# Patient Record
Sex: MALE | Race: WHITE | Employment: FULL TIME | URBAN - METROPOLITAN AREA
[De-identification: names, ages, dates, MRNs, and addresses within clinical notes are randomized per-mention and may not be internally consistent; named-entity substitution may affect disease eponyms.]

---

## 2017-04-13 ENCOUNTER — OFFICE VISIT (OUTPATIENT)
Dept: SURGERY | Age: 47
End: 2017-04-13

## 2017-04-13 VITALS
SYSTOLIC BLOOD PRESSURE: 129 MMHG | HEART RATE: 75 BPM | DIASTOLIC BLOOD PRESSURE: 76 MMHG | RESPIRATION RATE: 14 BRPM | TEMPERATURE: 98.4 F | BODY MASS INDEX: 36.54 KG/M2 | HEIGHT: 71 IN | OXYGEN SATURATION: 97 % | WEIGHT: 261 LBS

## 2017-04-13 DIAGNOSIS — R10.84 GENERALIZED ABDOMINAL PAIN: Primary | ICD-10-CM

## 2017-04-13 NOTE — PROGRESS NOTES
1. Have you been to the ER, urgent care clinic since your last visit? Hospitalized since your last visit?no    2. Have you seen or consulted any other health care providers outside of the [address] [place] [address] since your last visit? Include any pap smears or colon screening.  no

## 2017-04-13 NOTE — PROGRESS NOTES
Subjective:     Jay Gayle is a 55 y.o.  male who is being seen for possible incisional hernia recurrence. A few weeks ago he noticed a bulge in his abdomen the size of 'an alien head.'   It was painful. Pain was associated with constipation. He  nausea and vomiting. Bulge now comes and goes. It also moves around his abdomen. It was initially in the RLQ but has been in the left upper and lower quadrants as well.     Past Medical History:   Diagnosis Date    Gastrointestinal disorder     GERD    GERD (gastroesophageal reflux disease)     Panic attack     Psychiatric disorder     anxiety    Seizures ([facility] [address]     5 yo   was told it was a stress seizure, never had another one    Sleep apnea     uses CPAP    Unspecified adverse effect of anesthesia     woke up during endoscopy procedure      Past Surgical History:   Procedure Laterality Date    ABDOMEN SURGERY [address] [place] [address]    hernia(abdomen)- open    HX GI  2008    esophagus repair narrow- balloon dilation and scar tissue removal    HX HERNIA REPAIR      inguinal hernia    HX OTHER SURGICAL  11years of age    tonsils    HX OTHER SURGICAL  2009    hernia (hiatal and abdomen)-open    HX OTHER SURGICAL  23years of age    wisdom teeth extraction under anesthesia    HX OTHER SURGICAL  8 years     distended testicle     HX OTHER SURGICAL  1/16/15    Laparoscopic ventral hernia repair with mesh     Family History   Problem Relation Age of Onset    Diabetes Mother     Hypertension Mother     Stroke Mother      copd    Cancer Father      skin    Heart Disease Father     Diabetes Father     Heart Attack Father       Social History   Substance Use Topics    Smoking status: Never Smoker    Smokeless tobacco: Never Used    Alcohol use 1.0 oz/week     2 Cans of beer per week      Comment: 1 or 2 beer every two weeks       Current Outpatient Prescriptions   Medication Sig Dispense Refill    docusate sodium (COLACE) 100 mg capsule Take 100 mg by mouth two (2) times a day.  polyethylene glycol (MIRALAX) 17 gram/dose powder Take 17 g by mouth daily.  SERTRALINE HCL (ZOLOFT PO) Take 150 mg by mouth nightly.  LANSOPRAZOLE (PREVACID PO) Take 40 mg by mouth daily.  HYDROmorphone (DILAUDID) 2 mg tablet Take 1 tablet by mouth every six (6) hours as needed for Pain. 30 tablet 0    ondansetron (ZOFRAN ODT) 4 mg disintegrating tablet Take 1 tablet by mouth every eight (8) hours as needed for Nausea. 30 tablet 0    LORazepam (ATIVAN) 1 mg tablet Take 1 tablet by mouth every six (6) hours as needed for Anxiety. 30 tablet 0        Allergies   Allergen Reactions    Pcn [Penicillins] Hives    Codeine Nausea and Vomiting        Review of Systems:  A comprehensive review of systems was negative except for that written in the History of Present Illness. Objective:       Physical Exam:   Visit Vitals    /76 (BP 1 Location: Left arm, BP Patient Position: Sitting)    Pulse 75    Temp 98.4 °F (36.9 °C) (Oral)    Resp 14    Ht 5' 11\" (1.803 m)    Wt 261 lb (118.4 kg)    SpO2 97%    BMI 36.4 kg/m2     General appearance: alert, cooperative, no distress, appears stated age  Head: Normocephalic, without obvious abnormality, atraumatic  Eyes: conjunctivae/corneas clear. , EOM's intact. Abdomen: soft, non-tender. Bowel sounds normal. No masses,  no organomegaly, some diastasis in the epigastric region but no appreciable recurrent hernia  . Assessment:     Patient with an episode of abdominal pain and bulging concerning for a hernia recurrence.   I don't appreciate a hernia on exam.     Will get a CT to assess  Plan:     CT abdomen to assess for a recurrence     Signed By: Jc Zuniga MD     April 13, 2017

## 2017-04-20 ENCOUNTER — HOSPITAL ENCOUNTER (OUTPATIENT)
Dept: CT IMAGING | Age: 47
Discharge: HOME | End: 2017-04-20
Attending: SURGERY
Payer: COMMERCIAL

## 2017-04-20 DIAGNOSIS — R10.84 GENERALIZED ABDOMINAL PAIN: ICD-10-CM

## 2017-04-20 PROCEDURE — 74011636320 HC RX REV CODE- 636/320: Performed by: RADIOLOGY

## 2017-04-20 PROCEDURE — 74177 CT ABD & PELVIS W/CONTRAST: CPT

## 2017-04-20 RX ADMIN — IOPAMIDOL 100 ML: 755 INJECTION, SOLUTION INTRAVENOUS at 19:31

## 2019-12-30 ENCOUNTER — APPOINTMENT (OUTPATIENT)
Dept: CT IMAGING | Age: 49
End: 2019-12-30
Attending: PHYSICIAN ASSISTANT
Payer: COMMERCIAL

## 2019-12-30 ENCOUNTER — HOSPITAL ENCOUNTER (EMERGENCY)
Age: 49
Discharge: HOME | End: 2019-12-30
Attending: EMERGENCY MEDICINE
Payer: COMMERCIAL

## 2019-12-30 VITALS
WEIGHT: 250 LBS | SYSTOLIC BLOOD PRESSURE: 143 MMHG | DIASTOLIC BLOOD PRESSURE: 93 MMHG | RESPIRATION RATE: 18 BRPM | HEIGHT: 71 IN | OXYGEN SATURATION: 93 % | HEART RATE: 87 BPM | TEMPERATURE: 98.2 F | BODY MASS INDEX: 35 KG/M2

## 2019-12-30 DIAGNOSIS — K57.92 DIVERTICULITIS: Primary | ICD-10-CM

## 2019-12-30 LAB
ALBUMIN SERPL-MCNC: 4 G/DL (ref 3.5–5)
ALBUMIN/GLOB SERPL: 1 {RATIO} (ref 1.1–2.2)
ALP SERPL-CCNC: 85 U/L (ref 45–117)
ALT SERPL-CCNC: 47 U/L (ref 12–78)
ANION GAP SERPL CALC-SCNC: 5 MMOL/L (ref 5–15)
APPEARANCE UR: CLEAR
AST SERPL-CCNC: 18 U/L (ref 15–37)
BACTERIA URNS QL MICRO: NEGATIVE /HPF
BASOPHILS # BLD: 0.1 K/UL (ref 0–0.1)
BASOPHILS NFR BLD: 1 % (ref 0–1)
BILIRUB SERPL-MCNC: 0.7 MG/DL (ref 0.2–1)
BILIRUB UR QL: NEGATIVE
BUN SERPL-MCNC: 18 MG/DL (ref 6–20)
BUN/CREAT SERPL: 13 (ref 12–20)
CALCIUM SERPL-MCNC: 9.4 MG/DL (ref 8.5–10.1)
CHLORIDE SERPL-SCNC: 107 MMOL/L (ref 97–108)
CO2 SERPL-SCNC: 28 MMOL/L (ref 21–32)
COLOR UR: NORMAL
CREAT SERPL-MCNC: 1.38 MG/DL (ref 0.7–1.3)
DIFFERENTIAL METHOD BLD: NORMAL
EOSINOPHIL # BLD: 0.1 K/UL (ref 0–0.4)
EOSINOPHIL NFR BLD: 1 % (ref 0–7)
EPITH CASTS URNS QL MICRO: NORMAL /LPF
ERYTHROCYTE [DISTWIDTH] IN BLOOD BY AUTOMATED COUNT: 13.1 % (ref 11.5–14.5)
GLOBULIN SER CALC-MCNC: 4 G/DL (ref 2–4)
GLUCOSE SERPL-MCNC: 111 MG/DL (ref 65–100)
GLUCOSE UR STRIP.AUTO-MCNC: NEGATIVE MG/DL
HCT VFR BLD AUTO: 46.5 % (ref 36.6–50.3)
HGB BLD-MCNC: 16 G/DL (ref 12.1–17)
HGB UR QL STRIP: NEGATIVE
HYALINE CASTS URNS QL MICRO: NORMAL /LPF (ref 0–5)
IMM GRANULOCYTES # BLD AUTO: 0 K/UL (ref 0–0.04)
IMM GRANULOCYTES NFR BLD AUTO: 0 % (ref 0–0.5)
KETONES UR QL STRIP.AUTO: NEGATIVE MG/DL
LEUKOCYTE ESTERASE UR QL STRIP.AUTO: NEGATIVE
LYMPHOCYTES # BLD: 1.9 K/UL (ref 0.8–3.5)
LYMPHOCYTES NFR BLD: 19 % (ref 12–49)
MCH RBC QN AUTO: 30 PG (ref 26–34)
MCHC RBC AUTO-ENTMCNC: 34.4 G/DL (ref 30–36.5)
MCV RBC AUTO: 87.2 FL (ref 80–99)
MONOCYTES # BLD: 0.9 K/UL (ref 0–1)
MONOCYTES NFR BLD: 9 % (ref 5–13)
NEUTS SEG # BLD: 7.1 K/UL (ref 1.8–8)
NEUTS SEG NFR BLD: 70 % (ref 32–75)
NITRITE UR QL STRIP.AUTO: NEGATIVE
NRBC # BLD: 0 K/UL (ref 0–0.01)
NRBC BLD-RTO: 0 PER 100 WBC
PH UR STRIP: 5 [PH] (ref 5–8)
PLATELET # BLD AUTO: 215 K/UL (ref 150–400)
PMV BLD AUTO: 8.9 FL (ref 8.9–12.9)
POTASSIUM SERPL-SCNC: 3.7 MMOL/L (ref 3.5–5.1)
PROT SERPL-MCNC: 8 G/DL (ref 6.4–8.2)
PROT UR STRIP-MCNC: NEGATIVE MG/DL
RBC # BLD AUTO: 5.33 M/UL (ref 4.1–5.7)
RBC #/AREA URNS HPF: NORMAL /HPF (ref 0–5)
SODIUM SERPL-SCNC: 140 MMOL/L (ref 136–145)
SP GR UR REFRACTOMETRY: 1.01 (ref 1–1.03)
UROBILINOGEN UR QL STRIP.AUTO: 0.2 EU/DL (ref 0.2–1)
WBC # BLD AUTO: 10.2 K/UL (ref 4.1–11.1)
WBC URNS QL MICRO: NORMAL /HPF (ref 0–4)

## 2019-12-30 PROCEDURE — 99284 EMERGENCY DEPT VISIT MOD MDM: CPT

## 2019-12-30 PROCEDURE — 80053 COMPREHEN METABOLIC PANEL: CPT

## 2019-12-30 PROCEDURE — 96375 TX/PRO/DX INJ NEW DRUG ADDON: CPT

## 2019-12-30 PROCEDURE — 74177 CT ABD & PELVIS W/CONTRAST: CPT

## 2019-12-30 PROCEDURE — 85025 COMPLETE CBC W/AUTO DIFF WBC: CPT

## 2019-12-30 PROCEDURE — 81001 URINALYSIS AUTO W/SCOPE: CPT

## 2019-12-30 PROCEDURE — 96374 THER/PROPH/DIAG INJ IV PUSH: CPT

## 2019-12-30 PROCEDURE — 36415 COLL VENOUS BLD VENIPUNCTURE: CPT

## 2019-12-30 PROCEDURE — 74011636320 HC RX REV CODE- 636/320: Performed by: RADIOLOGY

## 2019-12-30 PROCEDURE — 74011250637 HC RX REV CODE- 250/637: Performed by: PHYSICIAN ASSISTANT

## 2019-12-30 PROCEDURE — 96361 HYDRATE IV INFUSION ADD-ON: CPT

## 2019-12-30 PROCEDURE — 74011250636 HC RX REV CODE- 250/636: Performed by: PHYSICIAN ASSISTANT

## 2019-12-30 RX ORDER — KETOROLAC TROMETHAMINE 30 MG/ML
30 INJECTION, SOLUTION INTRAMUSCULAR; INTRAVENOUS
Status: COMPLETED | OUTPATIENT
Start: 2019-12-30 | End: 2019-12-30

## 2019-12-30 RX ORDER — METRONIDAZOLE 250 MG/1
500 TABLET ORAL
Status: COMPLETED | OUTPATIENT
Start: 2019-12-30 | End: 2019-12-30

## 2019-12-30 RX ORDER — METRONIDAZOLE 500 MG/1
500 TABLET ORAL 2 TIMES DAILY
Qty: 14 TAB | Refills: 0 | Status: SHIPPED | OUTPATIENT
Start: 2019-12-30 | End: 2020-01-06

## 2019-12-30 RX ORDER — CIPROFLOXACIN 500 MG/1
750 TABLET ORAL
Status: DISCONTINUED | OUTPATIENT
Start: 2019-12-30 | End: 2019-12-30 | Stop reason: CLARIF

## 2019-12-30 RX ORDER — TRAMADOL HYDROCHLORIDE 50 MG/1
50 TABLET ORAL
Qty: 10 TAB | Refills: 0 | Status: SHIPPED | OUTPATIENT
Start: 2019-12-30 | End: 2020-01-02

## 2019-12-30 RX ORDER — MORPHINE SULFATE 4 MG/ML
4 INJECTION INTRAVENOUS
Status: COMPLETED | OUTPATIENT
Start: 2019-12-30 | End: 2019-12-30

## 2019-12-30 RX ORDER — CIPROFLOXACIN 500 MG/1
500 TABLET ORAL 2 TIMES DAILY
Qty: 14 TAB | Refills: 0 | Status: SHIPPED | OUTPATIENT
Start: 2019-12-30 | End: 2020-01-06

## 2019-12-30 RX ORDER — LEVOFLOXACIN 750 MG/1
750 TABLET ORAL
Status: COMPLETED | OUTPATIENT
Start: 2019-12-30 | End: 2019-12-30

## 2019-12-30 RX ADMIN — LEVOFLOXACIN 750 MG: 750 TABLET, FILM COATED ORAL at 20:28

## 2019-12-30 RX ADMIN — MORPHINE SULFATE 4 MG: 4 INJECTION INTRAVENOUS at 20:29

## 2019-12-30 RX ADMIN — SODIUM CHLORIDE 1000 ML: 900 INJECTION, SOLUTION INTRAVENOUS at 19:49

## 2019-12-30 RX ADMIN — METRONIDAZOLE 500 MG: 250 TABLET ORAL at 20:28

## 2019-12-30 RX ADMIN — KETOROLAC TROMETHAMINE 30 MG: 30 INJECTION, SOLUTION INTRAMUSCULAR at 19:49

## 2019-12-30 RX ADMIN — IOPAMIDOL 100 ML: 755 INJECTION, SOLUTION INTRAVENOUS at 17:37

## 2019-12-30 NOTE — ED TRIAGE NOTES
Patient arrives with c/o LLQ abdominal pain that now radiates to mid lower abdomen, onset yesterday with worsening.

## 2019-12-30 NOTE — ED PROVIDER NOTES
52 y.o male with past medical history significant for GERD, sleep apnea, anxiety, seizures, and hernia who presents via POV with c/c of abdominal pain. Pt c/o LLQ abdominal pain that now radiates to the middle of his abdomen jonnathan started last night and worsened today. He denies nausea, vomiting, diarrhea, and hematuria. He endorses normal urination, and normal LBM today. Social hx: denies tobacco use, endorses EtOH use (2 drinks per week), former marijuana user. Note written by John Nino, as dictated by Antoine Blackburn PA-C   4:40 PM      The history is provided by the patient. No  was used.         Past Medical History:   Diagnosis Date    Gastrointestinal disorder     GERD    GERD (gastroesophageal reflux disease)     Panic attack     Psychiatric disorder     anxiety    Seizures ([facility] [address]     5 yo   was told it was a stress seizure, never had another one    Sleep apnea     uses CPAP    Unspecified adverse effect of anesthesia     woke up during endoscopy procedure       Past Surgical History:   Procedure Laterality Date    ABDOM[address] SURGERY [address] [facility] [address]    hernia(abdomen)- open    HX GI  2008    esophagus repair narrow- balloon dilation and scar tissue removal    HX HERNIA REPAIR      inguinal hernia    HX OTHER SURGICAL  11years of age    tonsils    HX OTHER SURGICAL  2009    hernia (hiatal and abdomen)-open    HX OTHER SURGICAL  23years of age    wisdom teeth extraction under anesthesia    HX OTHER SURGICAL  8 years     distended testicle     HX OTHER SURGICAL  1/16/15    Laparoscopic ventral hernia repair with mesh         Family History:   Problem Relation Age of Onset    Diabetes Mother     Hypertension Mother     Stroke Mother         copd    Cancer Father         skin    Heart Disease Father     Diabetes Father     Heart Attack Father        Social History     Socioeconomic History    Marital status:      Spouse name: Not on file    Number of children: Not on file    Years of education: Not on file    Highest education level: Not on file   Occupational History    Not on file   Social Needs    Financial resource strain: Not on file    Food insecurity:     Worry: Not on file     Inability: Not on file    Transportation needs:     Medical: Not on file     Non-medical: Not on file   Tobacco Use    Smoking status: Never Smoker    Smokeless tobacco: Never Used   Substance and Sexual Activity    Alcohol use: Yes     Alcohol/week: 1.7 standard drinks     Types: 2 Cans of beer per week     Comment: 1 or 2 beer every two weeks    Drug use: Yes     Types: Marijuana     Comment: younger years/ in his twenities    Sexual activity: Yes     Partners: Female     Birth control/protection: None   Lifestyle    Physical activity:     Days per week: Not on file     Minutes per session: Not on file    Stress: Not on file   Relationships    Social connections:     Talks on phone: Not on file     Gets together: Not on file     Attends Bahai service: Not on file     Active member of club or organization: Not on file     Attends meetings of clubs or organizations: Not on file     Relationship status: Not on file    Intimate partner violence:     Fear of current or ex partner: Not on file     Emotionally abused: Not on file     Physically abused: Not on file     Forced sexual activity: Not on file   Other Topics Concern    Not on file   Social History Narrative    Not on file         ALLERGIES: Pcn [penicillins] and Codeine    Review of Systems   Constitutional: Negative for activity change, appetite change, chills and fever. HENT: Negative for congestion and sore throat. Respiratory: Negative for cough and shortness of breath. Cardiovascular: Negative for chest pain. Gastrointestinal: Positive for abdominal pain. Negative for blood in stool, diarrhea, nausea and vomiting.    Genitourinary: Negative for difficulty urinating, dysuria and hematuria. Musculoskeletal: Negative for arthralgias and myalgias. Skin: Negative for color change. Neurological: Negative for dizziness. Psychiatric/Behavioral: The patient is not nervous/anxious. All other systems reviewed and are negative. Vitals:    12/30/19 1636   BP: 147/85   Pulse: 87   Resp: 18   Temp: 98.2 °F (36.8 °C)   SpO2: 96%   Weight: 113.4 kg (250 lb)   Height: 5' 11\" (1.803 m)            Physical Exam  Vitals signs and nursing note reviewed. Constitutional:       Appearance: He is well-developed. HENT:      Head: Normocephalic and atraumatic. Right Ear: External ear normal.      Left Ear: External ear normal.      Mouth/Throat:      Pharynx: No oropharyngeal exudate. Eyes:      General: No scleral icterus. Right eye: No discharge. Left eye: No discharge. Conjunctiva/sclera: Conjunctivae normal.      Pupils: Pupils are equal, round, and reactive to light. Neck:      Musculoskeletal: Normal range of motion and neck supple. Thyroid: No thyromegaly. Trachea: No tracheal deviation. Cardiovascular:      Rate and Rhythm: Normal rate and regular rhythm. Heart sounds: Normal heart sounds. No murmur. Pulmonary:      Effort: Pulmonary effort is normal. No respiratory distress. Breath sounds: Normal breath sounds. No wheezing or rales. Abdominal:      General: Bowel sounds are normal. There is no distension. Palpations: Abdomen is soft. Tenderness: There is tenderness in the left lower quadrant. There is no guarding or rebound. Musculoskeletal: Normal range of motion. General: No tenderness. Lymphadenopathy:      Cervical: No cervical adenopathy. Skin:     General: Skin is warm. Findings: No erythema or rash. Neurological:      Mental Status: He is alert and oriented to person, place, and time. Cranial Nerves: No cranial nerve deficit.       Coordination: Coordination normal.   Psychiatric: Behavior: Behavior normal.         Thought Content: Thought content normal.         Judgment: Judgment normal.          MDM  Number of Diagnoses or Management Options  Diverticulitis:   Diagnosis management comments: Assesment/Plan- 52 y.o. Patient presents with:  Abdominal Pain  differential includes: diverticulitis, colitis, obstruction, constipation. Labs and imaging reviewed with ct showing diverticulitis. Patient is well appearing, afebrile and tolerating PO. Patient discharged with antibiotics. Recommend GI, PCP follow up. Patient educated on reasons to return to the ED.            Procedures

## 2019-12-31 NOTE — DISCHARGE INSTRUCTIONS
Patient Education        Diverticulitis: Care Instructions  Your Care Instructions    Diverticulitis occurs when pouches form in the wall of the colon and become inflamed or infected. It can be very painful. Doctors aren't sure what causes diverticulitis. There is no proof that foods such as nuts, seeds, or berries cause it or make it worse. A low-fiber diet may cause the colon to work harder to push stool forward. Pouches may form because of this extra work. It may be hard to think about healthy eating while you're in pain. But as you recover, you might think about how you can use healthy eating for overall better health. Healthy eating may help you avoid future attacks. Follow-up care is a key part of your treatment and safety. Be sure to make and go to all appointments, and call your doctor if you are having problems. It's also a good idea to know your test results and keep a list of the medicines you take. How can you care for yourself at home? · Drink plenty of fluids, enough so that your urine is light yellow or clear like water. If you have kidney, heart, or liver disease and have to limit fluids, talk with your doctor before you increase the amount of fluids you drink. · Stick to liquids or a bland diet (plain rice, bananas, dry toast or crackers, applesauce) until you are feeling better. Then you can return to regular foods and gradually increase the amount of fiber in your diet. · Use a heating pad set on low on your belly to relieve mild cramps and pain. · Get extra rest until you are feeling better. · Be safe with medicines. Read and follow all instructions on the label. ? If the doctor gave you a prescription medicine for pain, take it as prescribed. ? If you are not taking a prescription pain medicine, ask your doctor if you can take an over-the-counter medicine. · If your doctor prescribed antibiotics, take them as directed. Do not stop taking them just because you feel better.  You need to take the full course of antibiotics. To prevent future attacks of diverticulitis  · Avoid constipation:  ? Include fruits, vegetables, beans, and whole grains in your diet each day. These foods are high in fiber. ? Drink plenty of fluids, enough so that your urine is light yellow or clear like water. If you have kidney, heart, or liver disease and have to limit fluids, talk with your doctor before you increase the amount of fluids you drink. ? Get some exercise every day. Build up slowly to 30 to 60 minutes a day on 5 or more days of the week. ? Take a fiber supplement, such as Citrucel or Metamucil, every day if needed. Read and follow all instructions on the label. ? Schedule time each day for a bowel movement. Having a daily routine may help. Take your time and do not strain when having a bowel movement. When should you call for help? Call your doctor now or seek immediate medical care if:    · You have a fever.     · You are vomiting.     · You have new or worse belly pain.     · You cannot pass stools or gas.    Watch closely for changes in your health, and be sure to contact your doctor if you have any problems. Where can you learn more? Go to http://aman-nicolasa.info/. Enter H901 in the search box to learn more about \"Diverticulitis: Care Instructions. \"  Current as of: November 7, 2018  Content Version: 12.2  © 5680-3421 [employer]. Care instructions adapted under license by [employer] (which disclaims liability or warranty for this information). If you have questions about a medical condition or this instruction, always ask your healthcare professional. [place] [address] any warranty or liability for your use of this information.

## 2020-01-22 ENCOUNTER — APPOINTMENT (OUTPATIENT)
Dept: GENERAL RADIOLOGY | Age: 50
End: 2020-01-22
Attending: EMERGENCY MEDICINE
Payer: COMMERCIAL

## 2020-01-22 ENCOUNTER — HOSPITAL ENCOUNTER (OUTPATIENT)
Age: 50
Setting detail: OBSERVATION
Discharge: HOME | End: 2020-01-24
Attending: EMERGENCY MEDICINE | Admitting: INTERNAL MEDICINE
Payer: COMMERCIAL

## 2020-01-22 ENCOUNTER — APPOINTMENT (OUTPATIENT)
Dept: CT IMAGING | Age: 50
End: 2020-01-22
Attending: EMERGENCY MEDICINE
Payer: COMMERCIAL

## 2020-01-22 DIAGNOSIS — S09.90XA INJURY OF HEAD, INITIAL ENCOUNTER: Primary | ICD-10-CM

## 2020-01-22 DIAGNOSIS — R55 SYNCOPE AND COLLAPSE: ICD-10-CM

## 2020-01-22 DIAGNOSIS — R55 VASOVAGAL SYNCOPE: ICD-10-CM

## 2020-01-22 DIAGNOSIS — F41.0 PANIC ANXIETY SYNDROME: ICD-10-CM

## 2020-01-22 LAB
COMMENT, HOLDF: NORMAL
COMMENT, HOLDF: NORMAL
SAMPLES BEING HELD,HOLD: NORMAL
SAMPLES BEING HELD,HOLD: NORMAL

## 2020-01-22 PROCEDURE — 96374 THER/PROPH/DIAG INJ IV PUSH: CPT

## 2020-01-22 PROCEDURE — 85025 COMPLETE CBC W/AUTO DIFF WBC: CPT

## 2020-01-22 PROCEDURE — 82550 ASSAY OF CK (CPK): CPT

## 2020-01-22 PROCEDURE — 85730 THROMBOPLASTIN TIME PARTIAL: CPT

## 2020-01-22 PROCEDURE — 94762 N-INVAS EAR/PLS OXIMTRY CONT: CPT

## 2020-01-22 PROCEDURE — 36415 COLL VENOUS BLD VENIPUNCTURE: CPT

## 2020-01-22 PROCEDURE — 99285 EMERGENCY DEPT VISIT HI MDM: CPT

## 2020-01-22 PROCEDURE — 71045 X-RAY EXAM CHEST 1 VIEW: CPT

## 2020-01-22 PROCEDURE — 70450 CT HEAD/BRAIN W/O DYE: CPT

## 2020-01-22 PROCEDURE — 85610 PROTHROMBIN TIME: CPT

## 2020-01-22 PROCEDURE — 85379 FIBRIN DEGRADATION QUANT: CPT

## 2020-01-22 PROCEDURE — 93005 ELECTROCARDIOGRAM TRACING: CPT

## 2020-01-22 PROCEDURE — 84484 ASSAY OF TROPONIN QUANT: CPT

## 2020-01-22 PROCEDURE — 80053 COMPREHEN METABOLIC PANEL: CPT

## 2020-01-23 ENCOUNTER — APPOINTMENT (OUTPATIENT)
Dept: MRI IMAGING | Age: 50
End: 2020-01-23
Attending: INTERNAL MEDICINE
Payer: COMMERCIAL

## 2020-01-23 ENCOUNTER — APPOINTMENT (OUTPATIENT)
Dept: CT IMAGING | Age: 50
End: 2020-01-23
Attending: INTERNAL MEDICINE
Payer: COMMERCIAL

## 2020-01-23 ENCOUNTER — APPOINTMENT (OUTPATIENT)
Dept: VASCULAR SURGERY | Age: 50
End: 2020-01-23
Attending: INTERNAL MEDICINE
Payer: COMMERCIAL

## 2020-01-23 ENCOUNTER — APPOINTMENT (OUTPATIENT)
Dept: NON INVASIVE DIAGNOSTICS | Age: 50
End: 2020-01-23
Attending: INTERNAL MEDICINE
Payer: COMMERCIAL

## 2020-01-23 PROBLEM — F41.0 PANIC ATTACK: Status: ACTIVE | Noted: 2020-01-23

## 2020-01-23 PROBLEM — E66.01 MORBID OBESITY (HCC): Status: ACTIVE | Noted: 2020-01-23

## 2020-01-23 PROBLEM — I10 HTN (HYPERTENSION): Status: ACTIVE | Noted: 2020-01-23

## 2020-01-23 PROBLEM — R55 SYNCOPE: Status: ACTIVE | Noted: 2020-01-23

## 2020-01-23 PROBLEM — R56.9 SEIZURES (HCC): Status: ACTIVE | Noted: 2020-01-23

## 2020-01-23 PROBLEM — F99 PSYCHIATRIC DISORDER: Status: ACTIVE | Noted: 2020-01-23

## 2020-01-23 PROBLEM — G47.30 SLEEP APNEA: Status: ACTIVE | Noted: 2020-01-23

## 2020-01-23 LAB
ALBUMIN SERPL-MCNC: 3.9 G/DL (ref 3.5–5)
ALBUMIN/GLOB SERPL: 1.1 {RATIO} (ref 1.1–2.2)
ALP SERPL-CCNC: 75 U/L (ref 45–117)
ALT SERPL-CCNC: 43 U/L (ref 12–78)
ANION GAP SERPL CALC-SCNC: 6 MMOL/L (ref 5–15)
APPEARANCE UR: CLEAR
APTT PPP: 25.2 SEC (ref 22.1–32)
AST SERPL-CCNC: 29 U/L (ref 15–37)
ATRIAL RATE: 79 BPM
BACTERIA URNS QL MICRO: NEGATIVE /HPF
BASOPHILS # BLD: 0.1 K/UL (ref 0–0.1)
BASOPHILS NFR BLD: 1 % (ref 0–1)
BILIRUB SERPL-MCNC: 0.5 MG/DL (ref 0.2–1)
BILIRUB UR QL: NEGATIVE
BUN SERPL-MCNC: 17 MG/DL (ref 6–20)
BUN/CREAT SERPL: 13 (ref 12–20)
CALCIUM SERPL-MCNC: 9 MG/DL (ref 8.5–10.1)
CALCULATED P AXIS, ECG09: 32 DEGREES
CALCULATED R AXIS, ECG10: 96 DEGREES
CALCULATED T AXIS, ECG11: 20 DEGREES
CHLORIDE SERPL-SCNC: 107 MMOL/L (ref 97–108)
CK MB CFR SERPL CALC: 0.8 % (ref 0–2.5)
CK MB SERPL-MCNC: 2.1 NG/ML (ref 5–25)
CK SERPL-CCNC: 263 U/L (ref 39–308)
CK SERPL-CCNC: 286 U/L (ref 39–308)
CO2 SERPL-SCNC: 26 MMOL/L (ref 21–32)
COLOR UR: NORMAL
CREAT SERPL-MCNC: 1.35 MG/DL (ref 0.7–1.3)
D DIMER PPP FEU-MCNC: 0.49 MG/L FEU (ref 0–0.65)
DIAGNOSIS, 93000: NORMAL
DIFFERENTIAL METHOD BLD: NORMAL
ECHO AO ASC DIAM: 3.12 CM
ECHO AO ROOT DIAM: 3.43 CM
ECHO LA AREA 4C: 17 CM2
ECHO LA MAJOR AXIS: 4.2 CM
ECHO LA TO AORTIC ROOT RATIO: 1.22
ECHO LA VOL 2C: 33.19 ML (ref 18–58)
ECHO LA VOL 4C: 41.51 ML (ref 18–58)
ECHO LA VOL BP: 38 ML (ref 18–58)
ECHO LA VOL/BSA BIPLANE: 16.21 ML/M2 (ref 16–28)
ECHO LA VOLUME INDEX A2C: 14.16 ML/M2 (ref 16–28)
ECHO LA VOLUME INDEX A4C: 17.7 ML/M2 (ref 16–28)
ECHO LV E' LATERAL VELOCITY: 9.91 CENTIMETER/SECOND
ECHO LV E' SEPTAL VELOCITY: 8.89 CENTIMETER/SECOND
ECHO LV EDV TEICHHOLZ: 0.72 ML
ECHO LV ESV TEICHHOLZ: 0.35 ML
ECHO LV INTERNAL DIMENSION DIASTOLIC: 5.2 CM (ref 4.2–5.9)
ECHO LV INTERNAL DIMENSION SYSTOLIC: 3.84 CM
ECHO LV IVSD: 0.96 CM (ref 0.6–1)
ECHO LV MASS 2D: 226.5 G (ref 88–224)
ECHO LV MASS INDEX 2D: 96.6 G/M2 (ref 49–115)
ECHO LV POSTERIOR WALL DIASTOLIC: 1.03 CM (ref 0.6–1)
ECHO LVOT DIAM: 2.26 CM
ECHO MV A VELOCITY: 65.61 CM/S
ECHO MV AREA PHT: 3.7 CM2
ECHO MV E DECELERATION TIME (DT): 206.4 MS
ECHO MV E VELOCITY: 76.62 CM/S
ECHO MV E/A RATIO: 1.17
ECHO MV PRESSURE HALF TIME (PHT): 59.9 MS
ECHO PV MAX VELOCITY: 115.02 CM/S
ECHO PV PEAK GRADIENT: 5.3 MMHG
ECHO RV INTERNAL DIMENSION: 4.35 CM
ECHO RV TAPSE: 2.57 CM (ref 1.5–2)
ECHO TV REGURGITANT MAX VELOCITY: 221.62 CM/S
ECHO TV REGURGITANT PEAK GRADIENT: 19.6 MMHG
EOSINOPHIL # BLD: 0.2 K/UL (ref 0–0.4)
EOSINOPHIL NFR BLD: 2 % (ref 0–7)
EPITH CASTS URNS QL MICRO: NORMAL /LPF
ERYTHROCYTE [DISTWIDTH] IN BLOOD BY AUTOMATED COUNT: 12.9 % (ref 11.5–14.5)
GLOBULIN SER CALC-MCNC: 3.4 G/DL (ref 2–4)
GLUCOSE SERPL-MCNC: 133 MG/DL (ref 65–100)
GLUCOSE UR STRIP.AUTO-MCNC: NEGATIVE MG/DL
HCT VFR BLD AUTO: 41.9 % (ref 36.6–50.3)
HGB BLD-MCNC: 14.4 G/DL (ref 12.1–17)
HGB UR QL STRIP: NEGATIVE
HYALINE CASTS URNS QL MICRO: NORMAL /LPF (ref 0–5)
IMM GRANULOCYTES # BLD AUTO: 0 K/UL (ref 0–0.04)
IMM GRANULOCYTES NFR BLD AUTO: 0 % (ref 0–0.5)
INR PPP: 1 (ref 0.9–1.1)
KETONES UR QL STRIP.AUTO: NEGATIVE MG/DL
LEUKOCYTE ESTERASE UR QL STRIP.AUTO: NEGATIVE
LVFS 2D: 26.26 %
LVSV (TEICH): 27.3 ML
LYMPHOCYTES # BLD: 2.4 K/UL (ref 0.8–3.5)
LYMPHOCYTES NFR BLD: 30 % (ref 12–49)
MCH RBC QN AUTO: 29.5 PG (ref 26–34)
MCHC RBC AUTO-ENTMCNC: 34.4 G/DL (ref 30–36.5)
MCV RBC AUTO: 85.9 FL (ref 80–99)
MONOCYTES # BLD: 0.8 K/UL (ref 0–1)
MONOCYTES NFR BLD: 10 % (ref 5–13)
MV DEC SLOPE: 3.71
NEUTS SEG # BLD: 4.6 K/UL (ref 1.8–8)
NEUTS SEG NFR BLD: 57 % (ref 32–75)
NITRITE UR QL STRIP.AUTO: NEGATIVE
NRBC # BLD: 0 K/UL (ref 0–0.01)
NRBC BLD-RTO: 0 PER 100 WBC
P-R INTERVAL, ECG05: 156 MS
PH UR STRIP: 7 [PH] (ref 5–8)
PLATELET # BLD AUTO: 212 K/UL (ref 150–400)
PMV BLD AUTO: 9.2 FL (ref 8.9–12.9)
POTASSIUM SERPL-SCNC: 3.6 MMOL/L (ref 3.5–5.1)
PROT SERPL-MCNC: 7.3 G/DL (ref 6.4–8.2)
PROT UR STRIP-MCNC: NEGATIVE MG/DL
PROTHROMBIN TIME: 9.8 SEC (ref 9–11.1)
PV END DIASTOLIC VELOCITY: 0.6 MMHG
Q-T INTERVAL, ECG07: 390 MS
QRS DURATION, ECG06: 102 MS
QTC CALCULATION (BEZET), ECG08: 447 MS
RBC # BLD AUTO: 4.88 M/UL (ref 4.1–5.7)
RBC #/AREA URNS HPF: NORMAL /HPF (ref 0–5)
SODIUM SERPL-SCNC: 139 MMOL/L (ref 136–145)
SP GR UR REFRACTOMETRY: 1.01 (ref 1–1.03)
THERAPEUTIC RANGE,PTTT: NORMAL SECS (ref 58–77)
TROPONIN I SERPL-MCNC: <0.05 NG/ML
TROPONIN I SERPL-MCNC: <0.05 NG/ML
UA: UC IF INDICATED,UAUC: NORMAL
UROBILINOGEN UR QL STRIP.AUTO: 1 EU/DL (ref 0.2–1)
VENTRICULAR RATE, ECG03: 79 BPM
WBC # BLD AUTO: 8 K/UL (ref 4.1–11.1)
WBC URNS QL MICRO: NORMAL /HPF (ref 0–4)

## 2020-01-23 PROCEDURE — 74011250637 HC RX REV CODE- 250/637: Performed by: INTERNAL MEDICINE

## 2020-01-23 PROCEDURE — 96372 THER/PROPH/DIAG INJ SC/IM: CPT

## 2020-01-23 PROCEDURE — 97535 SELF CARE MNGMENT TRAINING: CPT

## 2020-01-23 PROCEDURE — 97116 GAIT TRAINING THERAPY: CPT

## 2020-01-23 PROCEDURE — 74011250636 HC RX REV CODE- 250/636: Performed by: EMERGENCY MEDICINE

## 2020-01-23 PROCEDURE — 77030021668 HC NEB PREFIL KT VYRM -A

## 2020-01-23 PROCEDURE — 71275 CT ANGIOGRAPHY CHEST: CPT

## 2020-01-23 PROCEDURE — 70544 MR ANGIOGRAPHY HEAD W/O DYE: CPT

## 2020-01-23 PROCEDURE — 95816 EEG AWAKE AND DROWSY: CPT | Performed by: NURSE PRACTITIONER

## 2020-01-23 PROCEDURE — 99218 HC RM OBSERVATION: CPT

## 2020-01-23 PROCEDURE — 74011250636 HC RX REV CODE- 250/636: Performed by: INTERNAL MEDICINE

## 2020-01-23 PROCEDURE — 93880 EXTRACRANIAL BILAT STUDY: CPT

## 2020-01-23 PROCEDURE — 70551 MRI BRAIN STEM W/O DYE: CPT

## 2020-01-23 PROCEDURE — 77030018846 HC SOL IRR STRL H20 ICUM -A

## 2020-01-23 PROCEDURE — 82550 ASSAY OF CK (CPK): CPT

## 2020-01-23 PROCEDURE — 36415 COLL VENOUS BLD VENIPUNCTURE: CPT

## 2020-01-23 PROCEDURE — 97165 OT EVAL LOW COMPLEX 30 MIN: CPT

## 2020-01-23 PROCEDURE — 96375 TX/PRO/DX INJ NEW DRUG ADDON: CPT

## 2020-01-23 PROCEDURE — 94660 CPAP INITIATION&MGMT: CPT

## 2020-01-23 PROCEDURE — 81001 URINALYSIS AUTO W/SCOPE: CPT

## 2020-01-23 PROCEDURE — 84484 ASSAY OF TROPONIN QUANT: CPT

## 2020-01-23 PROCEDURE — 96374 THER/PROPH/DIAG INJ IV PUSH: CPT

## 2020-01-23 PROCEDURE — 74011250637 HC RX REV CODE- 250/637: Performed by: EMERGENCY MEDICINE

## 2020-01-23 PROCEDURE — 97161 PT EVAL LOW COMPLEX 20 MIN: CPT

## 2020-01-23 PROCEDURE — 74011636320 HC RX REV CODE- 636/320: Performed by: RADIOLOGY

## 2020-01-23 RX ORDER — SERTRALINE HYDROCHLORIDE 50 MG/1
100 TABLET, FILM COATED ORAL DAILY
Status: DISCONTINUED | OUTPATIENT
Start: 2020-01-23 | End: 2020-01-24 | Stop reason: HOSPADM

## 2020-01-23 RX ORDER — NALOXONE HYDROCHLORIDE 0.4 MG/ML
0.4 INJECTION, SOLUTION INTRAMUSCULAR; INTRAVENOUS; SUBCUTANEOUS AS NEEDED
Status: DISCONTINUED | OUTPATIENT
Start: 2020-01-23 | End: 2020-01-24 | Stop reason: HOSPADM

## 2020-01-23 RX ORDER — GUAIFENESIN 100 MG/5ML
81 LIQUID (ML) ORAL DAILY
Status: DISCONTINUED | OUTPATIENT
Start: 2020-01-23 | End: 2020-01-24 | Stop reason: HOSPADM

## 2020-01-23 RX ORDER — OMEPRAZOLE 40 MG/1
40 CAPSULE, DELAYED RELEASE ORAL EVERY MORNING
COMMUNITY

## 2020-01-23 RX ORDER — PANTOPRAZOLE SODIUM 40 MG/1
40 TABLET, DELAYED RELEASE ORAL
Status: DISCONTINUED | OUTPATIENT
Start: 2020-01-23 | End: 2020-01-24 | Stop reason: HOSPADM

## 2020-01-23 RX ORDER — SODIUM CHLORIDE 0.9 % (FLUSH) 0.9 %
5-40 SYRINGE (ML) INJECTION EVERY 8 HOURS
Status: DISCONTINUED | OUTPATIENT
Start: 2020-01-23 | End: 2020-01-24 | Stop reason: HOSPADM

## 2020-01-23 RX ORDER — KETOROLAC TROMETHAMINE 30 MG/ML
30 INJECTION, SOLUTION INTRAMUSCULAR; INTRAVENOUS
Status: COMPLETED | OUTPATIENT
Start: 2020-01-23 | End: 2020-01-23

## 2020-01-23 RX ORDER — SIMVASTATIN 20 MG/1
40 TABLET, FILM COATED ORAL DAILY
Status: DISCONTINUED | OUTPATIENT
Start: 2020-01-23 | End: 2020-01-24 | Stop reason: HOSPADM

## 2020-01-23 RX ORDER — GUAIFENESIN 100 MG/5ML
162 LIQUID (ML) ORAL
Status: DISCONTINUED | OUTPATIENT
Start: 2020-01-23 | End: 2020-01-23

## 2020-01-23 RX ORDER — SIMVASTATIN 40 MG/1
40 TABLET, FILM COATED ORAL EVERY EVENING
COMMUNITY

## 2020-01-23 RX ORDER — BUPROPION HYDROCHLORIDE 150 MG/1
150 TABLET ORAL
COMMUNITY
End: 2020-11-04

## 2020-01-23 RX ORDER — LISINOPRIL 40 MG/1
40 TABLET ORAL EVERY EVENING
COMMUNITY

## 2020-01-23 RX ORDER — ACETAMINOPHEN 500 MG
1000 TABLET ORAL
Status: COMPLETED | OUTPATIENT
Start: 2020-01-23 | End: 2020-01-23

## 2020-01-23 RX ORDER — LISINOPRIL 20 MG/1
20 TABLET ORAL DAILY
Status: DISCONTINUED | OUTPATIENT
Start: 2020-01-23 | End: 2020-01-24 | Stop reason: HOSPADM

## 2020-01-23 RX ORDER — SODIUM CHLORIDE 0.9 % (FLUSH) 0.9 %
5-40 SYRINGE (ML) INJECTION AS NEEDED
Status: DISCONTINUED | OUTPATIENT
Start: 2020-01-23 | End: 2020-01-24 | Stop reason: HOSPADM

## 2020-01-23 RX ORDER — SERTRALINE HCL 100 MG
100 TABLET ORAL DAILY
COMMUNITY
End: 2020-11-04

## 2020-01-23 RX ORDER — GUAIFENESIN 100 MG/5ML
81 LIQUID (ML) ORAL DAILY
Status: DISCONTINUED | OUTPATIENT
Start: 2020-01-23 | End: 2020-01-23 | Stop reason: SDUPTHER

## 2020-01-23 RX ORDER — ENOXAPARIN SODIUM 100 MG/ML
40 INJECTION SUBCUTANEOUS EVERY 24 HOURS
Status: DISCONTINUED | OUTPATIENT
Start: 2020-01-23 | End: 2020-01-24 | Stop reason: HOSPADM

## 2020-01-23 RX ORDER — HYDROMORPHONE HYDROCHLORIDE 1 MG/ML
0.5 INJECTION, SOLUTION INTRAMUSCULAR; INTRAVENOUS; SUBCUTANEOUS
Status: ACTIVE | OUTPATIENT
Start: 2020-01-23 | End: 2020-01-23

## 2020-01-23 RX ORDER — ACETAMINOPHEN 325 MG/1
650 TABLET ORAL
Status: DISCONTINUED | OUTPATIENT
Start: 2020-01-23 | End: 2020-01-24 | Stop reason: HOSPADM

## 2020-01-23 RX ADMIN — IOPAMIDOL 100 ML: 755 INJECTION, SOLUTION INTRAVENOUS at 13:01

## 2020-01-23 RX ADMIN — ACETAMINOPHEN 1000 MG: 500 TABLET ORAL at 01:29

## 2020-01-23 RX ADMIN — SERTRALINE 100 MG: 50 TABLET, FILM COATED ORAL at 08:46

## 2020-01-23 RX ADMIN — ASPIRIN 81 MG 81 MG: 81 TABLET ORAL at 08:45

## 2020-01-23 RX ADMIN — PERFLUTREN 2 ML: 6.52 INJECTION, SUSPENSION INTRAVENOUS at 13:41

## 2020-01-23 RX ADMIN — LISINOPRIL 20 MG: 20 TABLET ORAL at 08:45

## 2020-01-23 RX ADMIN — ENOXAPARIN SODIUM 40 MG: 40 INJECTION SUBCUTANEOUS at 08:45

## 2020-01-23 RX ADMIN — Medication 10 ML: at 06:25

## 2020-01-23 RX ADMIN — KETOROLAC TROMETHAMINE 30 MG: 30 INJECTION, SOLUTION INTRAMUSCULAR; INTRAVENOUS at 01:30

## 2020-01-23 RX ADMIN — PANTOPRAZOLE SODIUM 40 MG: 40 TABLET, DELAYED RELEASE ORAL at 08:46

## 2020-01-23 RX ADMIN — Medication 10 ML: at 21:20

## 2020-01-23 RX ADMIN — SIMVASTATIN 40 MG: 20 TABLET, FILM COATED ORAL at 08:46

## 2020-01-23 NOTE — CONSULTS
Name: Cheryl Faulkner: [address]   : 1970 Admit Date: 2020   Phone: [phone]  Room: [place] [address]   PCP: David Brewer MD  MRN: [id number]   Date: 2020  Code: Full Code          Chart and notes reviewed. Data reviewed. I review the patient's current medications in the medical record at each encounter. I have evaluated and examined the patient. HPI:    2:22 PM       History was obtained from patient. I was asked by Jack Kaur MD to see Lennox [place] in consultation for a chief complaint of acute respiratory failure with hypoxia. History of Present Illness:  Mr. Elena Mercado is a 51 yo gentleman with a history of REBECCA (compliant with CPAP), GERD, and anxiety who presented after a syncopal episode. Yesterday he had an argument with his son and following this had a HA and hyperventilation with associated R hand numbness, facial numbness and blurred vision. He believes he passed out. Denied any notable facial droop or changes in speech. No focal weakness. Today he was noted to be hypoxic to the mid 80's, did not improve with 2L and ultimately required 5L to increase sats to 90% or greater. Did wear CPAP overnight. Has not been evaluated by a sleep physician since being started on this. Denies LE edema/swelling/pain. Does note some shortness of breath over the past several days.      Labs: WBC 8.0, cr 1.35, troponin <0.05,     Images:  CT head: no acute process  CXR : enlarged cardiac silhouette, no acute pulmonary process  Carotid dopplers: 0-49% stenosis bilaterally    CTA ordered, TTE with bubble being done at the bedside      Past Medical History:   Diagnosis Date    Gastrointestinal disorder     GERD    GERD (gastroesophageal reflux disease)     Panic attack     Psychiatric disorder     anxiety    Seizures ([facility] [address]     7 yo   was told it was a stress seizure, never had another one    Sleep apnea     uses CPAP    Unspecified adverse effect of anesthesia     woke up during endoscopy procedure       Past Surgical History:   Procedure Laterality Date    ABDOMEN SURGERY PROC UNLISTED  1977    hernia(abdomen)- open    HX GI  2008    esophagus repair narrow- balloon dilation and scar tissue removal    HX HERNIA REPAIR      inguinal hernia    HX OTHER SURGICAL  11years of age    tonsils    HX OTHER SURGICAL  2009    hernia (hiatal and abdomen)-open    HX OTHER SURGICAL  23years of age    wisdom teeth extraction under anesthesia    HX OTHER SURGICAL  8 years     distended testicle     HX OTHER SURGICAL  1/16/15    Laparoscopic ventral hernia repair with mesh       Family History   Problem Relation Age of Onset    Diabetes Mother     Hypertension Mother     Stroke Mother         copd    Cancer Father         skin    Heart Disease Father     Diabetes Father     Heart Attack Father        Social History     Tobacco Use    Smoking status: Never Smoker    Smokeless tobacco: Never Used   Substance Use Topics    Alcohol use:  Yes     Alcohol/week: 1.7 standard drinks     Types: 2 Cans of beer per week     Comment: 1 or 2 beer every two weeks       Allergies   Allergen Reactions    Pcn [Penicillins] Hives    Codeine Nausea and Vomiting       Current Facility-Administered Medications   Medication Dose Route Frequency    sodium chloride (NS) flush 5-40 mL  5-40 mL IntraVENous Q8H    sodium chloride (NS) flush 5-40 mL  5-40 mL IntraVENous PRN    acetaminophen (TYLENOL) tablet 650 mg  650 mg Oral Q6H PRN    naloxone (NARCAN) injection 0.4 mg  0.4 mg IntraVENous PRN    sertraline (ZOLOFT) tablet 100 mg  100 mg Oral DAILY    pantoprazole (PROTONIX) tablet 40 mg  40 mg Oral ACB    aspirin chewable tablet 81 mg  81 mg Oral DAILY    enoxaparin (LOVENOX) injection 40 mg  40 mg SubCUTAneous Q24H    HYDROmorphone (DILAUDID) syringe 0.5 mg  0.5 mg IntraVENous NOW    lisinopril (PRINIVIL, ZESTRIL) tablet 20 mg  20 mg Oral DAILY    simvastatin (ZOCOR) tablet 40 mg  40 mg Oral DAILY    iopamidoL (ISOVUE-370) 76 % injection 100 mL  100 mL IntraVENous RAD ONCE    iopamidoL (ISOVUE-370) 76 % injection         Current Outpatient Medications   Medication Sig    sertraline (ZOLOFT) 100 mg tablet Take 100 mg by mouth daily.  lisinopril (PRINIVIL, ZESTRIL) 40 mg tablet Take 40 mg by mouth every evening.  simvastatin (ZOCOR) 40 mg tablet Take 40 mg by mouth nightly.  buPROPion XL (WELLBUTRIN XL) 150 mg tablet Take 150 mg by mouth every morning.  omeprazole (PRILOSEC) 40 mg capsule Take 40 mg by mouth daily. REVIEW OF SYSTEMS   12 point ROS negative except as stated in the HPI. Physical Exam:   Visit Vitals  /77   Pulse 66   Temp 98.7 °F (37.1 °C)   Resp 16   Ht 5' 11\" (1.803 m)   Wt 116.4 kg (256 lb 9.9 oz)   SpO2 94%   BMI 35.79 kg/m²       General:  Alert, cooperative, no distress, appears stated age. Head:  Normocephalic, without obvious abnormality, atraumatic. Eyes:  Conjunctivae/corneas clear. Nose: Nares normal. Septum midline. Mucosa normal.    Throat: Lips, mucosa, and tongue normal.    Neck: Supple, symmetrical, trachea midline, no adenopathy. Lungs:   Clear to auscultation bilaterally. Chest wall:  No tenderness or deformity. Heart:  Regular rate and rhythm, S1, S2 normal, no murmur, click, rub or gallop. Abdomen:   Soft, non-tender. Bowel sounds normal. .   Extremities: Extremities normal, atraumatic, no cyanosis or edema. Pulses: 2+ and symmetric all extremities.    Skin: Skin color, texture, turgor normal. No rashes or lesions   Lymph nodes: Cervical, supraclavicular nodes normal.   Neurologic: Grossly nonfocal       Lab Results   Component Value Date/Time    Sodium 139 01/22/2020 11:48 PM    Potassium 3.6 01/22/2020 11:48 PM    Chloride 107 01/22/2020 11:48 PM    CO2 26 01/22/2020 11:48 PM    BUN 17 01/22/2020 11:48 PM    Creatinine 1.35 (H) 01/22/2020 11:48 PM    Glucose 133 (H) 01/22/2020 11:48 PM Calcium 9.0 01/22/2020 11:48 PM       Lab Results   Component Value Date/Time    WBC 8.0 01/22/2020 11:48 PM    HGB 14.4 01/22/2020 11:48 PM    PLATELET 587 33/00/6676 11:48 PM    MCV 85.9 01/22/2020 11:48 PM       Lab Results   Component Value Date/Time    INR 1.0 01/22/2020 11:48 PM    aPTT 25.2 01/22/2020 11:48 PM    AST (SGOT) 29 01/22/2020 11:48 PM    Alk.  phosphatase 75 01/22/2020 11:48 PM    Protein, total 7.3 01/22/2020 11:48 PM    Albumin 3.9 01/22/2020 11:48 PM    Globulin 3.4 01/22/2020 11:48 PM       No results found for: IRON, FE, TIBC, IBCT, PSAT, FERR    No results found for: SR, CRP, TEODORO, ANAIGG, RA, RPR, RPRT, VDRLT, VDRLS, TSH, TSHEXT     No results found for: PH, PHI, PCO2, PCO2I, PO2, PO2I, HCO3, HCO3I, FIO2, FIO2I    Lab Results   Component Value Date/Time     01/23/2020 06:06 AM    CK-MB Index 0.8 01/23/2020 06:06 AM    Troponin-I, Qt. <0.05 01/23/2020 06:06 AM        No results found for: CULT    No results found for: TOXA1, RPR, HBCM, HBSAG, HAAB, HCAB1, HAAT, G6PD, CRYAC, HIVGT, HIVR, HIV1, HIV12, HIVPC, HIVRPI    Lab Results   Component Value Date/Time     01/23/2020 06:06 AM       Lab Results   Component Value Date/Time    Color YELLOW/STRAW 01/23/2020 12:55 AM    Appearance CLEAR 01/23/2020 12:55 AM    pH (UA) 7.0 01/23/2020 12:55 AM    Protein NEGATIVE  01/23/2020 12:55 AM    Glucose NEGATIVE  01/23/2020 12:55 AM    Ketone NEGATIVE  01/23/2020 12:55 AM    Bilirubin NEGATIVE  01/23/2020 12:55 AM    Blood NEGATIVE  01/23/2020 12:55 AM    Urobilinogen 1.0 01/23/2020 12:55 AM    Nitrites NEGATIVE  01/23/2020 12:55 AM    Leukocyte Esterase NEGATIVE  01/23/2020 12:55 AM    WBC 0-4 01/23/2020 12:55 AM    RBC 0-5 01/23/2020 12:55 AM    Bacteria NEGATIVE  01/23/2020 12:55 AM       IMPRESSION  · Acute respiratory failure with hypoxia  · REBECCA, compliant with CPAP  · Syncope    PLAN  · Goal sats >90%, wean O2 as tolerated  · Will need 6 MWT closer to discharge  · CPAP at night, would benefit from outpatient pulmonary follow-up  · TTE with bubble completed, read pending  · CTA pending  · MRI/MRA brain pending  · Neurology consulted  · Lovenox      Thank you for allowing us to participate in the care of this patient. We will be happy to follow along in his/her progress with you.     Eliane Kahn MD

## 2020-01-23 NOTE — ED TRIAGE NOTES
Patient arrives via EMS from home. Per EMS at 2300 patient experienced dizziness with SOB, CP, chest pain, and right arm tingling. Patient then got in the shower and had a syncopal episode. . Patient currently denies any CP but reports a throbbing bilateral headache and numbness to right arm. History of TIA.

## 2020-01-23 NOTE — PROGRESS NOTES
tSephen Flaherty [facility] 79  [address] [facility], [address] [place] [address]  [phone]      Medical Progress Note      NAME: Ed Wilson   :  1970  MRM:  [id number]    Date/Time: 2020  12:00 PM       Assessment and Plan:   1. Syncope: brief LOC. Happened while he was taking a shower. Preceded by episode of headache, chest pain, hyperventilation, R arm / facial numbness. Likely vasovagal. Currently feeling much better. Chain of events precipitated by a huge argument with son, lots of stress recently (fighting with ex-wife over custody of children). Monitor on tele. Rule out CVA/TIA, workup to include brain MRI/MRA, TTE. carotid duplex is normal. Neurology consulted. Start ASA, continue statin    2. Hypoxia. Unclear cause. Will check CTA of the chest to R/O PE. Continue supplement O2 to keep SAO2 > 90%. Consult pulmonary      3. Depression/anxiety. Continue Zoloft.      4. GERD (gastroesophageal reflux disease): continue PPI     5. Sleep apnea: continue CPAP QHS     6. Morbid obesity: Body mass index is 35.79 kg/m². advised weight loss.      7.  Seizure: childhood, x 1 episode            Subjective:     Chief Complaint[de-identified] Patient was seen and examined as a follow up for syncope. Chart was reviewed. feels better     ROS:  (bold if positive, if negative)    Tolerating PT  Tolerating Diet        Objective:     Last 24hrs VS reviewed since prior progress note.  Most recent are:    Visit Vitals  /87 (BP 1 Location: Left arm, BP Patient Position: At rest)   Pulse 66   Temp 98.7 °F (37.1 °C)   Resp 16   Wt 116.4 kg (256 lb 9.9 oz)   SpO2 94%   BMI 35.79 kg/m²     SpO2 Readings from Last 6 Encounters:   20 94%   19 93%   17 97%   01/18/15 94%   14 94%   11 93%    O2 Flow Rate (L/min): 5 l/min       Intake/Output Summary (Last 24 hours) at 2020 1200  Last data filed at 2020 1025  Gross per 24 hour   Intake 240 ml   Output 900 ml   Net -660 ml Physical Exam:    Gen:  obese, in no acute distress  HEENT:  Pink conjunctivae, PERRL, hearing intact to voice, moist mucous membranes  Neck:  Supple, without masses, thyroid non-tender  Resp:  No accessory muscle use, clear breath sounds without wheezes rales or rhonchi  Card:  No murmurs, normal S1, S2 without thrills, bruits or peripheral edema  Abd:  Soft, non-tender, non-distended, normoactive bowel sounds are present, no palpable organomegaly and no detectable hernias  Lymph:  No cervical or inguinal adenopathy  Musc:  No cyanosis or clubbing  Skin:  No rashes or ulcers, skin turgor is good  Neuro:  Cranial nerves are grossly intact, no focal motor weakness, follows commands appropriately  Psych:  Good insight, oriented to person, place and time, alert  __________________________________________________________________  Medications Reviewed: (see below)  Medications:     Current Facility-Administered Medications   Medication Dose Route Frequency    sodium chloride (NS) flush 5-40 mL  5-40 mL IntraVENous Q8H    sodium chloride (NS) flush 5-40 mL  5-40 mL IntraVENous PRN    acetaminophen (TYLENOL) tablet 650 mg  650 mg Oral Q6H PRN    naloxone (NARCAN) injection 0.4 mg  0.4 mg IntraVENous PRN    sertraline (ZOLOFT) tablet 100 mg  100 mg Oral DAILY    pantoprazole (PROTONIX) tablet 40 mg  40 mg Oral ACB    aspirin chewable tablet 81 mg  81 mg Oral DAILY    enoxaparin (LOVENOX) injection 40 mg  40 mg SubCUTAneous Q24H    HYDROmorphone (DILAUDID) syringe 0.5 mg  0.5 mg IntraVENous NOW    lisinopril (PRINIVIL, ZESTRIL) tablet 20 mg  20 mg Oral DAILY    simvastatin (ZOCOR) tablet 40 mg  40 mg Oral DAILY     Current Outpatient Medications   Medication Sig    sertraline (ZOLOFT) 100 mg tablet Take 100 mg by mouth daily.  lisinopril (PRINIVIL, ZESTRIL) 40 mg tablet Take 40 mg by mouth every evening.  simvastatin (ZOCOR) 40 mg tablet Take 40 mg by mouth nightly.     buPROPion XL (WELLBUTRIN XL) 150 mg tablet Take 150 mg by mouth every morning.  omeprazole (PRILOSEC) 40 mg capsule Take 40 mg by mouth daily. Lab Data Reviewed: (see below)  Lab Review:     Recent Labs     01/22/20 2348   WBC 8.0   HGB 14.4   HCT 41.9        Recent Labs     01/22/20 2348      K 3.6      CO2 26   *   BUN 17   CREA 1.35*   CA 9.0   ALB 3.9   TBILI 0.5   SGOT 29   ALT 43   INR 1.0     Lab Results   Component Value Date/Time    Glucose (POC) 130 (H) 02/09/2014 10:05 PM     No results for input(s): PH, PCO2, PO2, HCO3, FIO2 in the last 72 hours. Recent Labs     01/22/20 2348   INR 1.0     All Micro Results     None          I have reviewed notes of prior 24hr. Other pertinent lab:      Total time spent with patient: [address] discussed with: Patient, Nursing Staff and >50% of time spent in counseling and coordination of care    Discussed:  Care Plan    Prophylaxis:  Lovenox    Disposition:  Home w/Family           ___________________________________________________    Attending Physician: Kaylene Loya MD

## 2020-01-23 NOTE — CONSULTS
JAMILAH SECOURS: [id number] [address] Neurology  [place] [facility]  [phone]      Name:   Luciana Diaz record #: [id number]  Admission Date: 1/22/2020     Who Consulted: Michoacano Davidson    Reason for Consult:  Syncope    HISTORY OF PRESENT ILLNESS:     This is a 52 y.o. male who is admitted for syncope. Mr. Elena Mercado presented to the ED after experiencing dizziness with SOB, CP, chest pain, and right arm tingling. Patient then got in the shower and had a syncopal episode. He reports that he had an argument with his son and then developed severe headache, leading to hyperventilation, subsequently feeling R hand numbness and bilateral facial numbness later only R-side. Blurred vision. His admission Bp was 164/99. The Neurology Service is asked to evaluate for posterior stroke. Neuro-imaging:     CT Head: No acute cranial process    EKG: normal sinus rhythm. Care Plan discussed with:  Patient x   Family    RN    Care Manager    Consultant/Specialist:         Thank you for allowing the Neurology Service the pleasure of participating in the care of your patient. This patient will be discussed with my collaborating care team physician,  Dr. Maria Luisa Perez, and he may have further recommendations regarding this patient's care      Rosi Coto, [facility]  ====================      Attending Attestation:       NEUROLOGY ATTENDING ADDENDUM:    January 23, 2020    Pt personally seen and examined. Chart reviewed.     Agree with advanced NP's history, exam and  A/P with changes/additions. Patient describes that he has had a lot of chronic stressors and that yesterday he was in the shower started having severe headache and then he started having chest pressure or chest pain and then he briefly passed out. He also noted that he had a major argument with 1 of his sons earlier  that day.   Patient told ER and telemetry neurology that after the argument he started to hyperventilate and then his right hand felt numb and then both sides of his face felt numb and he had some blurry vision. Tele-neurology recommended MRI brain to evaluate for possible stroke. In regards to any prior seizure patient says that he had one seizure when this is 9years old unclear the details but no further seizures after that. Patient Vitals for the past 4 hrs:   Temp Pulse Resp BP SpO2   01/23/20 1701     98 %   01/23/20 1659 97.5 °F (36.4 °C) 67 18 125/77 96 %   01/23/20 1634 97.9 °F (36.6 °C) 65 18 (!) 136/97 95 %         Exam   General: Awake alert conversant calm no acute distress. CN: EOMI, Face symmetric, Facial sensation intact bilaterally, Hearing grossly normal, Language normal (no aphasia, no dysarthria), Tongue Midline, Shrug symmetric  Motor: 5 out of 5 strength in all extremities  Sensory: Intact light touch pinprick vibration in all extremities  Cerebellar: Normal finger-nose-finger bilaterally  DTRs: 1+ symmetric  Gait: Normal    Impression/ Plan    52 y.o. male with episode of syncope preceded by severe headache and chest pressure. Neurologic exam is normal/ non-focal at this time. Discussed with patient that history is very suggestive that he had a vasovagal syncope episode, not a seizure. However given the remote history of seizure will check EEG to exclude underlying seizure discharges. Also given his description to telemetry neurology of face and arm without contrast to ensure there is no evidence of a stroke. We will follow-up tomorrow and review those results with patient. Thank you for the consultation. Signed By: Iván Mckeon MD     January 23, 2020               Assessment/Plan:     1.   Syncope, r/o Posterior Stroke:    · ASA 81 mg  · Will need ASA at discharge  · Neurochecks:  Every 4 hours  · Blood Sugar Goal:  140-180  · BP Goal: Less than 180/105 for 24 hours  · Telemetry for at least 24 hours    Stroke work up  · A1C:  · LDL:    · TTE:    · MRI:  · Carotid vascular imaging:    Risk factors for stroke include:  Obesity, DM, HTN, CAD, HLD, Excessive ETOH, physical inactivity, REBECCA  · Discussed with patient    · Discussed signs/symptoms of stroke and when to call 911    2. Mobility:   · Has not been OOB. · PT/OT to eval for rehab    3. Diet:    · Does not need SLP, passed STAND    4. VTE Prophylaxes:   · Per Primary team           Review of Systems: 10 point ROS was performed. Pertinent positives listed in HPI. Negative ROS is as follows. Pt denies: angina, palpitations, paresthesias, weakness,slurred speech, aphasia, confusion, fever, chills, diplopia, back pain, neck pain, prior episodes of vertigo, hallucinations, new medications or dosage changes. Allergies: Allergies   Allergen Reactions    Pcn [Penicillins] Hives    Codeine Nausea and Vomiting       Outpatient Meds  No current facility-administered medications on file prior to encounter. Current Outpatient Medications on File Prior to Encounter   Medication Sig Dispense Refill    sertraline (ZOLOFT) 100 mg tablet Take 100 mg by mouth daily.  lisinopril (PRINIVIL, ZESTRIL) 40 mg tablet Take 40 mg by mouth every evening.  simvastatin (ZOCOR) 40 mg tablet Take 40 mg by mouth nightly.  buPROPion XL (WELLBUTRIN XL) 150 mg tablet Take 150 mg by mouth every morning.  omeprazole (PRILOSEC) 40 mg capsule Take 40 mg by mouth daily.          Inpatient Meds    Current Facility-Administered Medications   Medication Dose Route Frequency Provider Last Rate Last Dose    sodium chloride (NS) flush 5-40 mL  5-40 mL IntraVENous Q8H Nabil Obando MD   10 mL at 01/23/20 2983    sodium chloride (NS) flush 5-40 mL  5-40 mL IntraVENous PRN Nabil Obando MD        acetaminophen (TYLENOL) tablet 650 mg  650 mg Oral Q6H PRN Nabil Obando MD        naloxone [facility]) injection 0.4 mg  0.4 mg IntraVENous PRN Nabil Obando MD        sertraline (ZOLOFT) tablet 100 mg  100 mg Oral DAILY Nabil Obando MD 100 mg at 01/23/20 0846    pantoprazole (PROTONIX) tablet 40 mg  40 mg Oral ACB Damaris Giraldo MD   40 mg at 01/23/20 8618    aspirin chewable tablet 81 mg  81 mg Oral DAILY Damaris Giraldo MD   81 mg at 01/23/20 0845    enoxaparin (LOVENOX) injection 40 mg  40 mg SubCUTAneous Q24H Damaris Giraldo MD   40 mg at 01/23/20 0845    lisinopril (PRINIVIL, ZESTRIL) tablet 20 mg  20 mg Oral DAILY Damaris Giraldo MD   20 mg at 01/23/20 0845    simvastatin (ZOCOR) tablet 40 mg  40 mg Oral DAILY Damaris Giraldo MD   40 mg at 01/23/20 0846    iopamidoL (ISOVUE-370) 76 % injection 100 mL  100 mL IntraVENous RAD ONCE Nguyen Jeffery MD        iopamidoL (ISOVUE-370) 76 % injection                    Past Medical History:   Diagnosis Date    Gastrointestinal disorder     GERD    GERD (gastroesophageal reflux disease)     Panic attack     Psychiatric disorder     anxiety    Seizures ([facility] [address]     5 yo   was told it was a stress seizure, never had another one    Sleep apnea     uses CPAP    Unspecified adverse effect of anesthesia     woke up during endoscopy procedure       Past Surgical History:   Procedure Laterality Date    ABDOMEN SURGERY [address]    hernia(abdomen)- open    HX GI  2008    esophagus repair narrow- balloon dilation and scar tissue removal    HX HERNIA REPAIR      inguinal hernia    HX OTHER SURGICAL  11years of age    tonsils    HX OTHER SURGICAL  2009    hernia (hiatal and abdomen)-open    HX OTHER SURGICAL  23years of age    wisdom teeth extraction under anesthesia    HX OTHER SURGICAL  8 years     distended testicle     HX OTHER SURGICAL  1/16/15    Laparoscopic ventral hernia repair with mesh       family history includes Cancer in his father; Diabetes in his father and mother; Heart Attack in his father; Heart Disease in his father; Hypertension in his mother; Stroke in his mother. reports that he has never smoked.  He has never used smokeless tobacco. He reports current alcohol use of about 1.7 standard drinks of alcohol per week. He reports current drug use. Drug: Marijuana. Lab Results (last 24 hrs)  Recent Results (from the past 24 hour(s))   EKG, 12 LEAD, INITIAL    Collection Time: 01/22/20 11:37 PM   Result Value Ref Range    Ventricular Rate 79 BPM    Atrial Rate 79 BPM    P-R Interval 156 ms    QRS Duration 102 ms    Q-T Interval 390 ms    QTC Calculation (Bezet) 447 ms    Calculated P Axis 32 degrees    Calculated R Axis 96 degrees    Calculated T Axis 20 degrees    Diagnosis       Normal sinus rhythm  Rightward axis  Borderline ECG  When compared with ECG of 09-FEB-2014 21:54,  No significant change was found  Confirmed by Mae Moran ([id number]) on 1/23/2020 11:46:28 AM     CBC WITH AUTOMATED DIFF    Collection Time: 01/22/20 11:48 PM   Result Value Ref Range    WBC 8.0 4.1 - 11.1 K/uL    RBC 4.88 4.10 - 5.70 M/uL    HGB 14.4 12.1 - 17.0 g/dL    HCT 41.9 36.6 - 50.3 %    MCV 85.9 80.0 - 99.0 FL    MCH 29.5 26.0 - 34.0 PG    MCHC 34.4 30.0 - 36.5 g/dL    RDW 12.9 11.5 - 14.5 %    PLATELET 023 544 - 222 K/uL    MPV 9.2 8.9 - 12.9 FL    NRBC 0.0 0  WBC    ABSOLUTE NRBC 0.00 0.00 - 0.01 K/uL    NEUTROPHILS 57 32 - 75 %    LYMPHOCYTES 30 12 - 49 %    MONOCYTES 10 5 - 13 %    EOSINOPHILS 2 0 - 7 %    BASOPHILS 1 0 - 1 %    IMMATURE GRANULOCYTES 0 0.0 - 0.5 %    ABS. NEUTROPHILS 4.6 1.8 - 8.0 K/UL    ABS. LYMPHOCYTES 2.4 0.8 - 3.5 K/UL    ABS. MONOCYTES 0.8 0.0 - 1.0 K/UL    ABS. EOSINOPHILS 0.2 0.0 - 0.4 K/UL    ABS. BASOPHILS 0.1 0.0 - 0.1 K/UL    ABS. IMM.  GRANS. 0.0 0.00 - 0.04 K/UL    DF AUTOMATED     CK W/ REFLX CKMB    Collection Time: 01/22/20 11:48 PM   Result Value Ref Range     39 - 564 U/L   METABOLIC PANEL, COMPREHENSIVE    Collection Time: 01/22/20 11:48 PM   Result Value Ref Range    Sodium 139 136 - 145 mmol/L    Potassium 3.6 3.5 - 5.1 mmol/L    Chloride 107 97 - 108 mmol/L    CO2 26 21 - 32 mmol/L    Anion gap 6 5 - 15 mmol/L    Glucose 133 (H) 65 - 100 mg/dL    BUN 17 6 - 20 MG/DL    Creatinine 1.35 (H) 0.70 - 1.30 MG/DL    BUN/Creatinine ratio 13 12 - 20      GFR est AA >60 >60 ml/min/1.73m2    GFR est non-AA 56 (L) >60 ml/min/1.73m2    Calcium 9.0 8.5 - 10.1 MG/DL    Bilirubin, total 0.5 0.2 - 1.0 MG/DL    ALT (SGPT) 43 12 - 78 U/L    AST (SGOT) 29 15 - 37 U/L    Alk. phosphatase 75 45 - 117 U/L    Protein, total 7.3 6.4 - 8.2 g/dL    Albumin 3.9 3.5 - 5.0 g/dL    Globulin 3.4 2.0 - 4.0 g/dL    A-G Ratio 1.1 1.1 - 2.2     TROPONIN I    Collection Time: 01/22/20 11:48 PM   Result Value Ref Range    Troponin-I, Qt. <0.05 <0.05 ng/mL   D DIMER    Collection Time: 01/22/20 11:48 PM   Result Value Ref Range    D-dimer 0.49 0.00 - 0.65 mg/L FEU   PROTHROMBIN TIME + INR    Collection Time: 01/22/20 11:48 PM   Result Value Ref Range    INR 1.0 0.9 - 1.1      Prothrombin time 9.8 9.0 - 11.1 sec   PTT    Collection Time: 01/22/20 11:48 PM   Result Value Ref Range    aPTT 25.2 22.1 - 32.0 sec    aPTT, therapeutic range     58.0 - 77.0 SECS   SAMPLES BEING HELD    Collection Time: 01/22/20 11:48 PM   Result Value Ref Range    SAMPLES BEING HELD 1BLU     COMMENT        Add-on orders for these samples will be processed based on acceptable specimen integrity and analyte stability, which may vary by analyte. SAMPLES BEING HELD    Collection Time: 01/22/20 11:51 PM   Result Value Ref Range    SAMPLES BEING HELD 1SST,1RED,1DRKGRN     COMMENT        Add-on orders for these samples will be processed based on acceptable specimen integrity and analyte stability, which may vary by analyte.    URINALYSIS W/ REFLEX CULTURE    Collection Time: 01/23/20 12:55 AM   Result Value Ref Range    Color YELLOW/STRAW      Appearance CLEAR CLEAR      Specific gravity 1.010 1.003 - 1.030      pH (UA) 7.0 5.0 - 8.0      Protein NEGATIVE  NEG mg/dL    Glucose NEGATIVE  NEG mg/dL    Ketone NEGATIVE  NEG mg/dL    Bilirubin NEGATIVE  NEG      Blood NEGATIVE  NEG      Urobilinogen 1.0 0.2 - 1.0 EU/dL    Nitrites NEGATIVE  NEG      Leukocyte Esterase NEGATIVE  NEG      WBC 0-4 0 - 4 /hpf    RBC 0-5 0 - 5 /hpf    Epithelial cells FEW FEW /lpf    Bacteria NEGATIVE  NEG /hpf    UA:UC IF INDICATED CULTURE NOT INDICATED BY UA RESULT CNI      Hyaline cast 0-2 0 - 5 /lpf   TROPONIN I    Collection Time: 01/23/20  6:06 AM   Result Value Ref Range    Troponin-I, Qt. <0.05 <0.05 ng/mL   CK W/ CKMB & INDEX    Collection Time: 01/23/20  6:06 AM   Result Value Ref Range     39 - 308 U/L    CK - MB 2.1 <3.6 NG/ML    CK-MB Index 0.8 0.0 - 2.5     DUPLEX CAROTID BILATERAL    Collection Time: 01/23/20 11:26 AM   Result Value Ref Range    Right subclavian sys 158.1 cm/s    RIGHT SUBCLAVIAN ARTERY D 15.84 cm/s    Right cca dist sys 81.1 cm/s    Right CCA dist singh 0.0 cm/s    Right CCA prox sys 116.0 cm/s    Right CCA prox sinhg 32.0 cm/s    Right eca sys 132.2 cm/s    RIGHT EXTERNAL CAROTID ARTERY D 11.38 cm/s    Right ICA dist sys 109.6 cm/s    Right ICA dist singh 43.4 cm/s    Right ICA mid sys 91.9 cm/s    Right ICA mid singh 25.7 cm/s    Right ICA prox sys 77.4 cm/s    Right ICA prox singh 20.8 cm/s    Right vertebral sys 88.6 cm/s    RIGHT VERTEBRAL ARTERY D 11.14 cm/s    Right ICA/CCA sys 1.4     Left subclavian sys 145.1 cm/s    LEFT SUBCLAVIAN ARTERY D 0.00 cm/s    Left CCA dist sys 99.7 cm/s    Left CCA dist singh 22.9 cm/s    Left CCA prox sys 141.1 cm/s    Left CCA prox singh 24.8 cm/s    Left ECA sys 105.7 cm/s    LEFT EXTERNAL CAROTID ARTERY D 9.07 cm/s    Left ICA dist sys 69.5 cm/s    Left ICA dist singh 18.9 cm/s    Left ICA mid sys 71.6 cm/s    Left ICA mid singh 39.4 cm/s    Left ICA prox sys 74.7 cm/s    Left ICA prox singh 30.5 cm/s    Left vertebral sys 63.0 cm/s    LEFT VERTEBRAL ARTERY D 9.25 cm/s    Left ICA/CCA sys 0.75    ECHO ADULT COMPLETE    Collection Time: 01/23/20  1:41 PM   Result Value Ref Range    LA Volume 38.0 18 - 58 mL    LV E' Lateral Velocity 9.91 centimeter/second    LV E' Septal Velocity 8.89 centimeter/second    Tapse 2.57 (A) 1.5 - 2.0 cm    Ao Root D 3.43 cm    AO ASC D 3.12 cm    LVIDd 5.20 4.2 - 5.9 cm    LVPWd 1.03 (A) 0.6 - 1.0 cm    LVIDs 3.84 cm    IVSd 0.96 0.6 - 1.0 cm    LVOT d 2.26 cm    MVA (PHT) 3.7 cm2    MV A Oral 65.61 cm/s    MV E Oral 76.62 cm/s    MV E/A 1.17     Left Atrium to Aortic Root Ratio 1.22     RVIDd 4.35 cm    LA Vol 4C 41.51 18 - 58 mL    LA Vol 2C 33.19 18 - 58 mL    LA Area 4C 17.0 cm2    LV Mass .5 (A) 88 - 224 g    LV Mass AL Index 96.6 49 - 115 g/m2    Mitral Valve E Wave Deceleration Time 206.4 ms    Mitral Valve Pressure Half-time 59.9 ms    Left Atrium Major Axis 4.20 cm    Triscuspid Valve Regurgitation Peak Gradient 19.6 mmHg    Pulmonic Valve Max Velocity 115.02 cm/s    TR Max Velocity 221.62 cm/s    LA Vol Index 16.21 16 - 28 ml/m2    LA Vol Index 14.16 16 - 28 ml/m2    LA Vol Index 17.70 16 - 28 ml/m2    Left Ventricular Fractional Shortening by 2D 23.452622447 %    PV End Diastolic Velocity 0.6 mmHg    Mitral Valve Deceleration [place] 4.2094279217120     Left Ventricular End Diastolic Volume by Teichholz Method 9.79142128477 mL    Left Ventricular End Systolic Volume by Teichholz Method 0.51328504596 mL    Left Ventricular Stroke Volume by Teichholz Method 27.703696241 mL    PV peak gradient 5.3 mmHg

## 2020-01-23 NOTE — PROGRESS NOTES
Patient passed STANd. No neuro dx. On regular diet. SLP evaluation deferred at this time. Noted large paraesophageal hernia on abd CT.

## 2020-01-23 NOTE — ED NOTES
Bedside and Verbal shift change report given to Christopher RN (oncoming nurse) by Hardik Webber RN (offgoing nurse). Report included the following information SBAR, Kardex, ED Summary, MAR, Accordion and Recent Results.

## 2020-01-23 NOTE — ED PROVIDER NOTES
The patient is a 71-year-old male with a past medical history significant for anxiety, GERD, panic attack, seizure disorder, sleep apnea who presents to the ED by EMS and states that while he was having a sharp, he felt dizzy, lightheaded, began having chest pain that he described as throbbing in nature, severity 8 out of 10, this was followed by syncopal episode that lasted approximately 1 to 2 minutes. EMS was notified. The patient not complain of headache accompanied by right-sided numbness and tingling sensation as well as left facial numbness. The patient denies any fever, sore throat, cough or congestion, blurred vision, neck pain, back pain, nausea, vomiting, abdominal pain, diarrhea, constipation, dysuria, dizziness, prior history of the same.            Past Medical History:   Diagnosis Date    Gastrointestinal disorder     GERD    GERD (gastroesophageal reflux disease)     Panic attack     Psychiatric disorder     anxiety    Seizures ([facility] [address]     5 yo   was told it was a stress seizure, never had another one    Sleep apnea     uses CPAP    Unspecified adverse effect of anesthesia     woke up during endoscopy procedure       Past Surgical History:   Procedure Laterality Date    ABDOM[address]GERY [address]    hernia(abdomen)- open    HX GI  2008    esophagus repair narrow- balloon dilation and scar tissue removal    HX HERNIA REPAIR      inguinal hernia    HX OTHER SURGICAL  11years of age    tonsils    HX OTHER SURGICAL  2009    hernia (hiatal and abdomen)-open    HX OTHER SURGICAL  23years of age    wisdom teeth extraction under anesthesia    HX OTHER SURGICAL  8 years     distended testicle     HX OTHER SURGICAL  1/16/15    Laparoscopic ventral hernia repair with mesh         Family History:   Problem Relation Age of Onset    Diabetes Mother     Hypertension Mother     Stroke Mother         copd    Cancer Father         skin    Heart Disease Father     Diabetes Father    Dulcynea.Katharina Heart Attack Father        Social History     Socioeconomic History    Marital status:      Spouse name: Not on file    Number of children: Not on file    Years of education: Not on file    Highest education level: Not on file   Occupational History    Not on file   Social Needs    Financial resource strain: Not on file    Food insecurity:     Worry: Not on file     Inability: Not on file    Transportation needs:     Medical: Not on file     Non-medical: Not on file   Tobacco Use    Smoking status: Never Smoker    Smokeless tobacco: Never Used   Substance and Sexual Activity    Alcohol use: Yes     Alcohol/week: 1.7 standard drinks     Types: 2 Cans of beer per week     Comment: 1 or 2 beer every two weeks    Drug use: Yes     Types: Marijuana     Comment: younger years/ in his twenities    Sexual activity: Yes     Partners: Female     Birth control/protection: None   Lifestyle    Physical activity:     Days per week: Not on file     Minutes per session: Not on file    Stress: Not on file   Relationships    Social connections:     Talks on phone: Not on file     Gets together: Not on file     Attends Worship service: Not on file     Active member of club or organization: Not on file     Attends meetings of clubs or organizations: Not on file     Relationship status: Not on file    Intimate partner violence:     Fear of current or ex partner: Not on file     Emotionally abused: Not on file     Physically abused: Not on file     Forced sexual activity: Not on file   Other Topics Concern    Not on file   Social History Narrative    Not on file         ALLERGIES: Pcn [penicillins] and Codeine    Review of Systems   All other systems reviewed and are negative. There were no vitals filed for this visit. Physical Exam  Vitals signs and nursing note reviewed.           CONSTITUTIONAL: Well-appearing; well-nourished; in no apparent distress  HEAD: Normocephalic; atraumatic  EYES: PERRL; EOM intact; conjunctiva and sclera are clear bilaterally. ENT: No rhinorrhea; normal pharynx with no tonsillar hypertrophy; mucous membranes pink/moist, no erythema, no exudate. NECK: Supple; non-tender; no cervical lymphadenopathy  CARD: Normal S1, S2; no murmurs, rubs, or gallops. Regular rate and rhythm. RESP: Normal respiratory effort; breath sounds clear and equal bilaterally; no wheezes, rhonchi, or rales. ABD: Normal bowel sounds; non-distended; non-tender; no palpable organomegaly, no masses, no bruits. Back Exam: Normal inspection; no vertebral point tenderness, no CVA tenderness. Normal range of motion. EXT: Normal ROM in all four extremities; non-tender to palpation; no swelling or deformity; distal pulses are normal, no edema. SKIN: Warm; dry; no rash. NEURO:Alert and oriented x 3, coherent, JEROMY-XII grossly intact, normal memory; normal reflexes ; no pronator drift; finger-to-nose intact bilaterally; sensory and motor are non-focal.      MDM  Number of Diagnoses or Management Options  Diagnosis management comments: Assessment: Differential diagnosis includes ACS,VTE, CVA rule out thrombotic versus hemorrhagic stroke, electrolyte abnormality, panic anxiety syndrome. The patient is a telemetry benign exam at this time with stable vital signs. He appears anxious. There are no focal findings. His NIH stroke scale is 1 at best at this time    Plan: EKG/chest x-ray/lab/CT scan of the head/consult neurology/consult hospitalist/ Monitor and Reevaluate.     .        Amount and/or Complexity of Data Reviewed  Clinical lab tests: ordered and reviewed  Tests in the radiology section of CPT®: ordered and reviewed  Tests in the medicine section of CPT®: reviewed and ordered  Discussion of test results with the performing providers: yes  Decide to obtain previous medical records or to obtain history from someone other than the patient: yes  Obtain history from someone other than the patient: yes  Review and summarize past medical records: yes  Discuss the patient with other providers: yes  Independent visualization of images, tracings, or specimens: yes    Risk of Complications, Morbidity, and/or Mortality  Presenting problems: moderate  Diagnostic procedures: moderate  Management options: moderate    Patient Progress  Patient progress: stable         Procedures    ED EKG interpretation:  Rhythm: normal sinus rhythm; and regular . Rate (approx.): 79; Axis: normal; P wave: normal; QRS interval: normal ; ST/T wave: normal; in  Lead: Diffusely; Other findings: borderline ekg. This EKG was interpreted by Esperanza France MD,ED Provider. XRAY INTERPRETATION (ED MD)  Chest Xray  No acute process seen. Normal heart size. No bony abnormalities. No infiltrate. Conchita Bryant MD 11:44 PM    CONSULT NOTE:  Conchita Bryant MD spoke with Dr. Beatriz Aldridge of [facility] Discussed patient's presentation, history, physical assessment, and available diagnostic results. Will come and see the patient in the ED. PROGRESS NOTE:  Pt has been reexamined by Conchita Bryant MD all available results have been reviewed with pt and any available family. The patient was seen and evaluated by tele-neurology. He is not a candidate for TPA. Recommendation was made for admission and further evaluation for syncope and CVA. He appears hemodynamically stable. Pt understands sx, dx, and tx in ED. Care plan has been outlined and questions have been answered. Pt and any available family understands and agrees to need for admission to hospital for further tx not available in ED. Pt is ready for admission.   Will consult hospitalist.  Written by Esperanza France MD,  12:00 AM    .

## 2020-01-23 NOTE — H&P
[facility]  [address] [facility], [facility] [address]  [phone]    Hospitalist Admission Note      NAME:  Becky Grissom   :   1970   MRN:  [id number]     PCP:  Lucy Stockton MD     Date/Time:  2020 2:45 AM         Assessment / Plan:         Syncope: brief LOC. Preceded by episode of headache, chest pain, hyperventilation, R arm / facial numbness. ?vasogagal. Currently feeling much better. Chain of events precipitated by a huge argument with son. Self-reports lots of stress recently (fighting with ex-wife over custody of children). Monitor on tele. Rule out CVA/TIA, workup to include brain MRI/MRA, TTE, carotid duplex. Neurology consult. Trend cardiac enzymes. Start ASA, continue statin      ? Panic attack: question hx of this per pt's own report which resulted in syncope x 3 prior. Has depression/anxiety. Continue Zoloft. GERD (gastroesophageal reflux disease): continue PPI      Sleep apnea: uses CPAP QHS      Morbid obesity: Body mass index is 35.79 kg/m². advised weight loss. Seizure: childhood, x 1 episode    Code Status: FULL     Surrogate decision maker: wife      ED notes and lab results reviewed. Total time spent with patient: 79 Minutes   Time spent in the care of this patient included reviewing records, discussing with nursing, obtaining history and examining the patient, and discussing treatment plans, with >50% time spent counseling/coordinating care    Risk of deterioration: High                 Care Plan discussed with: ED provider, Patient, Family, Nursing Staff and >50% of time spent in counseling and coordination of care    Discussed:  Care Plan and D/C Planning    Prophylaxis:  Lovenox    Disposition:  Home w/Family                 Subjective:     CHIEF COMPLAINT: syncope    HISTORY OF PRESENT ILLNESS:     Mr. Praveena Dalal is a 52 y.o. male w/ hx of depression/anxiety, REBECCA, GERD who presents with syncope.  Had an intense argument with son today. Thereafter began to have a severe headache, leading to hyperventilation, subsequently feeling R hand numbness and bilateral facial numbness later only R-side. Blurred vision. Believed he then passed out briefly. No facial droop, slurred speech, weakness, or numbness otherwise. ED workup was unremarkable including head CT. Evaluated by teleneurology who did not recommend CTA H&N per ED attending. Mr. Dolly Esquivel is admitted for further evaluation and management. Past Medical History:   Diagnosis Date    Gastrointestinal disorder     GERD    GERD (gastroesophageal reflux disease)     Panic attack     Psychiatric disorder     anxiety    Seizures ([facility] [address]     7 yo   was told it was a stress seizure, never had another one    Sleep apnea     uses CPAP    Unspecified adverse effect of anesthesia     woke up during endoscopy procedure        Past Surgical History:   Procedure Laterality Date    [address]    hernia(abdomen)- open    HX GI  2008    esophagus repair narrow- balloon dilation and scar tissue removal    HX HERNIA REPAIR      inguinal hernia    HX OTHER SURGICAL  11years of age    tonsils    HX OTHER SURGICAL  2009    hernia (hiatal and abdomen)-open    HX OTHER SURGICAL  23years of age    wisdom teeth extraction under anesthesia    HX OTHER SURGICAL  8 years     distended testicle     HX OTHER SURGICAL  1/16/15    Laparoscopic ventral hernia repair with mesh       Social History     Tobacco Use    Smoking status: Never Smoker    Smokeless tobacco: Never Used   Substance Use Topics    Alcohol use:  Yes     Alcohol/week: 1.7 standard drinks     Types: 2 Cans of beer per week     Comment: 1 or 2 beer every two weeks        Family History   Problem Relation Age of Onset    Diabetes Mother     Hypertension Mother     Stroke Mother         copd    Cancer Father         skin    Heart Disease Father     Diabetes Father     Heart Attack Father         Allergies   Allergen Reactions    Pcn [Penicillins] Hives    Codeine Nausea and Vomiting        Prior to Admission medications    Medication Sig Start Date End Date Taking? Authorizing Provider   docusate sodium (COLACE) 100 mg capsule Take 100 mg by mouth two (2) times a day. Provider, Historical   polyethylene glycol (MIRALAX) 17 gram/dose powder Take 17 g by mouth daily. Provider, Historical   HYDROmorphone (DILAUDID) 2 mg tablet Take 1 tablet by mouth every six (6) hours as needed for Pain. 1/28/15   Dyan Scott NP   ondansetron (ZOFRAN ODT) 4 mg disintegrating tablet Take 1 tablet by mouth every eight (8) hours as needed for Nausea. 1/18/15   Alton Mcginnis MD   LORazepam (ATIVAN) 1 mg tablet Take 1 tablet by mouth every six (6) hours as needed for Anxiety. 1/18/15   Alton Mcginnis MD   SERTRALINE HCL (ZOLOFT PO) Take 150 mg by mouth nightly. Agustina Morales MD   LANSOPRAZOLE (PREVACID PO) Take 40 mg by mouth daily. Agustina Morales MD       Review of Systems:  (bold if positive, if negative)    Gen:  fatigueEyes:  Visual changesENT:  CVS:  hest pain,Pulm:  dyspneaGI:    :    MS:  Skin:  Psych:  depression, anxiety, cryingEndo:    Hem:  Renal:    Neuro:  umbness, tinglingheadache          Objective:      VITALS:    Vital signs reviewed; most recent are:    Visit Vitals  /70   Pulse 67   Temp 98.2 °F (36.8 °C)   Resp 22   Wt 116.4 kg (256 lb 9.9 oz)   SpO2 90%   BMI 35.79 kg/m²     SpO2 Readings from Last 6 Encounters:   01/23/20 90%   12/30/19 93%   04/13/17 97%   01/18/15 94%   02/09/14 94%   02/04/11 93%        No intake or output data in the 24 hours ending 01/23/20 0245         Exam:     Physical Exam:    Gen:  Well-developed, well-nourished, in no acute distress. Pleasant   HEENT:  No scleral icterus, PERRL, hearing intact to voice, moist mucous membranes  Neck:  Supple, without masses. Thyroid non-tender  Resp:  No accessory muscle use.  CTAB without wheezing, rales, rhonchi  Card: RRR. Normal S1 and S2 without murmurs, rubs, or gallops. No peripheral lower extremity edema. No JVD. Peripheral pulses in tact. Abd:  Normoactive bowel sounds. Soft, non-tender, non-distended. No rebound, no guarding. No appreciable hepatosplenomegaly   Lymph:  No cervical adenopathy  Musc:  No cyanosis or clubbing  Skin:  No rashes or ulcers; turgor intact. Cap refill ~2 secs  Neuro:  Cranial nerves 3-12 in tact. No focal motor weakness, 5/5 strength BUE and BLEs. No numbness. Follows commands appropriately  Psych:  Good insight, normal affect. Alert, oriented x 3. Answers questions appropriately       Labs:    Recent Labs     01/22/20  2348   WBC 8.0   HGB 14.4   HCT 41.9        Recent Labs     01/22/20  2348      K 3.6      CO2 26   *   BUN 17   CREA 1.35*   CA 9.0   ALB 3.9   SGOT 29   ALT 43     No components found for: GLPOC  No results for input(s): PH, PCO2, PO2, HCO3, FIO2 in the last 72 hours. Recent Labs     01/22/20  2348   INR 1.0     No results found for: SDES  No results found for: CULT  All other current labs reviewed in the computer.       Imaging/Studies:    Head CT  No acute cranial process     CXR  No acute process    EKG: NSR, wandering baseline, poor quality  EKG personally reviewed    ___________________________________________________    Attending Physician: Zak Guzman MD

## 2020-01-23 NOTE — ED NOTES
Bedside report received from Melvi Horn RN. Assumed pt care. Pt is resting in bed with eyes closed. No s/s of distress noted. Call light in reach. Will continue to monitor.

## 2020-01-23 NOTE — PROGRESS NOTES
Problem: Self Care Deficits Care Plan (Adult)  Goal: *Acute Goals and Plan of Care (Insert Text)  Description    FUNCTIONAL STATUS PRIOR TO ADMISSION: Patient was independent and active without use of DME.     HOME SUPPORT: The patient lived with spouse and children, but did not require assist.    Occupational Therapy Goals  Initiated 1/23/2020    1. Patient will utilize energy conservation techniques during functional activities with verbal cues within 7 day(s). 2. Patient will perform grooming and bathing with supervision standing at the sink with < or = 2 rest breaks and maintain oxygen sats > or = 90% on room air within 7 day(s). 3. Patient will perform upper body dressing and lower body dressing with supervision with < or = 2 rest breaks and maintain oxygen sats > or = 90% on room air within 7 day(s). 4. Patient will perform toileting and toilet transfer with supervision and maintain oxygen sats > or = 90% on room air within 7 day(s). 5. Patient will demonstrate pursed lip breathing with min cues during functional tasks within 7 day(s). Outcome: Progressing Towards Goal   OCCUPATIONAL THERAPY EVALUATION  Patient: Ricardo Webb (71 y.o. male)  Date: 1/23/2020  Primary Diagnosis: Syncope [R55]        Precautions:        ASSESSMENT  Based on the objective data described below, the patient presents with hospital admission secondary to syncopal episode at home. Patient today received, supine in bed, agreeable to activity. Patient with decreased higher level balance, decreased O2 sats with activity and at rest, and overall slow mobility. Patient reports R side numbness and L side facial numbness resolved but reports weakness. Patient demonstrates UE strength WFL, but noted unable to take same amount of resistance to RUE as left. Patient able to manage ADLs tasks but limited by overall slow mobility, decreased balance and O2 saturations despite 4L O2.   O2 sats reading as low as 72% on 4L in standing. HR stable though BP elevated. Patient reports HTN baseline. Current Level of Function Impacting Discharge (ADLs/self-care): SBA to CGA for ADLs and transfers     Functional Outcome Measure: The patient scored 85/100 on the Barthel Index and 66/66 on the Fugl Mackay outcome measure. Other factors to consider for discharge: lives with family- per chart, patient with current difficulty with spouse and children     Patient will benefit from skilled therapy intervention to address the above noted impairments. PLAN :  Recommendations and Planned Interventions: self care training, functional mobility training, therapeutic exercise, balance training, therapeutic activities, endurance activities, patient education, home safety training, and family training/education    Frequency/Duration: Patient will be followed by occupational therapy 3 times a week to address goals. Recommendation for discharge: (in order for the patient to meet his/her long term goals)  No skilled occupational therapy/ follow up rehabilitation needs identified at this time. This discharge recommendation:  Has not yet been discussed the attending provider and/or case management    IF patient discharges home will need the following DME: none       SUBJECTIVE:   Patient stated I feel a little light headed.     OBJECTIVE DATA SUMMARY:   HISTORY:   Past Medical History:   Diagnosis Date    Gastrointestinal disorder     GERD    GERD (gastroesophageal reflux disease)     Panic attack     Psychiatric disorder     anxiety    Seizures ([facility] [address]     7 yo   was told it was a stress seizure, never had another one    Sleep apnea     uses CPAP    Unspecified adverse effect of anesthesia     woke up during endoscopy procedure     Past Surgical History:   Procedure Laterality Date    ABDOMEN SURGERY [address]    hernia(abdomen)- open    HX GI  2008    esophagus repair narrow- balloon dilation and scar tissue removal    HX HERNIA REPAIR      inguinal hernia    HX OTHER SURGICAL  11years of age    tonsils    HX OTHER SURGICAL  2009    hernia (hiatal and abdomen)-open    HX OTHER SURGICAL  23years of age    wisdom teeth extraction under anesthesia    HX OTHER SURGICAL  8 years     distended testicle     HX OTHER SURGICAL  1/16/15    Laparoscopic ventral hernia repair with mesh       Expanded or extensive additional review of patient history:     Home Situation  Home Environment: Private residence  # Steps to Enter: 3  Rails to Enter: Yes  Hand Rails : Bilateral  One/Two Story Residence: Two story  Lift Chair Available: No  Living Alone: No  Support Systems: Family member(s), Spouse/Significant Other/Partner, Child(cece)  Patient Expects to be Discharged to[de-identified] Private residence  Current DME Used/Available at Home: CPAP  Tub or Shower Type: Shower    Hand dominance: Right    EXAMINATION OF PERFORMANCE DEFICITS:  Cognitive/Behavioral Status:  Neurologic State: Alert  Orientation Level: Oriented X4  Cognition: Appropriate decision making; Follows commands        Safety/Judgement: Insight into deficits    Skin: intact as seen    Edema: none noted     Hearing:       Vision/Perceptual:                                     Range of Motion:  AROM: Within functional limits                         Strength:  Strength: Within functional limits   Right UE slightly decreased compared to LUE, but WFL. Coordination:                Tone & Sensation:  Tone: Normal  Sensation: Intact                      Balance:  Sitting: Intact  Standing: Intact    Functional Mobility and Transfers for ADLs:  Bed Mobility:  Supine to Sit: Stand-by assistance  Sit to Supine: Stand-by assistance  Scooting: Independent    Transfers:  Sit to Stand: Stand-by assistance  Stand to Sit: Stand-by assistance  Toilet Transfer : Stand-by assistance    ADL Assessment:  Feeding: Independent    Oral Facial Hygiene/Grooming: Independent    Bathing: Stand-by assistance    Upper Body Dressing: Supervision    Lower Body Dressing: Stand-by assistance    Toileting: Stand by assistance                ADL Intervention and task modifications:                                     Cognitive Retraining  Safety/Judgement: Insight into deficits    Therapeutic Exercise:     Functional Measure:  Barthel Index:    Bathin  Bladder: 10  Bowels: 10  Groomin  Dressing: 10  Feeding: 10  Mobility: 10  Stairs: 5  Toilet Use: 10  Transfer (Bed to Chair and Back): 10  Total: 85/100        The Barthel ADL Index: Guidelines  1. The index should be used as a record of what a patient does, not as a record of what a patient could do. 2. The main aim is to establish degree of independence from any help, physical or verbal, however minor and for whatever reason. 3. The need for supervision renders the patient not independent. 4. A patient's performance should be established using the best available evidence. Asking the patient, friends/relatives and nurses are the usual sources, but direct observation and common sense are also important. However direct testing is not needed. 5. Usually the patient's performance over the preceding 24-48 hours is important, but occasionally longer periods will be relevant. 6. Middle categories imply that the patient supplies over 50 per cent of the effort. 7. Use of aids to be independent is allowed. Harlee Cowden., Barthel, D.W. (3095). Functional evaluation: the Barthel Index. [address] [facility] (14)2. GLO Luis, William Salvador, Levi Hutton., [place], [address] [place] [address] (). Measuring the change indisability after inpatient rehabilitation; comparison of the responsiveness of the Barthel Index and Functional [place] Measure. Journal of Neurology, Neurosurgery, and Psychiatry, 66(4), 846-214. Saritha Rojas, N.J.A, UYEN LandJ.FRANCA, & Harshil Baker, M.A. (2004.) Assessment of post-stroke quality of life in cost-effectiveness studies:  The usefulness of the Barthel Index and the EuroQoL-5D. Quality of Life Research, 13, 427-43       Fugl-Mackay Assessment of Motor Recovery after Stroke:     Reflex Activity  Flexors/Biceps/Fingers: Can be elicited  Extensors/Triceps: Can be elicited  Reflex Subtotal: 4    Volitional Movement Within Synergies  Shoulder Retraction: Full  Shoulder Elevation: Full  Shoulder Abduction (90 degrees): Full  Shoulder External Rotation: Full  Elbow Flexion: Full  Forearm Supination: Full  Shoulder Adduction/Internal Rotation: Full  Elbow Extension: Full  Forearm Pronation: Full  Subtotal: 18    Volitional Movement Mixing Synergies  Hand to Lumbar Spine: Full  Shoulder Flexion (0-90 degrees): Full  Pronation-Supination: Full  Subtotal: 6    Volitional Movement With Little or No Synergy  Shoulder Abduction (0-90 degrees): Full  Shoulder Flexion ( degrees): Full  Pronation/Supination: Full  Subtotal : 6    Normal Reflex Activity  Biceps, Triceps, Finger Flexors: Full  Subtotal : 2    Upper Extremity Total   Upper Extremity Total: 36    Wrist  Stability at 15 Degree Dorsiflexion: Full  Repeated Dorsiflexion/ Volar Flexion: Full  Stability at 15 Degree Dorsiflexion: Full  Repeated Dorsiflexion/ Volar Flexion: Full  Circumduction: Full  Wrist Total: 10    Hand  Mass Flexion: Full  Mass Extension: Full  Grasp A: Full  Grasp B: Full  Grasp C: Full  Grasp D: Full  Grasp E: Full  Hand Total: 14    Coordination/Speed  Tremor: None  Dysmetria: None  Time: <1s  Coordination/Speed Total : 6    Total A-D  Total A-D (Motor Function): 66/66         This is a reliable/valid measure of arm function after a neurological event. It has established value to characterize functional status and for measuring spontaneous and therapy-induced recovery; tests proximal and distal motor functions. Fugl-Mackay Assessment - UE scores recorded between five and 30 days post neurologic event can be used to predict UE recovery at six months post neurologic event.   Severe = 0-21 points Moderately Severe = 22-33 points   Moderate = 34-47 points   Mild = 48-66 points  JITENDRA Hatch, YOEL Gil, & KIMBERLEY Tan (1992). Measurement of motor recovery after stroke: Outcome assessment and sample size requirements. Stroke, 23, pp. 9379-3509.   --------------------------------------------------------------------------------------------------------------------------------------------------------------------  MCID:  Stroke:   Mychal Plasencia et al, 2001; n = 171; mean age 79 (6) years; assessed within 16 (12) days of stroke, Acute Stroke)  FMA Motor Scores from Admission to Discharge   10 point increase in FMA Upper Extremity = 1.5 change in discharge FIM   10 point increase in FMA Lower Extremity = 1.9 change in discharge FIM  MDC:   Stroke:   Stefan Bagley et al, 2008, n = 14, mean age = 59.9 (14.6) years, assessed on average 14 (6.5) months post stroke, Chronic Stroke)   FMA = 5.2 points for the Upper Extremity portion of the assessment       Occupational Therapy Evaluation Charge Determination   History Examination Decision-Making   LOW Complexity : Brief history review  LOW Complexity : 1-3 performance deficits relating to physical, cognitive , or psychosocial skils that result in activity limitations and / or participation restrictions  LOW Complexity : No comorbidities that affect functional and no verbal or physical assistance needed to complete eval tasks       Based on the above components, the patient evaluation is determined to be of the following complexity level: LOW   Pain Rating:      Activity Tolerance:   Fair, desaturates with exertion and requires oxygen, and requires rest breaks  Please refer to the flowsheet for vital signs taken during this treatment.     After treatment patient left in no apparent distress:    Sitting in chair, Call bell within reach, and Side rails x 3    COMMUNICATION/EDUCATION:   The patients plan of care was discussed with: Physical Therapist and Registered Nurse. Home safety education was provided and the patient/caregiver indicated understanding., Patient/family have participated as able in goal setting and plan of care. , and Patient/family agree to work toward stated goals and plan of care. This patients plan of care is appropriate for delegation to [facility].     Thank you for this referral.  Pelon Stout, OTR/L  Time Calculation: 27 mins

## 2020-01-23 NOTE — PROGRESS NOTES
Bedside and Verbal shift change report given to Isai King (oncoming nurse) by Antione Waddell (offgoing nurse). Report included the following information SBAR, Kardex, ED Summary, Procedure Summary, Intake/Output and MAR. Patient SOB after Cpap machine removed applied 4L O2 NC patient desat 88%, notified Dr. Christopher Sampson. Adjusted to 5L O2 NC.    4 1/2 O2 NC low 90'S. TRANSFER - OUT REPORT:    Verbal report given to Esther(name) on Casandra Horan  being transferred to [facility](unit) for routine post - op       Report consisted of patients Situation, Background, Assessment and   Recommendations(SBAR). Information from the following report(s) SBAR, Kardex, ED Summary, Procedure Summary, Intake/Output and MAR was reviewed with the receiving nurse. Lines:   Peripheral IV Left Antecubital (Active)   Site Assessment Clean, dry, & intact 1/23/2020  9:46 AM   Phlebitis Assessment 0 1/23/2020  9:46 AM   Infiltration Assessment 0 1/23/2020  9:46 AM   Dressing Status Clean, dry, & intact 1/23/2020  9:46 AM   Dressing Type Transparent 1/23/2020  9:46 AM       Peripheral IV Anterior;Right Forearm (Active)        Opportunity for questions and clarification was provided.       Patient transported with:   Zollo

## 2020-01-23 NOTE — PROGRESS NOTES
[facility]: MED RECONCILIATION    Comments/Recommendations:   Patient able to confirm name, , allergies, and preferred pharmacy    Medications added:     Lisinopril  Simvastatin  Bupropion XL  Omeprazole    Medications removed:    Docusate 100mg PO BID  Dilaudid 1mg PO Q6hrs prn   Lansoprazole 40mg daily  Lorazeam 1mg PO Q6hrs prn  Zofran ODT 4mg PO Q8hrs prn   MIralax 17g PO daily    Medications adjusted:    Zoloft 100mg PO daily; Change from 150mg PO daily as previously reported. Information obtained from: Patient, Rx Query     Allergies: Pcn [penicillins] and Codeine    Prior to Admission Medications:   Prior to Admission Medications   Prescriptions Last Dose Informant Taking? buPROPion XL (WELLBUTRIN XL) 150 mg tablet 2020 at am Self Yes   Sig: Take 150 mg by mouth every morning. lisinopril (PRINIVIL, ZESTRIL) 40 mg tablet 2020 at pm Self Yes   Sig: Take 40 mg by mouth every evening. omeprazole (PRILOSEC) 40 mg capsule 2020 at am Self Yes   Sig: Take 40 mg by mouth daily. sertraline (ZOLOFT) 100 mg tablet 2020 at am Self Yes   Sig: Take 100 mg by mouth daily. simvastatin (ZOCOR) 40 mg tablet 2020 at pm Self Yes   Sig: Take 40 mg by mouth nightly. Facility-Administered Medications: None               Stevie Obregon. JITENDRA   [address]

## 2020-01-23 NOTE — PROGRESS NOTES
1/23/2020  1:18 PM  Case management note    Reason for Admission:   Syncope    Patient had a syncopal episode. He is independent with ADL's and works. Patient has history of GERD, REBECCA with cpap. [employer] @ [place]  Wife not at bedside Jena Delgado  [phone]  OBS educated, letter signed and placed in chart                    RRAT Score:          2           Plan for utilizing home health:      Patient is independent and will most likely not qualify for home health services                    Current Advanced Directive/Advance Care Plan:  No advance directive planning                         Transition of Care Plan:               1. Home with family assistance  2. PCP follow up  3.  CM to follow until discharge    Care Management Interventions  PCP Verified by CM: Yes(dr. jevon pearl)  Mode of Transport at Discharge: Self  Transition of Care Consult (CM Consult): Discharge Planning  Current Support Network: Lives with Spouse  Confirm Follow Up Transport: Family  Lucia Crandall

## 2020-01-23 NOTE — PROGRESS NOTES
Problem: Mobility Impaired (Adult and Pediatric)  Goal: *Acute Goals and Plan of Care (Insert Text)  Description  FUNCTIONAL STATUS PRIOR TO ADMISSION: Patient was independent and active without use of DME.    HOME SUPPORT PRIOR TO ADMISSION: The patient lived with family but did not require assist.    Physical Therapy Goals  Initiated 1/23/2020  1. Patient will move from supine to sit and sit to supine  in bed with independence within 7 day(s). 2.  Patient will transfer from bed to chair and chair to bed with independence using the least restrictive device within 7 day(s). 3.  Patient will perform sit to stand with independence within 7 day(s). 4.  Patient will ambulate with independence for 200 feet with the least restrictive device within 7 day(s). 5.  Patient will ascend/descend 4 stairs with 1 handrail(s) with supervision/set-up within 7 day(s). Outcome: Progressing Towards Goal   PHYSICAL THERAPY EVALUATION  Patient: Elena Ortiz (74 y.o. male)  Date: 1/23/2020  Primary Diagnosis: Syncope [R55]        Precautions: fall         ASSESSMENT  Based on the objective data described below, the patient presents with generalized weakness, decreased functional ambulation from baseline, decreased O2 sats, slightly decreased dynamic balance and slight SOB following admission for syncopal episode at home. Patient was seen in ER today and was alert and oriented. He came to ED on 1/22 with complaints of right sided numbness and tingling as well as syncope,and tele-neurology was consulted. CT of head negative and patient was not a candidate for TPA. Patient reports his symptoms have improved overall. Per chart, patient is under stress at home due to custody wong over his kids with wife, but patient did not open up about that with PT today. He reports that he lives at home with wife and 3 kids and 2 dogs, and works full time as  for [employer].  Patient does demonstrate slower and slightly unsteady gait from baseline, decreased balance, as well and decreased O2 sats even while on 4 liters of O2. Recommend OP PT upon discharge to address balance deficits. He should have assistance at all times with gait for safety. Hopefully will not need home O2. Current Level of Function Impacting Discharge (mobility/balance): Patient stood and ambulated 100 feet with slow gait and CGA. He was on 4 liters of O2 and O2 sats ranged from 72-93%. HR and BP stable, though BP running a bit high (see doc flow sheets). Encouraged pursed lip breathing. Patient does admit to SOB at times. He was on 4 liters at all times with PT. PT did educate patient regarding signs and symptoms of stroke using BEFAST, and handout provided. Patient reported overall fatigue at end of walk. Functional Outcome Measure: The patient scored 42/56 on the Steel outcome measure which is indicative of low fall risk. Other factors to consider for discharge: family issues per chart; low O2 sats with activity and on 4 liters of O2     Patient will benefit from skilled therapy intervention to address the above noted impairments. PLAN :  Recommendations and Planned Interventions: gait training      Frequency/Duration: Patient will be followed by physical therapy:  5 times a week to address goals. Recommendation for discharge: (in order for the patient to meet his/her long term goals)  Outpatient physical therapy follow up recommended for balance deficits     This discharge recommendation:  Has not yet been discussed the attending provider and/or case management    IF patient discharges home will need the following DME: to be determined (TBD)         SUBJECTIVE:   Patient stated I feel better overall.     OBJECTIVE DATA SUMMARY:   HISTORY:    Past Medical History:   Diagnosis Date    Gastrointestinal disorder     GERD    GERD (gastroesophageal reflux disease)     Panic attack     Psychiatric disorder     anxiety    Seizures ([facility] [address] 5 yo   was told it was a stress seizure, never had another one    Sleep apnea     uses CPAP    Unspecified adverse effect of anesthesia     woke up during endoscopy procedure     Past Surgical History:   Procedure Laterality Date    ABDOMEN SURGERY [address]    hernia(abdomen)- open    HX GI  2008    esophagus repair narrow- balloon dilation and scar tissue removal    HX HERNIA REPAIR      inguinal hernia    HX OTHER SURGICAL  11years of age    tonsils    HX OTHER SURGICAL  2009    hernia (hiatal and abdomen)-open    HX OTHER SURGICAL  23years of age    wisdom teeth extraction under anesthesia    HX OTHER SURGICAL  8 years     distended testicle     HX OTHER SURGICAL  1/16/15    Laparoscopic ventral hernia repair with mesh       Personal factors and/or comorbidities impacting plan of care: stress situation at home over custody of children (per chart); dynamic balance deficits, low O2 sats    Home Situation  Home Environment: Private residence  # Steps to Enter: 3  Rails to Enter: Yes  Hand Rails : Bilateral  One/Two Story Residence: Two story  Lift Chair Available: No  Living Alone: No  Support Systems: Family member(s), Spouse/Significant Other/Partner, Child(cece)  Patient Expects to be Discharged to[de-identified] Private residence  Current DME Used/Available at Home: CPAP  Tub or Shower Type: Shower    EXAMINATION/PRESENTATION/DECISION MAKING:   Critical Behavior:  Neurologic State: Alert  Orientation Level: Oriented X4  Cognition: Appropriate decision making, Follows commands  Safety/Judgement: Insight into deficits       Skin:  not fully observed    Range Of Motion:  AROM: Within functional limits                       Strength:    Strength:  Within functional limits                    Tone & Sensation:   Tone: Normal              Sensation: Intact                     Functional Mobility:  Bed Mobility:     Supine to Sit: Stand-by assistance  Sit to Supine: Stand-by assistance  Scooting: Independent  Transfers:  Sit to Stand: Stand-by assistance  Stand to Sit: Stand-by assistance                       Balance:   Sitting: Intact  Standing: Intact  Ambulation/Gait Training:  Distance (ft): 100 Feet (ft)  Assistive Device: Gait belt  Ambulation - Level of Assistance: Contact guard assistance        Gait Abnormalities: Decreased step clearance        Base of Support: Narrowed     Speed/Syl: Pace decreased (<100 feet/min)                                     Therapeutic Exercises:   Pursed lip breathing    Functional Measure:  Steel Balance Test:    Sitting to Standin  Standing Unsupported: 3  Sitting with Back Unsupported: 4  Standing to Sittin  Transfers: 4  Standing Unsupported with Eyes Closed: 3  Standing Unsupported with Feet Together: 3  Reach Forward with Outstretched Arm: 4   Object: 3  Turn to Look Over Shoulders: 4  Turn 360 Degrees: 2  Alternate Foot on Step/Stool: 1  Standing Unsupported One Foot in Front: 2  Stand on One Le  Total: 42/56         56=Maximum possible score;   0-20=High fall risk  21-40=Moderate fall risk   41-56=Low fall risk               Physical Therapy Evaluation Charge Determination   History Examination Presentation Decision-Making   MEDIUM  Complexity : 1-2 comorbidities / personal factors will impact the outcome/ POC  LOW Complexity : 1-2 Standardized tests and measures addressing body structure, function, activity limitation and / or participation in recreation  MEDIUM Complexity : Evolving with changing characteristics  Other outcome measures Steel  LOW       Based on the above components, the patient evaluation is determined to be of the following complexity level: LOW     Pain Rating:  None at this time    Activity Tolerance:   desaturates with exertion and requires oxygen and observed SOB with activity  Please refer to the flowsheet for vital signs taken during this treatment.     After treatment patient left in no apparent distress:   Supine in bed, Call bell within reach, and Side rails x 3    COMMUNICATION/EDUCATION:   The patients plan of care was discussed with: Occupational Therapist and Registered Nurse. Fall prevention education was provided and the patient/caregiver indicated understanding. and Patient/family agree to work toward stated goals and plan of care.     Thank you for this referral.  Elver Foster, PT   Time Calculation: 35 mins

## 2020-01-24 VITALS
BODY MASS INDEX: 35.93 KG/M2 | SYSTOLIC BLOOD PRESSURE: 96 MMHG | TEMPERATURE: 97.4 F | RESPIRATION RATE: 20 BRPM | DIASTOLIC BLOOD PRESSURE: 56 MMHG | HEIGHT: 71 IN | WEIGHT: 256.62 LBS | OXYGEN SATURATION: 96 % | HEART RATE: 73 BPM

## 2020-01-24 LAB
ALBUMIN SERPL-MCNC: 3.6 G/DL (ref 3.5–5)
ALBUMIN/GLOB SERPL: 1.1 {RATIO} (ref 1.1–2.2)
ALP SERPL-CCNC: 71 U/L (ref 45–117)
ALT SERPL-CCNC: 42 U/L (ref 12–78)
ANION GAP SERPL CALC-SCNC: 0 MMOL/L (ref 5–15)
AST SERPL-CCNC: 22 U/L (ref 15–37)
BASOPHILS # BLD: 0.1 K/UL (ref 0–0.1)
BASOPHILS NFR BLD: 1 % (ref 0–1)
BILIRUB SERPL-MCNC: 0.6 MG/DL (ref 0.2–1)
BUN SERPL-MCNC: 18 MG/DL (ref 6–20)
BUN/CREAT SERPL: 16 (ref 12–20)
CALCIUM SERPL-MCNC: 8.9 MG/DL (ref 8.5–10.1)
CHLORIDE SERPL-SCNC: 110 MMOL/L (ref 97–108)
CHOLEST SERPL-MCNC: 249 MG/DL
CO2 SERPL-SCNC: 30 MMOL/L (ref 21–32)
CREAT SERPL-MCNC: 1.16 MG/DL (ref 0.7–1.3)
DIFFERENTIAL METHOD BLD: ABNORMAL
EOSINOPHIL # BLD: 0.2 K/UL (ref 0–0.4)
EOSINOPHIL NFR BLD: 3 % (ref 0–7)
ERYTHROCYTE [DISTWIDTH] IN BLOOD BY AUTOMATED COUNT: 13.3 % (ref 11.5–14.5)
EST. AVERAGE GLUCOSE BLD GHB EST-MCNC: 126 MG/DL
GLOBULIN SER CALC-MCNC: 3.3 G/DL (ref 2–4)
GLUCOSE SERPL-MCNC: 110 MG/DL (ref 65–100)
HBA1C MFR BLD: 6 % (ref 4–5.6)
HCT VFR BLD AUTO: 43.4 % (ref 36.6–50.3)
HDLC SERPL-MCNC: 39 MG/DL
HDLC SERPL: 6.4 {RATIO} (ref 0–5)
HGB BLD-MCNC: 14.7 G/DL (ref 12.1–17)
IMM GRANULOCYTES # BLD AUTO: 0 K/UL (ref 0–0.04)
IMM GRANULOCYTES NFR BLD AUTO: 1 % (ref 0–0.5)
LDLC SERPL CALC-MCNC: 156 MG/DL (ref 0–100)
LEFT CCA DIST DIAS: 22.9 CM/S
LEFT CCA DIST SYS: 99.7 CM/S
LEFT CCA PROX DIAS: 24.8 CM/S
LEFT CCA PROX SYS: 141.1 CM/S
LEFT ECA DIAS: 9.07 CM/S
LEFT ECA SYS: 105.7 CM/S
LEFT ICA DIST DIAS: 18.9 CM/S
LEFT ICA DIST SYS: 69.5 CM/S
LEFT ICA MID DIAS: 39.4 CM/S
LEFT ICA MID SYS: 71.6 CM/S
LEFT ICA PROX DIAS: 30.5 CM/S
LEFT ICA PROX SYS: 74.7 CM/S
LEFT ICA/CCA SYS: 0.75
LEFT SUBCLAVIAN DIAS: 0 CM/S
LEFT SUBCLAVIAN SYS: 145.1 CM/S
LEFT VERTEBRAL DIAS: 9.25 CM/S
LEFT VERTEBRAL SYS: 63 CM/S
LIPID PROFILE,FLP: ABNORMAL
LYMPHOCYTES # BLD: 2.1 K/UL (ref 0.8–3.5)
LYMPHOCYTES NFR BLD: 32 % (ref 12–49)
MAGNESIUM SERPL-MCNC: 2.4 MG/DL (ref 1.6–2.4)
MCH RBC QN AUTO: 29.8 PG (ref 26–34)
MCHC RBC AUTO-ENTMCNC: 33.9 G/DL (ref 30–36.5)
MCV RBC AUTO: 88 FL (ref 80–99)
MONOCYTES # BLD: 0.7 K/UL (ref 0–1)
MONOCYTES NFR BLD: 11 % (ref 5–13)
NEUTS SEG # BLD: 3.4 K/UL (ref 1.8–8)
NEUTS SEG NFR BLD: 52 % (ref 32–75)
NRBC # BLD: 0 K/UL (ref 0–0.01)
NRBC BLD-RTO: 0 PER 100 WBC
PHOSPHATE SERPL-MCNC: 3.4 MG/DL (ref 2.6–4.7)
PLATELET # BLD AUTO: 192 K/UL (ref 150–400)
PMV BLD AUTO: 9 FL (ref 8.9–12.9)
POTASSIUM SERPL-SCNC: 3.9 MMOL/L (ref 3.5–5.1)
PROT SERPL-MCNC: 6.9 G/DL (ref 6.4–8.2)
RBC # BLD AUTO: 4.93 M/UL (ref 4.1–5.7)
RIGHT CCA DIST DIAS: 0 CM/S
RIGHT CCA DIST SYS: 81.1 CM/S
RIGHT CCA PROX DIAS: 32 CM/S
RIGHT CCA PROX SYS: 116 CM/S
RIGHT ECA DIAS: 11.38 CM/S
RIGHT ECA SYS: 132.2 CM/S
RIGHT ICA DIST DIAS: 43.4 CM/S
RIGHT ICA DIST SYS: 109.6 CM/S
RIGHT ICA MID DIAS: 25.7 CM/S
RIGHT ICA MID SYS: 91.9 CM/S
RIGHT ICA PROX DIAS: 20.8 CM/S
RIGHT ICA PROX SYS: 77.4 CM/S
RIGHT ICA/CCA SYS: 1.4
RIGHT SUBCLAVIAN DIAS: 15.84 CM/S
RIGHT SUBCLAVIAN SYS: 158.1 CM/S
RIGHT VERTEBRAL DIAS: 11.14 CM/S
RIGHT VERTEBRAL SYS: 88.6 CM/S
SODIUM SERPL-SCNC: 140 MMOL/L (ref 136–145)
TRIGL SERPL-MCNC: 270 MG/DL (ref ?–150)
VLDLC SERPL CALC-MCNC: 54 MG/DL
WBC # BLD AUTO: 6.4 K/UL (ref 4.1–11.1)

## 2020-01-24 PROCEDURE — 85025 COMPLETE CBC W/AUTO DIFF WBC: CPT

## 2020-01-24 PROCEDURE — 80053 COMPREHEN METABOLIC PANEL: CPT

## 2020-01-24 PROCEDURE — 74011250636 HC RX REV CODE- 250/636: Performed by: INTERNAL MEDICINE

## 2020-01-24 PROCEDURE — 97530 THERAPEUTIC ACTIVITIES: CPT

## 2020-01-24 PROCEDURE — 83036 HEMOGLOBIN GLYCOSYLATED A1C: CPT

## 2020-01-24 PROCEDURE — 36415 COLL VENOUS BLD VENIPUNCTURE: CPT

## 2020-01-24 PROCEDURE — 80061 LIPID PANEL: CPT

## 2020-01-24 PROCEDURE — 83735 ASSAY OF MAGNESIUM: CPT

## 2020-01-24 PROCEDURE — 84100 ASSAY OF PHOSPHORUS: CPT

## 2020-01-24 PROCEDURE — 74011250637 HC RX REV CODE- 250/637: Performed by: INTERNAL MEDICINE

## 2020-01-24 PROCEDURE — 97116 GAIT TRAINING THERAPY: CPT

## 2020-01-24 PROCEDURE — 99218 HC RM OBSERVATION: CPT

## 2020-01-24 PROCEDURE — 96372 THER/PROPH/DIAG INJ SC/IM: CPT

## 2020-01-24 RX ADMIN — SIMVASTATIN 40 MG: 20 TABLET, FILM COATED ORAL at 08:48

## 2020-01-24 RX ADMIN — SERTRALINE 100 MG: 50 TABLET, FILM COATED ORAL at 08:48

## 2020-01-24 RX ADMIN — Medication 10 ML: at 06:19

## 2020-01-24 RX ADMIN — ASPIRIN 81 MG 81 MG: 81 TABLET ORAL at 08:48

## 2020-01-24 RX ADMIN — LISINOPRIL 20 MG: 20 TABLET ORAL at 08:48

## 2020-01-24 RX ADMIN — ENOXAPARIN SODIUM 40 MG: 40 INJECTION SUBCUTANEOUS at 08:48

## 2020-01-24 RX ADMIN — PANTOPRAZOLE SODIUM 40 MG: 40 TABLET, DELAYED RELEASE ORAL at 06:18

## 2020-01-24 NOTE — DISCHARGE SUMMARY
Hospitalist Discharge Summary     Patient ID:    Cordelia Copeland  [id number]  52 y.o.  1970    Admit date: 1/22/2020    Discharge date and time: 1/24/2020    Admission Diagnoses: Syncope [R55]    Chronic Diagnoses:    Problem List as of 1/24/2020 Date Reviewed: 1/23/2020          Codes Class Noted - Resolved    Panic attack ICD-10-CM: F41.0  ICD-9-CM: 300.01  1/23/2020 - Present        Psychiatric disorder ICD-10-CM: F99  ICD-9-CM: 300.9  1/23/2020 - Present    Overview Signed 1/23/2020  1:40 AM by Milo Phalen, MD     anxiety             Seizures ([facility].) ICD-10-CM: R56.9  ICD-9-CM: 780.39  1/23/2020 - Present    Overview Signed 1/23/2020  1:41 AM by Milo Phalen, MD     5 yo   was told it was a stress seizure, never had another one             Sleep apnea ICD-10-CM: G47.30  ICD-9-CM: 780.57  1/23/2020 - Present    Overview Signed 1/23/2020  1:41 AM by Milo Phalen, MD     uses CPAP             Morbid obesity ([facility] ICD-10-CM: E66.01  ICD-9-CM: 278.01  1/23/2020 - Present        Syncope ICD-10-CM: R55  ICD-9-CM: 780.2  1/23/2020 - Present        HTN (hypertension) ICD-10-CM: I10  ICD-9-CM: 401.9  1/23/2020 - Present        Ventral hernia ICD-10-CM: K43.9  ICD-9-CM: 553.20  12/16/2014 - Present        GERD (gastroesophageal reflux disease) ICD-10-CM: K21.9  ICD-9-CM: 530.81  12/16/2014 - Present              Discharge Medications:   Current Discharge Medication List      CONTINUE these medications which have NOT CHANGED    Details   sertraline (ZOLOFT) 100 mg tablet Take 100 mg by mouth daily. lisinopril (PRINIVIL, ZESTRIL) 40 mg tablet Take 40 mg by mouth every evening. simvastatin (ZOCOR) 40 mg tablet Take 40 mg by mouth nightly. buPROPion XL (WELLBUTRIN XL) 150 mg tablet Take 150 mg by mouth every morning. omeprazole (PRILOSEC) 40 mg capsule Take 40 mg by mouth daily. Follow up Care:    1. Justus Vogt MD in 1-2 weeks  2.       Diet:  Regular Diet    Disposition:  Home. Advanced Directive:    Discharge Exam:  See today's note. CONSULTATIONS: Pulmonary/Intensive care and Neurology    Significant Diagnostic Studies:   Recent Labs     01/24/20  0400 01/22/20  2348   WBC 6.4 8.0   HGB 14.7 14.4   HCT 43.4 41.9    212     Recent Labs     01/24/20  0400 01/22/20  2348    139   K 3.9 3.6   * 107   CO2 30 26   BUN 18 17   CREA 1.16 1. 35*   * 133*   CA 8.9 9.0   MG 2.4  --    PHOS 3.4  --      Recent Labs     01/24/20  0400 01/22/20  2348   SGOT 22 29   ALT 42 43   AP 71 75   TBILI 0.6 0.5   TP 6.9 7.3   ALB 3.6 3.9   GLOB 3.3 3.4     Recent Labs     01/22/20  2348   INR 1.0   PTP 9.8   APTT 25.2      No results for input(s): FE, TIBC, PSAT, FERR in the last 72 hours. No results for input(s): PH, PCO2, PO2 in the last 72 hours. Recent Labs     01/23/20  0606      CKMB 2.1     Lab Results   Component Value Date/Time    Glucose (POC) 130 (H) 02/09/2014 10:05 PM             HOSPITAL COURSE & TREATMENT RENDERED:   1.  Syncope: brief LOC. Happened while he was taking a shower. Preceded by episode of headache, chest pain, hyperventilation, R arm / facial numbness. Likely vasovagal. Currently feeling much better. Chain of events precipitated by a huge argument with son, lots of stress recently (fighting with ex-wife over custody of children). Monitor on tele. Rule out CVA/TIA, workup to include brain MRI/MRA, TTE. all negative. Neurology consulted. Continue  ASA, continue statin     2.  Hypoxia. Unclear cause. CTA is negative for PE. Continue supplement O2 to keep SAO2 > 90%. Pulmonary evaluation appreciated. Needs outpatient FU.   6 minutes walk : no hypoxia      3.  Depression/anxiety.  Continue Zoloft.      4.  GERD (gastroesophageal reflux disease): continue PPI     5.  Sleep apnea: continue CPAP QHS     6.  Morbid obesity: Body mass index is 35.79 kg/m². advised weight loss.      7.  Seizure: childhood, x 1 episode        Discharged in improved condition.     Spent 35 minutes    Signed:  Kaylene Loya MD  1/24/2020  12:00 PM

## 2020-01-24 NOTE — PROGRESS NOTES
Problem: Mobility Impaired (Adult and Pediatric)  Goal: *Acute Goals and Plan of Care (Insert Text)  Description  FUNCTIONAL STATUS PRIOR TO ADMISSION: Patient was independent and active without use of DME.    HOME SUPPORT PRIOR TO ADMISSION: The patient lived with family but did not require assist.    Physical Therapy Goals  Initiated 1/23/2020  1. Patient will move from supine to sit and sit to supine  in bed with independence within 7 day(s). 2.  Patient will transfer from bed to chair and chair to bed with independence using the least restrictive device within 7 day(s). 3.  Patient will perform sit to stand with independence within 7 day(s). 4.  Patient will ambulate with independence for 200 feet with the least restrictive device within 7 day(s). 5.  Patient will ascend/descend 4 stairs with 1 handrail(s) with supervision/set-up within 7 day(s). Outcome: Progressing Towards Goal   PHYSICAL THERAPY TREATMENT/DISCHARGE  Patient: Javon Quintero (44 y.o. male)  Date: 1/24/2020  Diagnosis: Syncope [R55]   <principal problem not specified>       Precautions:    Chart, physical therapy assessment, plan of care and goals were reviewed. ASSESSMENT  Patient continues with skilled PT services and has progressed towards goals. independent with ambulation without assistive device as well as up and down stairs and independent with all functional mobility. Reviewed all safety precaution and home exercise program with the patient, verbalized understanding, clear to go home per Physical Therapy perspective. Skilled physical therapy is not indicated at this time. Documentation for home O2:     ROOM AIR    AT REST   O2 SATS  97 HR  78   ROOM AIR WITH ACTIVITY 02 SATS  95 HR  85   ROOM AIR PATIENT LEFT COMFORTABLY  SITTING/SUPINE O2 SATS  98 HR  80       Other factors to consider for discharge: none         PLAN :  Patient will be discharged from acute skilled physical therapy at this time.     Rationale for discharge:  Goals achieved    Recommendation for discharge: (in order for the patient to meet his/her long term goals)  No skilled physical therapy/ follow up rehabilitation needs identified at this time. This discharge recommendation:  Has been made in collaboration with the attending provider and/or case management    IF patient discharges home will need the following DME: none       SUBJECTIVE:   Patient stated Shelby Jules to go home.     OBJECTIVE DATA SUMMARY:   Critical Behavior:  Neurologic State: Alert, Appropriate for age, Eyes open spontaneously  Orientation Level: Appropriate for age, Oriented X4  Cognition: Appropriate decision making, Appropriate for age attention/concentration, Appropriate safety awareness, Follows commands  Safety/Judgement: Insight into deficits  Functional Mobility Training:  Bed Mobility:  Rolling: Independent  Supine to Sit: Independent  Sit to Supine: Independent  Scooting: Independent        Transfers:  Sit to Stand: Independent  Stand to Sit: Independent  Stand Pivot Transfers: Independent     Bed to Chair: Independent                    Balance:  Sitting: Intact  Standing: Intact; Without support  Ambulation/Gait Training:  Distance (ft): 250 Feet (ft)  Assistive Device: Gait belt  Ambulation - Level of Assistance: Independent     Gait Description (WDL): Within defined limits                                        Therapeutic Exercises:    Instructed patient to continue active range of motion exercise on both legs while up on chair or on bed. Pain Ratin/10    Activity Tolerance:   Good  Please refer to the flowsheet for vital signs taken during this treatment. After treatment patient left in no apparent distress:   Sitting in chair, Call bell within reach, and Caregiver / family present    COMMUNICATION/COLLABORATION:   The patients plan of care was discussed with: Occupational Therapist and Registered Nurse    Loren Pennington PT,MORRIS.    Time Calculation: 24 mins

## 2020-01-24 NOTE — PROCEDURES
Name:   Pepe Rust  YOB: 1970  MRN:   [id number]           Procedure:   EEG     Location:   [place] [address]  Date of Recordin20    Date of Interpretation:    20    Interpreting physician: Andie Kapadia MD  Requesting provider:   Andie Kapadia MD      Indication: 52 y.o. male with complaints of syncopal episode. Remote hx of seizure at 9years old, none since. Current medications: has a current medication list which includes the following prescription(s): sertraline, lisinopril, simvastatin, bupropion xl, and omeprazole, and the following Facility-Administered Medications: sodium chloride, sodium chloride, acetaminophen, naloxone, sertraline, pantoprazole, aspirin, enoxaparin, lisinopril, and simvastatin. Technical:   Digital EEG recorded in 10-20 international placement system, multiple montages    Interpretation:   Patient level of alertness: sleep  Background pattern: low amplitude. Posterior dominant rhythm: 2-3 hz.  Photic stimulation: no clear driving response. Hyperventilation: not performed due to pt sleeping. Drowsiness recorded: no.  Sleep recorded: yes,sleep spindles, background frequencies in the 2-3 Hz, low amplitude, occasional vertex waves. Pt briefly awakens and PDR increases to 7-8 Hz for few seconds, then back to sleep background. Single lead EKG: no abnormalities. Areas of focal slowing: none. Epileptiform discharges: none. Electrographic seizures: none. Impression: This is a normal predominantly sleep, brief awake EEG. Clinical correlation is necessary.         Andie Kapadia MD  [facility]

## 2020-01-24 NOTE — PROGRESS NOTES
Bedside shift change report given to Wojciech Ralph RN (oncoming nurse) by Netta Rangel RN (offgoing nurse). Report included the following information SBAR and Kardex.

## 2020-01-24 NOTE — PROGRESS NOTES
Inpatient Neurology Progress Note      Patient ID:   Marleni Norris  [id number]  52 y.o.  1970    Follow up: syncope    Subjective:     MRA Brain: normal.  MRI Brain: no evidence of stroke; a linear tract extending from surface of brain (left temporal-parietal region) to ventricle, suggestive of prior surgery though no definite post-surgical changes on CT head. This area is not identified on other sequence in this MRI. Carotid Dopplers: no significant stenosis. EEG: normal sleep and awake EEG. Discussed with patient. He's never had any type of brain surgery,and no shunt procedures. He's feeling back to himself, says is oxygenation has improved, now on room air. Vitals:   Patient Vitals for the past 8 hrs:   Temp Pulse Resp BP SpO2   01/24/20 1104 97.4 °F (36.3 °C) 73 20 96/56 96 %   01/24/20 0959     95 %   01/24/20 0920     92 %   01/24/20 0825 97.7 °F (36.5 °C) 73 18 168/72 95 %   01/24/20 0700  64      01/24/20 0355 97.9 °F (36.6 °C) 69 18 123/74 98 %        Exam:   Awake,alert,conversant, NAD  EOMI Face symmetric, hearing/ speech normal  Motor: 5/5 in all exts  Sensory: intact to to LT in all exts  Gait: normal         Objective: Interval progress notes in [facility] care were reviewed    Brief ROS: Per Interval Hx above    Allergies:    Allergies   Allergen Reactions    Pcn [Penicillins] Hives    Codeine Nausea and Vomiting       Medications:  Current Facility-Administered Medications   Medication Dose Route Frequency    sodium chloride (NS) flush 5-40 mL  5-40 mL IntraVENous Q8H    sodium chloride (NS) flush 5-40 mL  5-40 mL IntraVENous PRN    acetaminophen (TYLENOL) tablet 650 mg  650 mg Oral Q6H PRN    naloxone (NARCAN) injection 0.4 mg  0.4 mg IntraVENous PRN    sertraline (ZOLOFT) tablet 100 mg  100 mg Oral DAILY    pantoprazole (PROTONIX) tablet 40 mg  40 mg Oral ACB    aspirin chewable tablet 81 mg  81 mg Oral DAILY    enoxaparin (LOVENOX) injection 40 mg  40 mg SubCUTAneous Q24H    lisinopril (PRINIVIL, ZESTRIL) tablet 20 mg  20 mg Oral DAILY    simvastatin (ZOCOR) tablet 40 mg  40 mg Oral DAILY         Impression/ Plan:       ICD-10-CM ICD-9-CM    1. Injury of head, initial encounter S09.90XA 959.01    2. Syncope and collapse R55 780.2    3. Panic anxiety syndrome F41.0 300.01    4. Vasovagal syncope R55 780.2        Vasovagal syncope d/t severe headache and chest pain  EEG, MRI Brain, MRA Brain all normal  Non-focal neurologic exam  Can be discharged from my standpoint  Will s/o.     Signed By: Shireen Santillan MD     January 24, 2020

## 2020-01-24 NOTE — PROGRESS NOTES
Name: Tito Downs: [address]   : 1970 Admit Date: 2020   Phone: [phone]  Room: /   PCP: Moses Dixon MD  MRN: [id number]   Date: 2020  Code: Full Code          Chart and notes reviewed. Data reviewed. I review the patient's current medications in the medical record at each encounter. I have evaluated and examined the patient. History of Present Illness:  Mr. Yossi Lanier is a 53 yo gentleman with a history of REBECCA (compliant with CPAP), GERD, and anxiety who presented after a syncopal episode. Yesterday he had an argument with his son and following this had a HA and hyperventilation with associated R hand numbness, facial numbness and blurred vision. He believes he passed out. Denied any notable facial droop or changes in speech. No focal weakness. Today he was noted to be hypoxic to the mid 80's, did not improve with 2L and ultimately required 5L to increase sats to 90% or greater. Did wear CPAP overnight. Has not been evaluated by a sleep physician since being started on this. Denies LE edema/swelling/pain. Does note some shortness of breath over the past several days. Labs: WBC 8.0, cr 1.35, troponin <0.05,     Images:  CT head: no acute process  CXR : enlarged cardiac silhouette, no acute pulmonary process  Carotid dopplers: 0-49% stenosis bilaterally    CTA ordered, TTE with bubble being done at the bedside    Overnight events  Afebrile  Sats 95% on RA    Chest CTA personally reviewed. Lung are clear. No PE. Minimal left linear left basilar subsegmental atelectasis. ECHO: EF 55-60%; negative bubble study  MRI/MRA: No acute process/infarct  Carotid dopplers normal  EEG: normal    ROS: Feeling better this morning. Reports improved inspiratory effort. Denies SOB at rest on RA. Has not ambulated yet on RA. Denies CP. Denies HA, lightheadedness, dizziness. Denies abd pain or LE pain/swelling.       Past Medical History:   Diagnosis Date    Gastrointestinal disorder     GERD    GERD (gastroesophageal reflux disease)     Panic attack     Psychiatric disorder     anxiety    Seizures ([facility]     7 yo   was told it was a stress seizure, never had another one    Sleep apnea     uses CPAP    Unspecified adverse effect of anesthesia     woke up during endoscopy procedure       Past Surgical History:   Procedure Laterality Date    ABDOMEN SURGERY [address]    hernia(abdomen)- open    HX GI  2008    esophagus repair narrow- balloon dilation and scar tissue removal    HX HERNIA REPAIR      inguinal hernia    HX OTHER SURGICAL  11years of age    tonsils    HX OTHER SURGICAL  2009    hernia (hiatal and abdomen)-open    HX OTHER SURGICAL  23years of age    wisdom teeth extraction under anesthesia    HX OTHER SURGICAL  8 years     distended testicle     HX OTHER SURGICAL  1/16/15    Laparoscopic ventral hernia repair with mesh       Family History   Problem Relation Age of Onset    Diabetes Mother     Hypertension Mother     Stroke Mother         copd    Cancer Father         skin    Heart Disease Father     Diabetes Father     Heart Attack Father        Social History     Tobacco Use    Smoking status: Never Smoker    Smokeless tobacco: Never Used   Substance Use Topics    Alcohol use:  Yes     Alcohol/week: 1.7 standard drinks     Types: 2 Cans of beer per week     Comment: 1 or 2 beer every two weeks       Allergies   Allergen Reactions    Pcn [Penicillins] Hives    Codeine Nausea and Vomiting       Current Facility-Administered Medications   Medication Dose Route Frequency    sodium chloride (NS) flush 5-40 mL  5-40 mL IntraVENous Q8H    sodium chloride (NS) flush 5-40 mL  5-40 mL IntraVENous PRN    acetaminophen (TYLENOL) tablet 650 mg  650 mg Oral Q6H PRN    naloxone (NARCAN) injection 0.4 mg  0.4 mg IntraVENous PRN    sertraline (ZOLOFT) tablet 100 mg  100 mg Oral DAILY    pantoprazole (PROTONIX) tablet 40 mg 40 mg Oral ACB    aspirin chewable tablet 81 mg  81 mg Oral DAILY    enoxaparin (LOVENOX) injection 40 mg  40 mg SubCUTAneous Q24H    lisinopril (PRINIVIL, ZESTRIL) tablet 20 mg  20 mg Oral DAILY    simvastatin (ZOCOR) tablet 40 mg  40 mg Oral DAILY         REVIEW OF SYSTEMS   12 point ROS negative except as stated in the HPI. Physical Exam:   Visit Vitals  /72 (BP 1 Location: Left arm, BP Patient Position: At rest)   Pulse 73   Temp 97.7 °F (36.5 °C)   Resp 18   Ht 5' 11\" (1.803 m)   Wt 116.4 kg (256 lb 9.9 oz)   SpO2 95%   BMI 35.79 kg/m²       General:  Alert, cooperative, no distress, appears stated age. Head:  Normocephalic, without obvious abnormality, atraumatic. Eyes:  Conjunctivae/corneas clear. Nose: Nares normal. Septum midline. Mucosa normal.    Throat: Lips, mucosa, and tongue normal.    Neck: Supple, symmetrical, trachea midline, no adenopathy. Lungs:   Clear to auscultation bilaterally. Resp nonlabored. Chest wall:  No tenderness or deformity. Heart:  Regular rate and rhythm, S1, S2 normal, no murmur, click, rub or gallop. Abdomen:   Soft, non-tender. Bowel sounds normal. .   Extremities: Extremities normal, atraumatic, no cyanosis or edema. Pulses: 2+ and symmetric all extremities.    Skin: Skin color, texture, turgor normal. No rashes or lesions   Lymph nodes: Cervical, supraclavicular nodes normal.   Neurologic: Grossly nonfocal       Lab Results   Component Value Date/Time    Sodium 140 01/24/2020 04:00 AM    Potassium 3.9 01/24/2020 04:00 AM    Chloride 110 (H) 01/24/2020 04:00 AM    CO2 30 01/24/2020 04:00 AM    BUN 18 01/24/2020 04:00 AM    Creatinine 1.16 01/24/2020 04:00 AM    Glucose 110 (H) 01/24/2020 04:00 AM    Calcium 8.9 01/24/2020 04:00 AM    Magnesium 2.4 01/24/2020 04:00 AM    Phosphorus 3.4 01/24/2020 04:00 AM       Lab Results   Component Value Date/Time    WBC 6.4 01/24/2020 04:00 AM    HGB 14.7 01/24/2020 04:00 AM    PLATELET 665 20/62/0051 04:00 AM    MCV 88.0 01/24/2020 04:00 AM       Lab Results   Component Value Date/Time    INR 1.0 01/22/2020 11:48 PM    aPTT 25.2 01/22/2020 11:48 PM    AST (SGOT) 22 01/24/2020 04:00 AM    Alk.  phosphatase 71 01/24/2020 04:00 AM    Protein, total 6.9 01/24/2020 04:00 AM    Albumin 3.6 01/24/2020 04:00 AM    Globulin 3.3 01/24/2020 04:00 AM       No results found for: IRON, FE, TIBC, IBCT, PSAT, FERR    No results found for: SR, CRP, TEODORO, ANAIGG, RA, RPR, RPRT, VDRLT, VDRLS, TSH, TSHEXT, TSHEXT     No results found for: PH, PHI, PCO2, PCO2I, PO2, PO2I, HCO3, HCO3I, FIO2, FIO2I    Lab Results   Component Value Date/Time     01/23/2020 06:06 AM    CK-MB Index 0.8 01/23/2020 06:06 AM    Troponin-I, Qt. <0.05 01/23/2020 06:06 AM        No results found for: CULT    No results found for: TOXA1, RPR, HBCM, HBSAG, HAAB, HCAB1, HAAT, G6PD, CRYAC, HIVGT, HIVR, HIV1, HIV12, HIVPC, HIVRPI    Lab Results   Component Value Date/Time     01/23/2020 06:06 AM       Lab Results   Component Value Date/Time    Color YELLOW/STRAW 01/23/2020 12:55 AM    Appearance CLEAR 01/23/2020 12:55 AM    pH (UA) 7.0 01/23/2020 12:55 AM    Protein NEGATIVE  01/23/2020 12:55 AM    Glucose NEGATIVE  01/23/2020 12:55 AM    Ketone NEGATIVE  01/23/2020 12:55 AM    Bilirubin NEGATIVE  01/23/2020 12:55 AM    Blood NEGATIVE  01/23/2020 12:55 AM    Urobilinogen 1.0 01/23/2020 12:55 AM    Nitrites NEGATIVE  01/23/2020 12:55 AM    Leukocyte Esterase NEGATIVE  01/23/2020 12:55 AM    WBC 0-4 01/23/2020 12:55 AM    RBC 0-5 01/23/2020 12:55 AM    Bacteria NEGATIVE  01/23/2020 12:55 AM       IMPRESSION  · Acute respiratory failure with hypoxia: improved  · REBECCA, compliant with CPAP  · Syncope: work up normal thus far    PLAN  · Goal sats >90%, wean O2 as tolerated  · Will need 6 MWT prior to discharge  · IS  · CPAP at night, would benefit from outpatient pulmonary follow-up; reports upcoming sleep eval appt  · Neurology consulted  · Lovenox/Protonix    Patient is stable from a pulmonary standpoint. We will sign off and arrange for outpatient pulmonary follow up as above. Please call with questions.       Jessee Newton, [address]

## 2020-01-24 NOTE — DISCHARGE INSTRUCTIONS
ACUTE DIAGNOSES:  Syncope [R55]    CHRONIC MEDICAL DIAGNOSES:  Problem List as of 1/24/2020 Date Reviewed: 1/23/2020          Codes Class Noted - Resolved    Panic attack ICD-10-CM: F41.0  ICD-9-CM: 300.01  1/23/2020 - Present        Psychiatric disorder ICD-10-CM: F99  ICD-9-CM: 300.9  1/23/2020 - Present    Overview Signed 1/23/2020  1:40 AM by Wood Beasley MD     anxiety             Seizures [facility]) ICD-10-CM: R56.9  ICD-9-CM: 780.39  1/23/2020 - Present    Overview Signed 1/23/2020  1:41 AM by Wood Beasley MD     7 yo   was told it was a stress seizure, never had another one             Sleep apnea ICD-10-CM: G47.30  ICD-9-CM: 780.57  1/23/2020 - Present    Overview Signed 1/23/2020  1:41 AM by Wood Beasley MD     uses CPAP             Morbid obesity ([facility] [address] ICD-10-CM: E66.01  ICD-9-CM: 278.01  1/23/2020 - Present        Syncope ICD-10-CM: R55  ICD-9-CM: 780.2  1/23/2020 - Present        HTN (hypertension) ICD-10-CM: I10  ICD-9-CM: 401.9  1/23/2020 - Present        Ventral hernia ICD-10-CM: K43.9  ICD-9-CM: 553.20  12/16/2014 - Present        GERD (gastroesophageal reflux disease) ICD-10-CM: K21.9  ICD-9-CM: 530.81  12/16/2014 - Present              DISCHARGE MEDICATIONS:          · It is important that you take the medication exactly as they are prescribed. · Keep your medication in the bottles provided by the pharmacist and keep a list of the medication names, dosages, and times to be taken in your wallet. · Do not take other medications without consulting your doctor. DIET:  Regular Diet    ACTIVITY: Activity as tolerated    ADDITIONAL INFORMATION: If you experience any of the following symptoms then please call your primary care physician or return to the emergency room if you cannot get hold of your doctor: Fever, chills, nausea, vomiting, diarrhea, change in mentation, falling, bleeding, shortness of breath.     FOLLOW UP CARE:  Dr. Miguel Humphrey MD  you are to call and set up an appointment to see them in 2 weeks. Information obtained by :  I understand that if any problems occur once I am at home I am to contact my physician. I understand and acknowledge receipt of the instructions indicated above.                                                                                                                                            Physician's or R.N.'s Signature                                                                  Date/Time                                                                                                                                              Patient or Representative Signature                                                          Date/Time

## 2020-04-21 ENCOUNTER — TELEPHONE (OUTPATIENT)
Dept: SLEEP MEDICINE | Age: 50
End: 2020-04-21

## 2020-04-27 ENCOUNTER — VIRTUAL VISIT (OUTPATIENT)
Dept: SLEEP MEDICINE | Age: 50
End: 2020-04-27

## 2020-04-27 VITALS — HEIGHT: 71 IN | WEIGHT: 250 LBS | BODY MASS INDEX: 35 KG/M2

## 2020-04-27 DIAGNOSIS — G47.33 OSA (OBSTRUCTIVE SLEEP APNEA): Primary | ICD-10-CM

## 2020-04-27 NOTE — PROGRESS NOTES
[address] [place]. [place], [address]  Tel.  [phone]  Fax. [address] [facility] [address]  [place], [address] [facility]  Tel.  [phone]  Fax. [phone] [address]  Tel.  [phone]  Fax. [phone]         Subjective:    Kane Mcgregor is a 52 y.o. male who was seen by synchronous (real-time) audio-video technology on 4/27/2020. Consent:  He is aware that this patient-initiated Telehealth encounter is a billable service, with coverage as determined by his insurance carrier. He is aware that he may receive a bill and has provided verbal consent to proceed: Yes. Encounter switched to a telephone encounter due to audio-difficulties. I was at home while conducting this encounter. Patient verified with 's License. He complains of snoring associated with periods of not breathing, excessive daytime sleepiness. Symptoms began several years ago, he was diagnosed with REBECCA and has been on PAP therapy since that time. He states that he was initial set at a pressure of 20 cmH2O and this was decreased to 16 cmH2O a year and a half previously when he got the current device. He has been experiencing difficulty breathing, waking up gasping for air and choking since that time - he reports of having gained about 60 lbs in the past year. He usually can fall asleep in 60 minutes. Family or house members do not note snoring on PAP. He denies completely or partially paralyzed while falling asleep or waking up. Kane Mcgregor does not wake up frequently at night. He is not bothered by waking up too early and left unable to get back to sleep. He actually sleeps about 6 hours at night and wakes up about 2 times during the night. He does work shifts:  First Shift. Deven Wyman indicates he does not get too little sleep at night. His bedtime is 2300. He awakens at 0600. He does not take naps.   He has the following observed behaviors: Twitching of legs or feet, Pauses in breathing; Nightmares. Other remarks:        [place] Sleepiness Score: 12   and Modified F.O.S.Q. Score Total / 2: 16      Allergies   Allergen Reactions    Pcn [Penicillins] Hives    Codeine Nausea and Vomiting         Current Outpatient Medications:     sertraline (ZOLOFT) 100 mg tablet, Take 100 mg by mouth daily. , Disp: , Rfl:     lisinopril (PRINIVIL, ZESTRIL) 40 mg tablet, Take 40 mg by mouth every evening., Disp: , Rfl:     simvastatin (ZOCOR) 40 mg tablet, Take 40 mg by mouth nightly., Disp: , Rfl:     omeprazole (PRILOSEC) 40 mg capsule, Take 40 mg by mouth daily. , Disp: , Rfl:     buPROPion XL (WELLBUTRIN XL) 150 mg tablet, Take 150 mg by mouth every morning., Disp: , Rfl:      He  has a past medical history of Gastrointestinal disorder, GERD (gastroesophageal reflux disease), Panic attack, Psychiatric disorder, Seizures ([facility] [address] Sleep apnea, and Unspecified adverse effect of anesthesia. He  has a past surgical history that includes hx gi (2008); pr abdomen surgery proc unlisted (1977); hx hernia repair; hx other surgical (11years of age); hx other surgical (2009); hx other surgical (23years of age); hx other surgical (8 years ); and hx other surgical (1/16/15). He family history includes Cancer in his father; Diabetes in his father and mother; Heart Attack in his father; Heart Disease in his father; Hypertension in his mother; Stroke in his mother. He  reports that he has never smoked. He has never used smokeless tobacco. He reports current alcohol use of about 1.7 standard drinks of alcohol per week. He reports current drug use. Drug: Marijuana.      Review of Systems:  Constitutional:  + significant  weight gain  Eyes:  No blurred vision  CVS:  No significant chest pain  Pulm:  + significant shortness of breath - attributed to weight gain  GI:  No significant nausea or vomiting  :  No significant nocturia  Musculoskeletal:  No significant joint pain at night  Skin:  No significant rashes  Neuro:  No significant dizziness   Psych:  No active mood issues    Sleep Review of Systems: notable for difficulty falling asleep; frequent awakenings at night;  regular dreaming noted; no nightmares ; early morning headaches; memory problems; concentration issues; no history of any automobile or occupational accidents due to daytime drowsiness. Objective:   Vitals reported by patient to Medical Assistant    Visit Vitals  Ht 5' 11\" (1.803 m)   Wt 250 lb (113.4 kg)   BMI 34.87 kg/m²          Physical Exam completed by visual and auditory observation of patient with verbal input from patient. General:   Alert, oriented, not in acute distress   Eyes:  Anicteric Sclerae; no obvious strabismus   Nose:  No obvious nasal septum deviation    Neck:   Midline trachea, no visible mass   Chest/Lungs:  Respiratory effort normal, no visualized signs of difficulty breathing or respiratory distress   CVS:  No JVD   Extremities:  No obvious rashes noted on face, neck, or hands   Neuro:  No facial asymmetry, no focal deficits; no obvious tremor    Psych:  Normal affect,  normal countenance       Assessment:       ICD-10-CM ICD-9-CM    1. REBECCA (obstructive sleep apnea) G47.33 327.23 SLEEP STUDY UNATTENDED, 4 CHANNEL      AMB SUPPLY ORDER   2. BMI 34.0-34.9,adult Z68.34 V85.34          Plan:        Sleep testing was ordered for evaluation due to weight change and absence of previous record.  Patient to bring in his current device for download.  If AHI is elevated that we will reset current device if it is not consider a bi-level trial.      Orders Placed This Encounter    AMB SUPPLY ORDER     . Diagnosis: (G47.33) REBECCA (obstructive sleep apnea)  (primary encounter diagnosis)    Replacement Supplies for Positive Airway Pressure Therapy Device:   Duration of need: 99 months.  Full Face Mask 1 every 3 months.  Full Face Mask Cushion 1 per month.      Headgear 1 every 6 months.  Tubing 1 every 3 months.  Filter(s) Disposable 2 per month.  Filter(s) Non-Disposable 1 every 6 months. .   [address] for Humidifier (Replace) 1 every 6 months. Jasper Weaver MD, [facility]; NPI: [id number]    Electronically signed. Date:- 04/27/20    SLEEP STUDY UNATTENDED, 4 CHANNEL     Order Specific Question:   Reason for Exam     Answer:   REBECCA        He was provided information on sleep apnea including coresponding risk factors and the importance of proper treatment.  Treatment options if indicated were reviewed today.  Counseling was provided regarding proper sleep hygiene, appropriate sleep schedule, need for sleep environment safety and safe driving.  Recommended a dedicated weight loss program through appropriate diet and exercise regimen as significant weight reduction has been shown to reduce severity of obstructive sleep apnea. Patient's phone number [phone] (home) [phone] (work) was reviewed and confirmed for accuracy. He gives permission for messages regarding results and appointments to be left at that number. Pursuant to the emergency declaration under the [facility] [address] waiver authority and the [employer] General Act, this Virtual Visit was conducted, with patient's consent, to reduce the patient's risk of exposure to COVID-19 and provide continuity of care for an established patient. Services were provided through a video synchronous discussion virtually to substitute for in-person clinic visit. Tunde Rick MD, FAASM  Electronically signed.  04/27/20

## 2020-04-27 NOTE — PATIENT INSTRUCTIONS
[address] [place]. [address] [facility], [address] Tel.  [phone] Fax. 100 [facility] 60 [place], [address] [facility] Tel.  [phone] Fax. [phone] [address] [employer] Nichole Espinal Tel.  [phone] Fax. [phone] Learning About CPAP for Sleep Apnea What is CPAP? CPAP is a small machine that you use at home every night while you sleep. It increases air pressure in your throat to keep your airway open. When you have sleep apnea, this can help you sleep better so you feel much better. CPAP stands for \"continuous positive airway pressure. \" The CPAP machine will have one of the following: · A mask that covers your nose and mouth · Prongs that fit into your nose · A mask that covers your nose only, the most common type. This type is called NCPAP. The N stands for \"nasal.\" Why is it done? CPAP is usually the best treatment for obstructive sleep apnea. It is the first treatment choice and the most widely used. Your doctor may suggest CPAP if you have: · Moderate to severe sleep apnea. · Sleep apnea and coronary artery disease (CAD) or heart failure. How does it help? · CPAP can help you have more normal sleep, so you feel less sleepy and more alert during the daytime. · CPAP may help keep heart failure or other heart problems from getting worse. · NCPAP may help lower your blood pressure. · If you use CPAP, your bed partner may also sleep better because you are not snoring or restless. What are the side effects? Some people who use CPAP have: · A dry or stuffy nose and a sore throat. · Irritated skin on the face. · Sore eyes. · Bloating. If you have any of these problems, work with your doctor to fix them. Here are some things you can try: · Be sure the mask or nasal prongs fit well. · See if your doctor can adjust the pressure of your CPAP. · If your nose is dry, try a humidifier. · If your nose is runny or stuffy, try decongestant medicine or a steroid nasal spray. If these things do not help, you might try a different type of machine. Some machines have air pressure that adjusts on its own. Others have air pressures that are different when you breathe in than when you breathe out. This may reduce discomfort caused by too much pressure in your nose. Where can you learn more? Go to Stylesight.be Enter X877 in the search box to learn more about \"Learning About CPAP for Sleep Apnea. \"  
© 5195-9650 Healthwise, Incorporated. Care instructions adapted under license by [employer] (which disclaims liability or warranty for this information). This care instruction is for use with your licensed healthcare professional. If you have questions about a medical condition or this instruction, always ask your healthcare professional. [address] any warranty or liability for your use of this information. Content Version: 5.4.17217; Last Revised: January 11, 2010 PROPER SLEEP HYGIENE What to avoid · Do not have drinks with caffeine, such as coffee or black tea, for 8 hours before bed. · Do not smoke or use other types of tobacco near bedtime. Nicotine is a stimulant and can keep you awake. · Avoid drinking alcohol late in the evening, because it can cause you to wake in the middle of the night. · Do not eat a big meal close to bedtime. If you are hungry, eat a light snack. · Do not drink a lot of water close to bedtime, because the need to urinate may wake you up during the night. · Do not read or watch TV in bed. Use the bed only for sleeping and sexual activity. What to try · Go to bed at the same time every night, and wake up at the same time every morning. Do not take naps during the day. · Keep your bedroom quiet, dark, and cool. · Get regular exercise, but not within 3 to 4 hours of your bedtime. Baron Keane · Sleep on a comfortable pillow and mattress. · If watching the clock makes you anxious, turn it facing away from you so you cannot see the time. · If you worry when you lie down, start a worry book. Well before bedtime, write down your worries, and then set the book and your concerns aside. · Try meditation or other relaxation techniques before you go to bed. · If you cannot fall asleep, get up and go to another room until you feel sleepy. Do something relaxing. Repeat your bedtime routine before you go to bed again. · Make your house quiet and calm about an hour before bedtime. Turn down the lights, turn off the TV, log off the computer, and turn down the volume on music. This can help you relax after a busy day. Drowsy Driving: The [employer] cites drowsiness as a causing factor in more than 644,776 police reported crashes annually, resulting in 76,000 injuries and 1,500 deaths. Other surveys suggest 55% of people polled have driven while drowsy in the past year, 23% had fallen asleep but not crashed, 3% crashed, and 2% had and accident due to drowsy driving. Who is at risk? Young Drivers: One study of drowsy driving accidents states that 55% of the drivers were under 25 years. Of those, 75% were male. Shift Workers and Travelers: People who work overnight or travel across time zones frequently are at higher risk of experiencing Circadian Rhythm Disorders. They are trying to work and function when their body is programed to sleep. Sleep Deprived: Lack of sleep has a serious impact on your ability to pay attention or focus on a task. Consistently getting less than the average of 8 hours your body needs creates partial or cumulative sleep deprivation.   
Untreated Sleep Disorders: Sleep Apnea, Narcolepsy, R.L.S., and other sleep disorders (untreated) prevent a person from getting enough restful sleep. This leads to excessive daytime sleepiness and increases the risk for drowsy driving accidents by up to 7 times. Medications / Alcohol: Even over the counter medications can cause drowsiness. Medications that impair a drivers attention should have a warning label. Alcohol naturally makes you sleepy and on its own can cause accidents. Combined with excessive drowsiness its effects are amplified. Signs of Drowsy Driving: * You don't remember driving the last few miles * You may drift out of your rajat * You are unable to focus and your thoughts wander * You may yawn more often than normal 
 * You have difficulty keeping your eyes open / nodding off * Missing traffic signs, speeding, or tailgating Prevention-  
Good sleep hygiene, lifestyle and behavioral choices have the most impact on drowsy driving. There is no substitute for sleep and the average person requires 8 hours nightly. If you find yourself driving drowsy, stop and sleep. Consider the sleep hygiene tips provided during your visit as well. Medication Refill Policy: Refills for all medications require 1 week advance notice. Please have your pharmacy fax a refill request. We are unable to fax, or call in \"controled substance\" medications and you will need to pick these prescriptions up from our office. [employer] Activation Thank you for requesting access to [employer]. Please follow the instructions below to securely access and download your online medical record. [employer] allows you to send messages to your doctor, view your test results, renew your prescriptions, schedule appointments, and more. How Do I Sign Up? 1. In your internet browser, go to [email or web address] 2. Click on the First Time User? Click Here link in the Sign In box. You will see the New Member Sign Up page. 3. Enter your [employer] Access Code exactly as it appears below.  You will not need to use this code after youve completed the sign-up process. If you do not sign up before the expiration date, you must request a new code. [employer] Access Code: [email or web address] Expires: 2020  1:20 PM (This is the date your [employer] access code will ) 4. Enter the last four digits of your Social Security Number (xxxx) and Date of Birth (mm/dd/yyyy) as indicated and click Submit. You will be taken to the next sign-up page. 5. Create a [employer] ID. This will be your [employer] login ID and cannot be changed, so think of one that is secure and easy to remember. 6. Create a [employer] password. You can change your password at any time. 7. Enter your Password Reset Question and Answer. This can be used at a later time if you forget your password. 8. Enter your e-mail address. You will receive e-mail notification when new information is available in [address] 9. Click Sign Up. You can now view and download portions of your medical record. 10. Click the Download Summary menu link to download a portable copy of your medical information. Additional Information If you have questions, please call [phone]. Remember, [employer] is NOT to be used for urgent needs. For medical emergencies, dial 911.

## 2020-04-28 ENCOUNTER — DOCUMENTATION ONLY (OUTPATIENT)
Dept: SLEEP MEDICINE | Age: 50
End: 2020-04-28

## 2020-05-12 ENCOUNTER — PATIENT MESSAGE (OUTPATIENT)
Dept: SLEEP MEDICINE | Age: 50
End: 2020-05-12

## 2020-05-14 ENCOUNTER — TELEPHONE (OUTPATIENT)
Dept: SLEEP MEDICINE | Age: 50
End: 2020-05-14

## 2020-05-14 DIAGNOSIS — G47.33 OSA (OBSTRUCTIVE SLEEP APNEA): Primary | ICD-10-CM

## 2020-05-14 NOTE — TELEPHONE ENCOUNTER
Orders Placed This Encounter    AMB SUPPLY ORDER     Diagnosis: Obstructive Sleep Apnea ICD-10 Code (G47.33)    Positive Airway Pressure Therapy: Duration of need: 99 months. ResMed APAP Device with Heated Humidifer [id number] / [id number]. Minimum Pressure: 4 cmH2O, Maximum Pressure: 20 cmH2O.  Nasal Cushion (Replace) 2 per month.  Nasal Interface Mask 1 every 3 months.  Headgear 1 every 6 months.  Filter(s) Disposable 2 per month.  Filter(s) Non-Disposable 1 every 6 months. [address] [facility] for Yuli Julio (Replace) 1 every 6 months.  Tubing with heating element 1 every 3 months. Perform Mask Fitting per patient preference and comfort - replace as above. Audie Tavera MD, FAASM; NPI: [id number]  Electronically signed. 05/14/20

## 2020-05-18 ENCOUNTER — DOCUMENTATION ONLY (OUTPATIENT)
Dept: SLEEP MEDICINE | Age: 50
End: 2020-05-18

## 2020-08-11 ENCOUNTER — DOCUMENTATION ONLY (OUTPATIENT)
Dept: SLEEP MEDICINE | Age: 50
End: 2020-08-11

## 2020-11-03 ENCOUNTER — HOSPITAL ENCOUNTER (EMERGENCY)
Dept: ULTRASOUND IMAGING | Age: 50
Discharge: HOME | DRG: 417 | End: 2020-11-03
Attending: EMERGENCY MEDICINE
Payer: COMMERCIAL

## 2020-11-03 ENCOUNTER — HOSPITAL ENCOUNTER (INPATIENT)
Age: 50
LOS: 21 days | Discharge: HOME | DRG: 417 | End: 2020-11-25
Attending: EMERGENCY MEDICINE | Admitting: SURGERY
Payer: COMMERCIAL

## 2020-11-03 DIAGNOSIS — R10.11 ABDOMINAL PAIN, RIGHT UPPER QUADRANT: ICD-10-CM

## 2020-11-03 DIAGNOSIS — K81.9 CHOLECYSTITIS: ICD-10-CM

## 2020-11-03 DIAGNOSIS — R11.2 NAUSEA AND VOMITING, INTRACTABILITY OF VOMITING NOT SPECIFIED, UNSPECIFIED VOMITING TYPE: ICD-10-CM

## 2020-11-03 DIAGNOSIS — R10.9 INTRACTABLE ABDOMINAL PAIN: ICD-10-CM

## 2020-11-03 DIAGNOSIS — K80.50 BILIARY COLIC: Primary | ICD-10-CM

## 2020-11-03 LAB
BASOPHILS # BLD: 0.1 K/UL (ref 0–0.1)
BASOPHILS NFR BLD: 1 % (ref 0–1)
COMMENT, HOLDF: NORMAL
DIFFERENTIAL METHOD BLD: NORMAL
EOSINOPHIL # BLD: 0.2 K/UL (ref 0–0.4)
EOSINOPHIL NFR BLD: 2 % (ref 0–7)
ERYTHROCYTE [DISTWIDTH] IN BLOOD BY AUTOMATED COUNT: 12.5 % (ref 11.5–14.5)
HCT VFR BLD AUTO: 41 % (ref 36.6–50.3)
HGB BLD-MCNC: 14.2 G/DL (ref 12.1–17)
IMM GRANULOCYTES # BLD AUTO: 0 K/UL
IMM GRANULOCYTES NFR BLD AUTO: 0 %
LACTATE BLD-SCNC: 1.79 MMOL/L (ref 0.4–2)
LYMPHOCYTES # BLD: 2.2 K/UL (ref 0.8–3.5)
LYMPHOCYTES NFR BLD: 28 % (ref 12–49)
MCH RBC QN AUTO: 29.1 PG (ref 26–34)
MCHC RBC AUTO-ENTMCNC: 34.6 G/DL (ref 30–36.5)
MCV RBC AUTO: 84 FL (ref 80–99)
MONOCYTES # BLD: 0.6 K/UL (ref 0–1)
MONOCYTES NFR BLD: 7 % (ref 5–13)
NEUTS BAND NFR BLD MANUAL: 4 % (ref 0–6)
NEUTS SEG # BLD: 4.9 K/UL (ref 1.8–8)
NEUTS SEG NFR BLD: 58 % (ref 32–75)
NRBC # BLD: 0 K/UL (ref 0–0.01)
NRBC BLD-RTO: 0 PER 100 WBC
PLATELET # BLD AUTO: 246 K/UL (ref 150–400)
PMV BLD AUTO: 9.1 FL (ref 8.9–12.9)
RBC # BLD AUTO: 4.88 M/UL (ref 4.1–5.7)
RBC MORPH BLD: NORMAL
SAMPLES BEING HELD,HOLD: NORMAL
WBC # BLD AUTO: 8 K/UL (ref 4.1–11.1)
WBC MORPH BLD: NORMAL

## 2020-11-03 PROCEDURE — 80053 COMPREHEN METABOLIC PANEL: CPT

## 2020-11-03 PROCEDURE — 74011250636 HC RX REV CODE- 250/636: Performed by: NURSE PRACTITIONER

## 2020-11-03 PROCEDURE — 96374 THER/PROPH/DIAG INJ IV PUSH: CPT

## 2020-11-03 PROCEDURE — 83690 ASSAY OF LIPASE: CPT

## 2020-11-03 PROCEDURE — 36415 COLL VENOUS BLD VENIPUNCTURE: CPT

## 2020-11-03 PROCEDURE — 83605 ASSAY OF LACTIC ACID: CPT

## 2020-11-03 PROCEDURE — 99285 EMERGENCY DEPT VISIT HI MDM: CPT

## 2020-11-03 PROCEDURE — 74011250636 HC RX REV CODE- 250/636

## 2020-11-03 PROCEDURE — 94762 N-INVAS EAR/PLS OXIMTRY CONT: CPT

## 2020-11-03 PROCEDURE — 93005 ELECTROCARDIOGRAM TRACING: CPT

## 2020-11-03 PROCEDURE — 96375 TX/PRO/DX INJ NEW DRUG ADDON: CPT

## 2020-11-03 PROCEDURE — 76705 ECHO EXAM OF ABDOMEN: CPT

## 2020-11-03 PROCEDURE — 83735 ASSAY OF MAGNESIUM: CPT

## 2020-11-03 PROCEDURE — 85025 COMPLETE CBC W/AUTO DIFF WBC: CPT

## 2020-11-03 PROCEDURE — 87040 BLOOD CULTURE FOR BACTERIA: CPT

## 2020-11-03 PROCEDURE — 84484 ASSAY OF TROPONIN QUANT: CPT

## 2020-11-03 PROCEDURE — 74011250636 HC RX REV CODE- 250/636: Performed by: EMERGENCY MEDICINE

## 2020-11-03 RX ORDER — HYDROMORPHONE HYDROCHLORIDE 1 MG/ML
1 INJECTION, SOLUTION INTRAMUSCULAR; INTRAVENOUS; SUBCUTANEOUS ONCE
Status: COMPLETED | OUTPATIENT
Start: 2020-11-03 | End: 2020-11-04

## 2020-11-03 RX ORDER — MORPHINE SULFATE 2 MG/ML
4 INJECTION, SOLUTION INTRAMUSCULAR; INTRAVENOUS
Status: COMPLETED | OUTPATIENT
Start: 2020-11-03 | End: 2020-11-03

## 2020-11-03 RX ORDER — ONDANSETRON 2 MG/ML
INJECTION INTRAMUSCULAR; INTRAVENOUS
Status: COMPLETED
Start: 2020-11-03 | End: 2020-11-04

## 2020-11-03 RX ORDER — MORPHINE SULFATE 4 MG/ML
INJECTION INTRAVENOUS
Status: DISPENSED
Start: 2020-11-03 | End: 2020-11-04

## 2020-11-03 RX ORDER — ONDANSETRON 2 MG/ML
8 INJECTION INTRAMUSCULAR; INTRAVENOUS
Status: COMPLETED | OUTPATIENT
Start: 2020-11-03 | End: 2020-11-03

## 2020-11-03 RX ORDER — MORPHINE SULFATE 4 MG/ML
6 INJECTION, SOLUTION INTRAMUSCULAR; INTRAVENOUS
Status: DISCONTINUED | OUTPATIENT
Start: 2020-11-03 | End: 2020-11-03

## 2020-11-03 RX ADMIN — SODIUM CHLORIDE 1000 ML: 900 INJECTION, SOLUTION INTRAVENOUS at 23:28

## 2020-11-03 RX ADMIN — MORPHINE SULFATE 4 MG: 2 INJECTION, SOLUTION INTRAMUSCULAR; INTRAVENOUS at 23:24

## 2020-11-03 RX ADMIN — ONDANSETRON 8 MG: 2 INJECTION INTRAMUSCULAR; INTRAVENOUS at 23:15

## 2020-11-04 ENCOUNTER — ANESTHESIA EVENT (OUTPATIENT)
Dept: SURGERY | Age: 50
DRG: 417 | End: 2020-11-04
Payer: COMMERCIAL

## 2020-11-04 ENCOUNTER — ANESTHESIA (OUTPATIENT)
Dept: SURGERY | Age: 50
DRG: 417 | End: 2020-11-04
Payer: COMMERCIAL

## 2020-11-04 ENCOUNTER — APPOINTMENT (OUTPATIENT)
Dept: GENERAL RADIOLOGY | Age: 50
DRG: 417 | End: 2020-11-04
Attending: SURGERY
Payer: COMMERCIAL

## 2020-11-04 PROBLEM — K81.9 CHOLECYSTITIS: Status: ACTIVE | Noted: 2020-11-04

## 2020-11-04 PROBLEM — K80.50 BILIARY COLIC: Status: ACTIVE | Noted: 2020-11-04

## 2020-11-04 LAB
ALBUMIN SERPL-MCNC: 4 G/DL (ref 3.5–5)
ALBUMIN/GLOB SERPL: 1.1 {RATIO} (ref 1.1–2.2)
ALP SERPL-CCNC: 77 U/L (ref 45–117)
ALT SERPL-CCNC: 44 U/L (ref 12–78)
ANION GAP SERPL CALC-SCNC: 7 MMOL/L (ref 5–15)
ARTERIAL PATENCY WRIST A: ABNORMAL
AST SERPL-CCNC: 21 U/L (ref 15–37)
ATRIAL RATE: 125 BPM
ATRIAL RATE: 68 BPM
BASE DEFICIT BLDA-SCNC: 13.7 MMOL/L
BDY SITE: ABNORMAL
BILIRUB SERPL-MCNC: 0.5 MG/DL (ref 0.2–1)
BUN SERPL-MCNC: 17 MG/DL (ref 6–20)
BUN/CREAT SERPL: 13 (ref 12–20)
CALCIUM SERPL-MCNC: 9.1 MG/DL (ref 8.5–10.1)
CALCULATED P AXIS, ECG09: 34 DEGREES
CALCULATED P AXIS, ECG09: 57 DEGREES
CALCULATED R AXIS, ECG10: 105 DEGREES
CALCULATED R AXIS, ECG10: 93 DEGREES
CALCULATED T AXIS, ECG11: 10 DEGREES
CALCULATED T AXIS, ECG11: 34 DEGREES
CHLORIDE SERPL-SCNC: 105 MMOL/L (ref 97–108)
CO2 SERPL-SCNC: 26 MMOL/L (ref 21–32)
CREAT SERPL-MCNC: 1.35 MG/DL (ref 0.7–1.3)
DIAGNOSIS, 93000: NORMAL
DIAGNOSIS, 93000: NORMAL
EPAP/CPAP/PEEP, PAPEEP: 8
ERYTHROCYTE [DISTWIDTH] IN BLOOD BY AUTOMATED COUNT: 13.5 % (ref 11.5–14.5)
ERYTHROCYTE [DISTWIDTH] IN BLOOD BY AUTOMATED COUNT: 13.6 % (ref 11.5–14.5)
FIO2 ON VENT: 100 %
GAS FLOW.O2 SETTING OXYMISER: 14 L/MIN
GLOBULIN SER CALC-MCNC: 3.7 G/DL (ref 2–4)
GLUCOSE SERPL-MCNC: 147 MG/DL (ref 65–100)
HCO3 BLDA-SCNC: 14 MMOL/L (ref 22–26)
HCT VFR BLD AUTO: 21.4 % (ref 36.6–50.3)
HCT VFR BLD AUTO: 27.9 % (ref 36.6–50.3)
HCT VFR BLD AUTO: 28.1 % (ref 36.6–50.3)
HGB BLD-MCNC: 6.8 G/DL (ref 12.1–17)
HGB BLD-MCNC: 9.1 G/DL (ref 12.1–17)
HGB BLD-MCNC: 9.2 G/DL (ref 12.1–17)
HISTORY CHECKED?,CKHIST: NORMAL
LIPASE SERPL-CCNC: 204 U/L (ref 73–393)
MAGNESIUM SERPL-MCNC: 2.2 MG/DL (ref 1.6–2.4)
MCH RBC QN AUTO: 29.9 PG (ref 26–34)
MCH RBC QN AUTO: 30.5 PG (ref 26–34)
MCHC RBC AUTO-ENTMCNC: 31.8 G/DL (ref 30–36.5)
MCHC RBC AUTO-ENTMCNC: 32.7 G/DL (ref 30–36.5)
MCV RBC AUTO: 91.2 FL (ref 80–99)
MCV RBC AUTO: 96 FL (ref 80–99)
NRBC # BLD: 0.03 K/UL (ref 0–0.01)
NRBC # BLD: 0.06 K/UL (ref 0–0.01)
NRBC BLD-RTO: 0.1 PER 100 WBC
NRBC BLD-RTO: 0.2 PER 100 WBC
P-R INTERVAL, ECG05: 118 MS
P-R INTERVAL, ECG05: 172 MS
PCO2 BLDA: 40 MMHG (ref 35–45)
PH BLDA: 7.17 [PH] (ref 7.35–7.45)
PLATELET # BLD AUTO: 197 K/UL (ref 150–400)
PLATELET # BLD AUTO: 218 K/UL (ref 150–400)
PMV BLD AUTO: 9.1 FL (ref 8.9–12.9)
PMV BLD AUTO: 9.3 FL (ref 8.9–12.9)
PO2 BLDA: 477 MMHG (ref 80–100)
POTASSIUM SERPL-SCNC: 3.3 MMOL/L (ref 3.5–5.1)
PROT SERPL-MCNC: 7.7 G/DL (ref 6.4–8.2)
Q-T INTERVAL, ECG07: 322 MS
Q-T INTERVAL, ECG07: 436 MS
QRS DURATION, ECG06: 102 MS
QRS DURATION, ECG06: 92 MS
QTC CALCULATION (BEZET), ECG08: 463 MS
QTC CALCULATION (BEZET), ECG08: 464 MS
RBC # BLD AUTO: 2.23 M/UL (ref 4.1–5.7)
RBC # BLD AUTO: 3.08 M/UL (ref 4.1–5.7)
SAO2 % BLD: 100 % (ref 92–97)
SAO2% DEVICE SAO2% SENSOR NAME: ABNORMAL
SERVICE CMNT-IMP: ABNORMAL
SODIUM SERPL-SCNC: 138 MMOL/L (ref 136–145)
SPECIMEN SITE: ABNORMAL
TROPONIN I SERPL-MCNC: <0.05 NG/ML
TROPONIN I SERPL-MCNC: <0.05 NG/ML
VENTRICULAR RATE, ECG03: 125 BPM
VENTRICULAR RATE, ECG03: 68 BPM
VT SETTING VENT: 500 ML
WBC # BLD AUTO: 26.8 K/UL (ref 4.1–11.1)
WBC # BLD AUTO: 33.7 K/UL (ref 4.1–11.1)

## 2020-11-04 PROCEDURE — 77030008771 HC TU NG SALEM SUMP -A: Performed by: ANESTHESIOLOGY

## 2020-11-04 PROCEDURE — 77030031139 HC SUT VCRL2 J&J -A: Performed by: SURGERY

## 2020-11-04 PROCEDURE — 77030018836 HC SOL IRR NACL ICUM -A: Performed by: SURGERY

## 2020-11-04 PROCEDURE — P9045 ALBUMIN (HUMAN), 5%, 250 ML: HCPCS | Performed by: NURSE ANESTHETIST, CERTIFIED REGISTERED

## 2020-11-04 PROCEDURE — 74011000258 HC RX REV CODE- 258: Performed by: NURSE ANESTHETIST, CERTIFIED REGISTERED

## 2020-11-04 PROCEDURE — 77030039147 HC PWDR HEMSTS SURGICEL JNJ -D: Performed by: SURGERY

## 2020-11-04 PROCEDURE — 74011250636 HC RX REV CODE- 250/636: Performed by: SURGERY

## 2020-11-04 PROCEDURE — 5A1955Z RESPIRATORY VENTILATION, GREATER THAN 96 CONSECUTIVE HOURS: ICD-10-PCS | Performed by: SURGERY

## 2020-11-04 PROCEDURE — 77030008684 HC TU ET CUF COVD -B: Performed by: ANESTHESIOLOGY

## 2020-11-04 PROCEDURE — 77030013797 HC KT TRNSDUC PRSSR EDWD -A: Performed by: ANESTHESIOLOGY

## 2020-11-04 PROCEDURE — 5A09557 ASSISTANCE WITH RESPIRATORY VENTILATION, GREATER THAN 96 CONSECUTIVE HOURS, CONTINUOUS POSITIVE AIRWAY PRESSURE: ICD-10-PCS | Performed by: SURGERY

## 2020-11-04 PROCEDURE — 74011000250 HC RX REV CODE- 250: Performed by: NURSE ANESTHETIST, CERTIFIED REGISTERED

## 2020-11-04 PROCEDURE — 0W3P3ZZ CONTROL BLEEDING IN GASTROINTESTINAL TRACT, PERCUTANEOUS APPROACH: ICD-10-PCS | Performed by: SURGERY

## 2020-11-04 PROCEDURE — 0TJB0ZZ INSPECTION OF BLADDER, OPEN APPROACH: ICD-10-PCS | Performed by: SURGERY

## 2020-11-04 PROCEDURE — 85018 HEMOGLOBIN: CPT

## 2020-11-04 PROCEDURE — 77030010935 HC CLP LIG ABSRB TELE -B: Performed by: SURGERY

## 2020-11-04 PROCEDURE — 74011250636 HC RX REV CODE- 250/636

## 2020-11-04 PROCEDURE — 77030005513 HC CATH URETH FOL11 MDII -B: Performed by: SURGERY

## 2020-11-04 PROCEDURE — 36430 TRANSFUSION BLD/BLD COMPNT: CPT

## 2020-11-04 PROCEDURE — 76010000876 HC OR TIME 2 TO 2.5HR INTENSV - TIER 2: Performed by: SURGERY

## 2020-11-04 PROCEDURE — 74011000258 HC RX REV CODE- 258: Performed by: SURGERY

## 2020-11-04 PROCEDURE — 77030040361 HC SLV COMPR DVT MDII -B

## 2020-11-04 PROCEDURE — 74011250636 HC RX REV CODE- 250/636: Performed by: NURSE ANESTHETIST, CERTIFIED REGISTERED

## 2020-11-04 PROCEDURE — 0FT44ZZ RESECTION OF GALLBLADDER, PERCUTANEOUS ENDOSCOPIC APPROACH: ICD-10-PCS | Performed by: SURGERY

## 2020-11-04 PROCEDURE — 74011000250 HC RX REV CODE- 250: Performed by: SURGERY

## 2020-11-04 PROCEDURE — C1751 CATH, INF, PER/CENT/MIDLINE: HCPCS | Performed by: ANESTHESIOLOGY

## 2020-11-04 PROCEDURE — 76060000034 HC ANESTHESIA 1.5 TO 2 HR: Performed by: SURGERY

## 2020-11-04 PROCEDURE — 77030020703 HC SEAL CANN DISP INTU -B: Performed by: SURGERY

## 2020-11-04 PROCEDURE — 96376 TX/PRO/DX INJ SAME DRUG ADON: CPT

## 2020-11-04 PROCEDURE — 2709999900 HC NON-CHARGEABLE SUPPLY: Performed by: SURGERY

## 2020-11-04 PROCEDURE — 77030026102 HC DEV TISS ENSEAL G2 J&J -F: Performed by: SURGERY

## 2020-11-04 PROCEDURE — 77030013079 HC BLNKT BAIR HGGR 3M -A: Performed by: ANESTHESIOLOGY

## 2020-11-04 PROCEDURE — 71045 X-RAY EXAM CHEST 1 VIEW: CPT

## 2020-11-04 PROCEDURE — 77030018809 HC RETRCTR ALXSO DISP AMR -B: Performed by: SURGERY

## 2020-11-04 PROCEDURE — 76010000153 HC OR TIME 1.5 TO 2 HR: Performed by: SURGERY

## 2020-11-04 PROCEDURE — 30233N1 TRANSFUSION OF NONAUTOLOGOUS RED BLOOD CELLS INTO PERIPHERAL VEIN, PERCUTANEOUS APPROACH: ICD-10-PCS | Performed by: SURGERY

## 2020-11-04 PROCEDURE — 77030010507 HC ADH SKN DERMBND J&J -B: Performed by: SURGERY

## 2020-11-04 PROCEDURE — 74011250636 HC RX REV CODE- 250/636: Performed by: EMERGENCY MEDICINE

## 2020-11-04 PROCEDURE — 76210000017 HC OR PH I REC 1.5 TO 2 HR: Performed by: SURGERY

## 2020-11-04 PROCEDURE — 77030040922 HC BLNKT HYPOTHRM STRY -A

## 2020-11-04 PROCEDURE — 74011000254 HC RX REV CODE- 254: Performed by: SURGERY

## 2020-11-04 PROCEDURE — 85027 COMPLETE CBC AUTOMATED: CPT

## 2020-11-04 PROCEDURE — 36415 COLL VENOUS BLD VENIPUNCTURE: CPT

## 2020-11-04 PROCEDURE — 88304 TISSUE EXAM BY PATHOLOGIST: CPT

## 2020-11-04 PROCEDURE — P9016 RBC LEUKOCYTES REDUCED: HCPCS

## 2020-11-04 PROCEDURE — 99218 HC RM OBSERVATION: CPT

## 2020-11-04 PROCEDURE — 96375 TX/PRO/DX INJ NEW DRUG ADDON: CPT

## 2020-11-04 PROCEDURE — 77030040506 HC DRN WND MDII -A: Performed by: SURGERY

## 2020-11-04 PROCEDURE — 77030002916 HC SUT ETHLN J&J -A: Performed by: SURGERY

## 2020-11-04 PROCEDURE — 76210000001 HC OR PH I REC 2.5 TO 3 HR: Performed by: SURGERY

## 2020-11-04 PROCEDURE — 96372 THER/PROPH/DIAG INJ SC/IM: CPT

## 2020-11-04 PROCEDURE — 77030026438 HC STYL ET INTUB CARD -A: Performed by: ANESTHESIOLOGY

## 2020-11-04 PROCEDURE — 86923 COMPATIBILITY TEST ELECTRIC: CPT

## 2020-11-04 PROCEDURE — 77030035277 HC OBTRTR BLDELSS DISP INTU -B: Performed by: SURGERY

## 2020-11-04 PROCEDURE — 65610000006 HC RM INTENSIVE CARE

## 2020-11-04 PROCEDURE — 77030008756 HC TU IRR SUC STRY -B: Performed by: SURGERY

## 2020-11-04 PROCEDURE — 94002 VENT MGMT INPAT INIT DAY: CPT

## 2020-11-04 PROCEDURE — 8E0W4CZ ROBOTIC ASSISTED PROCEDURE OF TRUNK REGION, PERCUTANEOUS ENDOSCOPIC APPROACH: ICD-10-PCS | Performed by: SURGERY

## 2020-11-04 PROCEDURE — 77030016151 HC PROTCTR LNS DFOG COVD -B: Performed by: SURGERY

## 2020-11-04 PROCEDURE — 86900 BLOOD TYPING SEROLOGIC ABO: CPT

## 2020-11-04 PROCEDURE — 77030002966 HC SUT PDS J&J -A: Performed by: SURGERY

## 2020-11-04 PROCEDURE — 74011250637 HC RX REV CODE- 250/637: Performed by: SURGERY

## 2020-11-04 PROCEDURE — 76060000035 HC ANESTHESIA 2 TO 2.5 HR: Performed by: SURGERY

## 2020-11-04 PROCEDURE — 77030020263 HC SOL INJ SOD CL0.9% LFCR 1000ML: Performed by: SURGERY

## 2020-11-04 PROCEDURE — 86920 COMPATIBILITY TEST SPIN: CPT

## 2020-11-04 PROCEDURE — 93005 ELECTROCARDIOGRAM TRACING: CPT

## 2020-11-04 PROCEDURE — 84484 ASSAY OF TROPONIN QUANT: CPT

## 2020-11-04 PROCEDURE — 77030005401 HC CATH RAD ARRO -A: Performed by: ANESTHESIOLOGY

## 2020-11-04 PROCEDURE — 82803 BLOOD GASES ANY COMBINATION: CPT

## 2020-11-04 PROCEDURE — 77030035029 HC NDL INSUF VERES DISP COVD -B: Performed by: SURGERY

## 2020-11-04 PROCEDURE — 0DNU4ZZ RELEASE OMENTUM, PERCUTANEOUS ENDOSCOPIC APPROACH: ICD-10-PCS | Performed by: SURGERY

## 2020-11-04 RX ORDER — FAMOTIDINE 10 MG/ML
INJECTION INTRAVENOUS AS NEEDED
Status: DISCONTINUED | OUTPATIENT
Start: 2020-11-04 | End: 2020-11-04 | Stop reason: HOSPADM

## 2020-11-04 RX ORDER — INDOCYANINE GREEN AND WATER 25 MG
7.5 KIT INJECTION
Status: COMPLETED | OUTPATIENT
Start: 2020-11-04 | End: 2020-11-04

## 2020-11-04 RX ORDER — LIDOCAINE HYDROCHLORIDE 20 MG/ML
INJECTION, SOLUTION EPIDURAL; INFILTRATION; INTRACAUDAL; PERINEURAL AS NEEDED
Status: DISCONTINUED | OUTPATIENT
Start: 2020-11-04 | End: 2020-11-04 | Stop reason: HOSPADM

## 2020-11-04 RX ORDER — SODIUM CHLORIDE, SODIUM LACTATE, POTASSIUM CHLORIDE, CALCIUM CHLORIDE 600; 310; 30; 20 MG/100ML; MG/100ML; MG/100ML; MG/100ML
125 INJECTION, SOLUTION INTRAVENOUS CONTINUOUS
Status: DISCONTINUED | OUTPATIENT
Start: 2020-11-04 | End: 2020-11-04 | Stop reason: HOSPADM

## 2020-11-04 RX ORDER — BUPROPION HYDROCHLORIDE 150 MG/1
150 TABLET ORAL
Status: DISCONTINUED | OUTPATIENT
Start: 2020-11-04 | End: 2020-11-04

## 2020-11-04 RX ORDER — ROCURONIUM BROMIDE 10 MG/ML
INJECTION, SOLUTION INTRAVENOUS AS NEEDED
Status: DISCONTINUED | OUTPATIENT
Start: 2020-11-04 | End: 2020-11-04 | Stop reason: HOSPADM

## 2020-11-04 RX ORDER — SODIUM CHLORIDE 0.9 % (FLUSH) 0.9 %
5-40 SYRINGE (ML) INJECTION AS NEEDED
Status: CANCELLED | OUTPATIENT
Start: 2020-11-04

## 2020-11-04 RX ORDER — PROPOFOL 10 MG/ML
INJECTION, EMULSION INTRAVENOUS AS NEEDED
Status: DISCONTINUED | OUTPATIENT
Start: 2020-11-04 | End: 2020-11-04 | Stop reason: HOSPADM

## 2020-11-04 RX ORDER — DOCUSATE SODIUM 100 MG/1
100 CAPSULE, LIQUID FILLED ORAL 2 TIMES DAILY
Qty: 20 CAP | Refills: 0 | Status: SHIPPED | OUTPATIENT
Start: 2020-11-04 | End: 2020-11-14

## 2020-11-04 RX ORDER — SODIUM CHLORIDE 0.9 % (FLUSH) 0.9 %
5-40 SYRINGE (ML) INJECTION EVERY 8 HOURS
Status: CANCELLED | OUTPATIENT
Start: 2020-11-04

## 2020-11-04 RX ORDER — SODIUM CHLORIDE 9 MG/ML
250 INJECTION, SOLUTION INTRAVENOUS AS NEEDED
Status: DISCONTINUED | OUTPATIENT
Start: 2020-11-04 | End: 2020-11-11

## 2020-11-04 RX ORDER — HYDROMORPHONE HYDROCHLORIDE 1 MG/ML
1 INJECTION, SOLUTION INTRAMUSCULAR; INTRAVENOUS; SUBCUTANEOUS
Status: DISCONTINUED | OUTPATIENT
Start: 2020-11-04 | End: 2020-11-05

## 2020-11-04 RX ORDER — GLYCOPYRROLATE 0.2 MG/ML
INJECTION INTRAMUSCULAR; INTRAVENOUS AS NEEDED
Status: DISCONTINUED | OUTPATIENT
Start: 2020-11-04 | End: 2020-11-04 | Stop reason: HOSPADM

## 2020-11-04 RX ORDER — SIMETHICONE 80 MG
80 TABLET,CHEWABLE ORAL
Status: DISCONTINUED | OUTPATIENT
Start: 2020-11-04 | End: 2020-11-25 | Stop reason: HOSPADM

## 2020-11-04 RX ORDER — SODIUM CHLORIDE, SODIUM LACTATE, POTASSIUM CHLORIDE, CALCIUM CHLORIDE 600; 310; 30; 20 MG/100ML; MG/100ML; MG/100ML; MG/100ML
100 INJECTION, SOLUTION INTRAVENOUS CONTINUOUS
Status: CANCELLED | OUTPATIENT
Start: 2020-11-04 | End: 2020-11-05

## 2020-11-04 RX ORDER — ONDANSETRON 2 MG/ML
4 INJECTION INTRAMUSCULAR; INTRAVENOUS
Status: DISCONTINUED | OUTPATIENT
Start: 2020-11-04 | End: 2020-11-21

## 2020-11-04 RX ORDER — SODIUM CHLORIDE, SODIUM LACTATE, POTASSIUM CHLORIDE, CALCIUM CHLORIDE 600; 310; 30; 20 MG/100ML; MG/100ML; MG/100ML; MG/100ML
125 INJECTION, SOLUTION INTRAVENOUS CONTINUOUS
Status: CANCELLED | OUTPATIENT
Start: 2020-11-04 | End: 2020-11-05

## 2020-11-04 RX ORDER — KETOROLAC TROMETHAMINE 30 MG/ML
30 INJECTION, SOLUTION INTRAMUSCULAR; INTRAVENOUS
Status: COMPLETED | OUTPATIENT
Start: 2020-11-04 | End: 2020-11-04

## 2020-11-04 RX ORDER — KETOROLAC TROMETHAMINE 30 MG/ML
INJECTION, SOLUTION INTRAMUSCULAR; INTRAVENOUS AS NEEDED
Status: DISCONTINUED | OUTPATIENT
Start: 2020-11-04 | End: 2020-11-04 | Stop reason: HOSPADM

## 2020-11-04 RX ORDER — DICYCLOMINE HYDROCHLORIDE 10 MG/ML
20 INJECTION INTRAMUSCULAR
Status: COMPLETED | OUTPATIENT
Start: 2020-11-04 | End: 2020-11-04

## 2020-11-04 RX ORDER — PANTOPRAZOLE SODIUM 40 MG/1
40 TABLET, DELAYED RELEASE ORAL
Status: DISCONTINUED | OUTPATIENT
Start: 2020-11-04 | End: 2020-11-05

## 2020-11-04 RX ORDER — KETOROLAC TROMETHAMINE 30 MG/ML
15 INJECTION, SOLUTION INTRAMUSCULAR; INTRAVENOUS EVERY 6 HOURS
Status: DISCONTINUED | OUTPATIENT
Start: 2020-11-04 | End: 2020-11-08

## 2020-11-04 RX ORDER — FLUMAZENIL 0.1 MG/ML
0.2 INJECTION INTRAVENOUS
Status: CANCELLED | OUTPATIENT
Start: 2020-11-04

## 2020-11-04 RX ORDER — DOCUSATE SODIUM 100 MG/1
100 CAPSULE, LIQUID FILLED ORAL 2 TIMES DAILY
Status: DISCONTINUED | OUTPATIENT
Start: 2020-11-04 | End: 2020-11-10

## 2020-11-04 RX ORDER — LISINOPRIL 20 MG/1
20 TABLET ORAL DAILY
Status: DISCONTINUED | OUTPATIENT
Start: 2020-11-04 | End: 2020-11-06

## 2020-11-04 RX ORDER — ONDANSETRON 2 MG/ML
INJECTION INTRAMUSCULAR; INTRAVENOUS AS NEEDED
Status: DISCONTINUED | OUTPATIENT
Start: 2020-11-04 | End: 2020-11-04 | Stop reason: HOSPADM

## 2020-11-04 RX ORDER — CALCIUM CHLORIDE INJECTION 100 MG/ML
INJECTION, SOLUTION INTRAVENOUS AS NEEDED
Status: DISCONTINUED | OUTPATIENT
Start: 2020-11-04 | End: 2020-11-04 | Stop reason: HOSPADM

## 2020-11-04 RX ORDER — FENTANYL CITRATE 50 UG/ML
INJECTION, SOLUTION INTRAMUSCULAR; INTRAVENOUS AS NEEDED
Status: DISCONTINUED | OUTPATIENT
Start: 2020-11-04 | End: 2020-11-04 | Stop reason: HOSPADM

## 2020-11-04 RX ORDER — OXYCODONE HYDROCHLORIDE 5 MG/1
5 TABLET ORAL
Status: DISCONTINUED | OUTPATIENT
Start: 2020-11-04 | End: 2020-11-25 | Stop reason: HOSPADM

## 2020-11-04 RX ORDER — OXYCODONE HYDROCHLORIDE 5 MG/1
5 TABLET ORAL
Qty: 20 TAB | Refills: 0 | Status: SHIPPED | OUTPATIENT
Start: 2020-11-04 | End: 2020-11-07

## 2020-11-04 RX ORDER — ALBUMIN HUMAN 50 G/1000ML
SOLUTION INTRAVENOUS AS NEEDED
Status: DISCONTINUED | OUTPATIENT
Start: 2020-11-04 | End: 2020-11-04 | Stop reason: HOSPADM

## 2020-11-04 RX ORDER — LIDOCAINE HYDROCHLORIDE 10 MG/ML
0.1 INJECTION, SOLUTION EPIDURAL; INFILTRATION; INTRACAUDAL; PERINEURAL AS NEEDED
Status: CANCELLED | OUTPATIENT
Start: 2020-11-04

## 2020-11-04 RX ORDER — CITALOPRAM 40 MG/1
40 TABLET, FILM COATED ORAL DAILY
COMMUNITY
End: 2021-03-02

## 2020-11-04 RX ORDER — SODIUM CHLORIDE, SODIUM LACTATE, POTASSIUM CHLORIDE, CALCIUM CHLORIDE 600; 310; 30; 20 MG/100ML; MG/100ML; MG/100ML; MG/100ML
125 INJECTION, SOLUTION INTRAVENOUS CONTINUOUS
Status: DISCONTINUED | OUTPATIENT
Start: 2020-11-04 | End: 2020-11-09

## 2020-11-04 RX ORDER — MIDAZOLAM HYDROCHLORIDE 1 MG/ML
INJECTION, SOLUTION INTRAMUSCULAR; INTRAVENOUS AS NEEDED
Status: DISCONTINUED | OUTPATIENT
Start: 2020-11-04 | End: 2020-11-04 | Stop reason: HOSPADM

## 2020-11-04 RX ORDER — HYDROMORPHONE HYDROCHLORIDE 1 MG/ML
1 INJECTION, SOLUTION INTRAMUSCULAR; INTRAVENOUS; SUBCUTANEOUS ONCE
Status: COMPLETED | OUTPATIENT
Start: 2020-11-04 | End: 2020-11-04

## 2020-11-04 RX ORDER — HYDROMORPHONE HYDROCHLORIDE 2 MG/ML
1 INJECTION, SOLUTION INTRAMUSCULAR; INTRAVENOUS; SUBCUTANEOUS
Status: DISCONTINUED | OUTPATIENT
Start: 2020-11-04 | End: 2020-11-25 | Stop reason: HOSPADM

## 2020-11-04 RX ORDER — ONDANSETRON 2 MG/ML
4 INJECTION INTRAMUSCULAR; INTRAVENOUS
Status: COMPLETED | OUTPATIENT
Start: 2020-11-04 | End: 2020-11-04

## 2020-11-04 RX ORDER — NEOSTIGMINE METHYLSULFATE 1 MG/ML
INJECTION, SOLUTION INTRAVENOUS AS NEEDED
Status: DISCONTINUED | OUTPATIENT
Start: 2020-11-04 | End: 2020-11-04 | Stop reason: HOSPADM

## 2020-11-04 RX ORDER — CITALOPRAM 20 MG/1
40 TABLET, FILM COATED ORAL DAILY
Status: DISCONTINUED | OUTPATIENT
Start: 2020-11-04 | End: 2020-11-06

## 2020-11-04 RX ORDER — SERTRALINE HYDROCHLORIDE 50 MG/1
100 TABLET, FILM COATED ORAL DAILY
Status: DISCONTINUED | OUTPATIENT
Start: 2020-11-04 | End: 2020-11-04

## 2020-11-04 RX ORDER — BUPIVACAINE HYDROCHLORIDE 5 MG/ML
INJECTION, SOLUTION EPIDURAL; INTRACAUDAL AS NEEDED
Status: DISCONTINUED | OUTPATIENT
Start: 2020-11-04 | End: 2020-11-04 | Stop reason: HOSPADM

## 2020-11-04 RX ORDER — HYDROMORPHONE HYDROCHLORIDE 1 MG/ML
.5-1 INJECTION, SOLUTION INTRAMUSCULAR; INTRAVENOUS; SUBCUTANEOUS
Status: DISCONTINUED | OUTPATIENT
Start: 2020-11-04 | End: 2020-11-04 | Stop reason: HOSPADM

## 2020-11-04 RX ORDER — DEXAMETHASONE SODIUM PHOSPHATE 4 MG/ML
INJECTION, SOLUTION INTRA-ARTICULAR; INTRALESIONAL; INTRAMUSCULAR; INTRAVENOUS; SOFT TISSUE AS NEEDED
Status: DISCONTINUED | OUTPATIENT
Start: 2020-11-04 | End: 2020-11-04 | Stop reason: HOSPADM

## 2020-11-04 RX ORDER — ACETAMINOPHEN 325 MG/1
650 TABLET ORAL
Status: DISCONTINUED | OUTPATIENT
Start: 2020-11-04 | End: 2020-11-11

## 2020-11-04 RX ORDER — PROPOFOL 10 MG/ML
5-50 VIAL (ML) INTRAVENOUS
Status: DISCONTINUED | OUTPATIENT
Start: 2020-11-04 | End: 2020-11-10 | Stop reason: SDUPTHER

## 2020-11-04 RX ORDER — DIPHENHYDRAMINE HCL 25 MG
25 CAPSULE ORAL
Status: DISCONTINUED | OUTPATIENT
Start: 2020-11-04 | End: 2020-11-25 | Stop reason: HOSPADM

## 2020-11-04 RX ORDER — SUCCINYLCHOLINE CHLORIDE 20 MG/ML
INJECTION INTRAMUSCULAR; INTRAVENOUS AS NEEDED
Status: DISCONTINUED | OUTPATIENT
Start: 2020-11-04 | End: 2020-11-04 | Stop reason: HOSPADM

## 2020-11-04 RX ORDER — ONDANSETRON 2 MG/ML
4 INJECTION INTRAMUSCULAR; INTRAVENOUS AS NEEDED
Status: DISCONTINUED | OUTPATIENT
Start: 2020-11-04 | End: 2020-11-04 | Stop reason: HOSPADM

## 2020-11-04 RX ORDER — NALOXONE HYDROCHLORIDE 0.4 MG/ML
0.2 INJECTION, SOLUTION INTRAMUSCULAR; INTRAVENOUS; SUBCUTANEOUS
Status: CANCELLED | OUTPATIENT
Start: 2020-11-04

## 2020-11-04 RX ORDER — EPHEDRINE SULFATE/0.9% NACL/PF 50 MG/5 ML
SYRINGE (ML) INTRAVENOUS AS NEEDED
Status: DISCONTINUED | OUTPATIENT
Start: 2020-11-04 | End: 2020-11-04 | Stop reason: HOSPADM

## 2020-11-04 RX ADMIN — PROPOFOL 50 MG: 10 INJECTION, EMULSION INTRAVENOUS at 16:16

## 2020-11-04 RX ADMIN — Medication 20 MG: at 17:00

## 2020-11-04 RX ADMIN — ONDANSETRON 4 MG: 2 INJECTION INTRAMUSCULAR; INTRAVENOUS at 22:54

## 2020-11-04 RX ADMIN — PANTOPRAZOLE SODIUM 40 MG: 40 TABLET, DELAYED RELEASE ORAL at 06:37

## 2020-11-04 RX ADMIN — PHENYLEPHRINE HYDROCHLORIDE 125 MCG/MIN: 10 INJECTION INTRAVENOUS at 21:01

## 2020-11-04 RX ADMIN — SODIUM CHLORIDE 100 MCG: 9 INJECTION INTRAMUSCULAR; INTRAVENOUS; SUBCUTANEOUS at 13:45

## 2020-11-04 RX ADMIN — CEFAZOLIN SODIUM 2 G: 1 POWDER, FOR SOLUTION INTRAMUSCULAR; INTRAVENOUS at 12:01

## 2020-11-04 RX ADMIN — MIDAZOLAM HYDROCHLORIDE 3 MG: 2 INJECTION, SOLUTION INTRAMUSCULAR; INTRAVENOUS at 16:14

## 2020-11-04 RX ADMIN — Medication 7.5 MG: at 12:33

## 2020-11-04 RX ADMIN — KETOROLAC TROMETHAMINE 30 MG: 30 INJECTION, SOLUTION INTRAMUSCULAR at 00:46

## 2020-11-04 RX ADMIN — Medication 10 MG: at 12:39

## 2020-11-04 RX ADMIN — DEXTROSE MONOHYDRATE 7 MCG/MIN: 5 INJECTION, SOLUTION INTRAVENOUS at 21:53

## 2020-11-04 RX ADMIN — MIDAZOLAM HYDROCHLORIDE 2 MG: 2 INJECTION, SOLUTION INTRAMUSCULAR; INTRAVENOUS at 17:05

## 2020-11-04 RX ADMIN — PHENYLEPHRINE HYDROCHLORIDE 60 MCG/MIN: 10 INJECTION INTRAVENOUS at 14:37

## 2020-11-04 RX ADMIN — INDOCYANINE GREEN AND WATER 7.5 MG: KIT at 11:59

## 2020-11-04 RX ADMIN — SODIUM CHLORIDE 100 MCG: 9 INJECTION INTRAMUSCULAR; INTRAVENOUS; SUBCUTANEOUS at 17:00

## 2020-11-04 RX ADMIN — SODIUM CHLORIDE 100 MCG: 9 INJECTION INTRAMUSCULAR; INTRAVENOUS; SUBCUTANEOUS at 13:50

## 2020-11-04 RX ADMIN — ROCURONIUM BROMIDE 10 MG: 10 INJECTION INTRAVENOUS at 13:11

## 2020-11-04 RX ADMIN — MIDAZOLAM HYDROCHLORIDE 3 MG: 2 INJECTION, SOLUTION INTRAMUSCULAR; INTRAVENOUS at 11:46

## 2020-11-04 RX ADMIN — HYDROMORPHONE HYDROCHLORIDE 1 MG: 1 INJECTION, SOLUTION INTRAMUSCULAR; INTRAVENOUS; SUBCUTANEOUS at 00:46

## 2020-11-04 RX ADMIN — DEXTROSE MONOHYDRATE 10 MCG/MIN: 5 INJECTION, SOLUTION INTRAVENOUS at 21:40

## 2020-11-04 RX ADMIN — ROCURONIUM BROMIDE 25 MG: 10 INJECTION INTRAVENOUS at 12:01

## 2020-11-04 RX ADMIN — CALCIUM CHLORIDE 1 G: 100 INJECTION INTRAVENOUS; INTRAVENTRICULAR at 17:00

## 2020-11-04 RX ADMIN — Medication 10 MG: at 17:09

## 2020-11-04 RX ADMIN — SODIUM CHLORIDE, SODIUM LACTATE, POTASSIUM CHLORIDE, AND CALCIUM CHLORIDE 125 ML/HR: 600; 310; 30; 20 INJECTION, SOLUTION INTRAVENOUS at 19:48

## 2020-11-04 RX ADMIN — KETOROLAC TROMETHAMINE 30 MG: 30 INJECTION INTRAMUSCULAR; INTRAVENOUS at 13:56

## 2020-11-04 RX ADMIN — SODIUM CHLORIDE 100 MCG: 9 INJECTION INTRAMUSCULAR; INTRAVENOUS; SUBCUTANEOUS at 17:09

## 2020-11-04 RX ADMIN — LIDOCAINE HYDROCHLORIDE 100 MG: 20 INJECTION, SOLUTION EPIDURAL; INFILTRATION; INTRACAUDAL; PERINEURAL at 11:55

## 2020-11-04 RX ADMIN — PHENYLEPHRINE HYDROCHLORIDE 150 MCG/MIN: 10 INJECTION INTRAVENOUS at 21:05

## 2020-11-04 RX ADMIN — HYDROMORPHONE HYDROCHLORIDE 1 MG: 1 INJECTION, SOLUTION INTRAMUSCULAR; INTRAVENOUS; SUBCUTANEOUS at 06:38

## 2020-11-04 RX ADMIN — SODIUM CHLORIDE 100 MCG: 9 INJECTION INTRAMUSCULAR; INTRAVENOUS; SUBCUTANEOUS at 13:37

## 2020-11-04 RX ADMIN — PROPOFOL 200 MG: 10 INJECTION, EMULSION INTRAVENOUS at 11:55

## 2020-11-04 RX ADMIN — DEXTROSE MONOHYDRATE 5 MCG/MIN: 5 INJECTION, SOLUTION INTRAVENOUS at 21:31

## 2020-11-04 RX ADMIN — ALBUMIN (HUMAN) 250 ML: 12.5 SOLUTION INTRAVENOUS at 14:49

## 2020-11-04 RX ADMIN — KETOROLAC TROMETHAMINE 15 MG: 30 INJECTION, SOLUTION INTRAMUSCULAR at 06:20

## 2020-11-04 RX ADMIN — PROPOFOL 40 MCG/KG/MIN: 10 INJECTION, EMULSION INTRAVENOUS at 22:55

## 2020-11-04 RX ADMIN — PHENYLEPHRINE HYDROCHLORIDE 100 MCG: 10 INJECTION INTRAVENOUS at 13:25

## 2020-11-04 RX ADMIN — Medication 10 MG: at 17:51

## 2020-11-04 RX ADMIN — FENTANYL CITRATE 100 MCG: 50 INJECTION, SOLUTION INTRAMUSCULAR; INTRAVENOUS at 11:55

## 2020-11-04 RX ADMIN — Medication 3 MG: at 13:52

## 2020-11-04 RX ADMIN — PROPOFOL 35 MCG/KG/MIN: 10 INJECTION, EMULSION INTRAVENOUS at 18:51

## 2020-11-04 RX ADMIN — PHENYLEPHRINE HYDROCHLORIDE 200 MCG/MIN: 10 INJECTION INTRAVENOUS at 23:54

## 2020-11-04 RX ADMIN — SODIUM CHLORIDE 200 MCG: 9 INJECTION INTRAMUSCULAR; INTRAVENOUS; SUBCUTANEOUS at 14:35

## 2020-11-04 RX ADMIN — ROCURONIUM BROMIDE 5 MG: 10 INJECTION INTRAVENOUS at 11:55

## 2020-11-04 RX ADMIN — FENTANYL CITRATE 50 MCG: 50 INJECTION, SOLUTION INTRAMUSCULAR; INTRAVENOUS at 12:12

## 2020-11-04 RX ADMIN — Medication 10 MG: at 12:06

## 2020-11-04 RX ADMIN — SODIUM CHLORIDE 200 MCG: 9 INJECTION INTRAMUSCULAR; INTRAVENOUS; SUBCUTANEOUS at 17:26

## 2020-11-04 RX ADMIN — ONDANSETRON 4 MG: 2 INJECTION INTRAMUSCULAR; INTRAVENOUS at 06:52

## 2020-11-04 RX ADMIN — FENTANYL CITRATE 50 MCG: 50 INJECTION, SOLUTION INTRAMUSCULAR; INTRAVENOUS at 13:47

## 2020-11-04 RX ADMIN — HYDROMORPHONE HYDROCHLORIDE 1 MG: 1 INJECTION, SOLUTION INTRAMUSCULAR; INTRAVENOUS; SUBCUTANEOUS at 00:01

## 2020-11-04 RX ADMIN — ALBUMIN (HUMAN) 250 ML: 12.5 SOLUTION INTRAVENOUS at 15:48

## 2020-11-04 RX ADMIN — HYDROMORPHONE HYDROCHLORIDE 1 MG: 2 INJECTION, SOLUTION INTRAMUSCULAR; INTRAVENOUS; SUBCUTANEOUS at 22:55

## 2020-11-04 RX ADMIN — DICYCLOMINE HYDROCHLORIDE 20 MG: 20 INJECTION, SOLUTION INTRAMUSCULAR at 00:45

## 2020-11-04 RX ADMIN — FENTANYL CITRATE 50 MCG: 50 INJECTION, SOLUTION INTRAMUSCULAR; INTRAVENOUS at 16:15

## 2020-11-04 RX ADMIN — LIDOCAINE HYDROCHLORIDE 50 MG: 20 INJECTION, SOLUTION EPIDURAL; INFILTRATION; INTRACAUDAL; PERINEURAL at 16:15

## 2020-11-04 RX ADMIN — FAMOTIDINE 20 MG: 10 INJECTION INTRAVENOUS at 11:46

## 2020-11-04 RX ADMIN — ONDANSETRON HYDROCHLORIDE 4 MG: 2 SOLUTION INTRAMUSCULAR; INTRAVENOUS at 06:52

## 2020-11-04 RX ADMIN — Medication 10 MG: at 18:00

## 2020-11-04 RX ADMIN — SODIUM CHLORIDE, SODIUM LACTATE, POTASSIUM CHLORIDE, AND CALCIUM CHLORIDE 100 ML/HR: 600; 310; 30; 20 INJECTION, SOLUTION INTRAVENOUS at 01:34

## 2020-11-04 RX ADMIN — Medication 10 MG: at 13:02

## 2020-11-04 RX ADMIN — SODIUM CHLORIDE 100 MCG: 9 INJECTION INTRAMUSCULAR; INTRAVENOUS; SUBCUTANEOUS at 14:00

## 2020-11-04 RX ADMIN — SUGAMMADEX 400 MG: 100 INJECTION, SOLUTION INTRAVENOUS at 17:28

## 2020-11-04 RX ADMIN — SODIUM CHLORIDE 150 MCG: 9 INJECTION INTRAMUSCULAR; INTRAVENOUS; SUBCUTANEOUS at 14:32

## 2020-11-04 RX ADMIN — SODIUM CHLORIDE 200 MCG: 9 INJECTION INTRAMUSCULAR; INTRAVENOUS; SUBCUTANEOUS at 14:38

## 2020-11-04 RX ADMIN — SODIUM CHLORIDE 100 MCG: 9 INJECTION INTRAMUSCULAR; INTRAVENOUS; SUBCUTANEOUS at 14:28

## 2020-11-04 RX ADMIN — DEXAMETHASONE SODIUM PHOSPHATE 4 MG: 4 INJECTION, SOLUTION INTRAMUSCULAR; INTRAVENOUS at 12:11

## 2020-11-04 RX ADMIN — KETOROLAC TROMETHAMINE 15 MG: 30 INJECTION, SOLUTION INTRAMUSCULAR at 21:30

## 2020-11-04 RX ADMIN — ROCURONIUM BROMIDE 5 MG: 10 INJECTION INTRAVENOUS at 16:16

## 2020-11-04 RX ADMIN — PHENYLEPHRINE HYDROCHLORIDE 75 MCG/MIN: 10 INJECTION INTRAVENOUS at 19:55

## 2020-11-04 RX ADMIN — GLYCOPYRROLATE 0.5 MG: 0.2 INJECTION INTRAMUSCULAR; INTRAVENOUS at 13:52

## 2020-11-04 RX ADMIN — ONDANSETRON HYDROCHLORIDE 4 MG: 2 SOLUTION INTRAMUSCULAR; INTRAVENOUS at 12:10

## 2020-11-04 RX ADMIN — SUCCINYLCHOLINE CHLORIDE 100 MG: 20 INJECTION, SOLUTION INTRAMUSCULAR; INTRAVENOUS; PARENTERAL at 11:55

## 2020-11-04 RX ADMIN — SUCCINYLCHOLINE CHLORIDE 140 MG: 20 INJECTION, SOLUTION INTRAMUSCULAR; INTRAVENOUS; PARENTERAL at 16:16

## 2020-11-04 NOTE — ED PROVIDER NOTES
This is a 59-year-old male comes emergency room with chief complaint of right upper quadrant and epigastric pain. Patient states that after eating sloppy Dk's tonight he experienced onset of epigastric and right upper quadrant pain. Patient states he then became short of breath then the pain radiated into his chest.  Patient has had nausea vomiting. Patient is also had diarrhea. Patient denies any urinary symptoms. The history is provided by the patient. No  was used. Abdominal Pain    This is a new problem. The current episode started 3 to 5 hours ago. The problem occurs constantly. The problem has been rapidly worsening. The pain is associated with vomiting. The pain is located in the epigastric region and RUQ. The quality of the pain is sharp and cramping. The pain is at a severity of 10/10. The pain is severe. Associated symptoms include diarrhea, nausea, vomiting and chest pain. Pertinent negatives include no fever, no constipation, no dysuria, no hematuria, no myalgias and no back pain. The pain is worsened by palpation and eating. The pain is relieved by nothing. Past workup includes surgery. Past workup comments: Hernia surgery. His past medical history is significant for GERD. His past medical history does not include PUD, UTI or DM.         Past Medical History:   Diagnosis Date    Gastrointestinal disorder     GERD    GERD (gastroesophageal reflux disease)     Panic attack     Psychiatric disorder     anxiety    Seizures ([facility] [address]     7 yo   was told it was a stress seizure, never had another one    Sleep apnea     uses CPAP    Unspecified adverse effect of anesthesia     woke up during endoscopy procedure       Past Surgical History:   Procedure Laterality Date    ABDOMEN SURGERY [address] [place] [address]    hernia(abdomen)- open    HX GI  2008    esophagus repair narrow- balloon dilation and scar tissue removal    HX HERNIA REPAIR      inguinal hernia    HX OTHER SURGICAL 11years of age    tonsils    HX OTHER SURGICAL  2009    hernia (hiatal and abdomen)-open    HX OTHER SURGICAL  23years of age    wisdom teeth extraction under anesthesia    HX OTHER SURGICAL  8 years     distended testicle     HX OTHER SURGICAL  1/16/15    Laparoscopic ventral hernia repair with mesh         Family History:   Problem Relation Age of Onset    Diabetes Mother     Hypertension Mother     Stroke Mother         copd    Cancer Father         skin    Heart Disease Father     Diabetes Father     Heart Attack Father        Social History     Socioeconomic History    Marital status:      Spouse name: Not on file    Number of children: Not on file    Years of education: Not on file    Highest education level: Not on file   Occupational History    Not on file   Social Needs    Financial resource strain: Not on file    Food insecurity     Worry: Not on file     Inability: Not on file    Transportation needs     Medical: Not on file     Non-medical: Not on file   Tobacco Use    Smoking status: Never Smoker    Smokeless tobacco: Never Used   Substance and Sexual Activity    Alcohol use:  Yes     Alcohol/week: 1.7 standard drinks     Types: 2 Cans of beer per week     Comment: 1 or 2 beer every two weeks    Drug use: Yes     Types: Marijuana     Comment: younger years/ in his twenities    Sexual activity: Yes     Partners: Female     Birth control/protection: None   Lifestyle    Physical activity     Days per week: Not on file     Minutes per session: Not on file    Stress: Not on file   Relationships    Social connections     Talks on phone: Not on file     Gets together: Not on file     Attends Mandaen service: Not on file     Active member of club or organization: Not on file     Attends meetings of clubs or organizations: Not on file     Relationship status: Not on file    Intimate partner violence     Fear of current or ex partner: Not on file     Emotionally abused: Not on file     Physically abused: Not on file     Forced sexual activity: Not on file   Other Topics Concern    Not on file   Social History Narrative    Not on file         ALLERGIES: Pcn [penicillins] and Codeine    Review of Systems   Constitutional: Positive for diaphoresis. Negative for appetite change, chills, fever and unexpected weight change. HENT: Negative for ear pain, hearing loss, rhinorrhea and trouble swallowing. Eyes: Negative for pain and visual disturbance. Respiratory: Positive for shortness of breath. Negative for cough and chest tightness. Cardiovascular: Positive for chest pain. Negative for palpitations. Gastrointestinal: Positive for abdominal pain, diarrhea, nausea and vomiting. Negative for abdominal distention, blood in stool and constipation. Genitourinary: Negative for dysuria, hematuria and urgency. Musculoskeletal: Negative for back pain and myalgias. Skin: Negative for rash. Neurological: Negative for dizziness, syncope, weakness and numbness. Psychiatric/Behavioral: Negative for confusion and suicidal ideas. All other systems reviewed and are negative. Vitals:    11/04/20 0015 11/04/20 0034 11/04/20 0045 11/04/20 0130   BP: (!) 139/97  (!) 150/69 135/71   Pulse:       Resp:       Temp:       SpO2: 100% 100% 98% 93%            Physical Exam  Vitals signs and nursing note reviewed. Constitutional:       General: He is not in acute distress. Appearance: He is well-developed. He is ill-appearing and diaphoretic. HENT:      Head: Normocephalic and atraumatic. Right Ear: External ear normal.      Left Ear: External ear normal.   Eyes:      General: No scleral icterus. Right eye: No discharge. Left eye: No discharge. Extraocular Movements: Extraocular movements intact. Conjunctiva/sclera: Conjunctivae normal.      Pupils: Pupils are equal, round, and reactive to light.    Neck:      Musculoskeletal: Normal range of motion and neck supple. Vascular: No JVD. Trachea: No tracheal deviation. Cardiovascular:      Rate and Rhythm: Normal rate and regular rhythm. Heart sounds: Normal heart sounds. No murmur. No friction rub. No gallop. Pulmonary:      Effort: Pulmonary effort is normal. No respiratory distress. Breath sounds: Normal breath sounds. No stridor. No decreased breath sounds, wheezing, rhonchi or rales. Chest:      Chest wall: No tenderness. Abdominal:      General: Bowel sounds are normal. There is no distension. Palpations: Abdomen is soft. Tenderness: There is abdominal tenderness in the right upper quadrant and epigastric area. There is no guarding or rebound. Positive signs include Samuel's sign. Negative signs include Rovsing's sign and McBurney's sign. Musculoskeletal: Normal range of motion. General: No tenderness. Right lower leg: No edema. Left lower leg: No edema. Skin:     General: Skin is warm. Coloration: Skin is not pale. Findings: No erythema or rash. Neurological:      General: No focal deficit present. Mental Status: He is alert and oriented to person, place, and time. GCS: GCS eye subscore is 4. GCS verbal subscore is 5. GCS motor subscore is 6. Cranial Nerves: No cranial nerve deficit. Sensory: No sensory deficit. Motor: No weakness or abnormal muscle tone. Coordination: Coordination normal.      Deep Tendon Reflexes: Reflexes are normal and symmetric. Reflexes normal.   Psychiatric:         Mood and Affect: Mood normal.         Behavior: Behavior normal.         Thought Content:  Thought content normal.         Judgment: Judgment normal.          MDM  Number of Diagnoses or Management Options  Abdominal pain, right upper quadrant:   Biliary colic:   Cholecystitis:   Intractable abdominal pain:   Nausea and vomiting, intractability of vomiting not specified, unspecified vomiting type:      Amount and/or Complexity of Data Reviewed  Clinical lab tests: ordered and reviewed  Tests in the radiology section of CPT®: ordered and reviewed  Tests in the medicine section of CPT®: ordered and reviewed  Discuss the patient with other providers: yes (General surgery)  Independent visualization of images, tracings, or specimens: yes (EKG)    Risk of Complications, Morbidity, and/or Mortality  Presenting problems: moderate  Diagnostic procedures: moderate  Management options: moderate    Patient Progress  Patient progress: stable       Procedures     Chief Complaint   Patient presents with    Abdominal Pain    Vomiting    Chest Pain    Shortness of Breath       The patient's presenting problems have been discussed, and they are in agreement with the care plan formulated and outlined with them. I have encouraged them to ask questions as they arise throughout their visit.     MEDICATIONS GIVEN:  Medications   morphine 4 mg/mL injection (4 mg  Canceled Entry 11/3/20 2318)   ondansetron (ZOFRAN) 4 mg/2 mL injection (  Canceled Entry 11/3/20 2318)   lactated Ringers infusion (100 mL/hr IntraVENous New Bag 11/4/20 8018)   sertraline (ZOLOFT) tablet 100 mg (has no administration in time range)   lisinopriL (PRINIVIL, ZESTRIL) tablet 20 mg (has no administration in time range)   buPROPion XL (WELLBUTRIN XL) tablet 150 mg (has no administration in time range)   pantoprazole (PROTONIX) tablet 40 mg (has no administration in time range)   acetaminophen (TYLENOL) tablet 650 mg (has no administration in time range)   HYDROmorphone (DILAUDID) syringe 1 mg (has no administration in time range)   ketorolac (TORADOL) injection 15 mg (has no administration in time range)   diphenhydrAMINE (BENADRYL) capsule 25 mg (has no administration in time range)   simethicone (MYLICON) tablet 80 mg (has no administration in time range)   ondansetron (ZOFRAN) injection 8 mg (8 mg IntraVENous Given 11/3/20 2315)   morphine injection 4 mg (4 mg IntraVENous Given 11/3/20 2329)   sodium chloride 0.9 % bolus infusion 1,000 mL (1,000 mL IntraVENous New Bag 11/3/20 2328)   HYDROmorphone (DILAUDID) syringe 1 mg (1 mg IntraVENous Given 11/4/20 0001)   HYDROmorphone (DILAUDID) syringe 1 mg (1 mg IntraVENous Given 11/4/20 0046)   ketorolac (TORADOL) injection 30 mg (30 mg IntraVENous Given 11/4/20 0046)   dicyclomine (BENTYL) 10 mg/mL injection 20 mg (20 mg IntraMUSCular Given 11/4/20 0045)       LABS REVIEWED:  Recent Results (from the past 24 hour(s))   EKG, 12 LEAD, INITIAL    Collection Time: 11/03/20 10:58 PM   Result Value Ref Range    Ventricular Rate 68 BPM    Atrial Rate 68 BPM    P-R Interval 172 ms    QRS Duration 102 ms    Q-T Interval 436 ms    QTC Calculation (Bezet) 463 ms    Calculated P Axis 34 degrees    Calculated R Axis 93 degrees    Calculated T Axis 34 degrees    Diagnosis       Normal sinus rhythm  Rightward axis  Borderline ECG  When compared with ECG of 22-JAN-2020 23:37,  No significant change was found     CBC WITH AUTOMATED DIFF    Collection Time: 11/03/20 11:07 PM   Result Value Ref Range    WBC 8.0 4.1 - 11.1 K/uL    RBC 4.88 4.10 - 5.70 M/uL    HGB 14.2 12.1 - 17.0 g/dL    HCT 41.0 36.6 - 50.3 %    MCV 84.0 80.0 - 99.0 FL    MCH 29.1 26.0 - 34.0 PG    MCHC 34.6 30.0 - 36.5 g/dL    RDW 12.5 11.5 - 14.5 %    PLATELET 582 825 - 317 K/uL    MPV 9.1 8.9 - 12.9 FL    NRBC 0.0 0  WBC    ABSOLUTE NRBC 0.00 0.00 - 0.01 K/uL    NEUTROPHILS 58 32 - 75 %    BAND NEUTROPHILS 4 0 - 6 %    LYMPHOCYTES 28 12 - 49 %    MONOCYTES 7 5 - 13 %    EOSINOPHILS 2 0 - 7 %    BASOPHILS 1 0 - 1 %    IMMATURE GRANULOCYTES 0 %    ABS. NEUTROPHILS 4.9 1.8 - 8.0 K/UL    ABS. LYMPHOCYTES 2.2 0.8 - 3.5 K/UL    ABS. MONOCYTES 0.6 0.0 - 1.0 K/UL    ABS. EOSINOPHILS 0.2 0.0 - 0.4 K/UL    ABS. BASOPHILS 0.1 0.0 - 0.1 K/UL    ABS. IMM.  GRANS. 0.0 K/UL    DF MANUAL      RBC COMMENTS NORMOCYTIC, NORMOCHROMIC      WBC COMMENTS TOXIC GRANULATION     METABOLIC PANEL, COMPREHENSIVE Collection Time: 11/03/20 11:07 PM   Result Value Ref Range    Sodium 138 136 - 145 mmol/L    Potassium 3.3 (L) 3.5 - 5.1 mmol/L    Chloride 105 97 - 108 mmol/L    CO2 26 21 - 32 mmol/L    Anion gap 7 5 - 15 mmol/L    Glucose 147 (H) 65 - 100 mg/dL    BUN 17 6 - 20 MG/DL    Creatinine 1.35 (H) 0.70 - 1.30 MG/DL    BUN/Creatinine ratio 13 12 - 20      GFR est AA >60 >60 ml/min/1.73m2    GFR est non-AA 56 (L) >60 ml/min/1.73m2    Calcium 9.1 8.5 - 10.1 MG/DL    Bilirubin, total 0.5 0.2 - 1.0 MG/DL    ALT (SGPT) 44 12 - 78 U/L    AST (SGOT) 21 15 - 37 U/L    Alk. phosphatase 77 45 - 117 U/L    Protein, total 7.7 6.4 - 8.2 g/dL    Albumin 4.0 3.5 - 5.0 g/dL    Globulin 3.7 2.0 - 4.0 g/dL    A-G Ratio 1.1 1.1 - 2.2     TROPONIN I    Collection Time: 11/03/20 11:07 PM   Result Value Ref Range    Troponin-I, Qt. <0.05 <0.05 ng/mL   LIPASE    Collection Time: 11/03/20 11:07 PM   Result Value Ref Range    Lipase 204 73 - 393 U/L   MAGNESIUM    Collection Time: 11/03/20 11:07 PM   Result Value Ref Range    Magnesium 2.2 1.6 - 2.4 mg/dL   SAMPLES BEING HELD    Collection Time: 11/03/20 11:07 PM   Result Value Ref Range    SAMPLES BEING HELD 1BLU,1RED,1SST     COMMENT        Add-on orders for these samples will be processed based on acceptable specimen integrity and analyte stability, which may vary by analyte.    POC LACTIC ACID    Collection Time: 11/03/20 11:08 PM   Result Value Ref Range    Lactic Acid (POC) 1.79 0.40 - 2.00 mmol/L       VITAL SIGNS:  Patient Vitals for the past 24 hrs:   Temp Pulse Resp BP SpO2   11/04/20 0130    135/71 93 %   11/04/20 0045    (!) 150/69 98 %   11/04/20 0034     100 %   11/04/20 0015    (!) 139/97 100 %   11/04/20 0001     100 %   11/04/20 0000    (!) 140/75    11/03/20 2247 97.7 °F (36.5 °C) 78 20 (!) 146/105 98 %       RADIOLOGY RESULTS:  The following have been ordered and reviewed:  [address]    Result Date: 11/3/2020  INDICATION:   ruq pain COMPARISON:  None FINDINGS: Limited right upper quadrant ultrasound was performed. Gallbladder:   Large gallstones in the neck of the gallbladder which does not move in decubitus position. No significant wall thickening. No pericholecystic fluid. Negative sonographic Samuel's sign. Common Bile Duct:  5 mm. Liver:  Diffusely echogenic, mildly enlarged 20 cm. Main Portal Vein:  Patent and appropriate hepatopetal flow. Pancreas Head (Body and Tail not evaluated): Obscured by bowel gas. Right kidney:  12.3 cm in length. No nephrolithiasis or hydronephrosis. Other:  None. IMPRESSION: 1. Cholelithiasis, with no sonographic evidence of acute cholecystitis. 2. Echogenic liver suggests steatosis with mild hepatomegaly. ED EKG interpretation:  Rhythm: normal sinus rhythm; and regular . Rate (approx.): 68; Axis: right axis deviation; P wave: normal; QRS interval: normal ; ST/T wave: normal; Other findings: borderline ekg. This EKG was interpreted by Handy Villarreal DO, ED Provider. CONSULTATIONS:   CONSULT NOTE:  12:23 AM Abdirashid Antonio MD spoke with Dr. Jerrell Machado, Consult for Surgery. Discussed available diagnostic tests and clinical findings. He is in agreement with care plans as outlined. Dr. Jerrell Machado will see and admit pt for further evaluation and treatment. PROGRESS NOTES:  Discussed results and plan with patient. Patient will be admitted/observed for further evaluation and treatment. DIAGNOSIS:    1. Biliary colic    2. Cholecystitis    3. Abdominal pain, right upper quadrant    4. Intractable abdominal pain    5. Nausea and vomiting, intractability of vomiting not specified, unspecified vomiting type        PLAN:    Admit for further surgical evaluation and treatment. ED COURSE: The patient's hospital course has been uncomplicated. Please note that this dictation was completed with [employer], the [employer] voice recognition software.   Quite often unanticipated grammatical, syntax, homophones, and other interpretive errors are inadvertently transcribed by the computer software. Please disregard these errors. Please excuse any errors that have escaped final proofreading.

## 2020-11-04 NOTE — ED TRIAGE NOTES
2248: Pt to room 22 for rapid EKG. 2256: Pt reports abdominal pain with vomiting since 2030. SOB and chest pain shortly after abdominal pain started. No cardiac hx per pt.

## 2020-11-04 NOTE — ANESTHESIA PREPROCEDURE EVALUATION
Anesthetic History               Review of Systems / Medical History  Patient summary reviewed, nursing notes reviewed and pertinent labs reviewed    Pulmonary        Sleep apnea: CPAP  Smoker         Neuro/Psych     seizures (X1 YEARS AGO. \"STRESS RELATED\".   NO RX)    Psychiatric history (ANXIETY/DEPRESSION)     Cardiovascular    Hypertension              Exercise tolerance: >4 METS     GI/Hepatic/Renal     GERD           Endo/Other        Morbid obesity     Other Findings              Physical Exam    Airway  Mallampati: III  TM Distance: < 4 cm  Neck ROM: normal range of motion   Mouth opening: Normal     Cardiovascular    Rhythm: regular  Rate: normal         Dental  No notable dental hx       Pulmonary  Breath sounds clear to auscultation               Abdominal  GI exam deferred       Other Findings            Anesthetic Plan    ASA: 3  Anesthesia type: general          Induction: Intravenous  Anesthetic plan and risks discussed with: Patient

## 2020-11-04 NOTE — ED NOTES
Introduced myself as primary RN. Assumed care of patient. Updated, discussed and explained plan of care to patient, including patient's expectations of visit. Pt given opportunity to ask questions. Pt advised that medical information will be discussed and it is their own responsibility to tell nurse if such conversation should not take place in the presence of visitors. Pt verbalizes understanding. Pt denies any additional complaints at this time. Pt resting on stretcher in low/locked position. Call bell in reach. Pt care whiteboard updated and approximate length of time for test results explained to patient. Pt placed on CPAP mask per his request. Lights dimmed.     Cody Livingston RN

## 2020-11-04 NOTE — BRIEF OP NOTE
Brief Postoperative Note    Patient: Yvette Preston  YOB: 1970  MRN: [id number]    Date of Procedure: 11/4/2020     Pre-Op Diagnosis: Bleeding post-op    Post-Op Diagnosis: tear in liver capsule      Procedure(s):  LAPAROTOMY EXPLORATORY, evacuation of hematoma.     Surgeon(s):  Choco Perea MD    Surgical Assistant: Surg Asst-1: Manju Neff RN    Anesthesia: General     Estimated Blood Loss (mL): 2671    Complications: None    Specimens: * No specimens in log *     Implants: * No implants in log *    Drains:   Pelon-Garcia Drain 11/04/20 Right;Upper Abdomen (Active)       Findings: tear in liver capsule with active bleeding, 2L hematoma evacuated    Electronically Signed by Lesly Arevalo MD on 11/4/2020 at 6:15 PM

## 2020-11-04 NOTE — PROGRESS NOTES
11/4/2020  9:32 AM  CM completed assessment w/ pt in person, CM wore mask at all times. Charted demographics verified, pt lives w/ wife and 3 children in pvt residence, at baseline pt is ambulatory, iADLs, drives, family can assist pt  Reason for Admission:  OBS for  Biliary Colic  Pt is a 51 yo  male who presets to [facility] ED c/o abdominal pain for 3-5 hrs, also c/o vomiting, nausea, diarrhea and chest pain. PMHx: GERD                   RUR Score:  N/A pt is OBS, Low Risk                   Plan for utilizing home health:  No history, pt is not homebound  DME:CPaP  Rx; Reginia Holley         PCP: Raj Cuevas and Last name:  Nerissa Pagan MD   Name of Practice:    Are you a current patient: Yes/No: Yes   Approximate date of last visit:  > 30 days   Can you participate in a virtual visit with your PCP: Yes                    Current Advanced Directive/Advance Care Plan:   Pt does not have ACP, wife Negro Cos C) [phone] is NOK                       Transition of Care Plan:  [facility] admission LIBIA for surgical management  2. Surgery consult  3. CM to follow through for treatment/response  4. ACP planning, pt declined to complete on this admission  5. CM educated pt on OBS status, State OBS letter given, signed, copy to pt and to be scanned into EMR  6. DC when stable to home w/ family assistance  7. Outpatient f/u PCP, surgery  8. [facility] resources  9.  Wife Willie Monroy will transport    Care Management Interventions  PCP Verified by CM: Yes(Harry Angulo MD, last visit > 30 days)  Palliative Care Criteria Met (RRAT>21 & CHF Dx)?: No  Mode of Transport at Discharge: Self(Wife)  Physical Therapy Consult: No  Occupational Therapy Consult: No  Speech Therapy Consult: No  Current Support Network: Lives with Spouse, Own Home(pt lvies w/ wife and children in pvt residence, reports to be ambulatory,iADLs, drives)  Confirm Follow Up Transport: Self  Discharge Location  Discharge Placement: Home with outpatient services  ALYSSA Hickman

## 2020-11-04 NOTE — ED NOTES
TRANSFER - OUT REPORT:    Verbal report given to Laura RN (name) on Dorian Andrew  being transferred to Pre-Op (unit) for ordered procedure. Report consisted of patients Situation, Background, Assessment and   Recommendations(SBAR). Information from the following report(s) SBAR, Kardex, ED Summary, Intake/Output and Recent Results was reviewed with the receiving nurse. Lines:   Peripheral IV 11/03/20 Right Antecubital (Active)   Site Assessment Clean, dry, & intact 11/04/20 0758   Phlebitis Assessment 0 11/04/20 0758   Infiltration Assessment 0 11/04/20 0758   Dressing Status Clean, dry, & intact 11/04/20 0758   Dressing Type Transparent 11/04/20 0758   Hub Color/Line Status Green; Infusing 11/04/20 0758   Action Taken Open ports on tubing capped 11/04/20 0758   Alcohol Cap Used Yes 11/04/20 0758        Opportunity for questions and clarification was provided.       Patient transported with:   [employer]

## 2020-11-04 NOTE — PROGRESS NOTES
Called by PACU about hypotension. Hg sent returned at 6.8  Given significant TAMIKO and concern for post operative bleeding the patient was taken emergently back to the OR for exploration. Discussed plan over the phone with the patients wife. He received 3U of uncrossed match blood as emergence release given significant hypotension and concern for bleeding. Home

## 2020-11-04 NOTE — ANESTHESIA POSTPROCEDURE EVALUATION
Procedure(s):  ROBOTIC ASSISTED LAPAROSCOPIC CHOLECYSTECTOMY, LYSIS OF ADHESIONS. general    Anesthesia Post Evaluation        Patient location during evaluation: bedside  Level of consciousness: awake  Pain management: satisfactory to patient  Airway patency: patent  Anesthetic complications: no  Cardiovascular status: acceptable  Respiratory status: acceptable  Hydration status: acceptable        INITIAL Post-op Vital signs:   Vitals Value Taken Time   BP 79/47 11/4/2020  3:00 PM   Temp 36.9 °C (98.5 °F) 11/4/2020  2:39 PM   Pulse 109 11/4/2020  3:01 PM   Resp 21 11/4/2020  3:01 PM   SpO2 100 % 11/4/2020  3:01 PM   Vitals shown include unvalidated device data.

## 2020-11-04 NOTE — BRIEF OP NOTE
Brief Postoperative Note    Patient: Flavia Loaiza  YOB: 1970  MRN: [id number]    Date of Procedure: 11/4/2020     Pre-Op Diagnosis: Acute CHolecystitis    Post-Op Diagnosis: Same as preoperative diagnosis.       Procedure(s):  ROBOTIC ASSISTED LAPAROSCOPIC CHOLECYSTECTOMY, LYSIS OF ADHESIONS    Surgeon(s):  Chase Perez MD    Surgical Assistant: Surg Asst-1: Ashok GRAMAJO    Anesthesia: General     Estimated Blood Loss (mL): less than 50     Complications: None    Specimens:   ID Type Source Tests Collected by Time Destination   1 : GALLBLADDER Preservative Abdomen  Chase Perez MD 11/4/2020 1328 Pathology        Implants: * No implants in log *    Drains: * No LDAs found *    Findings: large stones, adhesions to mesh along midline    Electronically Signed by John Lea MD on 11/4/2020 at 1:58 PM

## 2020-11-04 NOTE — ED NOTES
Bedside and Verbal shift change report given to [address] [place] [address] (oncoming nurse) by Gabby Roque (offgoing nurse). Report included the following information SBAR, Kardex, ED Summary, Procedure Summary, Intake/Output, MAR, Accordion and Recent Results.

## 2020-11-04 NOTE — CONSULTS
Surgery Consult    Subjective:      Faisal oLera is a 52 y.o. male who presents with RUQ pain associated with nausea, vomiting, and diarrhea. Denies any fevers or chills. No previous episodes. This started last night after a meal.  Patient has had extensive abdominal surgery which included UHR with mesh, open hiatal hernia repair, esophageal dilatation with TAMIKO, inguinal hernia repair.   US showed cholelithiasis with stone in neck of gallbladder    Past Medical History:   Diagnosis Date    Gastrointestinal disorder     GERD    GERD (gastroesophageal reflux disease)     Panic attack     Psychiatric disorder     anxiety    Seizures ([facility] [address]     5 yo   was told it was a stress seizure, never had another one    Sleep apnea     uses CPAP    Unspecified adverse effect of anesthesia     woke up during endoscopy procedure     Past Surgical History:   Procedure Laterality Date    ABDOMEN SURGERY [address]    hernia(abdomen)- open    HX GI  2008    esophagus repair narrow- balloon dilation and scar tissue removal    HX HERNIA REPAIR      inguinal hernia    HX OTHER SURGICAL  11years of age    tonsils    HX OTHER SURGICAL  2009    hernia (hiatal and abdomen)-open    HX OTHER SURGICAL  23years of age    wisdom teeth extraction under anesthesia    HX OTHER SURGICAL  8 years     distended testicle     HX OTHER SURGICAL  1/16/15    Laparoscopic ventral hernia repair with mesh      Family History   Problem Relation Age of Onset    Diabetes Mother     Hypertension Mother     Stroke Mother         copd    Cancer Father         skin    Heart Disease Father     Diabetes Father     Heart Attack Father      Social History     Socioeconomic History    Marital status:      Spouse name: Not on file    Number of children: Not on file    Years of education: Not on file    Highest education level: Not on file   Tobacco Use    Smoking status: Never Smoker    Smokeless tobacco: Never Used Substance and Sexual Activity    Alcohol use: Yes     Alcohol/week: 1.7 standard drinks     Types: 2 Cans of beer per week     Comment: 1 or 2 beer every two weeks    Drug use: Yes     Types: Marijuana     Comment: younger years/ in his twenities    Sexual activity: Yes     Partners: Female     Birth control/protection: None      Current Facility-Administered Medications   Medication Dose Route Frequency    lactated Ringers infusion  100 mL/hr IntraVENous CONTINUOUS    lisinopriL (PRINIVIL, ZESTRIL) tablet 20 mg  20 mg Oral DAILY    pantoprazole (PROTONIX) tablet 40 mg  40 mg Oral ACB    acetaminophen (TYLENOL) tablet 650 mg  650 mg Oral Q4H PRN    HYDROmorphone (DILAUDID) syringe 1 mg  1 mg IntraVENous Q4H PRN    ketorolac (TORADOL) injection 15 mg  15 mg IntraVENous Q6H    diphenhydrAMINE (BENADRYL) capsule 25 mg  25 mg Oral Q6H PRN    simethicone (MYLICON) tablet 80 mg  80 mg Oral QID PRN    citalopram (CELEXA) tablet 40 mg  40 mg Oral DAILY    morphine 4 mg/mL injection         Current Outpatient Medications   Medication Sig    citalopram (CELEXA) 40 mg tablet Take 40 mg by mouth daily.  lisinopril (PRINIVIL, ZESTRIL) 40 mg tablet Take 40 mg by mouth every evening.  simvastatin (ZOCOR) 40 mg tablet Take 40 mg by mouth nightly.  omeprazole (PRILOSEC) 40 mg capsule Take 40 mg by mouth daily.       Allergies   Allergen Reactions    Pcn [Penicillins] Hives    Codeine Nausea and Vomiting       Review of Systems:REVIEW OF SYSTEMS:     []     Unable to obtain  ROS due to  []    mental status change  []    sedated   []    intubated   [x]    Total of 12 systems reviewed as follows:    Constitutional: neg for fevers, chills, weight loss, malaise  Eyes: negative for blurry vision  Ears, nose, mouth, throat, and face: negative for sore throat  Respiratory: negative for SOB  Cardiovascular: negative for CP  Gastrointestinal: negative for constipation, melena, hematochezia  Genitourinary: negative for dysuria  Integument/breast: neg for skin rash  Hematologic/lymphatic: neg for bruising  Musculoskeletal: negative for muscle aches  Neurological: no dizziness or h/a    Objective:        Patient Vitals for the past 8 hrs:   BP Temp Pulse Resp SpO2   20 1000 137/82    95 %   20 0758 133/79 98.4 °F (36.9 °C) 93 18 93 %   20 0627 (!) 122/95    97 %   20 0600 (!) 153/73    99 %   20 0400 136/67    96 %       Temp (24hrs), Av.1 °F (36.7 °C), Min:97.7 °F (36.5 °C), Max:98.4 °F (36.9 °C)      Physical Exam:  General:  Alert, cooperative, no distress, appears stated age. Eyes:  Conjunctivae/corneas clear. Nose: Nares normal. Septum midline   Mouth/Throat: Lips, mucosa, and tongue normal.    Neck: Supple, symmetrical, trachea midline   Lungs:   Clear to auscultation bilaterally. Heart:  Regular rate and rhythm   Abdomen:   Soft, tender epigastric and RUQ, non-distended, no rebound, no guarding, normal bowel sounds   Extremities: Extremities normal, atraumatic, no cyanosis or edema. Skin: Skin color, texture, turgor normal. No rashes or lesions   Neuro: Alert, oriented, speech clear     Labs:   Recent Labs     20  2307   WBC 8.0   HGB 14.2   HCT 41.0        Recent Labs     20  2307      K 3.3*      CO2 26   *   BUN 17   CREA 1.35*   CA 9.1   MG 2.2   ALB 4.0   TBILI 0.5   ALT 44     No results for input(s): INR, INREXT in the last 72 hours.       Assessment and Plan:   53 y/o male with cholelithiasis  Will need cholecystectomy  Spoke about surgery along with risks  All questions answered        Signed By: Gabe Cain MD     2020

## 2020-11-04 NOTE — PROGRESS NOTES
[facility]: MED RECONCILIATION    Comments/Recommendations:   Discussed PTA meds with pt at bedside. Patient was a good historian, noting which medications had been discontinued, med indications, etc. Referenced RxQuery to confirm med list. Concern for non-adherence of simvastatin and lisinopril given lack of fill history on RxQuery and elevated lipid panel and blood pressure, respectively. Medications added:     Citalopram 40mg     Medications removed:    Sertraline 100mg  Bupropion XL 150mg       Information obtained from: patient, RxQuery    Allergies: Pcn [penicillins] and Codeine    Prior to Admission Medications:   Medication Documentation Review Audit       Reviewed by Jana De Leon (Pharmacy Student) on 11/04/20 at 4239      Medication Sig Documenting Provider Last Dose Status Taking?   citalopram (CELEXA) 40 mg tablet Take 40 mg by mouth daily. Provider, Historical 11/3/2020 0900 Active Yes   lisinopril (PRINIVIL, ZESTRIL) 40 mg tablet Take 40 mg by mouth every evening. Provider, Historical 11/3/2020 0900 Active Yes   omeprazole (PRILOSEC) 40 mg capsule Take 40 mg by mouth daily. Provider, Historical 11/3/2020 0900 Active Yes   simvastatin (ZOCOR) 40 mg tablet Take 40 mg by mouth nightly.  Provider, Historical 11/3/2020 0900 Active Yes                    Pharmacy Student Thai [phone]

## 2020-11-04 NOTE — ED NOTES
Bedside and Verbal shift change report given to [employer] RN (oncoming nurse) by Benito Koch RN (offgoing nurse). Report included the following information SBAR, Kardex, ED Summary, MAR, Accordion and Recent Results.

## 2020-11-05 LAB
ALBUMIN SERPL-MCNC: 2.1 G/DL (ref 3.5–5)
ALBUMIN/GLOB SERPL: 1.1 {RATIO} (ref 1.1–2.2)
ALP SERPL-CCNC: 78 U/L (ref 45–117)
ALT SERPL-CCNC: 309 U/L (ref 12–78)
ANION GAP SERPL CALC-SCNC: 3 MMOL/L (ref 5–15)
ANION GAP SERPL CALC-SCNC: 5 MMOL/L (ref 5–15)
ARTERIAL PATENCY WRIST A: NO
AST SERPL-CCNC: 185 U/L (ref 15–37)
BASE DEFICIT BLDA-SCNC: 6.6 MMOL/L
BASOPHILS # BLD: 0 K/UL (ref 0–0.1)
BASOPHILS NFR BLD: 0 % (ref 0–1)
BDY SITE: ABNORMAL
BILIRUB SERPL-MCNC: 0.9 MG/DL (ref 0.2–1)
BUN SERPL-MCNC: 12 MG/DL (ref 6–20)
BUN SERPL-MCNC: 14 MG/DL (ref 6–20)
BUN/CREAT SERPL: 10 (ref 12–20)
BUN/CREAT SERPL: 11 (ref 12–20)
CALCIUM SERPL-MCNC: 7.7 MG/DL (ref 8.5–10.1)
CALCIUM SERPL-MCNC: 7.9 MG/DL (ref 8.5–10.1)
CHLORIDE SERPL-SCNC: 113 MMOL/L (ref 97–108)
CHLORIDE SERPL-SCNC: 116 MMOL/L (ref 97–108)
CO2 SERPL-SCNC: 21 MMOL/L (ref 21–32)
CO2 SERPL-SCNC: 26 MMOL/L (ref 21–32)
CREAT SERPL-MCNC: 1.19 MG/DL (ref 0.7–1.3)
CREAT SERPL-MCNC: 1.24 MG/DL (ref 0.7–1.3)
DIFFERENTIAL METHOD BLD: ABNORMAL
EOSINOPHIL # BLD: 0 K/UL (ref 0–0.4)
EOSINOPHIL NFR BLD: 0 % (ref 0–7)
EPAP/CPAP/PEEP, PAPEEP: 5
ERYTHROCYTE [DISTWIDTH] IN BLOOD BY AUTOMATED COUNT: 13.7 % (ref 11.5–14.5)
ERYTHROCYTE [DISTWIDTH] IN BLOOD BY AUTOMATED COUNT: 14.4 % (ref 11.5–14.5)
FIO2 ON VENT: 50 %
GAS FLOW.O2 SETTING OXYMISER: 16 L/MIN
GLOBULIN SER CALC-MCNC: 2 G/DL (ref 2–4)
GLUCOSE SERPL-MCNC: 160 MG/DL (ref 65–100)
GLUCOSE SERPL-MCNC: 193 MG/DL (ref 65–100)
HCO3 BLDA-SCNC: 19 MMOL/L (ref 22–26)
HCT VFR BLD AUTO: 25.4 % (ref 36.6–50.3)
HCT VFR BLD AUTO: 35.6 % (ref 36.6–50.3)
HGB BLD-MCNC: 12.1 G/DL (ref 12.1–17)
HGB BLD-MCNC: 8.7 G/DL (ref 12.1–17)
IMM GRANULOCYTES # BLD AUTO: 0 K/UL
IMM GRANULOCYTES NFR BLD AUTO: 0 %
LYMPHOCYTES # BLD: 1.4 K/UL (ref 0.8–3.5)
LYMPHOCYTES NFR BLD: 5 % (ref 12–49)
MCH RBC QN AUTO: 29.5 PG (ref 26–34)
MCH RBC QN AUTO: 29.8 PG (ref 26–34)
MCHC RBC AUTO-ENTMCNC: 34 G/DL (ref 30–36.5)
MCHC RBC AUTO-ENTMCNC: 34.3 G/DL (ref 30–36.5)
MCV RBC AUTO: 86.1 FL (ref 80–99)
MCV RBC AUTO: 87.7 FL (ref 80–99)
METAMYELOCYTES NFR BLD MANUAL: 3 %
MONOCYTES # BLD: 1.4 K/UL (ref 0–1)
MONOCYTES NFR BLD: 5 % (ref 5–13)
MYELOCYTES NFR BLD MANUAL: 1 %
NEUTS BAND NFR BLD MANUAL: 19 % (ref 0–6)
NEUTS SEG # BLD: 24.2 K/UL (ref 1.8–8)
NEUTS SEG NFR BLD: 67 % (ref 32–75)
NRBC # BLD: 0.03 K/UL (ref 0–0.01)
NRBC # BLD: 0.05 K/UL (ref 0–0.01)
NRBC BLD-RTO: 0.2 PER 100 WBC
NRBC BLD-RTO: 0.2 PER 100 WBC
PCO2 BLDA: 37 MMHG (ref 35–45)
PH BLDA: 7.32 [PH] (ref 7.35–7.45)
PLATELET # BLD AUTO: 154 K/UL (ref 150–400)
PLATELET # BLD AUTO: 214 K/UL (ref 150–400)
PMV BLD AUTO: 9.3 FL (ref 8.9–12.9)
PMV BLD AUTO: 9.5 FL (ref 8.9–12.9)
PO2 BLDA: 164 MMHG (ref 80–100)
POTASSIUM SERPL-SCNC: 4.2 MMOL/L (ref 3.5–5.1)
POTASSIUM SERPL-SCNC: 5.1 MMOL/L (ref 3.5–5.1)
PROT SERPL-MCNC: 4.1 G/DL (ref 6.4–8.2)
RBC # BLD AUTO: 2.95 M/UL (ref 4.1–5.7)
RBC # BLD AUTO: 4.06 M/UL (ref 4.1–5.7)
RBC MORPH BLD: ABNORMAL
SAO2 % BLD: 99 % (ref 92–97)
SAO2% DEVICE SAO2% SENSOR NAME: ABNORMAL
SODIUM SERPL-SCNC: 142 MMOL/L (ref 136–145)
SODIUM SERPL-SCNC: 142 MMOL/L (ref 136–145)
SPECIMEN SITE: ABNORMAL
VENTILATION MODE VENT: ABNORMAL
VT SETTING VENT: 500 ML
WBC # BLD AUTO: 17.3 K/UL (ref 4.1–11.1)
WBC # BLD AUTO: 28.1 K/UL (ref 4.1–11.1)

## 2020-11-05 PROCEDURE — 77010033678 HC OXYGEN DAILY

## 2020-11-05 PROCEDURE — 74011250636 HC RX REV CODE- 250/636: Performed by: SURGERY

## 2020-11-05 PROCEDURE — 85027 COMPLETE CBC AUTOMATED: CPT

## 2020-11-05 PROCEDURE — 65610000006 HC RM INTENSIVE CARE

## 2020-11-05 PROCEDURE — 94003 VENT MGMT INPAT SUBQ DAY: CPT

## 2020-11-05 PROCEDURE — 82803 BLOOD GASES ANY COMBINATION: CPT

## 2020-11-05 PROCEDURE — 80053 COMPREHEN METABOLIC PANEL: CPT

## 2020-11-05 PROCEDURE — 74011250637 HC RX REV CODE- 250/637: Performed by: SURGERY

## 2020-11-05 PROCEDURE — 36600 WITHDRAWAL OF ARTERIAL BLOOD: CPT

## 2020-11-05 PROCEDURE — 74011000250 HC RX REV CODE- 250: Performed by: SURGERY

## 2020-11-05 PROCEDURE — 36415 COLL VENOUS BLD VENIPUNCTURE: CPT

## 2020-11-05 PROCEDURE — C9113 INJ PANTOPRAZOLE SODIUM, VIA: HCPCS | Performed by: SURGERY

## 2020-11-05 PROCEDURE — 74011250636 HC RX REV CODE- 250/636: Performed by: NURSE PRACTITIONER

## 2020-11-05 PROCEDURE — 74011000258 HC RX REV CODE- 258: Performed by: SURGERY

## 2020-11-05 PROCEDURE — 85025 COMPLETE CBC W/AUTO DIFF WBC: CPT

## 2020-11-05 PROCEDURE — 2709999900 HC NON-CHARGEABLE SUPPLY

## 2020-11-05 RX ORDER — TAMSULOSIN HYDROCHLORIDE 0.4 MG/1
0.4 CAPSULE ORAL
Status: CANCELLED | OUTPATIENT
Start: 2020-11-05

## 2020-11-05 RX ORDER — CALCIUM GLUCONATE 20 MG/ML
2 INJECTION, SOLUTION INTRAVENOUS ONCE
Status: COMPLETED | OUTPATIENT
Start: 2020-11-05 | End: 2020-11-05

## 2020-11-05 RX ORDER — ACETAMINOPHEN 650 MG/1
650 SUPPOSITORY RECTAL
Status: DISCONTINUED | OUTPATIENT
Start: 2020-11-05 | End: 2020-11-25 | Stop reason: HOSPADM

## 2020-11-05 RX ORDER — NOREPINEPHRINE BITARTRATE/D5W 8 MG/250ML
PLASTIC BAG, INJECTION (ML) INTRAVENOUS
Status: DISPENSED
Start: 2020-11-05 | End: 2020-11-06

## 2020-11-05 RX ADMIN — DEXTROSE MONOHYDRATE 6 MCG/MIN: 5 INJECTION, SOLUTION INTRAVENOUS at 18:15

## 2020-11-05 RX ADMIN — PROPOFOL 50 MCG/KG/MIN: 10 INJECTION, EMULSION INTRAVENOUS at 11:53

## 2020-11-05 RX ADMIN — ACETAMINOPHEN 650 MG: 650 SUPPOSITORY RECTAL at 21:06

## 2020-11-05 RX ADMIN — PROPOFOL 40 MCG/KG/MIN: 10 INJECTION, EMULSION INTRAVENOUS at 02:31

## 2020-11-05 RX ADMIN — SODIUM CHLORIDE, SODIUM LACTATE, POTASSIUM CHLORIDE, AND CALCIUM CHLORIDE 125 ML/HR: 600; 310; 30; 20 INJECTION, SOLUTION INTRAVENOUS at 09:50

## 2020-11-05 RX ADMIN — PROPOFOL 50 MCG/KG/MIN: 10 INJECTION, EMULSION INTRAVENOUS at 09:50

## 2020-11-05 RX ADMIN — HYDROMORPHONE HYDROCHLORIDE 1 MG: 2 INJECTION, SOLUTION INTRAMUSCULAR; INTRAVENOUS; SUBCUTANEOUS at 21:07

## 2020-11-05 RX ADMIN — KETOROLAC TROMETHAMINE 15 MG: 30 INJECTION, SOLUTION INTRAMUSCULAR at 02:32

## 2020-11-05 RX ADMIN — PROPOFOL 50 MCG/KG/MIN: 10 INJECTION, EMULSION INTRAVENOUS at 18:07

## 2020-11-05 RX ADMIN — CALCIUM GLUCONATE 2 G: 20 INJECTION, SOLUTION INTRAVENOUS at 09:43

## 2020-11-05 RX ADMIN — PHENYLEPHRINE HYDROCHLORIDE 130 MCG/MIN: 10 INJECTION INTRAVENOUS at 13:00

## 2020-11-05 RX ADMIN — HYDROMORPHONE HYDROCHLORIDE 1 MG: 2 INJECTION, SOLUTION INTRAMUSCULAR; INTRAVENOUS; SUBCUTANEOUS at 18:07

## 2020-11-05 RX ADMIN — PROPOFOL 50 MCG/KG/MIN: 10 INJECTION, EMULSION INTRAVENOUS at 15:09

## 2020-11-05 RX ADMIN — PROPOFOL 50 MCG/KG/MIN: 10 INJECTION, EMULSION INTRAVENOUS at 06:14

## 2020-11-05 RX ADMIN — KETOROLAC TROMETHAMINE 15 MG: 30 INJECTION, SOLUTION INTRAMUSCULAR at 09:48

## 2020-11-05 RX ADMIN — PHENYLEPHRINE HYDROCHLORIDE 145 MCG/MIN: 10 INJECTION INTRAVENOUS at 22:26

## 2020-11-05 RX ADMIN — SODIUM CHLORIDE, SODIUM LACTATE, POTASSIUM CHLORIDE, AND CALCIUM CHLORIDE 125 ML/HR: 600; 310; 30; 20 INJECTION, SOLUTION INTRAVENOUS at 17:11

## 2020-11-05 RX ADMIN — SODIUM CHLORIDE 40 MG: 9 INJECTION INTRAMUSCULAR; INTRAVENOUS; SUBCUTANEOUS at 09:48

## 2020-11-05 RX ADMIN — HYDROMORPHONE HYDROCHLORIDE 1 MG: 2 INJECTION, SOLUTION INTRAMUSCULAR; INTRAVENOUS; SUBCUTANEOUS at 11:54

## 2020-11-05 RX ADMIN — KETOROLAC TROMETHAMINE 15 MG: 30 INJECTION, SOLUTION INTRAMUSCULAR at 14:31

## 2020-11-05 RX ADMIN — PROPOFOL 50 MCG/KG/MIN: 10 INJECTION, EMULSION INTRAVENOUS at 04:30

## 2020-11-05 RX ADMIN — KETOROLAC TROMETHAMINE 15 MG: 30 INJECTION, SOLUTION INTRAMUSCULAR at 20:37

## 2020-11-05 NOTE — CONSULTS
[facility]     Name: Jose Benítez MRN: [id number]   : 1970 Hospital: [address]   Date: 2020        Impression Plan   1. Respiratory insufficiency  2. Hemorrhagic shock due  3. S/p lacerated liver capsule repair  4. Cholelithiasis- S/p Cholecystecomy and lysis of adhesions               · SBT this morning- barrier to extubation is pressor requirement  · Wean levoped and daniel- wean levophed first  · Wean prop to RASS -1  · IVF- LR  · Pantoprazole  · Hold on [facility] proph  · SCDs         Pt is critically ill. Critical care time spent with pt exclusive of procedures was 32 minutes. Pt at risk for further decline due to hemorrhagic shock    Radiology  ( personally reviewed) CXR reviewed: no infiltrates, ETT   ABG No results for input(s): PHI, PO2I, PCO2I in the last 72 hours. Subjective     Cc: hemorrhagic shock    49 yo with cholecystits s/p lab bailee complicated by laceration of the liver capsule and hemorrhagic shock. Hematoma evacuated- 2L blood. Currently on daniel 130 and Levophed 8. Pt intubated and sedated- unable to provide any further hx. Review of Systems:  Review of systems not obtained due to patient factors.     Past Medical History:   Diagnosis Date    GERD (gastroesophageal reflux disease)     Panic attack     Psychiatric disorder     anxiety    Seizures ([facility] [address]     5 yo   was told it was a stress seizure, never had another one    Sleep apnea     uses CPAP      Past Surgical History:   Procedure Laterality Date    ABDOMEN SURGERY PROC UNLISTED      hernia(abdomen)- open    HX GI      esophagus repair narrow- balloon dilation and scar tissue removal    HX HERNIA REPAIR      inguinal hernia    HX OTHER SURGICAL  11years of age    tonsils    HX OTHER SURGICAL  2009    hernia (hiatal and abdomen)-open    HX OTHER SURGICAL  23years of age    wisdom teeth extraction under anesthesia    HX OTHER SURGICAL  8 years     distended testicle  HX OTHER SURGICAL  1/16/15    Laparoscopic ventral hernia repair with mesh      Prior to Admission medications    Medication Sig Start Date End Date Taking? Authorizing Provider   citalopram (CELEXA) 40 mg tablet Take 40 mg by mouth daily. Yes Provider, Historical   docusate sodium (COLACE) 100 mg capsule Take 1 Cap by mouth two (2) times a day for 10 days. 11/4/20 11/14/20 Yes Brii Borden MD   oxyCODONE IR (ROXICODONE) 5 mg immediate release tablet Take 1 Tab by mouth every four (4) hours as needed for Pain for up to 3 days. Max Daily Amount: 30 mg. 11/4/20 11/7/20 Yes Brii Borden MD   lisinopril (PRINIVIL, ZESTRIL) 40 mg tablet Take 40 mg by mouth every evening. Yes Provider, Historical   simvastatin (ZOCOR) 40 mg tablet Take 40 mg by mouth nightly. Yes Provider, Historical   omeprazole (PRILOSEC) 40 mg capsule Take 40 mg by mouth daily.    Yes Provider, Historical     Current Facility-Administered Medications   Medication Dose Route Frequency    pantoprazole (PROTONIX) 40 mg in 0.9% sodium chloride 10 mL injection  40 mg IntraVENous DAILY    calcium gluconate 2 g/100 mL sodium chloride (ISO-OSM)  2 g IntraVENous ONCE    lactated Ringers infusion  125 mL/hr IntraVENous CONTINUOUS    lisinopriL (PRINIVIL, ZESTRIL) tablet 20 mg  20 mg Oral DAILY    ketorolac (TORADOL) injection 15 mg  15 mg IntraVENous Q6H    citalopram (CELEXA) tablet 40 mg  40 mg Oral DAILY    docusate sodium (COLACE) capsule 100 mg  100 mg Oral BID    propofol (DIPRIVAN) 10 mg/mL infusion  5-50 mcg/kg/min IntraVENous TITRATE    NOREPINephrine (LEVOPHED) 8,000 mcg in dextrose 5% 250 mL infusion  0.5-16 mcg/min IntraVENous TITRATE    PHENYLephrine (BETTY-SYNEPHRINE) 100 mg in 0.9% sodium chloride 250 mL infusion   mcg/min IntraVENous TITRATE     Allergies   Allergen Reactions    Pcn [Penicillins] Hives     Reaction was at age 6, itching and eyes swollen; denies any sob or tongue swelling    Codeine Nausea and Vomiting      Social History     Tobacco Use    Smoking status: Never Smoker    Smokeless tobacco: Never Used   Substance Use Topics    Alcohol use: Yes     Alcohol/week: 1.7 standard drinks     Types: 2 Cans of beer per week     Comment: 1 or 2 beer every two weeks      Family History   Problem Relation Age of Onset    Diabetes Mother     Hypertension Mother     Stroke Mother         copd    Cancer Father         skin    Heart Disease Father     Diabetes Father     Heart Attack Father           Laboratory: I have personally reviewed the critical care flowsheet and labs. Recent Labs     11/05/20  0120 11/04/20  1829 11/04/20  1700   WBC 28.1* 33.7* 26.8*   HGB 12.1 9.2* 6.8*   HCT 35.6* 28.1* 21.4*    218 197     Recent Labs     11/05/20  0120 11/03/20  2307    138   K 5.1 3.3*   * 105   CO2 21 26   * 147*   BUN 14 17   CREA 1.24 1.35*   CA 7.7* 9.1   MG  --  2.2   ALB  --  4.0   ALT  --  44       Objective:     Mode Rate Tidal Volume Pressure FiO2 PEEP   Spontaneous   500 ml  5 cm H2O 40 % 5 cm H20     Vital Signs:    Ventilator Pressures  Pressure Support (cm H2O): 5 cm H2O  PIP Observed (cm H2O): 11 cm H2O  MAP (cm H2O): 7.3  PEEP/VENT (cm H2O): 5 cm [id number](24)Ventilator Pressures  Pressure Support (cm H2O): 5 cm H2O  PIP Observed (cm H2O): 11 cm H2O  MAP (cm H2O): 7.3  PEEP/VENT (cm H2O): 5 cm H20  Safety & Alarms  Circuit Temperature: (HME)  Backup Mode Checked/Apnea: Yes  Pressure Max: 45 cm H2O  Pressure Min: 13 cm H2O  Ve Min: 2  Ve Max: 20  Vt Min: 200 ml  Vt Max: 1000 ml  RR Min: 16  RR Max: 50  Intake/Output:   Last shift:      Ventilator Volumes  Vt Set (ml): 500 ml  Vt Exhaled (Machine Breath) (ml): 562 ml  Vt Spont (ml): 685 ml  Ve Observed (l/min): 10.7 l/min  Last 3 shifts: No intake/output data recorded. [id number]    Intake/Output Summary (Last 24 hours) at 11/5/2020 0927  Last data filed at 11/5/2020 0600  Gross per 24 hour   Intake 48197.46 ml Output 5695 ml   Net 9472.46 ml     EXAM:   GENERAL: intubated, sedated, HEENT:  PERRL, EOMI, no alar flaring or epistaxis, oral mucosa moist without cyanosis, NECK:  no jugular vein distention, no retractions, no thyromegaly or masses, LUNGS: CTA, no w/r/r , HEART:  Regular rate and rhythm with no MGR; no edema is present, ABDOMEN:  soft s, bowel sounds absent, EXTREMITIES:  warm with no cyanosis, SKIN:  no jaundice or ecchymosis and NEUROLOGIC: RASS -2    Pilo Hnedricks MD  [facility]

## 2020-11-05 NOTE — PERIOP NOTES
Current vent settings: Assist Controlled- Rate-16, Peep-8, TV-500, FIO2-60%
Dr. Jerzy Camilo at the patient bedside and patient went back to the OR.
Notified Dr. Jim Erp of patient being hypotensive, new orders to draw an H and H. Will continue to monitor pt.
Patient Fall Protocol  Yellow arm band applied to patient and yellow non skid socks placed on  Bed in low position, all side rails up, call bell in reach  Pt and Family instructed in \"call- don't fall\" protocol   -use your call bell, wait for assistance, staff not family will assist you to get up and move about   Pt and family verbalize understanding of fall precautions and the \"call don't fall\" Protocol
Spoke to Dr. Kael Washington, Pulmonologist and orders to wean FIO2 to 40% to keep saturations greater than 92%. Spoke to RT to inform her of MD orders.
Spoke to Regina Suazo RN receiving nurse in the ICU to draw a CBC an hour after blood transfusion completion which is 2110 per Dr. Shahram Peng. Patient transferred to room 3013 in the ICU for further monitoring. Spoke to patients wife Omar aClloway and notified her of patient location and current patient condition.
Spoke to patients wife Radha Dueñas and gave her an update on patient condition, any and all questions answered.
Vent settings: Rate-14, Peep-8, TV-500, FIO2-100%.
Home

## 2020-11-05 NOTE — PROGRESS NOTES
Bedside and Verbal shift change report given to Rubin Pickett (oncoming nurse) by Abdirahman Little (offgoing nurse). Report included the following information SBAR, Procedure Summary, Intake/Output, Recent Results, Cardiac Rhythm sinus tach and Alarm Parameters . Primary Nurse Brandie Osman, RN and Bruce Shelby RN performed a dual skin assessment on this patient Impairment noted- see wound doc flow sheet  Jeffery score is 10    Neuro- pupils reactive and equal, follows commands, anxious and agitated  Cardiac- Sinus tach, art line, multiple pressors  Resp- Intubated, PEEP 5, 40% fiO2   GI/- cooley, pink tinge  Skin- surgical incision sites    0800  Pt agitated this AM, pain meds given, pt reaching and trying to remove ETT, restraints applied with order from Dr. Brianna Vanegas. Meds given, PO meds held d/t NG tube to suction. 1000  Wife given update, pt remains on multiple pressors, pt more comfortable after pain meds    1200  Pt remins intubated d/t pressures. Assessment complete, pt placed back on rate from SBT. SBT passed. 1600  Assessment complete, difficulty sliding in line suction catheter down ETT, RT repositioned ETT and suctioned pt. Pressors increasing. Repeat labs show improved K+, hgb dropped from ~12 to ~8.    Bedside and Verbal shift change report given to [facility] (oncoming nurse) by Rubin Pickett (offgoing nurse). Report included the following information SBAR, Intake/Output, MAR, Cardiac Rhythm sinus tach and Alarm Parameters .

## 2020-11-05 NOTE — PROGRESS NOTES
SURGERY PROGRESS NOTE      Admit Date: 11/3/2020    POD 1 Day Post-Op    Procedure: Procedure(s):  LAPAROTOMY EXPLORATORY, evacuation of hematoma. Subjective: Intubated and sedated but responsive to touch  Hg responded appropriately after transfusion  On pressors      Objective:     Visit Vitals  /62   Pulse (!) 111   Temp 99.1 °F (37.3 °C)   Resp 27   Ht 5' 11\" (1.803 m)   Wt 118.9 kg (262 lb 2 oz)   SpO2 100%   BMI 36.56 kg/m²        Temp (24hrs), Av °F (36.7 °C), Min:97.1 °F (36.2 °C), Max:99.1 °F (37.3 °C)      Physical Exam:     Abdomen:  Soft. Appropriately tender, non-distended.   dressing C/D/I. SRI thin bloody fluid        Lab Results   Component Value Date/Time    WBC 28.1 (H) 2020 01:20 AM    HGB 12.1 2020 01:20 AM    Hemoglobin (POC) 13.3 2014 10:05 PM    HCT 35.6 (L) 2020 01:20 AM    Hematocrit (POC) 39 2014 10:05 PM    PLATELET 941  01:20 AM    MCV 87.7 2020 01:20 AM     Lab Results   Component Value Date/Time    GFR est non-AA 56 (L) 2020 11:07 PM    GFRNA, POC >60 2014 10:05 PM    GFR est AA >60 2020 11:07 PM    GFRAA, POC >60 2014 10:05 PM    Creatinine 1.35 (H) 2020 11:07 PM    Creatinine (POC) 1.1 2014 10:05 PM    BUN 17 2020 11:07 PM    BUN (POC) 15 2014 10:05 PM    Sodium 138 2020 11:07 PM    Sodium (POC) 141 2014 10:05 PM    Potassium 3.3 (L) 2020 11:07 PM    Potassium (POC) 3.5 2014 10:05 PM    Chloride 105 2020 11:07 PM    Chloride (POC) 106 2014 10:05 PM    CO2 26 2020 11:07 PM    Magnesium 2.2 2020 11:07 PM    Phosphorus 3.4 2020 04:00 AM       Assessment:     Active Problems:    Biliary colic ()      Cholecystitis (2020)        Plan/Recommendations/Medical Decision Making:   OK to extubate today if meets parameters  Wean pressors as able  Continue to closely monitor in ICU

## 2020-11-05 NOTE — PROGRESS NOTES
Problem: Falls - Risk of  Goal: *Absence of Falls  Description: Document Mel Heads Fall Risk and appropriate interventions in the flowsheet. Outcome: Progressing Towards Goal  Note: Fall Risk Interventions:            Medication Interventions: Bed/chair exit alarm, Evaluate medications/consider consulting pharmacy, Teach patient to arise slowly    Elimination Interventions: Bed/chair exit alarm, Call light in reach, Patient to call for help with toileting needs              Problem: Patient Education: Go to Patient Education Activity  Goal: Patient/Family Education  Outcome: Progressing Towards Goal     Problem: Ventilator Management  Goal: *Adequate oxygenation and ventilation  Outcome: Progressing Towards Goal  Goal: *Patient maintains clear airway/free of aspiration  Outcome: Progressing Towards Goal  Goal: *Absence of infection signs and symptoms  Outcome: Progressing Towards Goal  Goal: *Normal spontaneous ventilation  Outcome: Progressing Towards Goal     Problem: Patient Education: Go to Patient Education Activity  Goal: Patient/Family Education  Outcome: Progressing Towards Goal     Problem: Pressure Injury - Risk of  Goal: *Prevention of pressure injury  Description: Document Jeffery Scale and appropriate interventions in the flowsheet.   Outcome: Progressing Towards Goal  Note: Pressure Injury Interventions:  Sensory Interventions: Check visual cues for pain, Discuss PT/OT consult with provider, Float heels, Keep linens dry and wrinkle-free, Minimize linen layers, Monitor skin under medical devices, Pressure redistribution bed/mattress (bed type)    Moisture Interventions: Absorbent underpads    Activity Interventions: Assess need for specialty bed, Pressure redistribution bed/mattress(bed type)    Mobility Interventions: Float heels, HOB 30 degrees or less, Pressure redistribution bed/mattress (bed type)    Nutrition Interventions: Discuss nutritional consult with provider    Friction and Shear Interventions: HOB 30 degrees or less, Lift sheet                Problem: Patient Education: Go to Patient Education Activity  Goal: Patient/Family Education  Outcome: Progressing Towards Goal     Problem: Infection - Risk of, Central Venous Catheter-Associated Bloodstream Infection  Goal: *Absence of infection signs and symptoms  Outcome: Progressing Towards Goal     Problem: Patient Education: Go to Patient Education Activity  Goal: Patient/Family Education  Outcome: Progressing Towards Goal     Problem: Patient Education: Go to Patient Education Activity  Goal: Patient/Family Education  Outcome: Resolved/Met     Problem: Surgical Pathway Day of Surgery  Goal: Off Pathway (Use only if patient is Off Pathway)  Outcome: Progressing Towards Goal  Goal: Activity/Safety  Outcome: Progressing Towards Goal  Goal: Consults, if ordered  Outcome: Progressing Towards Goal  Goal: Nutrition/Diet  Outcome: Progressing Towards Goal  Goal: Medications  Outcome: Progressing Towards Goal  Goal: Respiratory  Outcome: Progressing Towards Goal  Goal: Treatments/Interventions/Procedures  Outcome: Progressing Towards Goal  Goal: Psychosocial  Outcome: Progressing Towards Goal  Goal: *No signs and symptoms of infection or wound complications  Outcome: Progressing Towards Goal  Goal: *Optimal pain control at patient's stated goal  Outcome: Progressing Towards Goal  Goal: *Adequate urinary output (equal to or greater than 30 milliliters/hour)  Description: Ambulatory Surgery patients voiding without difficulty.   Outcome: Progressing Towards Goal  Goal: *Hemodynamically stable  Outcome: Progressing Towards Goal  Goal: *Tolerating diet  Outcome: Progressing Towards Goal  Goal: *Demonstrates progressive activity  Outcome: Progressing Towards Goal     Problem: Surgical Pathway Post-Op Day 1  Goal: Off Pathway (Use only if patient is Off Pathway)  Outcome: Progressing Towards Goal  Goal: Activity/Safety  Outcome: Progressing Towards Goal  Goal: Diagnostic Test/Procedures  Outcome: Progressing Towards Goal  Goal: Nutrition/Diet  Outcome: Progressing Towards Goal  Goal: Discharge Planning  Outcome: Progressing Towards Goal  Goal: Medications  Outcome: Progressing Towards Goal  Goal: Respiratory  Outcome: Progressing Towards Goal  Goal: Treatments/Interventions/Procedures  Outcome: Progressing Towards Goal  Goal: Psychosocial  Outcome: Progressing Towards Goal  Goal: *No signs and symptoms of infection or wound complications  Outcome: Progressing Towards Goal  Goal: *Optimal pain control at patient's stated goal  Outcome: Progressing Towards Goal  Goal: *Adequate urinary output (equal to or greater than 30 milliliters/hour)  Outcome: Progressing Towards Goal  Goal: *Hemodynamically stable  Outcome: Progressing Towards Goal  Goal: *Tolerating diet  Outcome: Progressing Towards Goal  Goal: *Demonstrates progressive activity  Outcome: Progressing Towards Goal  Goal: *Lungs clear or at baseline  Outcome: Progressing Towards Goal

## 2020-11-05 NOTE — PROGRESS NOTES
Transition of care note:  RUR 8%      Pt remains intubated (Peep5,Fio2 40%)    Pt remains on pressors . Wife has been @ bedside. Support provided.     Pepe Ramon

## 2020-11-05 NOTE — ROUTINE PROCESS
TRANSFER - OUT REPORT: 
 
Verbal report given to Abel Perez RN(name) on Inga Em  being transferred to [facility](unit) for routine post - op Report consisted of patients Situation, Background, Assessment and  
Recommendations(SBAR). Information from the following report(s) SBAR and MAR was reviewed with the receiving nurse. Opportunity for questions and clarification was provided. Patient transported with: 
 Monitor O2 @ 15 liters Registered Nurse

## 2020-11-05 NOTE — PROGRESS NOTES
2115 Patient hypotensive and no urine output noted on admission. Full assessment completed. 2131 Levophed started and prepared to give last ordered unit of blood. 2300 noted there was still no urine output, began to trouble shoot catheter and after repositioning obtained a large amount of urine with a small amount of elan blood that dissipated after urine began to freely drain. 0100 Weaning pressors as tolerated. 0400 Became agitated with mouth care and suctioning, increased propofol, will wean back off as patient calms down. Is reaching for tubes and lines. Sedation wears off very quickly when paused. [phone] paged surgeon in reference to sepsis alert and patient care questions. 4330 Dr. Troy Whitehead returned page, updated on patient condition and events of the night, no new orders at this time. Plan is to move to extubate later this morning.

## 2020-11-06 ENCOUNTER — APPOINTMENT (OUTPATIENT)
Dept: GENERAL RADIOLOGY | Age: 50
DRG: 417 | End: 2020-11-06
Attending: INTERNAL MEDICINE
Payer: COMMERCIAL

## 2020-11-06 LAB
ABO + RH BLD: NORMAL
ANION GAP SERPL CALC-SCNC: 2 MMOL/L (ref 5–15)
ARTERIAL PATENCY WRIST A: YES
BASE DEFICIT BLDA-SCNC: 0.8 MMOL/L
BASOPHILS # BLD: 0 K/UL (ref 0–0.1)
BASOPHILS NFR BLD: 0 % (ref 0–1)
BDY SITE: ABNORMAL
BLD PROD TYP BPU: NORMAL
BLOOD GROUP ANTIBODIES SERPL: NORMAL
BPU ID: NORMAL
BUN SERPL-MCNC: 9 MG/DL (ref 6–20)
BUN/CREAT SERPL: 8 (ref 12–20)
CALCIUM SERPL-MCNC: 7.9 MG/DL (ref 8.5–10.1)
CHLORIDE SERPL-SCNC: 112 MMOL/L (ref 97–108)
CO2 SERPL-SCNC: 28 MMOL/L (ref 21–32)
CREAT SERPL-MCNC: 1.11 MG/DL (ref 0.7–1.3)
CROSSMATCH RESULT,%XM: NORMAL
DIFFERENTIAL METHOD BLD: ABNORMAL
EOSINOPHIL # BLD: 0 K/UL (ref 0–0.4)
EOSINOPHIL NFR BLD: 0 % (ref 0–7)
EPAP/CPAP/PEEP, PAPEEP: 5
ERYTHROCYTE [DISTWIDTH] IN BLOOD BY AUTOMATED COUNT: 14.4 % (ref 11.5–14.5)
FIO2 ON VENT: 40 %
GAS FLOW.O2 SETTING OXYMISER: 16 L/MIN
GLUCOSE SERPL-MCNC: 160 MG/DL (ref 65–100)
HCO3 BLDA-SCNC: 24 MMOL/L (ref 22–26)
HCT VFR BLD AUTO: 21.9 % (ref 36.6–50.3)
HGB BLD-MCNC: 6.8 G/DL (ref 12.1–17)
HGB BLD-MCNC: 7.4 G/DL (ref 12.1–17)
IMM GRANULOCYTES # BLD AUTO: 0 K/UL
IMM GRANULOCYTES NFR BLD AUTO: 0 %
LYMPHOCYTES # BLD: 2.3 K/UL (ref 0.8–3.5)
LYMPHOCYTES NFR BLD: 14 % (ref 12–49)
MCH RBC QN AUTO: 30 PG (ref 26–34)
MCHC RBC AUTO-ENTMCNC: 33.8 G/DL (ref 30–36.5)
MCV RBC AUTO: 88.7 FL (ref 80–99)
MONOCYTES # BLD: 1.3 K/UL (ref 0–1)
MONOCYTES NFR BLD: 8 % (ref 5–13)
NEUTS BAND NFR BLD MANUAL: 6 % (ref 0–6)
NEUTS SEG # BLD: 12.6 K/UL (ref 1.8–8)
NEUTS SEG NFR BLD: 72 % (ref 32–75)
NRBC # BLD: 0.03 K/UL (ref 0–0.01)
NRBC BLD-RTO: 0.2 PER 100 WBC
PCO2 BLDA: 41 MMHG (ref 35–45)
PH BLDA: 7.4 [PH] (ref 7.35–7.45)
PLATELET # BLD AUTO: 146 K/UL (ref 150–400)
PMV BLD AUTO: 9 FL (ref 8.9–12.9)
PO2 BLDA: 109 MMHG (ref 80–100)
POTASSIUM SERPL-SCNC: 3.9 MMOL/L (ref 3.5–5.1)
RBC # BLD AUTO: 2.47 M/UL (ref 4.1–5.7)
RBC MORPH BLD: ABNORMAL
SAO2 % BLD: 98 % (ref 92–97)
SAO2% DEVICE SAO2% SENSOR NAME: ABNORMAL
SODIUM SERPL-SCNC: 142 MMOL/L (ref 136–145)
SPECIMEN EXP DATE BLD: NORMAL
SPECIMEN SITE: ABNORMAL
STATUS OF UNIT,%ST: NORMAL
UNIT DIVISION, %UDIV: 0
VENTILATION MODE VENT: ABNORMAL
VT SETTING VENT: 500 ML
WBC # BLD AUTO: 16.2 K/UL (ref 4.1–11.1)
WBC MORPH BLD: ABNORMAL

## 2020-11-06 PROCEDURE — 2709999900 HC NON-CHARGEABLE SUPPLY

## 2020-11-06 PROCEDURE — 74011250637 HC RX REV CODE- 250/637: Performed by: SURGERY

## 2020-11-06 PROCEDURE — 65610000006 HC RM INTENSIVE CARE

## 2020-11-06 PROCEDURE — 74011250636 HC RX REV CODE- 250/636: Performed by: INTERNAL MEDICINE

## 2020-11-06 PROCEDURE — 74011250636 HC RX REV CODE- 250/636: Performed by: SURGERY

## 2020-11-06 PROCEDURE — 94003 VENT MGMT INPAT SUBQ DAY: CPT

## 2020-11-06 PROCEDURE — 74011000250 HC RX REV CODE- 250: Performed by: INTERNAL MEDICINE

## 2020-11-06 PROCEDURE — C9113 INJ PANTOPRAZOLE SODIUM, VIA: HCPCS | Performed by: SURGERY

## 2020-11-06 PROCEDURE — 71045 X-RAY EXAM CHEST 1 VIEW: CPT

## 2020-11-06 PROCEDURE — 0BJ08ZZ INSPECTION OF TRACHEOBRONCHIAL TREE, VIA NATURAL OR ARTIFICIAL OPENING ENDOSCOPIC: ICD-10-PCS | Performed by: INTERNAL MEDICINE

## 2020-11-06 PROCEDURE — 0BH17EZ INSERTION OF ENDOTRACHEAL AIRWAY INTO TRACHEA, VIA NATURAL OR ARTIFICIAL OPENING: ICD-10-PCS | Performed by: INTERNAL MEDICINE

## 2020-11-06 PROCEDURE — 82803 BLOOD GASES ANY COMBINATION: CPT

## 2020-11-06 PROCEDURE — 74011000258 HC RX REV CODE- 258: Performed by: SURGERY

## 2020-11-06 PROCEDURE — 80048 BASIC METABOLIC PNL TOTAL CA: CPT

## 2020-11-06 PROCEDURE — 36600 WITHDRAWAL OF ARTERIAL BLOOD: CPT

## 2020-11-06 PROCEDURE — 85025 COMPLETE CBC W/AUTO DIFF WBC: CPT

## 2020-11-06 PROCEDURE — 31500 INSERT EMERGENCY AIRWAY: CPT

## 2020-11-06 PROCEDURE — 36415 COLL VENOUS BLD VENIPUNCTURE: CPT

## 2020-11-06 PROCEDURE — 74011000250 HC RX REV CODE- 250: Performed by: SURGERY

## 2020-11-06 PROCEDURE — 77030019563 HC DEV ATTCH FEED HOLL -A

## 2020-11-06 RX ORDER — FLUCONAZOLE 2 MG/ML
200 INJECTION, SOLUTION INTRAVENOUS DAILY
Status: DISCONTINUED | OUTPATIENT
Start: 2020-11-06 | End: 2020-11-11

## 2020-11-06 RX ORDER — SUCCINYLCHOLINE CHLORIDE 20 MG/ML
70 INJECTION INTRAMUSCULAR; INTRAVENOUS
Status: COMPLETED | OUTPATIENT
Start: 2020-11-06 | End: 2020-11-06

## 2020-11-06 RX ORDER — ETOMIDATE 2 MG/ML
15 INJECTION INTRAVENOUS ONCE
Status: COMPLETED | OUTPATIENT
Start: 2020-11-06 | End: 2020-11-06

## 2020-11-06 RX ORDER — ENOXAPARIN SODIUM 100 MG/ML
40 INJECTION SUBCUTANEOUS EVERY 24 HOURS
Status: DISCONTINUED | OUTPATIENT
Start: 2020-11-06 | End: 2020-11-25 | Stop reason: HOSPADM

## 2020-11-06 RX ORDER — NOREPINEPHRINE BITARTRATE/D5W 8 MG/250ML
.5-16 PLASTIC BAG, INJECTION (ML) INTRAVENOUS
Status: DISCONTINUED | OUTPATIENT
Start: 2020-11-06 | End: 2020-11-10

## 2020-11-06 RX ADMIN — KETOROLAC TROMETHAMINE 15 MG: 30 INJECTION, SOLUTION INTRAMUSCULAR at 03:23

## 2020-11-06 RX ADMIN — SUCCINYLCHOLINE CHLORIDE 70 MG: 20 INJECTION, SOLUTION INTRAMUSCULAR; INTRAVENOUS; PARENTERAL at 17:08

## 2020-11-06 RX ADMIN — PHENYLEPHRINE HYDROCHLORIDE 165 MCG/MIN: 10 INJECTION INTRAVENOUS at 19:00

## 2020-11-06 RX ADMIN — PROPOFOL 50 MCG/KG/MIN: 10 INJECTION, EMULSION INTRAVENOUS at 03:23

## 2020-11-06 RX ADMIN — HYDROMORPHONE HYDROCHLORIDE 1 MG: 2 INJECTION, SOLUTION INTRAMUSCULAR; INTRAVENOUS; SUBCUTANEOUS at 00:37

## 2020-11-06 RX ADMIN — PROPOFOL 50 MCG/KG/MIN: 10 INJECTION, EMULSION INTRAVENOUS at 00:41

## 2020-11-06 RX ADMIN — FLUCONAZOLE 200 MG: 200 INJECTION, SOLUTION INTRAVENOUS at 10:28

## 2020-11-06 RX ADMIN — SODIUM CHLORIDE, SODIUM LACTATE, POTASSIUM CHLORIDE, AND CALCIUM CHLORIDE 125 ML/HR: 600; 310; 30; 20 INJECTION, SOLUTION INTRAVENOUS at 00:49

## 2020-11-06 RX ADMIN — KETOROLAC TROMETHAMINE 15 MG: 30 INJECTION, SOLUTION INTRAMUSCULAR at 15:09

## 2020-11-06 RX ADMIN — PROPOFOL 40 MCG/KG/MIN: 10 INJECTION, EMULSION INTRAVENOUS at 21:57

## 2020-11-06 RX ADMIN — ACETAMINOPHEN 650 MG: 650 SUPPOSITORY RECTAL at 04:20

## 2020-11-06 RX ADMIN — ENOXAPARIN SODIUM 40 MG: 40 INJECTION SUBCUTANEOUS at 20:41

## 2020-11-06 RX ADMIN — PROPOFOL 50 MCG/KG/MIN: 10 INJECTION, EMULSION INTRAVENOUS at 15:43

## 2020-11-06 RX ADMIN — KETOROLAC TROMETHAMINE 15 MG: 30 INJECTION, SOLUTION INTRAMUSCULAR at 08:33

## 2020-11-06 RX ADMIN — HYDROMORPHONE HYDROCHLORIDE 1 MG: 2 INJECTION, SOLUTION INTRAMUSCULAR; INTRAVENOUS; SUBCUTANEOUS at 16:47

## 2020-11-06 RX ADMIN — PIPERACILLIN AND TAZOBACTAM 3.38 G: 3; .375 INJECTION, POWDER, LYOPHILIZED, FOR SOLUTION INTRAVENOUS at 23:37

## 2020-11-06 RX ADMIN — PHENYLEPHRINE HYDROCHLORIDE 160 MCG/MIN: 10 INJECTION INTRAVENOUS at 08:26

## 2020-11-06 RX ADMIN — SODIUM CHLORIDE 40 MG: 9 INJECTION INTRAMUSCULAR; INTRAVENOUS; SUBCUTANEOUS at 09:00

## 2020-11-06 RX ADMIN — ACETAMINOPHEN 650 MG: 650 SUPPOSITORY RECTAL at 12:00

## 2020-11-06 RX ADMIN — SODIUM CHLORIDE, SODIUM LACTATE, POTASSIUM CHLORIDE, AND CALCIUM CHLORIDE 125 ML/HR: 600; 310; 30; 20 INJECTION, SOLUTION INTRAVENOUS at 09:59

## 2020-11-06 RX ADMIN — PROPOFOL 50 MCG/KG/MIN: 10 INJECTION, EMULSION INTRAVENOUS at 10:26

## 2020-11-06 RX ADMIN — PROPOFOL 50 MCG/KG/MIN: 10 INJECTION, EMULSION INTRAVENOUS at 18:43

## 2020-11-06 RX ADMIN — DEXTROSE MONOHYDRATE 3 MCG/MIN: 50 INJECTION, SOLUTION INTRAVENOUS at 12:47

## 2020-11-06 RX ADMIN — ETOMIDATE 15 MG: 2 INJECTION, SOLUTION INTRAVENOUS at 17:08

## 2020-11-06 RX ADMIN — ACETAMINOPHEN 650 MG: 650 SUPPOSITORY RECTAL at 23:47

## 2020-11-06 RX ADMIN — HYDROMORPHONE HYDROCHLORIDE 1 MG: 2 INJECTION, SOLUTION INTRAMUSCULAR; INTRAVENOUS; SUBCUTANEOUS at 08:13

## 2020-11-06 RX ADMIN — HYDROMORPHONE HYDROCHLORIDE 1 MG: 2 INJECTION, SOLUTION INTRAMUSCULAR; INTRAVENOUS; SUBCUTANEOUS at 20:39

## 2020-11-06 RX ADMIN — PIPERACILLIN AND TAZOBACTAM 3.38 G: 3; .375 INJECTION, POWDER, LYOPHILIZED, FOR SOLUTION INTRAVENOUS at 08:14

## 2020-11-06 RX ADMIN — HYDROMORPHONE HYDROCHLORIDE 1 MG: 2 INJECTION, SOLUTION INTRAMUSCULAR; INTRAVENOUS; SUBCUTANEOUS at 12:00

## 2020-11-06 RX ADMIN — PROPOFOL 50 MCG/KG/MIN: 10 INJECTION, EMULSION INTRAVENOUS at 12:45

## 2020-11-06 RX ADMIN — PROPOFOL 50 MCG/KG/MIN: 10 INJECTION, EMULSION INTRAVENOUS at 06:51

## 2020-11-06 RX ADMIN — PIPERACILLIN AND TAZOBACTAM 3.38 G: 3; .375 INJECTION, POWDER, LYOPHILIZED, FOR SOLUTION INTRAVENOUS at 15:09

## 2020-11-06 RX ADMIN — KETOROLAC TROMETHAMINE 15 MG: 30 INJECTION, SOLUTION INTRAMUSCULAR at 20:39

## 2020-11-06 RX ADMIN — SODIUM CHLORIDE, SODIUM LACTATE, POTASSIUM CHLORIDE, AND CALCIUM CHLORIDE 125 ML/HR: 600; 310; 30; 20 INJECTION, SOLUTION INTRAVENOUS at 19:03

## 2020-11-06 NOTE — PROGRESS NOTES
Comprehensive Nutrition Assessment    Type and Reason for Visit: Initial, RD nutrition re-screen/LOS    Nutrition Recommendations/Plan:   1. Once extubated, Recommend advancing diet order to GI lite as medically appropriate. Nutrition Assessment:      11/6: 47 yo male admitted for cholecystitis. PMhx: GERD, hernia repairs, esophageal dilation. Class II obese per BMI of 36.6. Weight hx indicates weight gain PTA. S/P cholecystectomy and lysis of adhesions on 11/4. Intubated for procedure and remains on vent today. Being sedated with Propofol at 50mcg/kg/min (providing 863 kcals/d). Requiring pressor support: levophed at 2mcg/min and William at 165. NPO x 3 days. NGT to suction with 600 ml output today. MD notes plan to wean off pressors and extubated. LR running at 125 ml/hr. Labs: . Meds: propofol, Lvophed, William. Malnutrition Assessment:  Unable to assess at this time. Estimated Daily Nutrient Needs:  Energy (kcal): 2818(PAL 2349 x 1.2); Weight Used for Energy Requirements: Current  Protein (g): 117-140(1.5-1.8gm/Kg IBW/d while intubated); Weight Used for Protein Requirements: Ideal  Fluid (ml/day): 2818; Method Used for Fluid Requirements: 1 ml/kcal      Nutrition Related Findings:  LBM unknown; non-pitting BUE edema      Wounds:    Surgical incision(ABD)       Current Nutrition Therapies:  DIET NPO    Anthropometric Measures:  · Height:  5' 11\" (180.3 cm)  · Current Body Wt:  118.9 kg (262 lb 2 oz)   · Admission Body Wt:       · Usual Body Wt:        · Ideal Body Wt:   :      · Adjusted Body Weight:   ; Weight Adjustment for: No adjustment   · Adjusted BMI:       · BMI Category:  Obese class 2 (BMI 35.0-39. 9)       Nutrition Diagnosis:   · Inadequate protein-energy intake related to inadequate protein-energy intake as evidenced by NPO or clear liquid status due to medical condition, intubation      Nutrition Interventions:   Food and/or Nutrient Delivery: Start oral diet  Nutrition Education and Counseling: No recommendations at this time  Coordination of Nutrition Care: Continue to monitor while inpatient    Goals:  Advance PO diet once extubated with intakes > 50% within next 2-3 days       Nutrition Monitoring and Evaluation:   Behavioral-Environmental Outcomes:    Food/Nutrient Intake Outcomes: Diet advancement/tolerance, Food and nutrient intake  Physical Signs/Symptoms Outcomes: GI status, Biochemical data, Weight    Discharge Planning:     Too soon to determine     Electronically signed by Robert Osman, [address] [place] [address] on 11/6/2020     Contact: [phone]

## 2020-11-06 NOTE — PROGRESS NOTES
1900: Bedside and Verbal shift change report given to Tio Shaver RN  (oncoming nurse) by Violet Baker RN (offgoing nurse). Report included the following information SBAR, Kardex, Procedure Summary, Intake/Output, MAR, Recent Results, Med Rec Status and Cardiac Rhythm NSR    Primary Nurse Michelle Leone and Violet Baker, RN performed a dual skin assessment on this patient No impairment noted  Jeffery score is 14   Wife at bedside     1945: Patient de sating into the mid 80's. Patient lavaged and had thick yellow secretions. FIO2 increased to 100. RT at bedside. Chest PT started. 2000: Patient assessment noted, see flow sheet. Patient intubated and sedated. Vent settings: FIO2 100 PEEP 5 R 16 . BP stable with map > 65. William titrated to 150, see MAR. ART line re zeroed, but having a hard time correlating with BP at time and bad waveform on monitor. Propofol at 50. Opens eyes to pain, no command following. Abdominal midline dressing CDI. SRI to suction draining sanguinous drainage. Cook draining clear yellow urine. Axillary temp 99, patient appears to have shivers from cooling blanket, cooling blanket turned off and blanket placed. Restraints on, no harm or injury to patient. Patient turned and repositioned in bed. 2115: Patient ART line not correlating with cuff pressure, art line zeroed and rezeroed multiple times since beginning of shift, sometimes having a pleth some time nots, unable to draw back blood from art line. Dr. Joe Cunningham notified, order to remove art line and titrate pressors by BP cuff. 2205: Propofol now at 40 for RASS -2. William titrated to 165. Patient turned and mouth care completed. 0000: Patient given tylenol and cooling blanket turned back on for fever of 102.4. Patient desating in the [de-identified]. Patient FIO2 at 100%. PEEP increased to 8. RT at bedside. Will get ABG. 0032: Patient now sating > 92%.  Propofol titrated back to 45 due to patients being restless in bed, HR elevated and pulling at restraints. 0927: Dr. Brandy Lomas paged, awaiting return page. 0110: Dr. Brandy Lomas updated on patients status including vent setting changes throughout the night, fever and blood pressure. Order to start back levophed if needed. 3706: Patient became very agitated, pulling at restraints, HR elevated. Dilaudid given for pain. 0410: Patient reassessed, see flow sheet. Fever now 100.2. Patient blood pressure still remains soft with MAP < 65,Levophed started see MAR. William at 190. Patient turned and mouth care completed. Remains on cooling blanket. 6801: Patient anxious, pulling at restraints, HR elevated. Propofol titrated to 50. Dilaudid given see MAR.     0700: Bedside and Verbal shift change report given to Aide Russell RN (oncoming nurse) by Galindo Mariano RN (offgoing nurse). Report included the following information SBAR, Kardex, Procedure Summary, Intake/Output, MAR, Recent Results, Med Rec Status and Cardiac Rhythm NSR .

## 2020-11-06 NOTE — PROGRESS NOTES
Due to compromised ET tube, patient extubated and immediately reintubated. No adverse reaction. RT, RN and MD at bedside.

## 2020-11-06 NOTE — PROGRESS NOTES
Spiritual Care Assessment/Progress Note  [address]      NAME: Alicia Simon      MRN: [id number]  AGE: 48 y.o. SEX: male  [employer] Affiliation: [employer]   Language: English     11/6/2020     Total Time (in minutes): 20     Spiritual Assessment begun in [facility] INTENSIVE CARE through conversation with:         [x]Patient        [x] Family    [] Friend(s)        Reason for Consult: Request by staff, Request by family/friend(s)     Spiritual beliefs: (Please include comment if needed)     [] Identifies with a gunner tradition:         [] Supported by a gunner community:            [] Claims no spiritual orientation:           [] Seeking spiritual identity:                [] Adheres to an individual form of spirituality:           [x] Not able to assess:                           Identified resources for coping:      [] Prayer                               [] Music                  [] Guided Imagery     [] Family/friends                 [] Pet visits     [] Devotional reading                         [x] Unknown     [] Other:                                          Interventions offered during this visit: (See comments for more details)    Patient Interventions: Affirmation of gunner, Prayer (actual), Prayer (assurance of), Initial/Spiritual assessment, Critical care     Family/Friend(s):  Affirmation of emotions/emotional suffering, Affirmation of gunner, Iconic (affirming the presence of God/Higher Power), Prayer (actual), Prayer (assurance of)     Plan of Care:     [] Support spiritual and/or cultural needs    [] Support AMD and/or advance care planning process      [] Support grieving process   [] Coordinate Rites and/or Rituals    [] Coordination with community clergy   [] No spiritual needs identified at this time   [] Detailed Plan of Care below (See Comments)  [] Make referral to Music Therapy  [] Make referral to Pet Therapy     [] Make referral to Addiction services  [] Make referral to [facility]  [] Make referral to Spiritual Care Partner  [] No future visits requested        [x] Follow up visits as needed     Comments:  Responded to request from  for a visit in the ICU. Mr. Cynthia Paris wife Kavya Mayes was in the room. He is intubated. She shared their recent medical journey and the unexpected route that has occurred. She shared they have three children under the age of 16. She said they have a [place] belief in God and believe in [employer] [facility]. She requested prayer. We joined Tasha Newby one on either side of him, provided prayer. He became a bit restless after prayer. Consulted with his nurse who came in to work with him. Provided assurance  Of prayers and Advised of  Availability .   Visited by: Sharon Martin., MS., [address] [facility] ([id number])

## 2020-11-06 NOTE — PROGRESS NOTES
SURGERY PROGRESS NOTE      Admit Date: 11/3/2020    POD 2 Days Post-Op    Procedure: Procedure(s):  LAPAROTOMY EXPLORATORY, evacuation of hematoma. Subjective:   No acute events overnight  Weaning pressors this am  Remains intubated      Objective:     Visit Vitals  BP (!) 131/56   Pulse 76   Temp (!) 101.4 °F (38.6 °C) Comment: cannot give tylenol until later time   Resp 14   Ht 5' 11\" (1.803 m)   Wt 118.9 kg (262 lb 2 oz)   SpO2 97%   BMI 36.56 kg/m²        Temp (24hrs), Av.8 °F (38.2 °C), Min:99 °F (37.2 °C), Max:102 °F (38.9 °C)      Physical Exam:     Abdomen:  Soft. Appropriately tender, non-distended.   Incision C/D/I. SRI serous        Lab Results   Component Value Date/Time    WBC 16.2 (H) 2020 02:20 AM    HGB 7.4 (L) 2020 02:20 AM    Hemoglobin (POC) 6.8 (L) 2020 03:42 PM    Hemoglobin (POC) 13.3 2014 10:05 PM    HCT 21.9 (L) 2020 02:20 AM    Hematocrit (POC) 39 2014 10:05 PM    PLATELET 642 (L)  02:20 AM    MCV 88.7 2020 02:20 AM     Lab Results   Component Value Date/Time    GFR est non-AA >60 2020 02:20 AM    GFRNA, POC >60 2014 10:05 PM    GFR est AA >60 2020 02:20 AM    GFRAA, POC >60 2014 10:05 PM    Creatinine 1.11 2020 02:20 AM    Creatinine (POC) 1.1 2014 10:05 PM    BUN 9 2020 02:20 AM    BUN (POC) 15 2014 10:05 PM    Sodium 142 2020 02:20 AM    Sodium (POC) 141 2014 10:05 PM    Potassium 3.9 2020 02:20 AM    Potassium (POC) 3.5 2014 10:05 PM    Chloride 112 (H) 2020 02:20 AM    Chloride (POC) 106 2014 10:05 PM    CO2 28 2020 02:20 AM    Magnesium 2.2 2020 11:07 PM    Phosphorus 3.4 2020 04:00 AM       Assessment:     Active Problems:    Biliary colic ()      Cholecystitis (2020)        Plan/Recommendations/Medical Decision Making:   Continue to wean pressors  Extubate once pressor requirement decreased  Hg drifting downward I believe this is just settling out from large volume transfusion no transfusion needed at this time as there is no concern for bleeding and vitals improving  Repeat labs in am

## 2020-11-06 NOTE — ROUTINE PROCESS
...0700: Bedside shift change report given to Gloria Mckeon RN (oncoming nurse) by Gennaro Zamora RN (offgoing nurse). Report included the following information SBAR, Kardex, ED Summary, OR Summary, Procedure Summary, Intake/Output, MAR, Accordion, Recent Results, Med Rec Status, Cardiac Rhythm NSR, Alarm Parameters , Procedure Verification, Quality Measures and Dual Neuro Assessment. Primary Nurse Gloria Mckeon and Bernie vieira RN performed a dual skin assessment on this patient Impairment noted- see wound doc flow sheet Jeffery score is 14 
 
0800: Shift  Assessment completed. Medication administered. PO meds held. NG to low intermittent suction. VAP bundle. Difficult to advance inline suction cathether. Dr. Mounika Zavala and RT aware. Restraints assessed: ROM. Repositioned, turned. Wound care and Mobility team orders placed per protocol. Eyelids and eyes appear swollen and almost fluid filled. Febrile. Mental Status:  unable to assess. Responds to pain. Pupils equal and reactive. Respiratory: Ventilator: ET 25@ Lip. , RR 16 PEEP 5 FIOS 40%. Cardiac: NSR, Hypotensive. S1S2 possible murmur heard. GI/: NG without insertion markings hooked to low intermittent suction. NPO. Bowels sounds distant. IV Drips: Propofol, Levo, William,  
 
0900: SBT initiated by RT.  
 
1000: VAP bundle. Restraints assessed: ROM. Family at bedside. Repositioned, turned 
 
1200: Reassessment completed. No changes from previous assessment. Restraints assessed: ROM. VAP Bundle. Repositioned, turned. Continuous to be febrile 100.4   
 
1300: Febrile 101.7F axillary. Placed on cooling blanket. 1400: VAP Bundle. Repositioned, turned. Restraints assessed: ROM. 1600: Reassessment completed. No changes from previous assessment. Levo stopped. Tolerated well. VAP bundle. Repositioned, turned. Medication administered. Restraints assessed: ROM. 1648:  Dr Mounika Zavala at bedside to assess ET tube. 1800: VAP Bundle. Repositioned, turned. Restraints assessed: ROM. 1900: Bedside shift change report given to Dewayne Taylor RN (oncoming nurse) by Jolie Triana RN (offgoing nurse). Report included the following information SBAR, Kardex, ED Summary, OR Summary, Procedure Summary, Intake/Output, MAR, Accordion, Recent Results, Med Rec Status, Cardiac Rhythm NSR, Alarm Parameters , Procedure Verification, Quality Measures and Dual Neuro Assessment.

## 2020-11-06 NOTE — PROGRESS NOTES
Problem: Falls - Risk of  Goal: *Absence of Falls  Description: Document Ry Saravia Fall Risk and appropriate interventions in the flowsheet.   Outcome: Progressing Towards Goal  Note: Fall Risk Interventions:       Mentation Interventions: Adequate sleep, hydration, pain control, Bed/chair exit alarm    Medication Interventions: Assess postural VS orthostatic hypotension, Evaluate medications/consider consulting pharmacy, Teach patient to arise slowly    Elimination Interventions: Bed/chair exit alarm, Call light in reach              Problem: Ventilator Management  Goal: *Adequate oxygenation and ventilation  Outcome: Progressing Towards Goal  Goal: *Patient maintains clear airway/free of aspiration  Outcome: Progressing Towards Goal  Goal: *Absence of infection signs and symptoms  Outcome: Progressing Towards Goal  Goal: *Normal spontaneous ventilation  Outcome: Progressing Towards Goal     Problem: Non-Violent Restraints  Goal: *Removal from restraints as soon as assessed to be safe  Outcome: Progressing Towards Goal  Goal: *No harm/injury to patient while restraints in use  Outcome: Progressing Towards Goal  Goal: *Patient's dignity will be maintained  Outcome: Progressing Towards Goal  Goal: *Patient Specific Goal (EDIT GOAL, INSERT TEXT)  Outcome: Progressing Towards Goal  Goal: Non-violent Restaints:Standard Interventions  Outcome: Progressing Towards Goal  Goal: Non-violent Restraints:Patient Interventions  Outcome: Progressing Towards Goal  Goal: Patient/Family Education  Outcome: Progressing Towards Goal

## 2020-11-06 NOTE — ANCILLARY DISCHARGE INSTRUCTIONS
[address].  
 
11/06/20 RE: Blanca Kimble To Whom It May Concern, This is to certify that Blanca Kimble was admitted on 11/3/2020 and is under my care. He remains hospitalized and will be out of work for the next 3-4 weeks pending my further follow up and evaluation. Please feel free to contact my office if you have any questions or concerns. Thank you for your assistance in this matter. Sincerely, 
Francisco Stanton MD 
[facility] O:  [id number]

## 2020-11-06 NOTE — PROGRESS NOTES
1900: Bedside and Verbal shift change report given to Michael Burt RN (oncoming nurse) by Jean Claude Mitchell RN (offgoing nurse). Report included the following information SBAR, Kardex, Procedure Summary, Intake/Output, MAR, Recent Results, Med Rec Status and Cardiac Rhythm NSR . Primary Nurse Angel Montemayor and Jean Claude Mitchell RN performed a dual skin assessment on this patient No impairment noted  Jeffery score is 16    2000: Shift assessment noted, see flow sheet. Patient intubated and sedated. Opens eyes to voice. NSR on monitor. BP soft, ART line zeroed. William at 140. Levophed at 6. Abdominal midline dressing CDI, SRI to suction with sanguinous drainage. Cook draining sushma cloudy urine. NGT to suction with dark green drainage. Patient turned and heels elevated with pillow. Mouth care completed. 2045: Spoke with Dr. Shane Oglesby regarding patient temp of 102. Order for Tylenol 650 mg suppository q6h prn. Also made aware of hgb from this afternoon of 8.7 from 12.1. Order to transfuse if morning hgb is less than 7.     2106: Tylenol given of fever. Patient grimacing and trying to pull at restraints. Appears to be in pain. Dilaudid given. 2200: Patient turned and mouth care completed. 2308: BP remains soft, Levophed now at 9. William at 160. See MAR for titrations. 0034: Patient restless in bed. Dilaudid given for pain. Bloody drainage from NGT. Midline incision CDI, SRI draining 30 cc sanguinous drainage. Patient turned and mouth care completed. 0228: Patient turned and mouth care completed. Levophed titrated to 8 from 9.   0303: Hgb 7.4, no order to transfuse unless less than 7. Will continue to monitor. 0430: Updated wife on patients status. 0700: Bedside and Verbal shift change report given to John Archer RN (oncoming nurse) by Michael Burt RN (offgoing nurse).  Report included the following information SBAR, Kardex, Procedure Summary, Intake/Output, MAR, Recent Results, Med Rec Status and Cardiac Rhythm NSR .

## 2020-11-06 NOTE — CONSULTS
[facility]     Name: Rahul Bernal MRN: [id number]   : 1970 Hospital: [address]   Date: 2020        Impression Plan   1. Respiratory insufficiency  2. Hemorrhagic shock due  3. S/p lacerated liver capsule repair  4. Cholelithiasis- S/p Cholecystecomy and lysis of adhesions               · SBT again this morning- barrier to extubation is pressor requirement  · Wean levoped and william- wean levophed first  · Wean prop to RASS -1  · IVF- LR  · Pantoprazole  · Hold on AC proph  · SCDs  · NPO         Pt is critically ill. Critical care time spent with pt exclusive of procedures was 30 minutes. Pt at risk for further decline due to hemorrhagic shock    Radiology  ( personally reviewed) CXR reviewed: no infiltrates, ETT   ABG No results for input(s): PHI, PO2I, PCO2I in the last 72 hours. Subjective     Cc: hemorrhagic shock    47 yo with cholecystits s/p lab bailee complicated by laceration of the liver capsule and hemorrhagic shock. Hematoma evacuated- 2L blood. Currently on william 130 and Levophed 8. Pt intubated and sedated- unable to provide any further hx.     : William at 165, levo at 4  Tolerated SBT yesterday and placed on again this morning    Review of Systems:  Review of systems not obtained due to patient factors.     Past Medical History:   Diagnosis Date    GERD (gastroesophageal reflux disease)     Panic attack     Psychiatric disorder     anxiety    Seizures ([facility] ([place] [address]     5 yo   was told it was a stress seizure, never had another one    Sleep apnea     uses CPAP      Past Surgical History:   Procedure Laterality Date    ABDOMEN SURGERY PROC UNLISTED      hernia(abdomen)- open    HX GI      esophagus repair narrow- balloon dilation and scar tissue removal    HX HERNIA REPAIR      inguinal hernia    HX OTHER SURGICAL  11years of age    tonsils    HX OTHER SURGICAL      hernia (hiatal and abdomen)-open    HX OTHER SURGICAL  23years of age wisdom teeth extraction under anesthesia    HX OTHER SURGICAL  8 years     distended testicle     HX OTHER SURGICAL  1/16/15    Laparoscopic ventral hernia repair with mesh      Prior to Admission medications    Medication Sig Start Date End Date Taking? Authorizing Provider   citalopram (CELEXA) 40 mg tablet Take 40 mg by mouth daily. Yes Provider, Historical   docusate sodium (COLACE) 100 mg capsule Take 1 Cap by mouth two (2) times a day for 10 days. 11/4/20 11/14/20 Yes Greg Wood MD   oxyCODONE IR (ROXICODONE) 5 mg immediate release tablet Take 1 Tab by mouth every four (4) hours as needed for Pain for up to 3 days. Max Daily Amount: 30 mg. 11/4/20 11/7/20 Yes Greg Wood MD   lisinopril (PRINIVIL, ZESTRIL) 40 mg tablet Take 40 mg by mouth every evening. Yes Provider, Historical   simvastatin (ZOCOR) 40 mg tablet Take 40 mg by mouth nightly. Yes Provider, Historical   omeprazole (PRILOSEC) 40 mg capsule Take 40 mg by mouth daily.    Yes Provider, Historical     Current Facility-Administered Medications   Medication Dose Route Frequency    piperacillin-tazobactam (ZOSYN) 3.375 g in 0.9% sodium chloride (MBP/ADV) 100 mL  3.375 g IntraVENous Q8H    fluconazole (DIFLUCAN) 200mg/100 mL IVPB (premix)  200 mg IntraVENous DAILY    pantoprazole (PROTONIX) 40 mg in 0.9% sodium chloride 10 mL injection  40 mg IntraVENous DAILY    lactated Ringers infusion  125 mL/hr IntraVENous CONTINUOUS    lisinopriL (PRINIVIL, ZESTRIL) tablet 20 mg  20 mg Oral DAILY    ketorolac (TORADOL) injection 15 mg  15 mg IntraVENous Q6H    citalopram (CELEXA) tablet 40 mg  40 mg Oral DAILY    docusate sodium (COLACE) capsule 100 mg  100 mg Oral BID    propofol (DIPRIVAN) 10 mg/mL infusion  5-50 mcg/kg/min IntraVENous TITRATE    NOREPINephrine (LEVOPHED) 8,000 mcg in dextrose 5% 250 mL infusion  0.5-16 mcg/min IntraVENous TITRATE    PHENYLephrine (BETTY-SYNEPHRINE) 100 mg in 0.9% sodium chloride 250 mL infusion   mcg/min IntraVENous TITRATE     Allergies   Allergen Reactions    Pcn [Penicillins] Hives     Reaction was at age 6, itching and eyes swollen; denies any sob or tongue swelling    Codeine Nausea and Vomiting      Social History     Tobacco Use    Smoking status: Never Smoker    Smokeless tobacco: Never Used   Substance Use Topics    Alcohol use: Yes     Alcohol/week: 1.7 standard drinks     Types: 2 Cans of beer per week     Comment: 1 or 2 beer every two weeks      Family History   Problem Relation Age of Onset    Diabetes Mother     Hypertension Mother     Stroke Mother         copd    Cancer Father         skin    Heart Disease Father     Diabetes Father     Heart Attack Father           Laboratory: I have personally reviewed the critical care flowsheet and labs.      Recent Labs     11/06/20 0220 11/05/20  1424 11/05/20  0120   WBC 16.2* 17.3* 28.1*   HGB 7.4* 8.7* 12.1   HCT 21.9* 25.4* 35.6*   * 154 214     Recent Labs     11/06/20 0220 11/05/20  1424 11/05/20  0120 11/03/20  2307    142 142 138   K 3.9 4.2 5.1 3.3*   * 113* 116* 105   CO2 28 26 21 26   * 160* 193* 147*   BUN 9 12 14 17   CREA 1.11 1.19 1.24 1.35*   CA 7.9* 7.9* 7.7* 9.1   MG  --   --   --  2.2   ALB  --  2.1*  --  4.0   ALT  --  309*  --  44       Objective:     Mode Rate Tidal Volume Pressure FiO2 PEEP   Assist control   500 ml  5 cm H2O 40 % 5 cm H20     Vital Signs:    Ventilator Pressures  Pressure Support (cm H2O): 5 cm H2O  PIP Observed (cm H2O): 28 cm H2O  MAP (cm H2O): 11  PEEP/VENT (cm H2O): 5 cm A91VSFS(24)Ventilator Pressures  Pressure Support (cm H2O): 5 cm H2O  PIP Observed (cm H2O): 28 cm H2O  MAP (cm H2O): 11  PEEP/VENT (cm H2O): 5 cm H20  Safety & Alarms  Circuit Temperature: (HME)  Backup Mode Checked/Apnea: Yes  Pressure Max: 45 cm H2O  Pressure Min: 13 cm H2O  Ve Min: 2  Ve Max: 20  Vt Min: 200 ml  Vt Max: 950 ml  RR Min: 16  RR Max: 50  Intake/Output:   Last shift: Ventilator Volumes  Vt Set (ml): 500 ml  Vt Exhaled (Machine Breath) (ml): 480 ml  Vt Spont (ml): 685 ml  Ve Observed (l/min): 9 l/min  Last 3 shifts: No intake/output data recorded. [id number]    Intake/Output Summary (Last 24 hours) at 11/6/2020 0852  Last data filed at 11/6/2020 0600  Gross per 24 hour   Intake 4247.15 ml   Output 2650 ml   Net 1597.15 ml     EXAM:   GENERAL: intubated, sedated, HEENT:  PERRL, EOMI, no alar flaring or epistaxis, oral mucosa moist without cyanosis, NECK:  no jugular vein distention, no retractions, no thyromegaly or masses, LUNGS: CTA, no w/r/r , HEART:  Regular rate and rhythm with no MGR; no edema is present, ABDOMEN:  soft s, bowel sounds absent, EXTREMITIES:  warm with no cyanosis, SKIN:  no jaundice or ecchymosis and NEUROLOGIC: RASS -2    Kinza Todd MD  [facility]

## 2020-11-07 ENCOUNTER — APPOINTMENT (OUTPATIENT)
Dept: GENERAL RADIOLOGY | Age: 50
DRG: 417 | End: 2020-11-07
Attending: INTERNAL MEDICINE
Payer: COMMERCIAL

## 2020-11-07 LAB
ANION GAP SERPL CALC-SCNC: 4 MMOL/L (ref 5–15)
ARTERIAL PATENCY WRIST A: ABNORMAL
ARTERIAL PATENCY WRIST A: ABNORMAL
BASE EXCESS BLDA CALC-SCNC: 0.6 MMOL/L
BASE EXCESS BLDA CALC-SCNC: 3.7 MMOL/L
BASOPHILS # BLD: 0 K/UL (ref 0–0.1)
BASOPHILS NFR BLD: 0 % (ref 0–1)
BDY SITE: ABNORMAL
BDY SITE: ABNORMAL
BUN SERPL-MCNC: 7 MG/DL (ref 6–20)
BUN/CREAT SERPL: 7 (ref 12–20)
CALCIUM SERPL-MCNC: 7.9 MG/DL (ref 8.5–10.1)
CHLORIDE SERPL-SCNC: 109 MMOL/L (ref 97–108)
CO2 SERPL-SCNC: 30 MMOL/L (ref 21–32)
CREAT SERPL-MCNC: 0.99 MG/DL (ref 0.7–1.3)
DIFFERENTIAL METHOD BLD: ABNORMAL
EOSINOPHIL # BLD: 0 K/UL (ref 0–0.4)
EOSINOPHIL NFR BLD: 0 % (ref 0–7)
EPAP/CPAP/PEEP, PAPEEP: 8
EPAP/CPAP/PEEP, PAPEEP: 8
ERYTHROCYTE [DISTWIDTH] IN BLOOD BY AUTOMATED COUNT: 14.5 % (ref 11.5–14.5)
FIO2 ON VENT: 100 %
FIO2 ON VENT: 100 %
GAS FLOW.O2 SETTING OXYMISER: 16 L/MIN
GAS FLOW.O2 SETTING OXYMISER: 16 L/MIN
GLUCOSE SERPL-MCNC: 142 MG/DL (ref 65–100)
HCO3 BLDA-SCNC: 26 MMOL/L (ref 22–26)
HCO3 BLDA-SCNC: 29 MMOL/L (ref 22–26)
HCT VFR BLD AUTO: 21.7 % (ref 36.6–50.3)
HGB BLD-MCNC: 7.2 G/DL (ref 12.1–17)
IMM GRANULOCYTES # BLD AUTO: 0 K/UL
IMM GRANULOCYTES NFR BLD AUTO: 0 %
LYMPHOCYTES # BLD: 1.9 K/UL (ref 0.8–3.5)
LYMPHOCYTES NFR BLD: 12 % (ref 12–49)
MCH RBC QN AUTO: 30.3 PG (ref 26–34)
MCHC RBC AUTO-ENTMCNC: 33.2 G/DL (ref 30–36.5)
MCV RBC AUTO: 91.2 FL (ref 80–99)
MONOCYTES # BLD: 0.3 K/UL (ref 0–1)
MONOCYTES NFR BLD: 2 % (ref 5–13)
NEUTS BAND NFR BLD MANUAL: 10 % (ref 0–6)
NEUTS SEG # BLD: 13.6 K/UL (ref 1.8–8)
NEUTS SEG NFR BLD: 76 % (ref 32–75)
NRBC # BLD: 0.12 K/UL (ref 0–0.01)
NRBC BLD-RTO: 0.8 PER 100 WBC
PCO2 BLDA: 47 MMHG (ref 35–45)
PCO2 BLDA: 47 MMHG (ref 35–45)
PH BLDA: 7.37 [PH] (ref 7.35–7.45)
PH BLDA: 7.41 [PH] (ref 7.35–7.45)
PLATELET # BLD AUTO: 139 K/UL (ref 150–400)
PMV BLD AUTO: 9.2 FL (ref 8.9–12.9)
PO2 BLDA: 56 MMHG (ref 80–100)
PO2 BLDA: 71 MMHG (ref 80–100)
POTASSIUM SERPL-SCNC: 3.2 MMOL/L (ref 3.5–5.1)
RBC # BLD AUTO: 2.38 M/UL (ref 4.1–5.7)
RBC MORPH BLD: ABNORMAL
RBC MORPH BLD: ABNORMAL
SAO2 % BLD: 89 % (ref 92–97)
SAO2 % BLD: 94 % (ref 92–97)
SAO2% DEVICE SAO2% SENSOR NAME: ABNORMAL
SAO2% DEVICE SAO2% SENSOR NAME: ABNORMAL
SODIUM SERPL-SCNC: 143 MMOL/L (ref 136–145)
SPECIMEN SITE: ABNORMAL
SPECIMEN SITE: ABNORMAL
VENTILATION MODE VENT: ABNORMAL
VENTILATION MODE VENT: ABNORMAL
VT SETTING VENT: 500 ML
VT SETTING VENT: 500 ML
WBC # BLD AUTO: 15.8 K/UL (ref 4.1–11.1)
WBC MORPH BLD: ABNORMAL

## 2020-11-07 PROCEDURE — 82803 BLOOD GASES ANY COMBINATION: CPT

## 2020-11-07 PROCEDURE — 74011250636 HC RX REV CODE- 250/636: Performed by: SURGERY

## 2020-11-07 PROCEDURE — 74011000250 HC RX REV CODE- 250: Performed by: INTERNAL MEDICINE

## 2020-11-07 PROCEDURE — 80048 BASIC METABOLIC PNL TOTAL CA: CPT

## 2020-11-07 PROCEDURE — C9113 INJ PANTOPRAZOLE SODIUM, VIA: HCPCS | Performed by: SURGERY

## 2020-11-07 PROCEDURE — 94003 VENT MGMT INPAT SUBQ DAY: CPT

## 2020-11-07 PROCEDURE — 74011250636 HC RX REV CODE- 250/636: Performed by: INTERNAL MEDICINE

## 2020-11-07 PROCEDURE — 77010033678 HC OXYGEN DAILY

## 2020-11-07 PROCEDURE — 77030018846 HC SOL IRR STRL H20 ICUM -A

## 2020-11-07 PROCEDURE — 74011000250 HC RX REV CODE- 250: Performed by: SURGERY

## 2020-11-07 PROCEDURE — 77030013797 HC KT TRNSDUC PRSSR EDWD -A

## 2020-11-07 PROCEDURE — 65610000006 HC RM INTENSIVE CARE

## 2020-11-07 PROCEDURE — 2709999900 HC NON-CHARGEABLE SUPPLY

## 2020-11-07 PROCEDURE — 36600 WITHDRAWAL OF ARTERIAL BLOOD: CPT

## 2020-11-07 PROCEDURE — 36415 COLL VENOUS BLD VENIPUNCTURE: CPT

## 2020-11-07 PROCEDURE — 71045 X-RAY EXAM CHEST 1 VIEW: CPT

## 2020-11-07 PROCEDURE — 74011000258 HC RX REV CODE- 258: Performed by: SURGERY

## 2020-11-07 PROCEDURE — 85025 COMPLETE CBC W/AUTO DIFF WBC: CPT

## 2020-11-07 RX ORDER — DEXMEDETOMIDINE HYDROCHLORIDE 4 UG/ML
.1-1.5 INJECTION, SOLUTION INTRAVENOUS
Status: DISCONTINUED | OUTPATIENT
Start: 2020-11-07 | End: 2020-11-13

## 2020-11-07 RX ORDER — POTASSIUM CHLORIDE 29.8 MG/ML
20 INJECTION INTRAVENOUS ONCE
Status: COMPLETED | OUTPATIENT
Start: 2020-11-07 | End: 2020-11-07

## 2020-11-07 RX ADMIN — KETOROLAC TROMETHAMINE 15 MG: 30 INJECTION, SOLUTION INTRAMUSCULAR at 15:10

## 2020-11-07 RX ADMIN — SODIUM CHLORIDE, SODIUM LACTATE, POTASSIUM CHLORIDE, AND CALCIUM CHLORIDE 125 ML/HR: 600; 310; 30; 20 INJECTION, SOLUTION INTRAVENOUS at 01:03

## 2020-11-07 RX ADMIN — DEXTROSE MONOHYDRATE 2 MCG/MIN: 50 INJECTION, SOLUTION INTRAVENOUS at 04:18

## 2020-11-07 RX ADMIN — PROPOFOL 30 MCG/KG/MIN: 10 INJECTION, EMULSION INTRAVENOUS at 18:46

## 2020-11-07 RX ADMIN — PROPOFOL 50 MCG/KG/MIN: 10 INJECTION, EMULSION INTRAVENOUS at 22:14

## 2020-11-07 RX ADMIN — DEXMEDETOMIDINE HYDROCHLORIDE 0.6 MCG/KG/HR: 400 INJECTION INTRAVENOUS at 19:55

## 2020-11-07 RX ADMIN — DEXMEDETOMIDINE HYDROCHLORIDE 0.2 MCG/KG/HR: 400 INJECTION INTRAVENOUS at 14:10

## 2020-11-07 RX ADMIN — FLUCONAZOLE 200 MG: 200 INJECTION, SOLUTION INTRAVENOUS at 08:18

## 2020-11-07 RX ADMIN — HYDROMORPHONE HYDROCHLORIDE 1 MG: 2 INJECTION, SOLUTION INTRAMUSCULAR; INTRAVENOUS; SUBCUTANEOUS at 06:40

## 2020-11-07 RX ADMIN — ENOXAPARIN SODIUM 40 MG: 40 INJECTION SUBCUTANEOUS at 20:23

## 2020-11-07 RX ADMIN — PROPOFOL 45 MCG/KG/MIN: 10 INJECTION, EMULSION INTRAVENOUS at 11:20

## 2020-11-07 RX ADMIN — HYDROMORPHONE HYDROCHLORIDE 1 MG: 2 INJECTION, SOLUTION INTRAMUSCULAR; INTRAVENOUS; SUBCUTANEOUS at 20:04

## 2020-11-07 RX ADMIN — HYDROMORPHONE HYDROCHLORIDE 1 MG: 2 INJECTION, SOLUTION INTRAMUSCULAR; INTRAVENOUS; SUBCUTANEOUS at 03:18

## 2020-11-07 RX ADMIN — PHENYLEPHRINE HYDROCHLORIDE 190 MCG/MIN: 10 INJECTION INTRAVENOUS at 04:18

## 2020-11-07 RX ADMIN — Medication 150 MCG/HR: at 22:48

## 2020-11-07 RX ADMIN — SODIUM CHLORIDE 40 MG: 9 INJECTION INTRAMUSCULAR; INTRAVENOUS; SUBCUTANEOUS at 08:19

## 2020-11-07 RX ADMIN — PIPERACILLIN AND TAZOBACTAM 3.38 G: 3; .375 INJECTION, POWDER, LYOPHILIZED, FOR SOLUTION INTRAVENOUS at 08:19

## 2020-11-07 RX ADMIN — KETOROLAC TROMETHAMINE 15 MG: 30 INJECTION, SOLUTION INTRAMUSCULAR at 20:37

## 2020-11-07 RX ADMIN — PROPOFOL 50 MCG/KG/MIN: 10 INJECTION, EMULSION INTRAVENOUS at 08:16

## 2020-11-07 RX ADMIN — PIPERACILLIN AND TAZOBACTAM 3.38 G: 3; .375 INJECTION, POWDER, LYOPHILIZED, FOR SOLUTION INTRAVENOUS at 15:40

## 2020-11-07 RX ADMIN — SODIUM CHLORIDE 1000 ML: 900 INJECTION, SOLUTION INTRAVENOUS at 10:10

## 2020-11-07 RX ADMIN — KETOROLAC TROMETHAMINE 15 MG: 30 INJECTION, SOLUTION INTRAMUSCULAR at 08:32

## 2020-11-07 RX ADMIN — Medication 50 MCG/HR: at 10:06

## 2020-11-07 RX ADMIN — DEXTROSE MONOHYDRATE 14 MCG/MIN: 50 INJECTION, SOLUTION INTRAVENOUS at 20:59

## 2020-11-07 RX ADMIN — PROPOFOL 45 MCG/KG/MIN: 10 INJECTION, EMULSION INTRAVENOUS at 04:49

## 2020-11-07 RX ADMIN — POTASSIUM CHLORIDE 20 MEQ: 400 INJECTION, SOLUTION INTRAVENOUS at 10:08

## 2020-11-07 RX ADMIN — PROPOFOL 45 MCG/KG/MIN: 10 INJECTION, EMULSION INTRAVENOUS at 01:03

## 2020-11-07 RX ADMIN — KETOROLAC TROMETHAMINE 15 MG: 30 INJECTION, SOLUTION INTRAMUSCULAR at 03:20

## 2020-11-07 NOTE — PROGRESS NOTES
SURGERY PROGRESS NOTE      Admit Date: 11/3/2020    POD 3 Days Post-Op    Procedure: Procedure(s):  LAPAROTOMY EXPLORATORY, evacuation of hematoma. Subjective:   No acute events overnight  Weaning pressors this am  Remains intubated      Objective:     Visit Vitals  BP (!) 130/54   Pulse 67   Temp 99.2 °F (37.3 °C)   Resp 15   Ht 5' 11\" (1.803 m)   Wt 118.9 kg (262 lb 2 oz)   SpO2 98%   BMI 36.56 kg/m²        Temp (24hrs), Av.3 °F (37.9 °C), Min:98.4 °F (36.9 °C), Max:102.8 °F (39.3 °C)      Physical Exam:     Abdomen:  Soft. Appropriately tender, non-distended.   Incision C/D/I. SRI serous        Lab Results   Component Value Date/Time    WBC 15.8 (H) 2020 03:41 AM    HGB 7.2 (L) 2020 03:41 AM    Hemoglobin (POC) 6.8 (L) 2020 03:42 PM    Hemoglobin (POC) 13.3 2014 10:05 PM    HCT 21.7 (L) 2020 03:41 AM    Hematocrit (POC) 39 2014 10:05 PM    PLATELET 736 (L)  03:41 AM    MCV 91.2 2020 03:41 AM     Lab Results   Component Value Date/Time    GFR est non-AA >60 2020 03:41 AM    GFRNA, POC >60 2014 10:05 PM    GFR est AA >60 2020 03:41 AM    GFRAA, POC >60 2014 10:05 PM    Creatinine 0.99 2020 03:41 AM    Creatinine (POC) 1.1 2014 10:05 PM    BUN 7 2020 03:41 AM    BUN (POC) 15 2014 10:05 PM    Sodium 143 2020 03:41 AM    Sodium (POC) 141 2014 10:05 PM    Potassium 3.2 (L) 2020 03:41 AM    Potassium (POC) 3.5 2014 10:05 PM    Chloride 109 (H) 2020 03:41 AM    Chloride (POC) 106 2014 10:05 PM    CO2 30 2020 03:41 AM    Magnesium 2.2 2020 11:07 PM    Phosphorus 3.4 2020 04:00 AM       Assessment:     Active Problems:    Biliary colic (10/3/2580)      Cholecystitis (2020)      Plan/Recommendations/Medical Decision Making:   Continue to wean pressors  Extubate once pressor requirement decreased  Hg drifting downward I believe this is just settling out from large volume transfusion no transfusion needed at this time as there is no concern for bleeding and vitals improving  Repeat labs in am    11/7  Discussed with Yvette Savage, nurse. Weaning pressors. Working on SBT. Vitals appropriate and making great urine.

## 2020-11-07 NOTE — PROGRESS NOTES
@0700 Bedside and Verbal shift change report given to Terry Dee RN (oncoming nurse) by Sherren Heal RN (offgoing nurse). Report included the following information SBAR, Kardex, ED Summary, OR Summary, Procedure Summary, Intake/Output, MAR, Accordion, Recent Results, Med Rec Status, Cardiac Rhythm Sinus and Alarm Parameters . A lot of secretions over night and periods of agitation where patient attempted to sit up requiring dilaudid however it would drop his blood pressure. Also oxygen requirements escalated secondary to hypoxia with vent now FIO2 100% and PEEP increase to 8. Also potassium is low at 3.2 but no ectopy per report over night. Patient is also restrained to protect his airway. Will inquire about lyte repletion and adding fentanyl drip for sedation. Also was informed to wean levophed first per report over William. As levophed was wean subsequently the william was increased to 190 mcg/min overnight. @2545 Dr. Nathan Hayden arrives and exams the patient. Plan of care is wean william off and keep levophed for now. Check CVP and bolus if <8 mmHg. Give KCL 20 meq X1. Start on fentanyl and wean propofol for goal RASS of -1.    @1000 CVP noted to be 7 will give 1 liter NS bolus. William wean to 160 mcg/min thus far with MAPs in the 70s. Fentanyl drip started at 50 mcg/H and Propofol has been wean to 45 mcg/kg/min as well. FIO2 wean to 75% per Dr. Nathan Hayden. @1130 Propofol wean to 35 mcg/kg/min with Fentanyl 75 mcg/H. Wife speaks to the patient and unfortunately he appears agitated AEB moving restlessly in the bed attempting to sit up and nasal flaring. Fentanyl was increase to 100 mcg/H and propofol was also up titrated to 50 mcg/kg/min. O2sats also drop to 87% and sustained. Patient reposition and suction with no improvement. FIO2 increased to 90% before O2sats sustain at 90%. Will continue to monitor and notify Dr. Nathan Hayden for further direction.     @1210 O2sats sustaining at 97% with FIO2 wean back to 75%.  Levophed also subsequently was increase to 6 mcg/min to maintain MAPS >65 mmHg. @1400 Notified Dr. Anay Voss failure to wean propofol despite adding fentanyl. Plan of care is to add precedex and wean off Propofol for goal RASS of 0 to -1.    @1900 Bedside and Verbal shift change report given to Rochelle Acuña RN (oncoming nurse) by Travon Lim RN (offgoing nurse). Report included the following information SBAR, Kardex, ED Summary, OR Summary, Procedure Summary, Intake/Output, MAR, Accordion, Recent Results, Med Rec Status, Cardiac Rhythm Sinus and Alarm Parameters .

## 2020-11-08 ENCOUNTER — APPOINTMENT (OUTPATIENT)
Dept: GENERAL RADIOLOGY | Age: 50
DRG: 417 | End: 2020-11-08
Attending: INTERNAL MEDICINE
Payer: COMMERCIAL

## 2020-11-08 ENCOUNTER — APPOINTMENT (OUTPATIENT)
Dept: NON INVASIVE DIAGNOSTICS | Age: 50
DRG: 417 | End: 2020-11-08
Attending: INTERNAL MEDICINE
Payer: COMMERCIAL

## 2020-11-08 LAB
ALBUMIN SERPL-MCNC: 2 G/DL (ref 3.5–5)
ALBUMIN/GLOB SERPL: 0.6 {RATIO} (ref 1.1–2.2)
ALP SERPL-CCNC: 89 U/L (ref 45–117)
ALT SERPL-CCNC: 122 U/L (ref 12–78)
ANION GAP SERPL CALC-SCNC: 3 MMOL/L (ref 5–15)
ARTERIAL PATENCY WRIST A: YES
AST SERPL-CCNC: 38 U/L (ref 15–37)
BASE EXCESS BLDA CALC-SCNC: 6.1 MMOL/L
BASOPHILS # BLD: 0 K/UL (ref 0–0.1)
BASOPHILS NFR BLD: 0 % (ref 0–1)
BDY SITE: ABNORMAL
BILIRUB SERPL-MCNC: 0.9 MG/DL (ref 0.2–1)
BLASTS NFR BLD MANUAL: 0 %
BNP SERPL-MCNC: 2147 PG/ML
BUN SERPL-MCNC: 6 MG/DL (ref 6–20)
BUN/CREAT SERPL: 8 (ref 12–20)
CALCIUM SERPL-MCNC: 7.9 MG/DL (ref 8.5–10.1)
CHLORIDE SERPL-SCNC: 110 MMOL/L (ref 97–108)
CO2 SERPL-SCNC: 31 MMOL/L (ref 21–32)
CREAT SERPL-MCNC: 0.76 MG/DL (ref 0.7–1.3)
DIFFERENTIAL METHOD BLD: ABNORMAL
ECHO AO ASC DIAM: 3.08 CM
ECHO AO ROOT DIAM: 3.6 CM
ECHO AV AREA PEAK VELOCITY: 2.95 CM2
ECHO AV AREA VTI: 2.35 CM2
ECHO AV AREA/BSA PEAK VELOCITY: 1.2 CM2/M2
ECHO AV AREA/BSA VTI: 1 CM2/M2
ECHO AV MEAN GRADIENT: 6.32 MMHG
ECHO AV PEAK GRADIENT: 11.88 MMHG
ECHO AV PEAK VELOCITY: 172.35 CM/S
ECHO AV VTI: 33.54 CM
ECHO LA AREA 4C: 29.51 CM2
ECHO LA MAJOR AXIS: 4.14 CM
ECHO LA MINOR AXIS: 1.75 CM
ECHO LA VOL 2C: 87.31 ML (ref 18–58)
ECHO LA VOL 4C: 106.63 ML (ref 18–58)
ECHO LA VOL BP: 106.91 ML (ref 18–58)
ECHO LA VOL/BSA BIPLANE: 45.2 ML/M2 (ref 16–28)
ECHO LA VOLUME INDEX A2C: 36.91 ML/M2 (ref 16–28)
ECHO LA VOLUME INDEX A4C: 45.08 ML/M2 (ref 16–28)
ECHO LV E' LATERAL VELOCITY: 13.03 CENTIMETER/SECOND
ECHO LV E' SEPTAL VELOCITY: 12.41 CENTIMETER/SECOND
ECHO LV INTERNAL DIMENSION DIASTOLIC: 4.91 CM (ref 4.2–5.9)
ECHO LV INTERNAL DIMENSION SYSTOLIC: 2.71 CM
ECHO LV IVSD: 1.11 CM (ref 0.6–1)
ECHO LV MASS 2D: 207.5 G (ref 88–224)
ECHO LV MASS INDEX 2D: 87.7 G/M2 (ref 49–115)
ECHO LV POSTERIOR WALL DIASTOLIC: 1.14 CM (ref 0.6–1)
ECHO LVOT DIAM: 2.15 CM
ECHO LVOT PEAK GRADIENT: 5.88 MMHG
ECHO LVOT PEAK VELOCITY: 121.29 CM/S
ECHO LVOT SV: 79 ML
ECHO LVOT VTI: 21.73 CM
ECHO MV A VELOCITY: 79.7 CENTIMETER/SECOND
ECHO MV AREA PHT: 4.56 CM2
ECHO MV E DECELERATION TIME (DT): 166.29 MS
ECHO MV E VELOCITY: 85.46 CENTIMETER/SECOND
ECHO MV PRESSURE HALF TIME (PHT): 48.22 MS
ECHO PV MAX VELOCITY: 107.53 CM/S
ECHO PV PEAK INSTANTANEOUS GRADIENT SYSTOLIC: 4.7 MMHG
ECHO RV INTERNAL DIMENSION: 4.64 CM
ECHO RV TAPSE: 1.99 CM (ref 1.5–2)
ECHO TV REGURGITANT MAX VELOCITY: 255.56 CM/S
ECHO TV REGURGITANT PEAK GRADIENT: 26.13 MMHG
EOSINOPHIL # BLD: 0.2 K/UL (ref 0–0.4)
EOSINOPHIL NFR BLD: 2 % (ref 0–7)
EPAP/CPAP/PEEP, PAPEEP: 8
ERYTHROCYTE [DISTWIDTH] IN BLOOD BY AUTOMATED COUNT: 13.9 % (ref 11.5–14.5)
FIO2 ON VENT: 65 %
GAS FLOW.O2 SETTING OXYMISER: 16 L/MIN
GASTROCULT GAST QL: NEGATIVE
GLOBULIN SER CALC-MCNC: 3.4 G/DL (ref 2–4)
GLUCOSE SERPL-MCNC: 149 MG/DL (ref 65–100)
HCO3 BLDA-SCNC: 32 MMOL/L (ref 22–26)
HCT VFR BLD AUTO: 20.3 % (ref 36.6–50.3)
HCT VFR BLD AUTO: 21.8 % (ref 36.6–50.3)
HGB BLD-MCNC: 6.6 G/DL (ref 12.1–17)
HGB BLD-MCNC: 7.3 G/DL (ref 12.1–17)
HISTORY CHECKED?,CKHIST: NORMAL
IMM GRANULOCYTES # BLD AUTO: 0 K/UL
IMM GRANULOCYTES NFR BLD AUTO: 0 %
LA VOL DISK BP: 98.94 ML (ref 18–58)
LVOT MG: 3.56 MMHG
LYMPHOCYTES # BLD: 1 K/UL (ref 0.8–3.5)
LYMPHOCYTES NFR BLD: 9 % (ref 12–49)
MAGNESIUM SERPL-MCNC: 2.1 MG/DL (ref 1.6–2.4)
MCH RBC QN AUTO: 29.5 PG (ref 26–34)
MCHC RBC AUTO-ENTMCNC: 32.5 G/DL (ref 30–36.5)
MCV RBC AUTO: 90.6 FL (ref 80–99)
METAMYELOCYTES NFR BLD MANUAL: 1 %
MONOCYTES # BLD: 0.1 K/UL (ref 0–1)
MONOCYTES NFR BLD: 1 % (ref 5–13)
MYELOCYTES NFR BLD MANUAL: 0 %
NEUTS BAND NFR BLD MANUAL: 7 % (ref 0–6)
NEUTS SEG # BLD: 9.8 K/UL (ref 1.8–8)
NEUTS SEG NFR BLD: 80 % (ref 32–75)
NRBC # BLD: 0.06 K/UL (ref 0–0.01)
NRBC BLD-RTO: 0.5 PER 100 WBC
OTHER CELLS NFR BLD MANUAL: 0 %
PCO2 BLDA: 49 MMHG (ref 35–45)
PH BLDA: 7.43 [PH] (ref 7.35–7.45)
PH GAST: 3 [PH] (ref 1.5–3.5)
PHOSPHATE SERPL-MCNC: 1.8 MG/DL (ref 2.6–4.7)
PLATELET # BLD AUTO: 146 K/UL (ref 150–400)
PMV BLD AUTO: 9.3 FL (ref 8.9–12.9)
PO2 BLDA: 79 MMHG (ref 80–100)
POTASSIUM SERPL-SCNC: 3.1 MMOL/L (ref 3.5–5.1)
PROMYELOCYTES NFR BLD MANUAL: 0 %
PROT SERPL-MCNC: 5.4 G/DL (ref 6.4–8.2)
RBC # BLD AUTO: 2.24 M/UL (ref 4.1–5.7)
RBC MORPH BLD: ABNORMAL
SAO2 % BLD: 96 % (ref 92–97)
SAO2% DEVICE SAO2% SENSOR NAME: ABNORMAL
SODIUM SERPL-SCNC: 144 MMOL/L (ref 136–145)
SPECIMEN SITE: ABNORMAL
TROPONIN I SERPL-MCNC: <0.05 NG/ML
VENTILATION MODE VENT: ABNORMAL
VT SETTING VENT: 500 ML
WBC # BLD AUTO: 11.3 K/UL (ref 4.1–11.1)

## 2020-11-08 PROCEDURE — 74011000250 HC RX REV CODE- 250: Performed by: INTERNAL MEDICINE

## 2020-11-08 PROCEDURE — C9113 INJ PANTOPRAZOLE SODIUM, VIA: HCPCS | Performed by: SURGERY

## 2020-11-08 PROCEDURE — P9016 RBC LEUKOCYTES REDUCED: HCPCS

## 2020-11-08 PROCEDURE — 83735 ASSAY OF MAGNESIUM: CPT

## 2020-11-08 PROCEDURE — 71045 X-RAY EXAM CHEST 1 VIEW: CPT

## 2020-11-08 PROCEDURE — 82271 OCCULT BLOOD OTHER SOURCES: CPT

## 2020-11-08 PROCEDURE — 84100 ASSAY OF PHOSPHORUS: CPT

## 2020-11-08 PROCEDURE — 93306 TTE W/DOPPLER COMPLETE: CPT | Performed by: INTERNAL MEDICINE

## 2020-11-08 PROCEDURE — 80053 COMPREHEN METABOLIC PANEL: CPT

## 2020-11-08 PROCEDURE — 74011250636 HC RX REV CODE- 250/636

## 2020-11-08 PROCEDURE — 83880 ASSAY OF NATRIURETIC PEPTIDE: CPT

## 2020-11-08 PROCEDURE — 85027 COMPLETE CBC AUTOMATED: CPT

## 2020-11-08 PROCEDURE — 93005 ELECTROCARDIOGRAM TRACING: CPT

## 2020-11-08 PROCEDURE — 74011250637 HC RX REV CODE- 250/637: Performed by: SURGERY

## 2020-11-08 PROCEDURE — 84484 ASSAY OF TROPONIN QUANT: CPT

## 2020-11-08 PROCEDURE — 74011250636 HC RX REV CODE- 250/636: Performed by: INTERNAL MEDICINE

## 2020-11-08 PROCEDURE — 86900 BLOOD TYPING SEROLOGIC ABO: CPT

## 2020-11-08 PROCEDURE — 74011250636 HC RX REV CODE- 250/636: Performed by: SURGERY

## 2020-11-08 PROCEDURE — 36600 WITHDRAWAL OF ARTERIAL BLOOD: CPT

## 2020-11-08 PROCEDURE — 36415 COLL VENOUS BLD VENIPUNCTURE: CPT

## 2020-11-08 PROCEDURE — 74011000258 HC RX REV CODE- 258: Performed by: SURGERY

## 2020-11-08 PROCEDURE — 94003 VENT MGMT INPAT SUBQ DAY: CPT

## 2020-11-08 PROCEDURE — 82803 BLOOD GASES ANY COMBINATION: CPT

## 2020-11-08 PROCEDURE — 85018 HEMOGLOBIN: CPT

## 2020-11-08 PROCEDURE — 74011000250 HC RX REV CODE- 250: Performed by: SURGERY

## 2020-11-08 PROCEDURE — 86923 COMPATIBILITY TEST ELECTRIC: CPT

## 2020-11-08 PROCEDURE — 65610000006 HC RM INTENSIVE CARE

## 2020-11-08 PROCEDURE — 36430 TRANSFUSION BLD/BLD COMPNT: CPT

## 2020-11-08 PROCEDURE — 2709999900 HC NON-CHARGEABLE SUPPLY

## 2020-11-08 PROCEDURE — 93306 TTE W/DOPPLER COMPLETE: CPT

## 2020-11-08 RX ORDER — FENTANYL CITRATE 50 UG/ML
50 INJECTION, SOLUTION INTRAMUSCULAR; INTRAVENOUS ONCE
Status: COMPLETED | OUTPATIENT
Start: 2020-11-08 | End: 2020-11-08

## 2020-11-08 RX ORDER — SODIUM CHLORIDE 9 MG/ML
250 INJECTION, SOLUTION INTRAVENOUS AS NEEDED
Status: DISCONTINUED | OUTPATIENT
Start: 2020-11-08 | End: 2020-11-11

## 2020-11-08 RX ORDER — FENTANYL CITRATE 50 UG/ML
INJECTION, SOLUTION INTRAMUSCULAR; INTRAVENOUS
Status: COMPLETED
Start: 2020-11-08 | End: 2020-11-08

## 2020-11-08 RX ADMIN — FENTANYL CITRATE 50 MCG: 50 INJECTION, SOLUTION INTRAMUSCULAR; INTRAVENOUS at 09:57

## 2020-11-08 RX ADMIN — PIPERACILLIN AND TAZOBACTAM 3.38 G: 3; .375 INJECTION, POWDER, LYOPHILIZED, FOR SOLUTION INTRAVENOUS at 15:14

## 2020-11-08 RX ADMIN — Medication 300 MCG/HR: at 18:43

## 2020-11-08 RX ADMIN — ENOXAPARIN SODIUM 40 MG: 40 INJECTION SUBCUTANEOUS at 21:52

## 2020-11-08 RX ADMIN — Medication 175 MCG/HR: at 08:48

## 2020-11-08 RX ADMIN — DEXMEDETOMIDINE HYDROCHLORIDE 1.4 MCG/KG/HR: 400 INJECTION INTRAVENOUS at 13:57

## 2020-11-08 RX ADMIN — DEXTROSE MONOHYDRATE 7 MCG/MIN: 50 INJECTION, SOLUTION INTRAVENOUS at 11:21

## 2020-11-08 RX ADMIN — PIPERACILLIN AND TAZOBACTAM 3.38 G: 3; .375 INJECTION, POWDER, LYOPHILIZED, FOR SOLUTION INTRAVENOUS at 07:59

## 2020-11-08 RX ADMIN — PROPOFOL 20 MCG/KG/MIN: 10 INJECTION, EMULSION INTRAVENOUS at 21:52

## 2020-11-08 RX ADMIN — DEXMEDETOMIDINE HYDROCHLORIDE 1.4 MCG/KG/HR: 400 INJECTION INTRAVENOUS at 11:31

## 2020-11-08 RX ADMIN — DEXMEDETOMIDINE HYDROCHLORIDE 0.8 MCG/KG/HR: 400 INJECTION INTRAVENOUS at 00:15

## 2020-11-08 RX ADMIN — Medication 300 MCG/HR: at 14:04

## 2020-11-08 RX ADMIN — DEXMEDETOMIDINE HYDROCHLORIDE 0.8 MCG/KG/HR: 400 INJECTION INTRAVENOUS at 04:32

## 2020-11-08 RX ADMIN — FENTANYL CITRATE 50 MCG: 50 INJECTION, SOLUTION INTRAMUSCULAR; INTRAVENOUS at 11:26

## 2020-11-08 RX ADMIN — HYDROMORPHONE HYDROCHLORIDE 1 MG: 2 INJECTION, SOLUTION INTRAMUSCULAR; INTRAVENOUS; SUBCUTANEOUS at 09:27

## 2020-11-08 RX ADMIN — SODIUM CHLORIDE, SODIUM LACTATE, POTASSIUM CHLORIDE, AND CALCIUM CHLORIDE 125 ML/HR: 600; 310; 30; 20 INJECTION, SOLUTION INTRAVENOUS at 07:57

## 2020-11-08 RX ADMIN — SODIUM CHLORIDE, SODIUM LACTATE, POTASSIUM CHLORIDE, AND CALCIUM CHLORIDE 125 ML/HR: 600; 310; 30; 20 INJECTION, SOLUTION INTRAVENOUS at 21:52

## 2020-11-08 RX ADMIN — PROPOFOL 50 MCG/KG/MIN: 10 INJECTION, EMULSION INTRAVENOUS at 11:31

## 2020-11-08 RX ADMIN — KETOROLAC TROMETHAMINE 15 MG: 30 INJECTION, SOLUTION INTRAMUSCULAR at 04:32

## 2020-11-08 RX ADMIN — PIPERACILLIN AND TAZOBACTAM 3.38 G: 3; .375 INJECTION, POWDER, LYOPHILIZED, FOR SOLUTION INTRAVENOUS at 00:15

## 2020-11-08 RX ADMIN — POTASSIUM PHOSPHATE, MONOBASIC AND POTASSIUM PHOSPHATE, DIBASIC: 224; 236 INJECTION, SOLUTION, CONCENTRATE INTRAVENOUS at 08:54

## 2020-11-08 RX ADMIN — FLUCONAZOLE 200 MG: 200 INJECTION, SOLUTION INTRAVENOUS at 08:08

## 2020-11-08 RX ADMIN — PROPOFOL 40 MCG/KG/MIN: 10 INJECTION, EMULSION INTRAVENOUS at 01:50

## 2020-11-08 RX ADMIN — KETOROLAC TROMETHAMINE 15 MG: 30 INJECTION, SOLUTION INTRAMUSCULAR at 08:32

## 2020-11-08 RX ADMIN — DEXMEDETOMIDINE HYDROCHLORIDE 1.4 MCG/KG/HR: 400 INJECTION INTRAVENOUS at 19:29

## 2020-11-08 RX ADMIN — SODIUM CHLORIDE 40 MG: 9 INJECTION INTRAMUSCULAR; INTRAVENOUS; SUBCUTANEOUS at 08:07

## 2020-11-08 RX ADMIN — ACETAMINOPHEN 650 MG: 650 SUPPOSITORY RECTAL at 17:50

## 2020-11-08 RX ADMIN — PROPOFOL 40 MCG/KG/MIN: 10 INJECTION, EMULSION INTRAVENOUS at 14:23

## 2020-11-08 RX ADMIN — Medication 300 MCG/HR: at 23:45

## 2020-11-08 RX ADMIN — DEXMEDETOMIDINE HYDROCHLORIDE 1.4 MCG/KG/HR: 400 INJECTION INTRAVENOUS at 16:30

## 2020-11-08 RX ADMIN — PROPOFOL 20 MCG/KG/MIN: 10 INJECTION, EMULSION INTRAVENOUS at 05:53

## 2020-11-08 RX ADMIN — PIPERACILLIN AND TAZOBACTAM 3.38 G: 3; .375 INJECTION, POWDER, LYOPHILIZED, FOR SOLUTION INTRAVENOUS at 23:45

## 2020-11-08 RX ADMIN — DEXMEDETOMIDINE HYDROCHLORIDE 1.4 MCG/KG/HR: 400 INJECTION INTRAVENOUS at 22:13

## 2020-11-08 NOTE — PROGRESS NOTES
SURGERY PROGRESS NOTE      Admit Date: 11/3/2020    POD 4 Days Post-Op    Procedure: Procedure(s):  LAPAROTOMY EXPLORATORY, evacuation of hematoma. Subjective: Following commands this morning. No acute events overnight. Objective:     Visit Vitals  BP (!) 144/56   Pulse 93   Temp 98.4 °F (36.9 °C)   Resp 30   Ht 5' 11\" (1.803 m)   Wt 118.9 kg (262 lb 2 oz)   SpO2 (!) 70%   BMI 36.56 kg/m²        Temp (24hrs), Av.4 °F (36.9 °C), Min:95.6 °F (35.3 °C), Max:100.9 °F (38.3 °C)      Physical Exam:     Abdomen:  Soft. Appropriately tender, non-distended.   Incision C/D/I. SRI serous        Lab Results   Component Value Date/Time    WBC 11.3 (H) 2020 03:28 AM    HGB 6.6 (L) 2020 03:28 AM    Hemoglobin (POC) 6.8 (L) 2020 03:42 PM    Hemoglobin (POC) 13.3 2014 10:05 PM    HCT 20.3 (L) 2020 03:28 AM    Hematocrit (POC) 39 2014 10:05 PM    PLATELET 268 (L) 5492 03:28 AM    MCV 90.6 2020 03:28 AM     Lab Results   Component Value Date/Time    GFR est non-AA >60 2020 03:28 AM    GFRNA, POC >60 2014 10:05 PM    GFR est AA >60 2020 03:28 AM    GFRAA, POC >60 2014 10:05 PM    Creatinine 0.76 2020 03:28 AM    Creatinine (POC) 1.1 2014 10:05 PM    BUN 6 2020 03:28 AM    BUN (POC) 15 2014 10:05 PM    Sodium 144 2020 03:28 AM    Sodium (POC) 141 2014 10:05 PM    Potassium 3.1 (L) 2020 03:28 AM    Potassium (POC) 3.5 2014 10:05 PM    Chloride 110 (H) 2020 03:28 AM    Chloride (POC) 106 2014 10:05 PM    CO2 31 2020 03:28 AM    Magnesium 2.1 2020 03:28 AM    Phosphorus 1.8 (L) 2020 03:28 AM       Assessment:     Active Problems:    Biliary colic ()      Cholecystitis (2020)      Plan/Recommendations/Medical Decision Making:   Continue to wean pressors  Extubate once pressor requirement decreased  Hg drifting downward I believe this is just settling out from large volume transfusion no transfusion needed at this time as there is no concern for bleeding and vitals improving  Repeat labs in am    11/7  Discussed with Ester Gallego nurse. Weaning pressors. Working on SBT. Vitals appropriate and making great urine. 11/8  Discussed with priyanka Downs nurse. He's clinically appropriate, despite Hb downtrend 6.6. Patient has clear anasarca; the crystalloid volume he is retaining is likely contributing to the appearance of worsening anemia. Can transfuse if they feel appropriate. Replete labs per intensivist.  Extubate when able. If he can't be extubated today, may need to start trophic tube feeds.

## 2020-11-08 NOTE — PROGRESS NOTES
1900: Bedside and Verbal shift change report given to Michael Burt RN (oncoming nurse) by Lyn Keane RN (offgoing nurse). Report included the following information SBAR, Kardex, Procedure Summary, Intake/Output, MAR, Recent Results, Med Rec Status and Cardiac Rhythm NSR .     1940: Patient very agitated, pulling self up pulling at restraints, kicking legs out of the bed. HR increased to 110-120's. Propofol increased to 40. Wife at bedside. 1959: Propofol titrated to 50. Fentanyl increased to 150. Precedex titrated to 0.7.   2015: Precedex titrated to 0.8. Patient O2 sats holding at 88. FIO2 increased to 100%. RT at bedside. Patient now sedated and resting, HR back in the 80's. Patient has fever, tylenol given, cooling blanket turned on. 2200: Patient turned and repositioned in bed. Mouth care completed. 0000: Patient reassessed. Levophed titrated to 12. BP stable. Temp 98, cooling blanket turned off. Propofol titrated to 45 due to patient having Rass -2, -3. Will continue to titrate down. 0200: Propofol at 35. Patient RASS-2, will continue to wean. Patient turned and repositioned in bed. Mouth care completed. 9086: Wife updated on patients status. [phone]: Dr. Thony Sims paged regarding patients hgb and temperature. Awaiting return page. [phone]: Dr. Thony Sims notified of Hgb and temperature, patient on heating blanket. No orders received. 0700: Bedside and Verbal shift change report given to John Archer RN (oncoming nurse) by Michael Burt rn (offgoing nurse). Report included the following information SBAR, Kardex, Procedure Summary, Intake/Output, MAR, Recent Results, Med Rec Status and Cardiac Rhythm NSR .

## 2020-11-08 NOTE — PROGRESS NOTES
.0700: Bedside shift change report given to Nahum Aggarwal RN (oncoming nurse) by Ira Balderrama RN (offgoing nurse). Report included the following information SBAR, Kardex, ED Summary, OR Summary, Procedure Summary, Intake/Output, MAR, Accordion, Recent Results, Med Rec Status, Cardiac Rhythm NSR, Alarm Parameters , Procedure Verification, Quality Measures and Dual Neuro Assessment. Primary Nurse Nahum Aggarwal and Zaida Edge RN performed a dual skin assessment on this patient No impairment noted Jeffery score is 11 
 
0800: Shift  Assessment completed. Medication administered. VAP. Restraints assessed: ROM. Repositioned, turned. Dr. Lavonne Hoskins at bedside. Orders as follows: 1 unit PRBC. Potassium Phos 1 unit 30 mmol. Occult gastric. Cross type and match. SRI x1 without output Mental Status:  unable to assess, agitated, pupils equal and reactive. Respiratory: Ventilator: ET 26@ Teeth. PEEP 7, FIO2 65%, RR 16, . Bilateral lung sounds diminished at base and coarse throughout. Cardiac: NSR, S1S2, hypotensive. GI/: NG right nare without depth markings on intermittent suction, NPO. Bowel sounds distant IV Drips: Fent, Prop, Levo, Precedex, LR 
 
0930: RT at bedside. Patient agitated. Kicking and head forward. Attempts to verbal calm down unsuccessful. Would nod appropriately to yes and no questions. O2 in 70s. Placed on Ambu bag. Lavage performed. Per Dr. Lavonne Hoskins increase sedation to include propofol. Order to modify fent to 300mcg for titration Stat CXR and troponin. 1000: VAP. Repositioned with continuous lateral rotation, restraints assessed: ROM. 1100: Per Dr. Lavonne Hoskins, order EKG and STAT Echo. 1130: Family at bedside. Would like to speak with Dr. Lizbeth Mckenna and pulmonary tomorrow. Education provided. 1200: Reassessment completed. Changes documented. See Flow sheet. Echo at bedside. VAP. Repositioned, turned. Restraints assessed: ROM.  Cook urine is brown in color. 1400: VAP bundle. Repositioned, turned. Restraints assessed: ROM. 1546: EKG obtained. H&H obtained and sent 1600: Reassessment completed. No changes from previous assessment. VAP Bundle. Restraints assessed: ROM. Repositioned, turned. Medication administered. 1800: VAP bundle. Repositioned, turned. Restraints assessed: ROM. 1900: Bedside shift change report given to Michael Burt RN (oncoming nurse) by John Archer RN (offgoing nurse). Report included the following information SBAR, Kardex, ED Summary, OR Summary, Procedure Summary, Intake/Output, MAR, Accordion, Recent Results, Med Rec Status, Cardiac Rhythm NSR, Alarm Parameters , Procedure Verification, Quality Measures and Dual Neuro Assessment.

## 2020-11-08 NOTE — CONSULTS
[facility]     Name: Rupesh Sutton MRN: [id number]   : 1970 Hospital: [address]   Date: 2020        Impression Plan   1. Respiratory insufficiency, cxray with infiltrates, recent tube change due to \"kink\" in the tube  2. Hemorrhagic shock, some due to sedation  3. Anemia, likely dilutional and some from phlebotomy and critical illness  4. S/p lacerated liver capsule repair  5. Cholelithiasis- S/p Cholecystecomy and lysis of adhesions                 · Transfuse 1 unit of prbc, wean off william first, levophed to keep map more than 60-65, check blood from ng tube and if positive, can increase ppi to bid, wean off prop, replace kpo4, d/c toradol  · Hold on [facility] proph  · SCDs           Pt is critically ill. Critical care time spent with pt exclusive of procedures was 40 minutes. Pt at risk for further decline due to hemorrhagic shock    Radiology  ( personally reviewed) CXR reviewed: no infiltrates, ETT   ABG No results for input(s): PHI, PO2I, PCO2I in the last 72 hours. Subjective     Cc: hemorrhagic shock    49 yo with cholecystits s/p lab bailee complicated by laceration of the liver capsule and hemorrhagic shock. Hematoma evacuated- 2L blood. Currently on william 130 and Levophed 8. Pt intubated and sedated- unable to provide any further hx.     : William at 165, levo at 4  Tolerated SBT yesterday and placed on again this morning  : increased hypoxia overnight, fio2 100%, peep of 8, on prop at 50, sedated, william at 190, 2 of levophed  : On levophed at 3, off of william, lwo hgb but no profound active bleed, pro bnp no that high, sedation is an issue, on prop    Review of Systems:  Review of systems not obtained due to patient factors.     Past Medical History:   Diagnosis Date    GERD (gastroesophageal reflux disease)     Panic attack     Psychiatric disorder     anxiety    Seizures ([facility] [address]     5 yo   was told it was a stress seizure, never had another one   Urias Sleep apnea     uses CPAP      Past Surgical History:   Procedure Laterality Date    ABDOMEN SURGERY PROC UNLISTED  1977    hernia(abdomen)- open    HX GI  2008    esophagus repair narrow- balloon dilation and scar tissue removal    HX HERNIA REPAIR      inguinal hernia    HX OTHER SURGICAL  11years of age    tonsils    HX OTHER SURGICAL  2009    hernia (hiatal and abdomen)-open    HX OTHER SURGICAL  23years of age    wisdom teeth extraction under anesthesia    HX OTHER SURGICAL  8 years     distended testicle     HX OTHER SURGICAL  1/16/15    Laparoscopic ventral hernia repair with mesh      Prior to Admission medications    Medication Sig Start Date End Date Taking? Authorizing Provider   citalopram (CELEXA) 40 mg tablet Take 40 mg by mouth daily. Yes Provider, Historical   docusate sodium (COLACE) 100 mg capsule Take 1 Cap by mouth two (2) times a day for 10 days. 11/4/20 11/14/20 Yes Lexie Hearn MD   oxyCODONE IR (ROXICODONE) 5 mg immediate release tablet Take 1 Tab by mouth every four (4) hours as needed for Pain for up to 3 days. Max Daily Amount: 30 mg. 11/4/20 11/7/20 Yes Lexie Hearn MD   lisinopril (PRINIVIL, ZESTRIL) 40 mg tablet Take 40 mg by mouth every evening. Yes Provider, Historical   simvastatin (ZOCOR) 40 mg tablet Take 40 mg by mouth nightly. Yes Provider, Historical   omeprazole (PRILOSEC) 40 mg capsule Take 40 mg by mouth daily.    Yes Provider, Historical     Current Facility-Administered Medications   Medication Dose Route Frequency    fentaNYL (PF) 1,500 mcg/30 mL (50 mcg/mL) infusion  0-200 mcg/hr IntraVENous TITRATE    dexmedeTOMidine in 0.9 % NaCl (PRECEDEX) 400 mcg/100 mL (4 mcg/mL) infusion soln  0.1-1.5 mcg/kg/hr IntraVENous TITRATE    piperacillin-tazobactam (ZOSYN) 3.375 g in 0.9% sodium chloride (MBP/ADV) 100 mL  3.375 g IntraVENous Q8H    fluconazole (DIFLUCAN) 200mg/100 mL IVPB (premix)  200 mg IntraVENous DAILY    NOREPINephrine (LEVOPHED) 8 mg in 5% dextrose 250mL (32 mcg/mL) infusion  0.5-16 mcg/min IntraVENous TITRATE    enoxaparin (LOVENOX) injection 40 mg  40 mg SubCUTAneous Q24H    pantoprazole (PROTONIX) 40 mg in 0.9% sodium chloride 10 mL injection  40 mg IntraVENous DAILY    lactated Ringers infusion  125 mL/hr IntraVENous CONTINUOUS    ketorolac (TORADOL) injection 15 mg  15 mg IntraVENous Q6H    docusate sodium (COLACE) capsule 100 mg  100 mg Oral BID    propofol (DIPRIVAN) 10 mg/mL infusion  5-50 mcg/kg/min IntraVENous TITRATE    PHENYLephrine (BETTY-SYNEPHRINE) 100 mg in 0.9% sodium chloride 250 mL infusion   mcg/min IntraVENous TITRATE     Allergies   Allergen Reactions    Pcn [Penicillins] Hives     Reaction was at age 6, itching and eyes swollen; denies any sob or tongue swelling    Codeine Nausea and Vomiting      Social History     Tobacco Use    Smoking status: Never Smoker    Smokeless tobacco: Never Used   Substance Use Topics    Alcohol use: Yes     Alcohol/week: 1.7 standard drinks     Types: 2 Cans of beer per week     Comment: 1 or 2 beer every two weeks      Family History   Problem Relation Age of Onset    Diabetes Mother     Hypertension Mother     Stroke Mother         copd    Cancer Father         skin    Heart Disease Father     Diabetes Father     Heart Attack Father           Laboratory: I have personally reviewed the critical care flowsheet and labs.      Recent Labs     11/08/20 0328 11/07/20 0341 11/06/20 0220   WBC 11.3* 15.8* 16.2*   HGB 6.6* 7.2* 7.4*   HCT 20.3* 21.7* 21.9*   * 139* 146*     Recent Labs     11/08/20 0328 11/07/20 0341 11/06/20 0220 11/05/20  1424    143 142 142   K 3.1* 3.2* 3.9 4.2   * 109* 112* 113*   CO2 31 30 28 26   * 142* 160* 160*   BUN 6 7 9 12   CREA 0.76 0.99 1.11 1.19   CA 7.9* 7.9* 7.9* 7.9*   MG 2.1  --   --   --    PHOS 1.8*  --   --   --    ALB 2.0*  --   --  2.1*   *  --   --  309*       Objective:     Mode Rate Tidal Volume Pressure FiO2 PEEP   Assist control   500 ml  5 cm H2O 80 % 8 cm H20     Vital Signs:    Ventilator Pressures  Pressure Support (cm H2O): 5 cm H2O  PIP Observed (cm H2O): 26 cm H2O  MAP (cm H2O): 15  PEEP/VENT (cm H2O): 8 cm [id number](24)Ventilator Pressures  Pressure Support (cm H2O): 5 cm H2O  PIP Observed (cm H2O): 26 cm H2O  MAP (cm H2O): 15  PEEP/VENT (cm H2O): 8 cm H20  Safety & Alarms  Circuit Temperature: (HME)  Backup Mode Checked/Apnea: Yes  Pressure Max: 45 cm H2O  Pressure Min: 13 cm H2O  Ve Min: 2  Ve Max: 20  Vt Min: 200 ml  Vt Max: 1000 ml  RR Min: 19  RR Max: 50  Intake/Output:   Last shift:      Ventilator Volumes  Vt Set (ml): 500 ml  Vt Exhaled (Machine Breath) (ml): 552 ml  Vt Spont (ml): 475 ml  Ve Observed (l/min): 10 l/min  Last 3 shifts: No intake/output data recorded. [id number]    Intake/Output Summary (Last 24 hours) at 11/8/2020 0819  Last data filed at 11/8/2020 0600  Gross per 24 hour   Intake 5505.66 ml   Output 2360 ml   Net 3145.66 ml     EXAM:   GENERAL: intubated, sedated, HEENT:  PERRL, EOMI, no alar flaring or epistaxis, oral mucosa moist without cyanosis, NECK:  no jugular vein distention, no retractions, no thyromegaly or masses, LUNGS: CTA, no w/r/r , HEART:  Regular rate and rhythm with no MGR; no edema is present, ABDOMEN:  soft s, bowel sounds absent, EXTREMITIES:  warm with no cyanosis, SKIN:  no jaundice or ecchymosis and NEUROLOGIC: RASS -2    Radha Anthony MD  [facility] [place]

## 2020-11-08 NOTE — PROGRESS NOTES
visited Mr. Rita Sam for a follow up visit in the ICU. Mr. Rita Sam continues to be intubated and appeared to be resting comfortably. His wife, Haley Coleman, was standing near to bedside.  introduced herself and extended support. Engaged in active listening as Haley Coleman shared about the unexpectedness of her  illness concerns and his slow process to recovery. She shared her feelings of worry and shared that her focus is being pulled in many different directions as their family is dealing with a lot at this time. She also shared that she is trusting God in this process. Sadia continues to be an important resource for her and she has lots of family and Taoist support surrounding her during this time. Haley Coleman requested 's continue to keep her  and their family in their prayers and  provided assurance. Advised of 's availability. 's will follow up as able and/or needed  Agillic. Gabi Medrano.      Paging Service: [phone])

## 2020-11-09 ENCOUNTER — APPOINTMENT (OUTPATIENT)
Dept: GENERAL RADIOLOGY | Age: 50
DRG: 417 | End: 2020-11-09
Attending: SURGERY
Payer: COMMERCIAL

## 2020-11-09 LAB
ANION GAP SERPL CALC-SCNC: 3 MMOL/L (ref 5–15)
APPEARANCE UR: ABNORMAL
ARTERIAL PATENCY WRIST A: ABNORMAL
ATRIAL RATE: 77 BPM
BACTERIA SPEC CULT: NORMAL
BACTERIA URNS QL MICRO: NEGATIVE /HPF
BASE EXCESS BLDA CALC-SCNC: 4.9 MMOL/L
BASOPHILS # BLD: 0 K/UL (ref 0–0.1)
BASOPHILS NFR BLD: 0 % (ref 0–1)
BDY SITE: ABNORMAL
BILIRUB UR QL CFM: NEGATIVE
BUN SERPL-MCNC: 11 MG/DL (ref 6–20)
BUN/CREAT SERPL: 12 (ref 12–20)
CALCIUM SERPL-MCNC: 7.6 MG/DL (ref 8.5–10.1)
CALCULATED P AXIS, ECG09: 40 DEGREES
CALCULATED R AXIS, ECG10: 103 DEGREES
CALCULATED T AXIS, ECG11: 12 DEGREES
CHLORIDE SERPL-SCNC: 110 MMOL/L (ref 97–108)
CO2 SERPL-SCNC: 32 MMOL/L (ref 21–32)
COLOR UR: ABNORMAL
CREAT SERPL-MCNC: 0.9 MG/DL (ref 0.7–1.3)
DIAGNOSIS, 93000: NORMAL
DIFFERENTIAL METHOD BLD: ABNORMAL
EOSINOPHIL # BLD: 0.2 K/UL (ref 0–0.4)
EOSINOPHIL NFR BLD: 2 % (ref 0–7)
EPAP/CPAP/PEEP, PAPEEP: 12
EPITH CASTS URNS QL MICRO: ABNORMAL /LPF
ERYTHROCYTE [DISTWIDTH] IN BLOOD BY AUTOMATED COUNT: 14.3 % (ref 11.5–14.5)
ERYTHROCYTE [DISTWIDTH] IN BLOOD BY AUTOMATED COUNT: 14.4 % (ref 11.5–14.5)
FIO2 ON VENT: 70 %
GAS FLOW.O2 SETTING OXYMISER: 16 L/MIN
GLUCOSE SERPL-MCNC: 113 MG/DL (ref 65–100)
GLUCOSE UR STRIP.AUTO-MCNC: NEGATIVE MG/DL
HCO3 BLDA-SCNC: 30 MMOL/L (ref 22–26)
HCT VFR BLD AUTO: 20.1 % (ref 36.6–50.3)
HCT VFR BLD AUTO: 21.9 % (ref 36.6–50.3)
HGB BLD-MCNC: 6.5 G/DL (ref 12.1–17)
HGB BLD-MCNC: 7.1 G/DL (ref 12.1–17)
HGB UR QL STRIP: ABNORMAL
HISTORY CHECKED?,CKHIST: NORMAL
IMM GRANULOCYTES # BLD AUTO: 0 K/UL
IMM GRANULOCYTES NFR BLD AUTO: 0 %
KETONES UR QL STRIP.AUTO: 80 MG/DL
LEUKOCYTE ESTERASE UR QL STRIP.AUTO: ABNORMAL
LYMPHOCYTES # BLD: 1.7 K/UL (ref 0.8–3.5)
LYMPHOCYTES NFR BLD: 20 % (ref 12–49)
MCH RBC QN AUTO: 29.8 PG (ref 26–34)
MCH RBC QN AUTO: 29.8 PG (ref 26–34)
MCHC RBC AUTO-ENTMCNC: 32.3 G/DL (ref 30–36.5)
MCHC RBC AUTO-ENTMCNC: 32.4 G/DL (ref 30–36.5)
MCV RBC AUTO: 92 FL (ref 80–99)
MCV RBC AUTO: 92.2 FL (ref 80–99)
MONOCYTES # BLD: 0.2 K/UL (ref 0–1)
MONOCYTES NFR BLD: 2 % (ref 5–13)
NEUTS BAND NFR BLD MANUAL: 15 % (ref 0–6)
NEUTS SEG # BLD: 6.3 K/UL (ref 1.8–8)
NEUTS SEG NFR BLD: 61 % (ref 32–75)
NITRITE UR QL STRIP.AUTO: NEGATIVE
NRBC # BLD: 0.05 K/UL (ref 0–0.01)
NRBC # BLD: 0.08 K/UL (ref 0–0.01)
NRBC BLD-RTO: 0.6 PER 100 WBC
NRBC BLD-RTO: 1.2 PER 100 WBC
P-R INTERVAL, ECG05: 154 MS
PCO2 BLDA: 43 MMHG (ref 35–45)
PH BLDA: 7.45 [PH] (ref 7.35–7.45)
PH UR STRIP: 6 [PH] (ref 5–8)
PLATELET # BLD AUTO: 152 K/UL (ref 150–400)
PLATELET # BLD AUTO: 154 K/UL (ref 150–400)
PMV BLD AUTO: 9.3 FL (ref 8.9–12.9)
PMV BLD AUTO: 9.5 FL (ref 8.9–12.9)
PO2 BLDA: 185 MMHG (ref 80–100)
POTASSIUM SERPL-SCNC: 3.2 MMOL/L (ref 3.5–5.1)
PRESSURE SUPPORT SETTING VENT: 16 CM[H2O]
PROT UR STRIP-MCNC: 30 MG/DL
Q-T INTERVAL, ECG07: 414 MS
QRS DURATION, ECG06: 98 MS
QTC CALCULATION (BEZET), ECG08: 468 MS
RBC # BLD AUTO: 2.18 M/UL (ref 4.1–5.7)
RBC # BLD AUTO: 2.38 M/UL (ref 4.1–5.7)
RBC #/AREA URNS HPF: ABNORMAL /HPF (ref 0–5)
RBC MORPH BLD: ABNORMAL
SAO2 % BLD: 99 % (ref 92–97)
SAO2% DEVICE SAO2% SENSOR NAME: ABNORMAL
SERVICE CMNT-IMP: NORMAL
SODIUM SERPL-SCNC: 145 MMOL/L (ref 136–145)
SP GR UR REFRACTOMETRY: 1.03 (ref 1–1.03)
SPECIMEN SITE: ABNORMAL
UA: UC IF INDICATED,UAUC: ABNORMAL
UROBILINOGEN UR QL STRIP.AUTO: 1 EU/DL (ref 0.2–1)
VENTILATION MODE VENT: ABNORMAL
VENTRICULAR RATE, ECG03: 77 BPM
WBC # BLD AUTO: 6.9 K/UL (ref 4.1–11.1)
WBC # BLD AUTO: 8.4 K/UL (ref 4.1–11.1)
WBC URNS QL MICRO: ABNORMAL /HPF (ref 0–4)

## 2020-11-09 PROCEDURE — 87070 CULTURE OTHR SPECIMN AEROBIC: CPT

## 2020-11-09 PROCEDURE — 85025 COMPLETE CBC W/AUTO DIFF WBC: CPT

## 2020-11-09 PROCEDURE — 87086 URINE CULTURE/COLONY COUNT: CPT

## 2020-11-09 PROCEDURE — 82803 BLOOD GASES ANY COMBINATION: CPT

## 2020-11-09 PROCEDURE — 80048 BASIC METABOLIC PNL TOTAL CA: CPT

## 2020-11-09 PROCEDURE — P9016 RBC LEUKOCYTES REDUCED: HCPCS

## 2020-11-09 PROCEDURE — 87186 SC STD MICRODIL/AGAR DIL: CPT

## 2020-11-09 PROCEDURE — 74011000250 HC RX REV CODE- 250: Performed by: INTERNAL MEDICINE

## 2020-11-09 PROCEDURE — 74011250636 HC RX REV CODE- 250/636: Performed by: SURGERY

## 2020-11-09 PROCEDURE — 74011250636 HC RX REV CODE- 250/636: Performed by: INTERNAL MEDICINE

## 2020-11-09 PROCEDURE — 74011250636 HC RX REV CODE- 250/636: Performed by: NURSE PRACTITIONER

## 2020-11-09 PROCEDURE — 2709999900 HC NON-CHARGEABLE SUPPLY

## 2020-11-09 PROCEDURE — C9113 INJ PANTOPRAZOLE SODIUM, VIA: HCPCS | Performed by: SURGERY

## 2020-11-09 PROCEDURE — 77030018846 HC SOL IRR STRL H20 ICUM -A

## 2020-11-09 PROCEDURE — 81001 URINALYSIS AUTO W/SCOPE: CPT

## 2020-11-09 PROCEDURE — 74011250637 HC RX REV CODE- 250/637: Performed by: INTERNAL MEDICINE

## 2020-11-09 PROCEDURE — 36600 WITHDRAWAL OF ARTERIAL BLOOD: CPT

## 2020-11-09 PROCEDURE — 87077 CULTURE AEROBIC IDENTIFY: CPT

## 2020-11-09 PROCEDURE — 77030018798 HC PMP KT ENTRL FED COVD -A

## 2020-11-09 PROCEDURE — 74011000258 HC RX REV CODE- 258: Performed by: SURGERY

## 2020-11-09 PROCEDURE — 94003 VENT MGMT INPAT SUBQ DAY: CPT

## 2020-11-09 PROCEDURE — 36415 COLL VENOUS BLD VENIPUNCTURE: CPT

## 2020-11-09 PROCEDURE — 71045 X-RAY EXAM CHEST 1 VIEW: CPT

## 2020-11-09 PROCEDURE — 74011250637 HC RX REV CODE- 250/637: Performed by: SURGERY

## 2020-11-09 PROCEDURE — 87040 BLOOD CULTURE FOR BACTERIA: CPT

## 2020-11-09 PROCEDURE — 65610000006 HC RM INTENSIVE CARE

## 2020-11-09 PROCEDURE — 36430 TRANSFUSION BLD/BLD COMPNT: CPT

## 2020-11-09 PROCEDURE — 85027 COMPLETE CBC AUTOMATED: CPT

## 2020-11-09 PROCEDURE — 94640 AIRWAY INHALATION TREATMENT: CPT

## 2020-11-09 RX ORDER — SODIUM CHLORIDE 9 MG/ML
250 INJECTION, SOLUTION INTRAVENOUS AS NEEDED
Status: DISCONTINUED | OUTPATIENT
Start: 2020-11-09 | End: 2020-11-11

## 2020-11-09 RX ORDER — IPRATROPIUM BROMIDE AND ALBUTEROL SULFATE 2.5; .5 MG/3ML; MG/3ML
3 SOLUTION RESPIRATORY (INHALATION)
Status: DISCONTINUED | OUTPATIENT
Start: 2020-11-09 | End: 2020-11-10

## 2020-11-09 RX ORDER — GUAIFENESIN 100 MG/5ML
400 SOLUTION ORAL EVERY 4 HOURS
Status: DISCONTINUED | OUTPATIENT
Start: 2020-11-09 | End: 2020-11-18

## 2020-11-09 RX ORDER — POTASSIUM CHLORIDE 7.45 MG/ML
10 INJECTION INTRAVENOUS
Status: COMPLETED | OUTPATIENT
Start: 2020-11-09 | End: 2020-11-09

## 2020-11-09 RX ADMIN — ACETAMINOPHEN 650 MG: 325 TABLET ORAL at 12:45

## 2020-11-09 RX ADMIN — PIPERACILLIN AND TAZOBACTAM 3.38 G: 3; .375 INJECTION, POWDER, LYOPHILIZED, FOR SOLUTION INTRAVENOUS at 15:10

## 2020-11-09 RX ADMIN — DEXMEDETOMIDINE HYDROCHLORIDE 1.4 MCG/KG/HR: 400 INJECTION INTRAVENOUS at 12:55

## 2020-11-09 RX ADMIN — POTASSIUM CHLORIDE 10 MEQ: 7.46 INJECTION, SOLUTION INTRAVENOUS at 08:38

## 2020-11-09 RX ADMIN — IPRATROPIUM BROMIDE AND ALBUTEROL SULFATE 3 ML: .5; 3 SOLUTION RESPIRATORY (INHALATION) at 20:56

## 2020-11-09 RX ADMIN — Medication 300 MCG/HR: at 09:38

## 2020-11-09 RX ADMIN — Medication 300 MCG/HR: at 19:31

## 2020-11-09 RX ADMIN — DEXMEDETOMIDINE HYDROCHLORIDE 1.4 MCG/KG/HR: 400 INJECTION INTRAVENOUS at 18:16

## 2020-11-09 RX ADMIN — VANCOMYCIN HYDROCHLORIDE 2500 MG: 10 INJECTION, POWDER, LYOPHILIZED, FOR SOLUTION INTRAVENOUS at 19:46

## 2020-11-09 RX ADMIN — DEXMEDETOMIDINE HYDROCHLORIDE 1.4 MCG/KG/HR: 400 INJECTION INTRAVENOUS at 04:04

## 2020-11-09 RX ADMIN — ENOXAPARIN SODIUM 40 MG: 40 INJECTION SUBCUTANEOUS at 21:30

## 2020-11-09 RX ADMIN — SODIUM CHLORIDE, SODIUM LACTATE, POTASSIUM CHLORIDE, AND CALCIUM CHLORIDE 125 ML/HR: 600; 310; 30; 20 INJECTION, SOLUTION INTRAVENOUS at 05:48

## 2020-11-09 RX ADMIN — DEXMEDETOMIDINE HYDROCHLORIDE 1.4 MCG/KG/HR: 400 INJECTION INTRAVENOUS at 01:10

## 2020-11-09 RX ADMIN — ACETAMINOPHEN 650 MG: 325 TABLET ORAL at 19:32

## 2020-11-09 RX ADMIN — DEXMEDETOMIDINE HYDROCHLORIDE 1.5 MCG/KG/HR: 400 INJECTION INTRAVENOUS at 20:37

## 2020-11-09 RX ADMIN — DEXMEDETOMIDINE HYDROCHLORIDE 1.4 MCG/KG/HR: 400 INJECTION INTRAVENOUS at 15:05

## 2020-11-09 RX ADMIN — Medication 300 MCG/HR: at 04:42

## 2020-11-09 RX ADMIN — DEXMEDETOMIDINE HYDROCHLORIDE 1.4 MCG/KG/HR: 400 INJECTION INTRAVENOUS at 06:57

## 2020-11-09 RX ADMIN — GUAIFENESIN 400 MG: 200 SOLUTION ORAL at 19:36

## 2020-11-09 RX ADMIN — DEXMEDETOMIDINE HYDROCHLORIDE 1.4 MCG/KG/HR: 400 INJECTION INTRAVENOUS at 09:52

## 2020-11-09 RX ADMIN — PIPERACILLIN AND TAZOBACTAM 3.38 G: 3; .375 INJECTION, POWDER, LYOPHILIZED, FOR SOLUTION INTRAVENOUS at 08:03

## 2020-11-09 RX ADMIN — FLUCONAZOLE 200 MG: 200 INJECTION, SOLUTION INTRAVENOUS at 08:03

## 2020-11-09 RX ADMIN — POTASSIUM BICARBONATE 20 MEQ: 782 TABLET, EFFERVESCENT ORAL at 18:16

## 2020-11-09 RX ADMIN — SODIUM CHLORIDE 40 MG: 9 INJECTION INTRAMUSCULAR; INTRAVENOUS; SUBCUTANEOUS at 08:03

## 2020-11-09 RX ADMIN — Medication 300 MCG/HR: at 14:33

## 2020-11-09 RX ADMIN — DEXMEDETOMIDINE HYDROCHLORIDE 1.5 MCG/KG/HR: 400 INJECTION INTRAVENOUS at 23:11

## 2020-11-09 RX ADMIN — ALTEPLASE 1 MG: 2.2 INJECTION, POWDER, LYOPHILIZED, FOR SOLUTION INTRAVENOUS at 20:30

## 2020-11-09 RX ADMIN — POTASSIUM CHLORIDE 10 MEQ: 7.46 INJECTION, SOLUTION INTRAVENOUS at 09:38

## 2020-11-09 RX ADMIN — PROPOFOL 20 MCG/KG/MIN: 10 INJECTION, EMULSION INTRAVENOUS at 04:05

## 2020-11-09 NOTE — PROGRESS NOTES
1900: Bedside and Verbal shift change report given to Steven Ibarra RN (oncoming nurse) by Vi Escobar RN (offgoing nurse). Report included the following information SBAR, Kardex, Procedure Summary, Intake/Output, MAR, Recent Results, Med Rec Status and Cardiac Rhythm NSR . Wife at bedside. Primary Nurse Shira Garcia and Vi Escobar, RN performed a dual skin assessment on this patient No impairment noted  Jeffery score is 12    2000: Shift assessment noted, see flow sheet. Patient intubated and sedated, responsive to pain. Grimaces when suctioning and mouth care. Vent setting: FIO2 70 PEEP 12 R 16 . Propofol infusing at 20, Precedex @ 1.4. LR @ 125, Fentanyl @ 300, Levophed at 2. BP stable with map greater than 65. Temp of 101, ice packs placed, tylenol given at 1750. Midline incision open to air with andrae, SRI draining serosanguinous drainage. NGT to low inter. Suction draining brown output. Cook draining sushma urine. Restraints on for patient safety, no harm or injury to patient. 2059: Levophed titrated to 1.  2200: Patient turned and repositioned in bed. Mouth care completed. 0000: Patient reassessed. Levo stopped, BP stable with maps >65. Urine output minimal, will continue to monitor. Patient turned in bed. Suctioning and mouth care completed. 0200: Patient turned and repositioned in bed. Suctioning and mouth care completed. 0400: No new changes to assessment. Lab work sent for processing. SRI drain draing sponge dressing changed. Mouth care and suctioning completed. 0600: Patient turned and repositioned. Mouth care completed. 3746: Dr. Bc Alvarado paged. Awaiting return page. 5: Spoke with Dr. Bc Alvarado regarding patients hgb of 6.5, will not transfusion PRBC at this time, will pass on to day shift to consult pulmonology. Order for restraints given. 4816: Propofol stopped.    0700:Bedside and Verbal shift change report given to 2900 Cristal Tan (oncoming nurse) by Sadie Ramirez Leonor Tony RN (offgoing nurse). Report included the following information SBAR, Kardex, Procedure Summary, Intake/Output, MAR, Recent Results, Med Rec Status and Cardiac Rhythm NSR .

## 2020-11-09 NOTE — PROGRESS NOTES
Bedside and Verbal shift change report given to Kiana Zavala (oncoming nurse) by Sheri Farrell (offgoing nurse). Report included the following information SBAR, ED Summary, Procedure Summary, Intake/Output, Recent Results, Cardiac Rhythm NSR and Alarm Parameters . Primary Nurse Felipe Sahu RN and Sheri Farrell RN performed a dual skin assessment on this patient Impairment noted- see wound doc flow sheet  Jeffery score is 12    Neuro- pupils equal and reactive, follows commands  Cardiac- NSR, BP stable, pulses palpable,  Resp- Vent 70% PEEP 12  GI/- Cooley, active BS  Skin- surgical sites    0800  Vent titrated to 60%, Assessment complete. Wife at bedside, wishes to speak to  and attending,pt remains on precedex and fent, vasopressors off, prop off,     1200  Vent titrated to 50%, tube feeds started at 10ml/hr with 100ml flush, will be advanced q8 by 10 as tolerated. Goal of 65.     1600  Pt evaled by Dr. Galindo Saunders, plan for unit of blood for hgb instead of diurese, cultures for temp. 1700  Sputum culture obtained, paired blood cultures obtained, MRSA swab, cooley removed- new cooley placed, UA and UC obtained, Central line dressing replaced, unit of blood started transfusion, meds given, assessment complete. Bedside and Verbal shift change report given to Jing (oncoming nurse) by Kiana Zavala (offgoing nurse). Report included the following information SBAR, OR Summary, Procedure Summary, Intake/Output, MAR, Recent Results, Cardiac Rhythm NSR and Alarm Parameters .

## 2020-11-09 NOTE — PROGRESS NOTES
SURGERY PROGRESS NOTE      Admit Date: 11/3/2020    POD 5 Days Post-Op    Procedure: Procedure(s):  LAPAROTOMY EXPLORATORY, evacuation of hematoma. Subjective:   Yesterday - Transfuse 1 unit of prbc.     2000:  Vent setting: FIO2 70 PEEP 12 R 16 . Propofol infusing at 20, Precedex @ 1.4. LR @ 125, Fentanyl @ 300, Levophed at 2. BP stable with map greater than 65. Temp of 101, ice packs placed, tylenol given at 1750. Levo off at 0000. Propofol off at 0657. Hgb 6.5 0645. Hold PRBC. Reassess with pulm on day shift. Following commands this morning. Currently FIO2 70%. Incisions c/d/i. SRI with seroang output. Cook dark sushma. Objective:     Visit Vitals  BP (!) 108/59   Pulse 68   Temp 100 °F (37.8 °C)   Resp 16   Ht 5' 11\" (1.803 m)   Wt 118.8 kg (262 lb)   SpO2 95%   BMI 36.54 kg/m²        Temp (24hrs), Av.8 °F (37.7 °C), Min:98.4 °F (36.9 °C), Max:101 °F (38.3 °C)      Physical Exam:     Abdomen:  Soft. Appropriately tender, non-distended. Incision C/D/I. SRI serosang.           Lab Results   Component Value Date/Time    WBC 8.4 2020 04:13 AM    HGB 6.5 (L) 2020 04:13 AM    Hemoglobin (POC) 6.8 (L) 2020 03:42 PM    Hemoglobin (POC) 13.3 2014 10:05 PM    HCT 20.1 (L) 2020 04:13 AM    Hematocrit (POC) 39 2014 10:05 PM    PLATELET 037  04:13 AM    MCV 92.2 2020 04:13 AM     Lab Results   Component Value Date/Time    GFR est non-AA >60 2020 04:13 AM    GFRNA, POC >60 2014 10:05 PM    GFR est AA >60 2020 04:13 AM    GFRAA, POC >60 2014 10:05 PM    Creatinine 0.90 2020 04:13 AM    Creatinine (POC) 1.1 2014 10:05 PM    BUN 11 2020 04:13 AM    BUN (POC) 15 2014 10:05 PM    Sodium 145 2020 04:13 AM    Sodium (POC) 141 2014 10:05 PM    Potassium 3.2 (L) 2020 04:13 AM    Potassium (POC) 3.5 2014 10:05 PM    Chloride 110 (H) 2020 04:13 AM Chloride (POC) 106 02/09/2014 10:05 PM    CO2 32 11/09/2020 04:13 AM    Magnesium 2.1 11/08/2020 03:28 AM    Phosphorus 1.8 (L) 11/08/2020 03:28 AM       Assessment:     Active Problems:    Biliary colic (13/5/0167)      Cholecystitis (11/4/2020)      Plan/Recommendations/Medical Decision Making:   Continue to wean pressors  Extubate once pressor requirement decreased  Hg drifting downward- pt very edematous/anasarca- vitals improving crystalloid volume he is retaining is likely contributing to the appearance of worsening anemia. Can transfuse if intensivists feel appropriate.     Nutrition Consult for initiation of tube feeds  Replete labs per intensivist.  (I Did order 20meq IV K this am)      Pt seen and discussed with Dr Bartolo Burks, NP      Pt seen and examined  Off pressors  Some respritory issues over the weekend - wean vent as able  Continue IV Zosyn  Abd soft incision c/d/i, SRI serous  Hg 6.6 I believe this is dilutional hold on transfusion unless symptomatic  Consider diuretics   Start tube feeds today    Electronically signed by:  Radha Norris MD  [facility]  Office: [phone]  Fax: [phone]

## 2020-11-09 NOTE — PROGRESS NOTES
Problem: Ventilator Management  Goal: *Adequate oxygenation and ventilation  Outcome: Progressing Towards Goal  Goal: *Patient maintains clear airway/free of aspiration  Outcome: Progressing Towards Goal  Goal: *Absence of infection signs and symptoms  Outcome: Progressing Towards Goal  Goal: *Normal spontaneous ventilation  Outcome: Progressing Towards Goal     Problem: Non-Violent Restraints  Goal: *Removal from restraints as soon as assessed to be safe  Outcome: Progressing Towards Goal  Goal: *No harm/injury to patient while restraints in use  Outcome: Progressing Towards Goal  Goal: *Patient's dignity will be maintained  Outcome: Progressing Towards Goal  Goal: *Patient Specific Goal (EDIT GOAL, INSERT TEXT)  Outcome: Progressing Towards Goal  Goal: Non-violent Restaints:Standard Interventions  Outcome: Progressing Towards Goal  Goal: Non-violent Restraints:Patient Interventions  Outcome: Progressing Towards Goal  Goal: Patient/Family Education  Outcome: Progressing Towards Goal

## 2020-11-09 NOTE — PROGRESS NOTES
[facility] Dosing Services: Antimicrobial Stewardship Progress Note  Consult for antibiotic dosing of Vancomycin by Dr. Jovani Cope  Pharmacist reviewed antibiotic appropriateness for 49 yo male for indication of Sepsis  Day of Therapy: 1    Plan:  Vancomycin therapy:  Loading dose: Vancomycin 2500 mg IV x1 dose now  Maintenance dose: Vancomycin 1750 mg IV q12hr   Dose calculated to approximate a therapeutic trough of 15-20 mcg/mL. Last trough level / Plan for level:after 3rd dose  Pharmacy to follow daily and will make changes to dose and/or frequency based on clinical status. Other Antimicrobial  (not dosed by pharmacist)   Zosyn 3.375 gm IV q8hr  Fluconazole 200 mg IV daily   Cultures     11/3: Blood: NG final   Serum Creatinine     Lab Results   Component Value Date/Time    Creatinine 0.90 11/09/2020 04:13 AM    Creatinine (POC) 1.1 02/09/2014 10:05 PM       Creatinine Clearance Estimated Creatinine Clearance: 128.8 mL/min (based on SCr of 0.9 mg/dL).      Procalcitonin  No results found for: PCT     Temp   (!) 101.2 °F (38.4 °C)    WBC   Lab Results   Component Value Date/Time    WBC 8.4 11/09/2020 04:13 AM       H/H   Lab Results   Component Value Date/Time    HGB 6.5 (L) 11/09/2020 04:13 AM      Platelets Lab Results   Component Value Date/Time    PLATELET 164 73/36/0856 04:13 AM          Pharmacist: Johnson Ovalles Contact information: [phone]

## 2020-11-09 NOTE — PROGRESS NOTES
Comprehensive Nutrition Assessment    Type and Reason for Visit: Reassess    Nutrition Recommendations/Plan:     1. Initiate EN via NGT while pt remains intubated. TF REC's:  Vital 1.2 at 10ml/hr, increase by 10 ml q8h as pt tolerates to goal rate of 65 ml/hr with 100 ml h20 flush q4h. (Goal TF will provide 1872 kcals, 117 gm protein, 1865 ml fluid - this will meet 66% pt's kcal needs, 100% protein needs)     2. Once extubated, Recommend advancing diet order to GI lite as medically appropriate. Nutrition Assessment:      11/9: F/u. Pt remains on vent. Off pressors and Propofol. NGT has remained to suction. GI placed consult today to start trickle TF.  NPO x 6 days. Labs: K+ 3.2. Meds: Precedex. LR running at 125ml/hr. 11/6: 49 yo male admitted for cholecystitis. PMhx: GERD, hernia repairs, esophageal dilation. Class II obese per BMI of 36.6. Weight hx indicates weight gain PTA. S/P cholecystectomy and lysis of adhesions on 11/4. Intubated for procedure and remains on vent today. Being sedated with Propofol at 50mcg/kg/min (providing 863 kcals/d). Requiring pressor support: levophed at 2mcg/min and William at 165. NPO x 3 days. NGT to suction with 600 ml output today. MD notes plan to wean off pressors and extubated. LR running at 125 ml/hr. Labs: . Meds: propofol, Lvophed, William. Malnutrition Assessment:  Unable to assess at this time. Estimated Daily Nutrient Needs:  Energy (kcal): 2818(PAL 2349 x 1.2); Weight Used for Energy Requirements: Current  Protein (g): 117-140(1.5-1.8gm/Kg IBW/d while intubated);  Weight Used for Protein Requirements: Ideal  Fluid (ml/day): 2818; Method Used for Fluid Requirements: 1 ml/kcal      Nutrition Related Findings:  LBM PTA; BS's hypoactive; 1+ edema to all extremities      Wounds:    Surgical incision(ABD)       Current Nutrition Therapies:  DIET NPO    Anthropometric Measures:  · Height:  5' 11\" (180.3 cm)  · Current Body Wt:  118.9 kg (262 lb 2 oz)   · Admission Body Wt:       · Usual Body Wt:        · Ideal Body Wt:   :      · Adjusted Body Weight:   ; Weight Adjustment for: No adjustment   · Adjusted BMI:       · BMI Category:  Obese class 2 (BMI 35.0-39. 9)       Nutrition Diagnosis:   · Inadequate protein-energy intake related to inadequate protein-energy intake as evidenced by NPO or clear liquid status due to medical condition, intubation    11/9: Nutrition Dx continues - NPO. Nutrition Interventions:   Food and/or Nutrient Delivery: Start tube feeding  Nutrition Education and Counseling: No recommendations at this time  Coordination of Nutrition Care: Continue to monitor while inpatient    Goals:  Initiate EN and advance to meet > 60% EEN's while intubated within next 2-3 days       Nutrition Monitoring and Evaluation:   Behavioral-Environmental Outcomes:    Food/Nutrient Intake Outcomes: Enteral nutrition intake/tolerance  Physical Signs/Symptoms Outcomes: GI status, Biochemical data, Weight    Discharge Planning:     Too soon to determine     Electronically signed by Larry Dong, [address] [place] [address] on 11/9/2020     Contact: [phone]

## 2020-11-09 NOTE — PROGRESS NOTES
[facility]     Name: Dorian Andrew MRN: [id number]   : 1970 Hospital: [facility]   Date: 2020        Impression Plan   1. Acute hypoxemic respiratory failure, intubated , tube exchanged   2. Hemorrhagic shock, off pressors   3. Acute cholecystitis s/p robotic assisted lap bailee w/ TAMIKO   4. Post-op bleeding/tear in liver capsule s/p ex-lap and evacuation of hematoma   5. Acute blood loss anemia  6. transaminitis  7. H/o seizures  8. REBECCA                 · Continue vent support  · Wean FiO2/PEEP to keep sats > 88%  · Continue zosyn and flucon. Add vanc  · reculture today for fever. Check MRSA screen  · Add scheduled duonebs and mucinex  · Trend Hgb, transfuse to keep > 7. Will give 1 unit today. Check post-transfusion CBC  · Replete lytes  · Check TSH  · Stop IVF  · Post-op mgmt as per surgery    DVT ppx: lovenox  GI ppx: protonix  NPO    Pt is critically ill and at high risk of decompensation  CCT: 39 minutes         Radiology  ( personally reviewed) CXR reviewed: no infiltrates, ETT   ABG No results for input(s): PHI, PO2I, PCO2I in the last 72 hours. Subjective       47 yo with cholecystits s/p lab bailee complicated by laceration of the liver capsule and hemorrhagic shock. Hematoma evacuated- 2L blood. Currently on daniel 130 and Levophed 8.  Pt intubated and sedated- unable to provide any further hx.      - intubated     - bronch, tube exchange       - echo: EF 55-60%, RV normal    Interval history:  Tm 101.2  Off pressors since midnight  Remains intubated and sedated  Hgb 6.5 this am    Past Medical History:   Diagnosis Date    GERD (gastroesophageal reflux disease)     Panic attack     Psychiatric disorder     anxiety    Seizures ([facility] [address]     5 yo   was told it was a stress seizure, never had another one    Sleep apnea     uses CPAP      Past Surgical History:   Procedure Laterality Date    ABDOMEN SURGERY PROC UNLISTED 1977    hernia(abdomen)- open    HX GI  2008    esophagus repair narrow- balloon dilation and scar tissue removal    HX HERNIA REPAIR      inguinal hernia    HX OTHER SURGICAL  11years of age    tonsils    HX OTHER SURGICAL  2009    hernia (hiatal and abdomen)-open    HX OTHER SURGICAL  23years of age    wisdom teeth extraction under anesthesia    HX OTHER SURGICAL  8 years     distended testicle     HX OTHER SURGICAL  1/16/15    Laparoscopic ventral hernia repair with mesh      Prior to Admission medications    Medication Sig Start Date End Date Taking? Authorizing Provider   citalopram (CELEXA) 40 mg tablet Take 40 mg by mouth daily. Yes Provider, Historical   docusate sodium (COLACE) 100 mg capsule Take 1 Cap by mouth two (2) times a day for 10 days. 11/4/20 11/14/20 Yes Luci Wtakins MD   lisinopril (PRINIVIL, ZESTRIL) 40 mg tablet Take 40 mg by mouth every evening. Yes Provider, Historical   simvastatin (ZOCOR) 40 mg tablet Take 40 mg by mouth nightly. Yes Provider, Historical   omeprazole (PRILOSEC) 40 mg capsule Take 40 mg by mouth daily.    Yes Provider, Historical     Current Facility-Administered Medications   Medication Dose Route Frequency    fentaNYL (PF) 1,500 mcg/30 mL (50 mcg/mL) infusion  0-300 mcg/hr IntraVENous TITRATE    dexmedeTOMidine in 0.9 % NaCl (PRECEDEX) 400 mcg/100 mL (4 mcg/mL) infusion soln  0.1-1.5 mcg/kg/hr IntraVENous TITRATE    piperacillin-tazobactam (ZOSYN) 3.375 g in 0.9% sodium chloride (MBP/ADV) 100 mL  3.375 g IntraVENous Q8H    fluconazole (DIFLUCAN) 200mg/100 mL IVPB (premix)  200 mg IntraVENous DAILY    NOREPINephrine (LEVOPHED) 8 mg in 5% dextrose 250mL (32 mcg/mL) infusion  0.5-16 mcg/min IntraVENous TITRATE    enoxaparin (LOVENOX) injection 40 mg  40 mg SubCUTAneous Q24H    pantoprazole (PROTONIX) 40 mg in 0.9% sodium chloride 10 mL injection  40 mg IntraVENous DAILY    lactated Ringers infusion  125 mL/hr IntraVENous CONTINUOUS    docusate sodium (COLACE) capsule 100 mg  100 mg Oral BID    propofol (DIPRIVAN) 10 mg/mL infusion  5-50 mcg/kg/min IntraVENous TITRATE     Allergies   Allergen Reactions    Pcn [Penicillins] Hives     Reaction was at age 6, itching and eyes swollen; denies any sob or tongue swelling    Codeine Nausea and Vomiting      Social History     Tobacco Use    Smoking status: Never Smoker    Smokeless tobacco: Never Used   Substance Use Topics    Alcohol use: Yes     Alcohol/week: 1.7 standard drinks     Types: 2 Cans of beer per week     Comment: 1 or 2 beer every two weeks      Family History   Problem Relation Age of Onset    Diabetes Mother     Hypertension Mother     Stroke Mother         copd    Cancer Father         skin    Heart Disease Father     Diabetes Father     Heart Attack Father           Laboratory: I have personally reviewed the critical care flowsheet and labs.      Recent Labs     11/09/20 0413 11/08/20 1520 11/08/20 0328 11/07/20  0341   WBC 8.4  --  11.3* 15.8*   HGB 6.5* 7.3* 6.6* 7.2*   HCT 20.1* 21.8* 20.3* 21.7*     --  146* 139*     Recent Labs     11/09/20 0413 11/08/20 0328 11/07/20  0341    144 143   K 3.2* 3.1* 3.2*   * 110* 109*   CO2 32 31 30   * 149* 142*   BUN 11 6 7   CREA 0.90 0.76 0.99   CA 7.6* 7.9* 7.9*   MG  --  2.1  --    PHOS  --  1.8*  --    ALB  --  2.0*  --    ALT  --  122*  --        Objective:     Mode Rate Tidal Volume Pressure FiO2 PEEP   Assist control, Pressure control   500 ml  5 cm H2O 50 % 12 cm H20     Vital Signs:    Ventilator Pressures  PC Set: 16  Pressure Support (cm H2O): 5 cm H2O  PIP Observed (cm H2O): 28 cm H2O  MAP (cm H2O): 17  PEEP/VENT (cm H2O): 12 cm [id number](24)Ventilator Pressures  PC Set: 16  Pressure Support (cm H2O): 5 cm H2O  PIP Observed (cm H2O): 28 cm H2O  MAP (cm H2O): 17  PEEP/VENT (cm H2O): 12 cm H20  Safety & Alarms  Circuit Temperature: (HME)  Backup Mode Checked/Apnea: Yes  Pressure Max: 45 cm H2O  Pressure Min: 13 cm H2O  Ve Min: 2  Ve Max: 20  Vt Min: 200 ml  Vt Max: 1000 ml  RR Min: 19  RR Max: 45  Intake/Output:   Last shift:      Ventilator Volumes  Vt Set (ml): 500 ml  Vt Exhaled (Machine Breath) (ml): 540 ml  Vt Spont (ml): 521 ml  Ve Observed (l/min): 9 l/min  Last 3 shifts: 11/09 0701 - 11/09 1900  In: 1571.6 [I.V.:1301.6]  Out: 575 [Urine:450; Drains:25]RRIOLAST3    Intake/Output Summary (Last 24 hours) at 11/9/2020 1532  Last data filed at 11/9/2020 1300  Gross per 24 hour   Intake 4558.84 ml   Output 1351 ml   Net 3207.84 ml     EXAM:   GENERAL: intubated, sedated, HEENT:  PERRL, EOMI, no alar flaring or epistaxis, oral mucosa moist without cyanosis, NECK:  no jugular vein distention, no retractions, no thyromegaly or masses, LUNGS: CTA, no w/r/r , HEART:  Regular rate and rhythm with no MGR; no edema is present, ABDOMEN:  soft s, bowel sounds absent, EXTREMITIES:  warm with no cyanosis, SKIN:  no jaundice or ecchymosis and NEUROLOGIC: RASS -2    Xiomara Veliz MD  [facility]

## 2020-11-09 NOTE — PROGRESS NOTES
11/9/2020  12:00 PM  Update via chart review, pt is continuing to require medical management for  S/P POD #5 Ex-Lap w./ evacuation of hematoma, S/P Cholecystectomy w/ lysis of adhesions, hemorrhagic shock, anemia, s/p lacerated liver, capsule repair. Pt remains intubated. Transitions of Care Plan:   RUR 15%  Low Risk    LOS 5 days  1. S/P POD #5 Ex-Lap w/ evacuation of hematoma, lacerated Liver capsule repair,   2. S/P cholecystectomy  3. hemorraghic shock, respiratory insufficiency  pt intubated, off pressors   4. CM to follow through for treatment/response  5. Pt on TPN  6. CM met w/ pt's wife, offered support, provided wife w/ copy of note re: hospitalization for pt's employer, provided wife w/ contact number for medical records  7. Pt would benefit from PT/OT evals when stab le to determine skilled needs at DC  8. DC when stable to home w/ HH vs None, and family assistance  9. Outpatient f/u PCP, surgery  10.  Transport TBD pending progress  ALYSSA Barnes

## 2020-11-10 ENCOUNTER — APPOINTMENT (OUTPATIENT)
Dept: GENERAL RADIOLOGY | Age: 50
DRG: 417 | End: 2020-11-10
Attending: INTERNAL MEDICINE
Payer: COMMERCIAL

## 2020-11-10 LAB
ALBUMIN SERPL-MCNC: 1.8 G/DL (ref 3.5–5)
ALBUMIN/GLOB SERPL: 0.5 {RATIO} (ref 1.1–2.2)
ALP SERPL-CCNC: 148 U/L (ref 45–117)
ALT SERPL-CCNC: 69 U/L (ref 12–78)
ANION GAP SERPL CALC-SCNC: 5 MMOL/L (ref 5–15)
ARTERIAL PATENCY WRIST A: ABNORMAL
AST SERPL-CCNC: 42 U/L (ref 15–37)
ATRIAL RATE: 77 BPM
BACTERIA SPEC CULT: ABNORMAL
BACTERIA SPEC CULT: ABNORMAL
BACTERIA SPEC CULT: NORMAL
BASE EXCESS BLDA CALC-SCNC: 1.1 MMOL/L
BASOPHILS # BLD: 0 K/UL (ref 0–0.1)
BASOPHILS NFR BLD: 0 % (ref 0–1)
BDY SITE: ABNORMAL
BILIRUB SERPL-MCNC: 0.9 MG/DL (ref 0.2–1)
BUN SERPL-MCNC: 12 MG/DL (ref 6–20)
BUN/CREAT SERPL: 15 (ref 12–20)
CALCIUM SERPL-MCNC: 7.5 MG/DL (ref 8.5–10.1)
CALCULATED P AXIS, ECG09: 40 DEGREES
CALCULATED R AXIS, ECG10: 103 DEGREES
CALCULATED T AXIS, ECG11: 12 DEGREES
CHLORIDE SERPL-SCNC: 109 MMOL/L (ref 97–108)
CO2 SERPL-SCNC: 29 MMOL/L (ref 21–32)
CREAT SERPL-MCNC: 0.78 MG/DL (ref 0.7–1.3)
DIAGNOSIS, 93000: NORMAL
DIFFERENTIAL METHOD BLD: ABNORMAL
EOSINOPHIL # BLD: 0 K/UL (ref 0–0.4)
EOSINOPHIL NFR BLD: 0 % (ref 0–7)
EPAP/CPAP/PEEP, PAPEEP: 8
ERYTHROCYTE [DISTWIDTH] IN BLOOD BY AUTOMATED COUNT: 14.4 % (ref 11.5–14.5)
FIO2 ON VENT: 60 %
GAS FLOW.O2 SETTING OXYMISER: 14 L/MIN
GLOBULIN SER CALC-MCNC: 4 G/DL (ref 2–4)
GLUCOSE SERPL-MCNC: 136 MG/DL (ref 65–100)
HCO3 BLDA-SCNC: 25 MMOL/L (ref 22–26)
HCT VFR BLD AUTO: 23.3 % (ref 36.6–50.3)
HGB BLD-MCNC: 7.6 G/DL (ref 12.1–17)
IMM GRANULOCYTES # BLD AUTO: 0 K/UL
IMM GRANULOCYTES NFR BLD AUTO: 0 %
LYMPHOCYTES # BLD: 0.6 K/UL (ref 0.8–3.5)
LYMPHOCYTES NFR BLD: 8 % (ref 12–49)
MAGNESIUM SERPL-MCNC: 2.4 MG/DL (ref 1.6–2.4)
MCH RBC QN AUTO: 30 PG (ref 26–34)
MCHC RBC AUTO-ENTMCNC: 32.6 G/DL (ref 30–36.5)
MCV RBC AUTO: 92.1 FL (ref 80–99)
MONOCYTES # BLD: 0.5 K/UL (ref 0–1)
MONOCYTES NFR BLD: 6 % (ref 5–13)
NEUTS BAND NFR BLD MANUAL: 7 % (ref 0–6)
NEUTS SEG # BLD: 7 K/UL (ref 1.8–8)
NEUTS SEG NFR BLD: 79 % (ref 32–75)
NRBC # BLD: 0.06 K/UL (ref 0–0.01)
NRBC BLD-RTO: 0.7 PER 100 WBC
P-R INTERVAL, ECG05: 154 MS
PCO2 BLDA: 38 MMHG (ref 35–45)
PH BLDA: 7.44 [PH] (ref 7.35–7.45)
PHOSPHATE SERPL-MCNC: 2.4 MG/DL (ref 2.6–4.7)
PLATELET # BLD AUTO: 179 K/UL (ref 150–400)
PMV BLD AUTO: 9.3 FL (ref 8.9–12.9)
PO2 BLDA: 62 MMHG (ref 80–100)
POTASSIUM SERPL-SCNC: 3.8 MMOL/L (ref 3.5–5.1)
PRESSURE SUPPORT SETTING VENT: 16 CM[H2O]
PROCALCITONIN SERPL-MCNC: 1.09 NG/ML
PROT SERPL-MCNC: 5.8 G/DL (ref 6.4–8.2)
Q-T INTERVAL, ECG07: 414 MS
QRS DURATION, ECG06: 98 MS
QTC CALCULATION (BEZET), ECG08: 468 MS
RBC # BLD AUTO: 2.53 M/UL (ref 4.1–5.7)
RBC MORPH BLD: ABNORMAL
SAO2 % BLD: 93 % (ref 92–97)
SAO2% DEVICE SAO2% SENSOR NAME: ABNORMAL
SERVICE CMNT-IMP: ABNORMAL
SERVICE CMNT-IMP: NORMAL
SODIUM SERPL-SCNC: 143 MMOL/L (ref 136–145)
SPECIMEN SITE: ABNORMAL
TSH SERPL DL<=0.05 MIU/L-ACNC: 1.27 UIU/ML (ref 0.36–3.74)
VENTILATION MODE VENT: ABNORMAL
VENTRICULAR RATE, ECG03: 77 BPM
WBC # BLD AUTO: 8.1 K/UL (ref 4.1–11.1)

## 2020-11-10 PROCEDURE — 36415 COLL VENOUS BLD VENIPUNCTURE: CPT

## 2020-11-10 PROCEDURE — 71045 X-RAY EXAM CHEST 1 VIEW: CPT

## 2020-11-10 PROCEDURE — 74018 RADEX ABDOMEN 1 VIEW: CPT

## 2020-11-10 PROCEDURE — 94640 AIRWAY INHALATION TREATMENT: CPT

## 2020-11-10 PROCEDURE — C9113 INJ PANTOPRAZOLE SODIUM, VIA: HCPCS | Performed by: SURGERY

## 2020-11-10 PROCEDURE — 74011000250 HC RX REV CODE- 250: Performed by: INTERNAL MEDICINE

## 2020-11-10 PROCEDURE — 74011000258 HC RX REV CODE- 258: Performed by: SURGERY

## 2020-11-10 PROCEDURE — 77030018798 HC PMP KT ENTRL FED COVD -A

## 2020-11-10 PROCEDURE — 82803 BLOOD GASES ANY COMBINATION: CPT

## 2020-11-10 PROCEDURE — 74011250637 HC RX REV CODE- 250/637: Performed by: SURGERY

## 2020-11-10 PROCEDURE — 84145 PROCALCITONIN (PCT): CPT

## 2020-11-10 PROCEDURE — 65610000006 HC RM INTENSIVE CARE

## 2020-11-10 PROCEDURE — 85025 COMPLETE CBC W/AUTO DIFF WBC: CPT

## 2020-11-10 PROCEDURE — 2709999900 HC NON-CHARGEABLE SUPPLY

## 2020-11-10 PROCEDURE — 84443 ASSAY THYROID STIM HORMONE: CPT

## 2020-11-10 PROCEDURE — 74011250637 HC RX REV CODE- 250/637: Performed by: INTERNAL MEDICINE

## 2020-11-10 PROCEDURE — 74011250636 HC RX REV CODE- 250/636: Performed by: SURGERY

## 2020-11-10 PROCEDURE — 94003 VENT MGMT INPAT SUBQ DAY: CPT

## 2020-11-10 PROCEDURE — 36600 WITHDRAWAL OF ARTERIAL BLOOD: CPT

## 2020-11-10 PROCEDURE — 74011250636 HC RX REV CODE- 250/636: Performed by: INTERNAL MEDICINE

## 2020-11-10 PROCEDURE — 83735 ASSAY OF MAGNESIUM: CPT

## 2020-11-10 PROCEDURE — 80053 COMPREHEN METABOLIC PANEL: CPT

## 2020-11-10 PROCEDURE — 84100 ASSAY OF PHOSPHORUS: CPT

## 2020-11-10 RX ORDER — ACETYLCYSTEINE 100 MG/ML
4 SOLUTION ORAL; RESPIRATORY (INHALATION)
Status: DISCONTINUED | OUTPATIENT
Start: 2020-11-10 | End: 2020-11-18

## 2020-11-10 RX ORDER — CISATRACURIUM BESYLATE 2 MG/ML
0.2 INJECTION, SOLUTION INTRAVENOUS ONCE
Status: DISCONTINUED | OUTPATIENT
Start: 2020-11-10 | End: 2020-11-10

## 2020-11-10 RX ORDER — DOCUSATE SODIUM 50 MG/5ML
100 LIQUID ORAL 2 TIMES DAILY
Status: DISCONTINUED | OUTPATIENT
Start: 2020-11-10 | End: 2020-11-18

## 2020-11-10 RX ORDER — IPRATROPIUM BROMIDE AND ALBUTEROL SULFATE 2.5; .5 MG/3ML; MG/3ML
3 SOLUTION RESPIRATORY (INHALATION)
Status: DISCONTINUED | OUTPATIENT
Start: 2020-11-10 | End: 2020-11-18

## 2020-11-10 RX ORDER — BUMETANIDE 0.25 MG/ML
1 INJECTION INTRAMUSCULAR; INTRAVENOUS 2 TIMES DAILY
Status: DISCONTINUED | OUTPATIENT
Start: 2020-11-10 | End: 2020-11-11

## 2020-11-10 RX ORDER — PROPOFOL 10 MG/ML
0-50 VIAL (ML) INTRAVENOUS
Status: DISCONTINUED | OUTPATIENT
Start: 2020-11-10 | End: 2020-11-13

## 2020-11-10 RX ADMIN — PROPOFOL 15 MCG/KG/MIN: 10 INJECTION, EMULSION INTRAVENOUS at 19:43

## 2020-11-10 RX ADMIN — ACETAMINOPHEN 650 MG: 325 TABLET ORAL at 00:36

## 2020-11-10 RX ADMIN — ENOXAPARIN SODIUM 40 MG: 40 INJECTION SUBCUTANEOUS at 21:25

## 2020-11-10 RX ADMIN — ACETYLCYSTEINE 400 MG: 100 SOLUTION ORAL; RESPIRATORY (INHALATION) at 15:43

## 2020-11-10 RX ADMIN — IPRATROPIUM BROMIDE AND ALBUTEROL SULFATE 3 ML: .5; 3 SOLUTION RESPIRATORY (INHALATION) at 20:15

## 2020-11-10 RX ADMIN — VANCOMYCIN HYDROCHLORIDE 1750 MG: 10 INJECTION, POWDER, LYOPHILIZED, FOR SOLUTION INTRAVENOUS at 18:36

## 2020-11-10 RX ADMIN — HYDROMORPHONE HYDROCHLORIDE 1 MG: 2 INJECTION, SOLUTION INTRAMUSCULAR; INTRAVENOUS; SUBCUTANEOUS at 08:10

## 2020-11-10 RX ADMIN — POTASSIUM PHOSPHATE, MONOBASIC AND POTASSIUM PHOSPHATE, DIBASIC: 224; 236 INJECTION, SOLUTION, CONCENTRATE INTRAVENOUS at 13:15

## 2020-11-10 RX ADMIN — Medication 300 MCG/HR: at 00:27

## 2020-11-10 RX ADMIN — DEXMEDETOMIDINE HYDROCHLORIDE 1.5 MCG/KG/HR: 400 INJECTION INTRAVENOUS at 19:43

## 2020-11-10 RX ADMIN — BUMETANIDE 1 MG: 0.25 INJECTION INTRAMUSCULAR; INTRAVENOUS at 17:41

## 2020-11-10 RX ADMIN — Medication 300 MCG/HR: at 10:30

## 2020-11-10 RX ADMIN — GUAIFENESIN 400 MG: 200 SOLUTION ORAL at 07:41

## 2020-11-10 RX ADMIN — PIPERACILLIN AND TAZOBACTAM 3.38 G: 3; .375 INJECTION, POWDER, LYOPHILIZED, FOR SOLUTION INTRAVENOUS at 17:04

## 2020-11-10 RX ADMIN — DEXMEDETOMIDINE HYDROCHLORIDE 1.5 MCG/KG/HR: 400 INJECTION INTRAVENOUS at 22:15

## 2020-11-10 RX ADMIN — PROPOFOL 25 MCG/KG/MIN: 10 INJECTION, EMULSION INTRAVENOUS at 08:15

## 2020-11-10 RX ADMIN — DEXMEDETOMIDINE HYDROCHLORIDE 1.5 MCG/KG/HR: 400 INJECTION INTRAVENOUS at 04:42

## 2020-11-10 RX ADMIN — VANCOMYCIN HYDROCHLORIDE 1750 MG: 10 INJECTION, POWDER, LYOPHILIZED, FOR SOLUTION INTRAVENOUS at 05:35

## 2020-11-10 RX ADMIN — DEXMEDETOMIDINE HYDROCHLORIDE 1.5 MCG/KG/HR: 400 INJECTION INTRAVENOUS at 08:05

## 2020-11-10 RX ADMIN — Medication 300 MCG/HR: at 05:33

## 2020-11-10 RX ADMIN — Medication 300 MCG/HR: at 20:24

## 2020-11-10 RX ADMIN — IPRATROPIUM BROMIDE AND ALBUTEROL SULFATE 3 ML: .5; 3 SOLUTION RESPIRATORY (INHALATION) at 01:08

## 2020-11-10 RX ADMIN — GUAIFENESIN 400 MG: 200 SOLUTION ORAL at 00:37

## 2020-11-10 RX ADMIN — DEXMEDETOMIDINE HYDROCHLORIDE 1.5 MCG/KG/HR: 400 INJECTION INTRAVENOUS at 02:08

## 2020-11-10 RX ADMIN — SODIUM CHLORIDE 40 MG: 9 INJECTION INTRAMUSCULAR; INTRAVENOUS; SUBCUTANEOUS at 09:53

## 2020-11-10 RX ADMIN — ACETAMINOPHEN 650 MG: 325 TABLET ORAL at 04:27

## 2020-11-10 RX ADMIN — GUAIFENESIN 400 MG: 200 SOLUTION ORAL at 04:27

## 2020-11-10 RX ADMIN — IPRATROPIUM BROMIDE AND ALBUTEROL SULFATE 3 ML: .5; 3 SOLUTION RESPIRATORY (INHALATION) at 07:29

## 2020-11-10 RX ADMIN — Medication 300 MCG/HR: at 15:29

## 2020-11-10 RX ADMIN — ACETYLCYSTEINE 400 MG: 100 SOLUTION ORAL; RESPIRATORY (INHALATION) at 20:15

## 2020-11-10 RX ADMIN — GUAIFENESIN 400 MG: 200 SOLUTION ORAL at 20:27

## 2020-11-10 RX ADMIN — ACETAMINOPHEN 650 MG: 650 SUPPOSITORY RECTAL at 16:33

## 2020-11-10 RX ADMIN — IPRATROPIUM BROMIDE AND ALBUTEROL SULFATE 3 ML: .5; 3 SOLUTION RESPIRATORY (INHALATION) at 15:43

## 2020-11-10 RX ADMIN — BUMETANIDE 1 MG: 0.25 INJECTION INTRAMUSCULAR; INTRAVENOUS at 13:15

## 2020-11-10 RX ADMIN — PROPOFOL 25 MCG/KG/MIN: 10 INJECTION, EMULSION INTRAVENOUS at 11:26

## 2020-11-10 RX ADMIN — PIPERACILLIN AND TAZOBACTAM 3.38 G: 3; .375 INJECTION, POWDER, LYOPHILIZED, FOR SOLUTION INTRAVENOUS at 00:37

## 2020-11-10 RX ADMIN — PIPERACILLIN AND TAZOBACTAM 3.38 G: 3; .375 INJECTION, POWDER, LYOPHILIZED, FOR SOLUTION INTRAVENOUS at 07:41

## 2020-11-10 RX ADMIN — ACETAMINOPHEN 650 MG: 325 TABLET ORAL at 20:27

## 2020-11-10 RX ADMIN — FLUCONAZOLE 200 MG: 200 INJECTION, SOLUTION INTRAVENOUS at 09:53

## 2020-11-10 RX ADMIN — DEXMEDETOMIDINE HYDROCHLORIDE 1.5 MCG/KG/HR: 400 INJECTION INTRAVENOUS at 15:18

## 2020-11-10 NOTE — PROGRESS NOTES
1920 Received report. 1932 Temp 101.7,tylenol 650 mg ngt given, ice packs placed,  0036  Temp 103.2, tylenol 650 mg ngt given. cooling blanket on bed.  0427 Temp 101.4 , tylenol 650 mg ngt given. 2955 Bedside shift change report given to [facility]. Len Root  Report included the following information SBAR, Kardex, ED Summary, OR Summary, Procedure Summary, Intake/Output, MAR, Accordion, Recent Results, Med Rec Status and Cardiac Rhythm SR.

## 2020-11-10 NOTE — PROGRESS NOTES
11/10/2020  2:51 PM  Pt dic=scussed in IDR rounds, is continuing to require medical management for S/P POD #6 Ex-Lap w./ evacuation of hematoma, S/P Cholecystectomy w/ lysis of adhesions, hemorrhagic shock, anemia, s/p lacerated liver, capsule repair. Pt remains intubated. Transitions of Care Plan:   RUR 15%  Low Risk   LOS 6 days  1. S/P POD #6 Ex-Lap w/ evacuation of hematoma, lacerated Liver capsule repair, IV vanc,zosyn,flucon  2. S/P cholecystectomy  3. hemorraghic shock, respiratory insufficiency  pt intubated, off pressors   4. CM to follow through for treatment/response  5. Pt on TPN  6. CM met w/ pt's wife,advised to f/u   7. Pt would benefit from PT/OT evals when stable to determine skilled needs at DC  8. DC when stable to home w/ HH vs None, and family assistance  9. Outpatient f/u PCP, surgery  10.  Transport TBD pending progress  ALYSSA Bailon

## 2020-11-10 NOTE — PROGRESS NOTES
0700- Bedside and Verbal shift change report given to [address] (oncoming nurse) by Eduardo Ponce  (offgoing nurse). Report included the following information SBAR, Kardex, ED Summary, Procedure Summary, Intake/Output, MAR, Recent Results, Cardiac Rhythm NSR and Alarm Parameters . 0800- Shift assessment performed. Pt. Received intubated and sedated. Pt. Received in restraints. Neuro: Pt. Opens eyes spontaneously, follows limited commands to squeeze hands. PERRLA 2 mm  Respiratory: Pt. On vent. ETT 26 lip. ; Rate 14; PEEP 8, FiO2 60%. Cardiac: NSR on monitor; S1S2 noted; peripheral pulses present and palpable in all extremities; non pitting edema noted throughout. GI/: Abdomen distended, obese, semi soft; midline vertical incision from surgery; open to air with sutures . R side abdomen SRI tube in place, draining serosanguinous drainage, patent and charged. Cook catheter in place; draining sushma urine. Lines and Drips:   R IJ TLC + blood return all ports  R PIV AC 18 G   R PIV hand 18 G   Precedex 1.5   Fentanyl 300   NG tube in place. Auscultated for placement. 100 ml tan residual.   Vital AF1. 2 running at 30. Goal 65  VAP bundle performed  Restraints assessed; ROM performed; no s/s of distress  0810Adeel Ramos MD at bedside   0820- Pt. In respiratory distress. Nacho OROURKE in room. Bagging pt. And RT changing out tubing for ETT. Added propofol for sedation dt pt. Becoming highly agitated and attempting to pull ETT. Propofol running at 25  1000 - VAP bundle performed  Restraints assessed; ROM performed; no s/s of distress  NG tube noted on x ray by Dr. Pedro Joe to be coiled. End of tube in stomach. Removed NG tube. Primary Nurse Albaro Tapia and Joanna Nobles, RN performed a dual skin assessment on this patient Impairment noted- see wound doc flow sheet  Jeffery score is 13  Wife at bedside; updated on pt. Condition and plan of care  1015- Critical result from lab for MRSA in nares.  Contact precaution sign hung at door. 1100- Placed OG tube per verbal order from [facility] JOSE OROZCO MD. Ordered x-ray to confirm placement. 1130- IDR performed; awaiting new orders  1200- reassessment performed; changes documented in flowsheet  VAP bundle performed  Restraints assessed; ROM performed; no s/s of distress  1400 VAP bundle performed  Restraints assessed; ROM performed; no s/s of distress  1430- Xray at bedside to confirm OG tube placement. 1500- New NG tube placed Gastric residual noted. Auscultated for placement. Order for xray to confirm placement. Holding OG tube medications until placement is verified. 1600- Pt. Reassessment performed. No changes from previous assessment   Pt. Now febrile   VAP bundle performed  Restraints assessed; ROM performed; no s/s of distress  1633- Administered PRN tylenol rectally   Pt. On cooling blanket with ice packs placed.

## 2020-11-10 NOTE — PROGRESS NOTES
Arrived in room to find patient awake, agitated and trying to remove ET tube. Saturation 77%. RN and MD called to bedside. ET tube re-secured and patient suctioned with moderate amount of thick secretions. Ventilator changed to fio2 of 100% and patient sedated. MD bagged patient while RT changed ET Tube in line suction and ventilator tubing. Saturation increased. Patient placed back on ventilator with increase of settings due to saturation, heart rate and mentation. Patient on . 90 fio2, PEEP of 12 and assist control. Vitals remain stable.

## 2020-11-10 NOTE — WOUND CARE
Wound care consult for Jeffery <13. Patient intubated at present. Cook intact. Spoke with Staff Nurse, Gonzalo Mijares. Turn patient every 2 hours. Apply boots to bilateral feet to prevent skin breakdown.

## 2020-11-10 NOTE — PROGRESS NOTES
Chart accessed- patient in respiratory distress, Dr. Nia Payne on unit. STAT order for CXR. MD, RT and primary RN Susana Samano in room bagging patient.

## 2020-11-10 NOTE — PROGRESS NOTES
[facility] Dosing Services: Antimicrobial Stewardship Daily Doc    Consult for antibiotic dosing of vancomycin by Dr. Marco Jin  Indication: Sepsis of unknown etiology, febrile and remains intubated s/p ex lap  Day of Therapy: 2    Ht Readings from Last 1 Encounters:   11/08/20 180.3 cm (71\")        Wt Readings from Last 1 Encounters:   11/08/20 118.8 kg (262 lb)     Vancomycin therapy:  Current maintenance dose: 1750 mg IV every 12 hours  Dose calculated to approximate a therapeutic trough of 15-20 mcg/mL. Assessment/Plan:  SCr remains stable and is likely at baseline, no leukocytosis WBC = 8.1, ordered procalcitonin per protocol for sepsis as add-on lab, multiple fever spikes, cultures pending, urine output has been on lower side throughout admission  Continue current regimen as ordered and will order a trough level prior to 4th total dose which will be tomorrow 11/11 at 0500  CMP ordered daily by MD  Dose administration notes:   Doses given appropriately as scheduled    Date Dose & Interval Measured (mcg/mL) Extrapolated (mcg/mL)   ? ? ? ?   ? ? ? ?   ? ? ? ? Other Antimicrobial   (not dosed by pharmacist) Fluconazole 200 mg IV daily  Zosyn 3.375 g IV every 8 hours   Cultures 11/9 - MRSA nares - Pending  11/9 - Blood x 2 - NGTD x 12 hours - Prelim  11/9 - Urine - Pending  11/9 - Sputum - Pending   Serum Creatinine Lab Results   Component Value Date/Time    Creatinine 0.78 11/10/2020 04:13 AM    Creatinine (POC) 1.1 02/09/2014 10:05 PM         Creatinine Clearance Estimated Creatinine Clearance: 148.6 mL/min (based on SCr of 0.78 mg/dL).      Temp Temp: (!) 101.4 °F (38.6 °C)       WBC Lab Results   Component Value Date/Time    WBC 8.1 11/10/2020 04:13 AM        H/H Lab Results   Component Value Date/Time    HGB 7.6 (L) 11/10/2020 04:13 AM        Platelets    Lab Results   Component Value Date/Time    PLATELET 349 47/46/9026 04:13 AM          For Antifungals, Metronidazole, and Nafcillin:  ALT: 69 AST: 42 Alk Phos: 148 T Bili: 0.9    Pharmacist [facility] Contact information:

## 2020-11-10 NOTE — PROGRESS NOTES
SURGERY PROGRESS NOTE      Admit Date: 11/3/2020    POD 6 Days Post-Op    Procedure: Procedure(s):  LAPAROTOMY EXPLORATORY, evacuation of hematoma. Subjective:   Remains intubated - desats this am  Febrile overnight      Objective:     Visit Vitals  BP (!) 113/53   Pulse 73   Temp (!) 101.4 °F (38.6 °C)   Resp 16   Ht 5' 11\" (1.803 m)   Wt 118.8 kg (262 lb)   SpO2 96%   BMI 36.54 kg/m²        Temp (24hrs), Av.8 °F (38.8 °C), Min:101.2 °F (38.4 °C), Max:103.2 °F (39.6 °C)      Physical Exam:     Abdomen:  Soft. Non-tender, non-distended.   Incision C/D/I. SRI serous        Lab Results   Component Value Date/Time    WBC 8.1 11/10/2020 04:13 AM    HGB 7.6 (L) 11/10/2020 04:13 AM    Hemoglobin (POC) 6.8 (L) 2020 03:42 PM    Hemoglobin (POC) 13.3 2014 10:05 PM    HCT 23.3 (L) 11/10/2020 04:13 AM    Hematocrit (POC) 39 2014 10:05 PM    PLATELET 260  04:13 AM    MCV 92.1 11/10/2020 04:13 AM     Lab Results   Component Value Date/Time    GFR est non-AA >60 11/10/2020 04:13 AM    GFRNA, POC >60 2014 10:05 PM    GFR est AA >60 11/10/2020 04:13 AM    GFRAA, POC >60 2014 10:05 PM    Creatinine 0.78 11/10/2020 04:13 AM    Creatinine (POC) 1.1 2014 10:05 PM    BUN 12 11/10/2020 04:13 AM    BUN (POC) 15 2014 10:05 PM    Sodium 143 11/10/2020 04:13 AM    Sodium (POC) 141 2014 10:05 PM    Potassium 3.8 11/10/2020 04:13 AM    Potassium (POC) 3.5 2014 10:05 PM    Chloride 109 (H) 11/10/2020 04:13 AM    Chloride (POC) 106 2014 10:05 PM    CO2 29 11/10/2020 04:13 AM    Magnesium 2.4 11/10/2020 04:13 AM    Phosphorus 2.4 (L) 11/10/2020 04:13 AM       Assessment:     Active Problems:    Biliary colic (9857)      Cholecystitis (2020)        Plan/Recommendations/Medical Decision Making:   Wean vent as able  Replace NG (coiled) with OG and continue tube feeds  Continue Zosyn  diuresis as able

## 2020-11-10 NOTE — PROGRESS NOTES
[facility]     Name: Gurjit Hunter MRN: [id number]   : 1970 Hospital: [facility]   Date: 11/10/2020        Impression Plan   1. Acute hypoxemic respiratory failure, intubated , tube exchanged   2. Hemorrhagic shock, off pressors   3. Acute cholecystitis s/p robotic assisted lap bailee w/ TAMIKO   4. Post-op bleeding/tear in liver capsule s/p ex-lap and evacuation of hematoma   5. Acute blood loss anemia  6. transaminitis  7. H/o seizures  8. REBECCA                 · Continue vent support. Settings adjusted  · Wean FiO2/PEEP to keep sats > 88%  · Continue vanc, zosyn and flucon  · Follow cultures  · continue scheduled duonebs and mucinex  · Add mucomyst and chest PT  · Trend Hgb, transfuse to keep > 7  · Diurese today  · Replete lytes  · Post-op mgmt as per surgery    DVT ppx: lovenox  GI ppx: protonix  NPO. Tolerating TF    Pt is critically ill and at high risk of decompensation  CCT: 44 minutes         Radiology  ( personally reviewed) CXR reviewed: no infiltrates, ETT   ABG No results for input(s): PHI, PO2I, PCO2I in the last 72 hours. Subjective       49 yo with cholecystits s/p lab bailee complicated by laceration of the liver capsule and hemorrhagic shock. Hematoma evacuated- 2L blood. Currently on daniel 130 and Levophed 8.  Pt intubated and sedated- unable to provide any further hx.      - intubated     - bronch, tube exchange       - echo: EF 55-60%, RV normal    Interval history:  Tm 103.2  desated to the 70s this am, requiring bagging and increasing FiO2/PEEP on the vent  Suctioning thick secretions from ETT  NGT coiled in the back of the throat --> to be replaced    Past Medical History:   Diagnosis Date    GERD (gastroesophageal reflux disease)     Panic attack     Psychiatric disorder     anxiety    Seizures ([facility] [address]     7 yo   was told it was a stress seizure, never had another one    Sleep apnea     uses CPAP      Past Surgical History:   Procedure Laterality Date    ABDOMEN SURGERY PROC UNLISTED  1977    hernia(abdomen)- open    HX GI  2008    esophagus repair narrow- balloon dilation and scar tissue removal    HX HERNIA REPAIR      inguinal hernia    HX OTHER SURGICAL  11years of age    tonsils    HX OTHER SURGICAL  2009    hernia (hiatal and abdomen)-open    HX OTHER SURGICAL  23years of age    wisdom teeth extraction under anesthesia    HX OTHER SURGICAL  8 years     distended testicle     HX OTHER SURGICAL  1/16/15    Laparoscopic ventral hernia repair with mesh      Prior to Admission medications    Medication Sig Start Date End Date Taking? Authorizing Provider   citalopram (CELEXA) 40 mg tablet Take 40 mg by mouth daily. Yes Provider, Historical   docusate sodium (COLACE) 100 mg capsule Take 1 Cap by mouth two (2) times a day for 10 days. 11/4/20 11/14/20 Yes Emiliano Vasquez MD   lisinopril (PRINIVIL, ZESTRIL) 40 mg tablet Take 40 mg by mouth every evening. Yes Provider, Historical   simvastatin (ZOCOR) 40 mg tablet Take 40 mg by mouth nightly. Yes Provider, Historical   omeprazole (PRILOSEC) 40 mg capsule Take 40 mg by mouth daily. Yes Provider, Historical     Current Facility-Administered Medications   Medication Dose Route Frequency    propofol (DIPRIVAN) 10 mg/mL infusion  0-50 mcg/kg/min IntraVENous TITRATE    cisatracurium (NIMBEX) 2 mg/mL injection 23.76 mg  0.2 mg/kg IntraVENous ONCE    [START ON 11/11/2020] Vancomycin - Please draw trough level prior to dose due on 11/11 at 0500. Thanks!    Other ONCE    vancomycin (VANCOCIN) 1,750 mg in 0.9% sodium chloride 500 mL IVPB  1,750 mg IntraVENous Q12H    albuterol-ipratropium (DUO-NEB) 2.5 MG-0.5 MG/3 ML  3 mL Nebulization Q6H RT    guaiFENesin (ROBITUSSIN) 100 mg/5 mL oral liquid 400 mg  400 mg Oral Q4H    fentaNYL (PF) 1,500 mcg/30 mL (50 mcg/mL) infusion  0-300 mcg/hr IntraVENous TITRATE    dexmedeTOMidine in 0.9 % NaCl (PRECEDEX) 400 mcg/100 mL (4 mcg/mL) infusion soln  0.1-1.5 mcg/kg/hr IntraVENous TITRATE    piperacillin-tazobactam (ZOSYN) 3.375 g in 0.9% sodium chloride (MBP/ADV) 100 mL  3.375 g IntraVENous Q8H    fluconazole (DIFLUCAN) 200mg/100 mL IVPB (premix)  200 mg IntraVENous DAILY    NOREPINephrine (LEVOPHED) 8 mg in 5% dextrose 250mL (32 mcg/mL) infusion  0.5-16 mcg/min IntraVENous TITRATE    enoxaparin (LOVENOX) injection 40 mg  40 mg SubCUTAneous Q24H    pantoprazole (PROTONIX) 40 mg in 0.9% sodium chloride 10 mL injection  40 mg IntraVENous DAILY    docusate sodium (COLACE) capsule 100 mg  100 mg Oral BID     Allergies   Allergen Reactions    Pcn [Penicillins] Other (comments)     Reaction was at age 6, itching and eyes swollen; denies any sob or tongue swelling. Has been on Zosyn in the hospital with no problems    Codeine Nausea and Vomiting      Social History     Tobacco Use    Smoking status: Never Smoker    Smokeless tobacco: Never Used   Substance Use Topics    Alcohol use: Yes     Alcohol/week: 1.7 standard drinks     Types: 2 Cans of beer per week     Comment: 1 or 2 beer every two weeks      Family History   Problem Relation Age of Onset    Diabetes Mother     Hypertension Mother     Stroke Mother         copd    Cancer Father         skin    Heart Disease Father     Diabetes Father     Heart Attack Father           Laboratory: I have personally reviewed the critical care flowsheet and labs.      Recent Labs     11/10/20  0413 11/09/20  2129 11/09/20  0413   WBC 8.1 6.9 8.4   HGB 7.6* 7.1* 6.5*   HCT 23.3* 21.9* 20.1*    154 152     Recent Labs     11/10/20  0413 11/09/20  0413 11/08/20  0328    145 144   K 3.8 3.2* 3.1*   * 110* 110*   CO2 29 32 31   * 113* 149*   BUN 12 11 6   CREA 0.78 0.90 0.76   CA 7.5* 7.6* 7.9*   MG 2.4  --  2.1   PHOS 2.4*  --  1.8*   ALB 1.8*  --  2.0*   ALT 69  --  122*       Objective:     Mode Rate Tidal Volume Pressure FiO2 PEEP   Assist control 600 ml  5 cm H2O 90 % 12 cm H20     Vital Signs:    Ventilator Pressures  PC Set: 16  Pressure Support (cm H2O): 5 cm H2O  PIP Observed (cm H2O): 35 cm H2O  MAP (cm H2O): 17  PEEP/VENT (cm H2O): 12 cm M95FTSZ(24)Ventilator Pressures  PC Set: 16  Pressure Support (cm H2O): 5 cm H2O  PIP Observed (cm H2O): 35 cm H2O  MAP (cm H2O): 17  PEEP/VENT (cm H2O): 12 cm H20  Safety & Alarms  Circuit Temperature: (HME)  Backup Mode Checked/Apnea: Yes  Pressure Max: 45 cm H2O  Pressure Min: 13 cm H2O  Ve Min: 2  Ve Max: 20  Vt Min: 200 ml  Vt Max: 1000 ml  RR Min: 19  RR Max: 45  Intake/Output:   Last shift:      Ventilator Volumes  Vt Set (ml): 600 ml  Vt Exhaled (Machine Breath) (ml): 1234 ml  Vt Spont (ml): 786 ml  Ve Observed (l/min): 10.5 l/min  Last 3 shifts: 11/10 0701 - 11/10 1900  In: 661.4 [I.V.:451.4]  Out: 525 [Urine:525]RRIOLAST3    Intake/Output Summary (Last 24 hours) at 11/10/2020 1217  Last data filed at 11/10/2020 1100  Gross per 24 hour   Intake 4371.9 ml   Output 1725 ml   Net 2646.9 ml     EXAM:   GENERAL: intubated, sedated, HEENT:  PERRL, EOMI, no alar flaring or epistaxis, oral mucosa moist without cyanosis, NECK:  no jugular vein distention, no retractions, no thyromegaly or masses, LUNGS: CTA, no w/r/r , HEART:  Regular rate and rhythm with no MGR; no edema is present, ABDOMEN:  soft s, bowel sounds absent, EXTREMITIES:  warm with no cyanosis, SKIN:  no jaundice or ecchymosis and NEUROLOGIC: RASS -2    Xiomara Veliz MD  [facility]

## 2020-11-11 ENCOUNTER — APPOINTMENT (OUTPATIENT)
Dept: GENERAL RADIOLOGY | Age: 50
DRG: 417 | End: 2020-11-11
Attending: INTERNAL MEDICINE
Payer: COMMERCIAL

## 2020-11-11 LAB
ABO + RH BLD: NORMAL
ALBUMIN SERPL-MCNC: 1.7 G/DL (ref 3.5–5)
ALBUMIN/GLOB SERPL: 0.4 {RATIO} (ref 1.1–2.2)
ALP SERPL-CCNC: 117 U/L (ref 45–117)
ALT SERPL-CCNC: 56 U/L (ref 12–78)
ANION GAP SERPL CALC-SCNC: 3 MMOL/L (ref 5–15)
ARTERIAL PATENCY WRIST A: ABNORMAL
AST SERPL-CCNC: 36 U/L (ref 15–37)
BASE EXCESS BLDA CALC-SCNC: 3.8 MMOL/L
BASOPHILS # BLD: 0 K/UL (ref 0–0.1)
BASOPHILS NFR BLD: 0 % (ref 0–1)
BDY SITE: ABNORMAL
BILIRUB SERPL-MCNC: 1.1 MG/DL (ref 0.2–1)
BLOOD GROUP ANTIBODIES SERPL: NORMAL
BUN SERPL-MCNC: 11 MG/DL (ref 6–20)
BUN/CREAT SERPL: 16 (ref 12–20)
CALCIUM SERPL-MCNC: 7.6 MG/DL (ref 8.5–10.1)
CHLORIDE SERPL-SCNC: 107 MMOL/L (ref 97–108)
CO2 SERPL-SCNC: 30 MMOL/L (ref 21–32)
CREAT SERPL-MCNC: 0.69 MG/DL (ref 0.7–1.3)
DATE LAST DOSE: NORMAL
DIFFERENTIAL METHOD BLD: ABNORMAL
EOSINOPHIL # BLD: 0 K/UL (ref 0–0.4)
EOSINOPHIL NFR BLD: 0 % (ref 0–7)
EPAP/CPAP/PEEP, PAPEEP: 12
ERYTHROCYTE [DISTWIDTH] IN BLOOD BY AUTOMATED COUNT: 14.4 % (ref 11.5–14.5)
ERYTHROCYTE [DISTWIDTH] IN BLOOD BY AUTOMATED COUNT: 14.6 % (ref 11.5–14.5)
FIO2 ON VENT: 70 %
GAS FLOW.O2 SETTING OXYMISER: 10 L/MIN
GLOBULIN SER CALC-MCNC: 4.1 G/DL (ref 2–4)
GLUCOSE SERPL-MCNC: 120 MG/DL (ref 65–100)
HCO3 BLDA-SCNC: 27 MMOL/L (ref 22–26)
HCT VFR BLD AUTO: 21.9 % (ref 36.6–50.3)
HCT VFR BLD AUTO: 25 % (ref 36.6–50.3)
HGB BLD-MCNC: 6.9 G/DL (ref 12.1–17)
HGB BLD-MCNC: 8 G/DL (ref 12.1–17)
HISTORY CHECKED?,CKHIST: NORMAL
IMM GRANULOCYTES # BLD AUTO: 0 K/UL
IMM GRANULOCYTES NFR BLD AUTO: 0 %
LYMPHOCYTES # BLD: 0.9 K/UL (ref 0.8–3.5)
LYMPHOCYTES NFR BLD: 14 % (ref 12–49)
MAGNESIUM SERPL-MCNC: 2.3 MG/DL (ref 1.6–2.4)
MCH RBC QN AUTO: 29.5 PG (ref 26–34)
MCH RBC QN AUTO: 29.7 PG (ref 26–34)
MCHC RBC AUTO-ENTMCNC: 31.5 G/DL (ref 30–36.5)
MCHC RBC AUTO-ENTMCNC: 32 G/DL (ref 30–36.5)
MCV RBC AUTO: 92.3 FL (ref 80–99)
MCV RBC AUTO: 94.4 FL (ref 80–99)
MONOCYTES # BLD: 0.6 K/UL (ref 0–1)
MONOCYTES NFR BLD: 9 % (ref 5–13)
MYELOCYTES NFR BLD MANUAL: 1 %
NEUTS BAND NFR BLD MANUAL: 11 % (ref 0–6)
NEUTS SEG # BLD: 5.1 K/UL (ref 1.8–8)
NEUTS SEG NFR BLD: 65 % (ref 32–75)
NRBC # BLD: 0.02 K/UL (ref 0–0.01)
NRBC # BLD: 0.04 K/UL (ref 0–0.01)
NRBC BLD-RTO: 0.3 PER 100 WBC
NRBC BLD-RTO: 0.6 PER 100 WBC
PCO2 BLDA: 37 MMHG (ref 35–45)
PH BLDA: 7.48 [PH] (ref 7.35–7.45)
PHOSPHATE SERPL-MCNC: 2.1 MG/DL (ref 2.6–4.7)
PLATELET # BLD AUTO: 196 K/UL (ref 150–400)
PLATELET # BLD AUTO: 204 K/UL (ref 150–400)
PMV BLD AUTO: 10 FL (ref 8.9–12.9)
PMV BLD AUTO: 9.5 FL (ref 8.9–12.9)
PO2 BLDA: 66 MMHG (ref 80–100)
POTASSIUM SERPL-SCNC: 3.7 MMOL/L (ref 3.5–5.1)
PROT SERPL-MCNC: 5.8 G/DL (ref 6.4–8.2)
RBC # BLD AUTO: 2.32 M/UL (ref 4.1–5.7)
RBC # BLD AUTO: 2.71 M/UL (ref 4.1–5.7)
RBC MORPH BLD: ABNORMAL
REPORTED DOSE,DOSE: NORMAL UNITS
REPORTED DOSE/TIME,TMG: NORMAL
SAO2 % BLD: 94 % (ref 92–97)
SAO2% DEVICE SAO2% SENSOR NAME: ABNORMAL
SODIUM SERPL-SCNC: 140 MMOL/L (ref 136–145)
SPECIMEN EXP DATE BLD: NORMAL
SPECIMEN SITE: ABNORMAL
VANCOMYCIN TROUGH SERPL-MCNC: 8.6 UG/ML (ref 5–10)
VENTILATION MODE VENT: ABNORMAL
VT SETTING VENT: 600 ML
WBC # BLD AUTO: 6.4 K/UL (ref 4.1–11.1)
WBC # BLD AUTO: 6.7 K/UL (ref 4.1–11.1)

## 2020-11-11 PROCEDURE — 77030019940 HC BLNKT HYPOTHRM STRY -B

## 2020-11-11 PROCEDURE — 71045 X-RAY EXAM CHEST 1 VIEW: CPT

## 2020-11-11 PROCEDURE — 82803 BLOOD GASES ANY COMBINATION: CPT

## 2020-11-11 PROCEDURE — 94003 VENT MGMT INPAT SUBQ DAY: CPT

## 2020-11-11 PROCEDURE — 74011000250 HC RX REV CODE- 250: Performed by: INTERNAL MEDICINE

## 2020-11-11 PROCEDURE — 86900 BLOOD TYPING SEROLOGIC ABO: CPT

## 2020-11-11 PROCEDURE — 84100 ASSAY OF PHOSPHORUS: CPT

## 2020-11-11 PROCEDURE — 36430 TRANSFUSION BLD/BLD COMPNT: CPT

## 2020-11-11 PROCEDURE — 77030018846 HC SOL IRR STRL H20 ICUM -A

## 2020-11-11 PROCEDURE — 74011250637 HC RX REV CODE- 250/637: Performed by: INTERNAL MEDICINE

## 2020-11-11 PROCEDURE — 74011250636 HC RX REV CODE- 250/636: Performed by: SURGERY

## 2020-11-11 PROCEDURE — P9016 RBC LEUKOCYTES REDUCED: HCPCS

## 2020-11-11 PROCEDURE — C9113 INJ PANTOPRAZOLE SODIUM, VIA: HCPCS | Performed by: SURGERY

## 2020-11-11 PROCEDURE — 83735 ASSAY OF MAGNESIUM: CPT

## 2020-11-11 PROCEDURE — 74011250636 HC RX REV CODE- 250/636: Performed by: INTERNAL MEDICINE

## 2020-11-11 PROCEDURE — 36415 COLL VENOUS BLD VENIPUNCTURE: CPT

## 2020-11-11 PROCEDURE — 80202 ASSAY OF VANCOMYCIN: CPT

## 2020-11-11 PROCEDURE — 74011000258 HC RX REV CODE- 258: Performed by: SURGERY

## 2020-11-11 PROCEDURE — 80053 COMPREHEN METABOLIC PANEL: CPT

## 2020-11-11 PROCEDURE — 65610000006 HC RM INTENSIVE CARE

## 2020-11-11 PROCEDURE — 36600 WITHDRAWAL OF ARTERIAL BLOOD: CPT

## 2020-11-11 PROCEDURE — 85025 COMPLETE CBC W/AUTO DIFF WBC: CPT

## 2020-11-11 PROCEDURE — 94640 AIRWAY INHALATION TREATMENT: CPT

## 2020-11-11 PROCEDURE — 85027 COMPLETE CBC AUTOMATED: CPT

## 2020-11-11 PROCEDURE — 74011250637 HC RX REV CODE- 250/637: Performed by: SURGERY

## 2020-11-11 RX ORDER — BUMETANIDE 0.25 MG/ML
2 INJECTION INTRAMUSCULAR; INTRAVENOUS 2 TIMES DAILY
Status: DISCONTINUED | OUTPATIENT
Start: 2020-11-11 | End: 2020-11-18

## 2020-11-11 RX ORDER — SODIUM CHLORIDE 9 MG/ML
250 INJECTION, SOLUTION INTRAVENOUS AS NEEDED
Status: DISCONTINUED | OUTPATIENT
Start: 2020-11-11 | End: 2020-11-11

## 2020-11-11 RX ORDER — BUMETANIDE 0.25 MG/ML
1 INJECTION INTRAMUSCULAR; INTRAVENOUS ONCE
Status: COMPLETED | OUTPATIENT
Start: 2020-11-12 | End: 2020-11-11

## 2020-11-11 RX ORDER — FLUCONAZOLE 2 MG/ML
400 INJECTION, SOLUTION INTRAVENOUS DAILY
Status: DISCONTINUED | OUTPATIENT
Start: 2020-11-12 | End: 2020-11-16

## 2020-11-11 RX ORDER — QUETIAPINE FUMARATE 25 MG/1
25 TABLET, FILM COATED ORAL 2 TIMES DAILY
Status: DISCONTINUED | OUTPATIENT
Start: 2020-11-11 | End: 2020-11-12

## 2020-11-11 RX ADMIN — GUAIFENESIN 400 MG: 200 SOLUTION ORAL at 23:59

## 2020-11-11 RX ADMIN — ACETYLCYSTEINE 400 MG: 100 SOLUTION ORAL; RESPIRATORY (INHALATION) at 11:34

## 2020-11-11 RX ADMIN — ACETAMINOPHEN ORAL SOLUTION 650 MG: 650 SOLUTION ORAL at 20:06

## 2020-11-11 RX ADMIN — PROPOFOL 50 MCG/KG/MIN: 10 INJECTION, EMULSION INTRAVENOUS at 20:20

## 2020-11-11 RX ADMIN — PROPOFOL 25 MCG/KG/MIN: 10 INJECTION, EMULSION INTRAVENOUS at 04:15

## 2020-11-11 RX ADMIN — PROPOFOL 40 MCG/KG/MIN: 10 INJECTION, EMULSION INTRAVENOUS at 14:36

## 2020-11-11 RX ADMIN — ACETYLCYSTEINE 400 MG: 100 SOLUTION ORAL; RESPIRATORY (INHALATION) at 00:49

## 2020-11-11 RX ADMIN — SODIUM CHLORIDE: 900 INJECTION, SOLUTION INTRAVENOUS at 15:14

## 2020-11-11 RX ADMIN — BUMETANIDE 2 MG: 0.25 INJECTION INTRAMUSCULAR; INTRAVENOUS at 17:57

## 2020-11-11 RX ADMIN — DEXMEDETOMIDINE HYDROCHLORIDE 1.5 MCG/KG/HR: 400 INJECTION INTRAVENOUS at 11:14

## 2020-11-11 RX ADMIN — PIPERACILLIN AND TAZOBACTAM 3.38 G: 3; .375 INJECTION, POWDER, LYOPHILIZED, FOR SOLUTION INTRAVENOUS at 16:13

## 2020-11-11 RX ADMIN — ACETYLCYSTEINE 400 MG: 100 SOLUTION ORAL; RESPIRATORY (INHALATION) at 19:42

## 2020-11-11 RX ADMIN — ACETYLCYSTEINE 400 MG: 100 SOLUTION ORAL; RESPIRATORY (INHALATION) at 15:35

## 2020-11-11 RX ADMIN — DEXMEDETOMIDINE HYDROCHLORIDE 1.5 MCG/KG/HR: 400 INJECTION INTRAVENOUS at 08:35

## 2020-11-11 RX ADMIN — DEXMEDETOMIDINE HYDROCHLORIDE 1.5 MCG/KG/HR: 400 INJECTION INTRAVENOUS at 23:40

## 2020-11-11 RX ADMIN — VANCOMYCIN HYDROCHLORIDE 1750 MG: 10 INJECTION, POWDER, LYOPHILIZED, FOR SOLUTION INTRAVENOUS at 21:00

## 2020-11-11 RX ADMIN — PROPOFOL 40 MCG/KG/MIN: 10 INJECTION, EMULSION INTRAVENOUS at 08:35

## 2020-11-11 RX ADMIN — DEXMEDETOMIDINE HYDROCHLORIDE 1.5 MCG/KG/HR: 400 INJECTION INTRAVENOUS at 00:42

## 2020-11-11 RX ADMIN — BUMETANIDE 1 MG: 0.25 INJECTION INTRAMUSCULAR; INTRAVENOUS at 08:36

## 2020-11-11 RX ADMIN — DEXMEDETOMIDINE HYDROCHLORIDE 1.5 MCG/KG/HR: 400 INJECTION INTRAVENOUS at 18:17

## 2020-11-11 RX ADMIN — Medication 300 MCG/HR: at 16:24

## 2020-11-11 RX ADMIN — BUMETANIDE 1 MG: 0.25 INJECTION, SOLUTION INTRAMUSCULAR; INTRAVENOUS at 23:42

## 2020-11-11 RX ADMIN — ACETYLCYSTEINE 400 MG: 100 SOLUTION ORAL; RESPIRATORY (INHALATION) at 08:19

## 2020-11-11 RX ADMIN — DEXMEDETOMIDINE HYDROCHLORIDE 1.5 MCG/KG/HR: 400 INJECTION INTRAVENOUS at 16:13

## 2020-11-11 RX ADMIN — Medication 300 MCG/HR: at 06:22

## 2020-11-11 RX ADMIN — PIPERACILLIN AND TAZOBACTAM 3.38 G: 3; .375 INJECTION, POWDER, LYOPHILIZED, FOR SOLUTION INTRAVENOUS at 08:39

## 2020-11-11 RX ADMIN — PROPOFOL 40 MCG/KG/MIN: 10 INJECTION, EMULSION INTRAVENOUS at 16:31

## 2020-11-11 RX ADMIN — IPRATROPIUM BROMIDE AND ALBUTEROL SULFATE 3 ML: .5; 3 SOLUTION RESPIRATORY (INHALATION) at 15:35

## 2020-11-11 RX ADMIN — Medication 300 MCG/HR: at 01:24

## 2020-11-11 RX ADMIN — GUAIFENESIN 400 MG: 200 SOLUTION ORAL at 08:36

## 2020-11-11 RX ADMIN — IPRATROPIUM BROMIDE AND ALBUTEROL SULFATE 3 ML: .5; 3 SOLUTION RESPIRATORY (INHALATION) at 19:42

## 2020-11-11 RX ADMIN — GUAIFENESIN 400 MG: 200 SOLUTION ORAL at 20:07

## 2020-11-11 RX ADMIN — IPRATROPIUM BROMIDE AND ALBUTEROL SULFATE 3 ML: .5; 3 SOLUTION RESPIRATORY (INHALATION) at 04:41

## 2020-11-11 RX ADMIN — GUAIFENESIN 400 MG: 200 SOLUTION ORAL at 04:15

## 2020-11-11 RX ADMIN — VANCOMYCIN HYDROCHLORIDE 1750 MG: 10 INJECTION, POWDER, LYOPHILIZED, FOR SOLUTION INTRAVENOUS at 12:25

## 2020-11-11 RX ADMIN — Medication 300 MCG/HR: at 11:26

## 2020-11-11 RX ADMIN — IPRATROPIUM BROMIDE AND ALBUTEROL SULFATE 3 ML: .5; 3 SOLUTION RESPIRATORY (INHALATION) at 00:49

## 2020-11-11 RX ADMIN — ACETAMINOPHEN 650 MG: 325 TABLET ORAL at 08:36

## 2020-11-11 RX ADMIN — PROPOFOL 50 MCG/KG/MIN: 10 INJECTION, EMULSION INTRAVENOUS at 23:42

## 2020-11-11 RX ADMIN — HYDROMORPHONE HYDROCHLORIDE 1 MG: 2 INJECTION, SOLUTION INTRAMUSCULAR; INTRAVENOUS; SUBCUTANEOUS at 20:48

## 2020-11-11 RX ADMIN — PIPERACILLIN AND TAZOBACTAM 3.38 G: 3; .375 INJECTION, POWDER, LYOPHILIZED, FOR SOLUTION INTRAVENOUS at 00:35

## 2020-11-11 RX ADMIN — Medication 300 MCG/HR: at 21:25

## 2020-11-11 RX ADMIN — DEXMEDETOMIDINE HYDROCHLORIDE 1.5 MCG/KG/HR: 400 INJECTION INTRAVENOUS at 20:58

## 2020-11-11 RX ADMIN — PIPERACILLIN AND TAZOBACTAM 3.38 G: 3; .375 INJECTION, POWDER, LYOPHILIZED, FOR SOLUTION INTRAVENOUS at 23:43

## 2020-11-11 RX ADMIN — DOCUSATE SODIUM 100 MG: 50 LIQUID ORAL at 08:36

## 2020-11-11 RX ADMIN — GUAIFENESIN 400 MG: 200 SOLUTION ORAL at 11:04

## 2020-11-11 RX ADMIN — FLUCONAZOLE 200 MG: 200 INJECTION, SOLUTION INTRAVENOUS at 08:35

## 2020-11-11 RX ADMIN — DEXMEDETOMIDINE HYDROCHLORIDE 1.5 MCG/KG/HR: 400 INJECTION INTRAVENOUS at 03:26

## 2020-11-11 RX ADMIN — ACETYLCYSTEINE 400 MG: 100 SOLUTION ORAL; RESPIRATORY (INHALATION) at 04:42

## 2020-11-11 RX ADMIN — SODIUM CHLORIDE 40 MG: 9 INJECTION INTRAMUSCULAR; INTRAVENOUS; SUBCUTANEOUS at 08:35

## 2020-11-11 RX ADMIN — DEXMEDETOMIDINE HYDROCHLORIDE 1.5 MCG/KG/HR: 400 INJECTION INTRAVENOUS at 06:02

## 2020-11-11 RX ADMIN — IPRATROPIUM BROMIDE AND ALBUTEROL SULFATE 3 ML: .5; 3 SOLUTION RESPIRATORY (INHALATION) at 23:02

## 2020-11-11 RX ADMIN — GUAIFENESIN 400 MG: 200 SOLUTION ORAL at 16:13

## 2020-11-11 RX ADMIN — QUETIAPINE FUMARATE 25 MG: 25 TABLET ORAL at 20:07

## 2020-11-11 RX ADMIN — IPRATROPIUM BROMIDE AND ALBUTEROL SULFATE 3 ML: .5; 3 SOLUTION RESPIRATORY (INHALATION) at 08:19

## 2020-11-11 RX ADMIN — VANCOMYCIN HYDROCHLORIDE 1750 MG: 10 INJECTION, POWDER, LYOPHILIZED, FOR SOLUTION INTRAVENOUS at 04:56

## 2020-11-11 RX ADMIN — ENOXAPARIN SODIUM 40 MG: 40 INJECTION SUBCUTANEOUS at 20:06

## 2020-11-11 RX ADMIN — IPRATROPIUM BROMIDE AND ALBUTEROL SULFATE 3 ML: .5; 3 SOLUTION RESPIRATORY (INHALATION) at 11:34

## 2020-11-11 RX ADMIN — GUAIFENESIN 400 MG: 200 SOLUTION ORAL at 00:26

## 2020-11-11 RX ADMIN — ACETAMINOPHEN ORAL SOLUTION 650 MG: 650 SOLUTION ORAL at 23:59

## 2020-11-11 RX ADMIN — DOCUSATE SODIUM 100 MG: 50 LIQUID ORAL at 17:57

## 2020-11-11 RX ADMIN — ACETYLCYSTEINE 400 MG: 100 SOLUTION ORAL; RESPIRATORY (INHALATION) at 23:02

## 2020-11-11 NOTE — PROGRESS NOTES
SURGERY PROGRESS NOTE      Admit Date: 11/3/2020    POD 7 Days Post-Op    Procedure: Procedure(s):  LAPAROTOMY EXPLORATORY, evacuation of hematoma. Subjective:   Remains intubated  febrile      Objective:     Visit Vitals  /65   Pulse 65   Temp 98.2 °F (36.8 °C)   Resp 15   Ht 5' 11\" (1.803 m)   Wt 118.8 kg (262 lb)   SpO2 93%   BMI 36.54 kg/m²        Temp (24hrs), Av.6 °F (37.6 °C), Min:98.2 °F (36.8 °C), Max:103 °F (39.4 °C)      Physical Exam:     Abdomen:  Soft. Non-tender, non-distended.   Incision C/D/I. SRI serous        Lab Results   Component Value Date/Time    WBC 6.7 2020 04:50 AM    HGB 6.9 (L) 2020 04:50 AM    Hemoglobin (POC) 6.8 (L) 2020 03:42 PM    Hemoglobin (POC) 13.3 2014 10:05 PM    HCT 21.9 (L) 2020 04:50 AM    Hematocrit (POC) 39 2014 10:05 PM    PLATELET 432  04:50 AM    MCV 94.4 2020 04:50 AM     Lab Results   Component Value Date/Time    GFR est non-AA >60 2020 04:50 AM    GFRNA, POC >60 2014 10:05 PM    GFR est AA >60 2020 04:50 AM    GFRAA, POC >60 2014 10:05 PM    Creatinine 0.69 (L) 2020 04:50 AM    Creatinine (POC) 1.1 2014 10:05 PM    BUN 11 2020 04:50 AM    BUN (POC) 15 2014 10:05 PM    Sodium 140 2020 04:50 AM    Sodium (POC) 141 2014 10:05 PM    Potassium 3.7 2020 04:50 AM    Potassium (POC) 3.5 2014 10:05 PM    Chloride 107 2020 04:50 AM    Chloride (POC) 106 2014 10:05 PM    CO2 30 2020 04:50 AM    Magnesium 2.3 2020 04:50 AM    Phosphorus 2.1 (L) 2020 04:50 AM       Assessment:     Active Problems:    Biliary colic ()      Cholecystitis (2020)        Plan/Recommendations/Medical Decision Making:   Increase diuretic today still very positive overall  Wean vent as able  Vanc for pna  Advance tube feeds as tolerates

## 2020-11-11 NOTE — PROGRESS NOTES
[facility]OC[facility]     Name: Dorian Anderw MRN: [id number]   : 1970 Hospital: [address]   Date: 2020        Impression Plan   1. Acute hypoxemic respiratory failure, intubated , tube exchanged   2. Hemorrhagic shock, off pressors   3. Acute cholecystitis s/p robotic assisted lap bailee w/ TAMIKO   4. Post-op bleeding/tear in liver capsule s/p ex-lap and evacuation of hematoma   5. Acute blood loss anemia  6. Staph pneumonia  7. transaminitis  8. H/o seizures  9. REBECCA                 · Continue vent support. Settings adjusted  · Wean FiO2/PEEP to keep sats > 88%  · Continue vanc, zosyn and flucon  · Follow cultures  · continue scheduled duonebs, mucinex, mucomyst and chest PT  · Trend Hgb, transfuse to keep > 7. Will give 1 unit today. Repeat CBC post transfusion  · Increase bumex  · Replete lytes  · Post-op mgmt as per surgery    DVT ppx: lovenox  GI ppx: protonix  NPO. Tolerating TF    Pt is critically ill and at high risk of decompensation  CCT: 33 minutes         Radiology  ( personally reviewed) CXR reviewed: no infiltrates, ETT   ABG No results for input(s): PHI, PO2I, PCO2I in the last 72 hours. Subjective       49 yo with cholecystits s/p lab bailee complicated by laceration of the liver capsule and hemorrhagic shock. Hematoma evacuated- 2L blood. Currently on daniel 130 and Levophed 8.  Pt intubated and sedated- unable to provide any further hx.      - intubated     - bronch, tube exchange       - echo: EF 55-60%, RV normal    Interval history:  Tm 103  Requiring suctioning q3h of blood tinged secretions  24 L + since admission    Past Medical History:   Diagnosis Date    GERD (gastroesophageal reflux disease)     Panic attack     Psychiatric disorder     anxiety    Seizures ([facility] [address]     5 yo   was told it was a stress seizure, never had another one    Sleep apnea     uses CPAP      Past Surgical History:   Procedure Laterality Date    ABDOMEN SURGERY PROC UNLISTED  1977    hernia(abdomen)- open    HX GI  2008    esophagus repair narrow- balloon dilation and scar tissue removal    HX HERNIA REPAIR      inguinal hernia    HX OTHER SURGICAL  11years of age    tonsils    HX OTHER SURGICAL  2009    hernia (hiatal and abdomen)-open    HX OTHER SURGICAL  23years of age    wisdom teeth extraction under anesthesia    HX OTHER SURGICAL  8 years     distended testicle     HX OTHER SURGICAL  1/16/15    Laparoscopic ventral hernia repair with mesh      Prior to Admission medications    Medication Sig Start Date End Date Taking? Authorizing Provider   citalopram (CELEXA) 40 mg tablet Take 40 mg by mouth daily. Yes Provider, Historical   docusate sodium (COLACE) 100 mg capsule Take 1 Cap by mouth two (2) times a day for 10 days. 11/4/20 11/14/20 Yes Sarah Sharpe MD   lisinopril (PRINIVIL, ZESTRIL) 40 mg tablet Take 40 mg by mouth every evening. Yes Provider, Historical   simvastatin (ZOCOR) 40 mg tablet Take 40 mg by mouth nightly. Yes Provider, Historical   omeprazole (PRILOSEC) 40 mg capsule Take 40 mg by mouth daily. Yes Provider, Historical     Current Facility-Administered Medications   Medication Dose Route Frequency    vancomycin (VANCOCIN) 1,750 mg in 0.9% sodium chloride 500 mL IVPB  1,750 mg IntraVENous Q8H    [START ON 11/12/2020] Vancomycin - Please draw trough level prior to dose due on 11/12 at 1300. Thanks!    Other ONCE    propofol (DIPRIVAN) 10 mg/mL infusion  0-50 mcg/kg/min IntraVENous TITRATE    albuterol-ipratropium (DUO-NEB) 2.5 MG-0.5 MG/3 ML  3 mL Nebulization Q4H RT    acetylcysteine (MUCOMYST) 100 mg/mL (10 %) nebulizer solution 400 mg  4 mL Nebulization Q4H RT    bumetanide (BUMEX) injection 1 mg  1 mg IntraVENous BID    docusate (COLACE) 50 mg/5 mL oral liquid 100 mg  100 mg Oral BID    guaiFENesin (ROBITUSSIN) 100 mg/5 mL oral liquid 400 mg  400 mg Oral Q4H    fentaNYL (PF) 1,500 mcg/30 mL (50 mcg/mL) infusion  0-300 mcg/hr IntraVENous TITRATE    dexmedeTOMidine in 0.9 % NaCl (PRECEDEX) 400 mcg/100 mL (4 mcg/mL) infusion soln  0.1-1.5 mcg/kg/hr IntraVENous TITRATE    piperacillin-tazobactam (ZOSYN) 3.375 g in 0.9% sodium chloride (MBP/ADV) 100 mL  3.375 g IntraVENous Q8H    fluconazole (DIFLUCAN) 200mg/100 mL IVPB (premix)  200 mg IntraVENous DAILY    enoxaparin (LOVENOX) injection 40 mg  40 mg SubCUTAneous Q24H    pantoprazole (PROTONIX) 40 mg in 0.9% sodium chloride 10 mL injection  40 mg IntraVENous DAILY     Allergies   Allergen Reactions    Pcn [Penicillins] Other (comments)     Reaction was at age 6, itching and eyes swollen; denies any sob or tongue swelling. Has been on Zosyn in the hospital with no problems    Codeine Nausea and Vomiting      Social History     Tobacco Use    Smoking status: Never Smoker    Smokeless tobacco: Never Used   Substance Use Topics    Alcohol use: Yes     Alcohol/week: 1.7 standard drinks     Types: 2 Cans of beer per week     Comment: 1 or 2 beer every two weeks      Family History   Problem Relation Age of Onset    Diabetes Mother     Hypertension Mother     Stroke Mother         copd    Cancer Father         skin    Heart Disease Father     Diabetes Father     Heart Attack Father           Laboratory: I have personally reviewed the critical care flowsheet and labs.      Recent Labs     11/11/20  0450 11/10/20  0413 11/09/20  2129   WBC 6.7 8.1 6.9   HGB 6.9* 7.6* 7.1*   HCT 21.9* 23.3* 21.9*    179 154     Recent Labs     11/11/20  0450 11/10/20  0413 11/09/20  0413    143 145   K 3.7 3.8 3.2*    109* 110*   CO2 30 29 32   * 136* 113*   BUN 11 12 11   CREA 0.69* 0.78 0.90   CA 7.6* 7.5* 7.6*   MG 2.3 2.4  --    PHOS 2.1* 2.4*  --    ALB 1.7* 1.8*  --    ALT 56 69  --        Objective:     Mode Rate Tidal Volume Pressure FiO2 PEEP   Assist control   600 ml  5 cm H2O 40 % 8 cm H20     Vital Signs:    Ventilator Pressures  PC Set: 16  Pressure Support (cm H2O): 5 cm H2O  PIP Observed (cm H2O): 34 cm H2O  MAP (cm H2O): 18  PEEP/VENT (cm H2O): 8 cm S72PSWX(24)Ventilator Pressures  PC Set: 16  Pressure Support (cm H2O): 5 cm H2O  PIP Observed (cm H2O): 34 cm H2O  MAP (cm H2O): 18  PEEP/VENT (cm H2O): 8 cm H20  Safety & Alarms  Circuit Temperature: (HME)  Backup Mode Checked/Apnea: Yes  Pressure Max: 50 cm H2O  Pressure Min: 13 cm H2O  Ve Min: 2  Ve Max: 20  Vt Min: 200 ml  Vt Max: 1000 ml  RR Min: 19  RR Max: 45  Intake/Output:   Last shift:      Ventilator Volumes  Vt Set (ml): 600 ml  Vt Exhaled (Machine Breath) (ml): 716 ml  Vt Spont (ml): 786 ml  Ve Observed (l/min): 11.3 l/min  Last 3 shifts: 11/11 0701 - 11/11 1900  In: 801.8 [I.V.:331.8]  Out: 2586 [Urine:1450; Drains:20]RRIOLAST3    Intake/Output Summary (Last 24 hours) at 11/11/2020 1417  Last data filed at 11/11/2020 1200  Gross per 24 hour   Intake 3953.52 ml   Output 3650 ml   Net 303.52 ml     EXAM:   GENERAL: intubated, sedated, HEENT:  PERRL, EOMI, no alar flaring or epistaxis, oral mucosa moist without cyanosis, NECK:  no jugular vein distention, no retractions, no thyromegaly or masses, LUNGS: CTA, no w/r/r , HEART:  Regular rate and rhythm with no MGR; no edema is present, ABDOMEN:  soft s, bowel sounds absent, EXTREMITIES:  warm with no cyanosis, SKIN:  no jaundice or ecchymosis and NEUROLOGIC: RASS -3    Tyler Briseno MD  [facility]

## 2020-11-11 NOTE — OP NOTES
[facility]  OPERATIVE REPORT    Name:  Michael Stevenson  MR#:  [id number]  :  1970  ACCOUNT #:  [de-identified]  DATE OF SERVICE:  2020    CLINICAL SERVICE:  General Surgery    PREOPERATIVE DIAGNOSIS:  Postoperative bleed. POSTOPERATIVE DIAGNOSIS:  Postoperative bleed. PROCEDURE PERFORMED:  Exploratory laparotomy. SURGEON:  Ashwin Yin MD    ASSISTANT:  Jayde Molina    ANESTHESIA:  General.    COMPLICATIONS:  None. SPECIMENS REMOVED:  None. IMPLANTS:  None. ESTIMATED BLOOD LOSS:  2000 mL. DRAINS/TUBES:  10 flat SRI    INDICATIONS:  The patient is a 45-year-old gentleman who had undergone a robotic cholecystectomy earlier in the day. In the recovery room, he became profoundly hypotensive and labs that were drawn showed a significant anemia. Given the clinical picture, decision was made to bring him back to the operating room emergently for exploration. PROCEDURE:  After discussing with the wife piyush, proceeded with emergency consent confirmed. The patient was brought to the operating room, where he was placed on the operating room table. After induction of general endotracheal anesthesia, the patient was prepped and draped in standard fashion. Surgical timeout was conducted and a midline incision was made through his previous midline incision. Earlier in the day, there had been a significant amount of time spent on lysis of adhesions. The abdomen was dissected carefully along the midline due to concern for any additional adhesions and knowing that the stomach was still somewhat adherent in the upper abdomen. A 10-blade scalpel was used to make that incision along the midline and Bovie electrocautery was used to dissect through subcutaneous tissue down to the level of the fascia. The fascia was sharply incised. Upon entering into the abdominal cavity, approximately 2 liters of dark blood was evacuated.   The abdomen was packed in four quadrants until the blood was completely evacuated. Careful inspection was then undertaken. The liver was first inspected and did appear to have bleeding from the liver edge just adjacent to where the stomach was still densely adherent and it appeared that there was a tear in the liver capsule in this location with significant oozing from that surface. This area was cauterized. The laparotomy pads that were packed in the abdomen were then slowly removed starting in the left upper quadrant. The left upper quadrant was inspected. There was no obvious bleeding. The left lower quadrant was then inspected, and similarly, no bleeding was found. Right lower quadrant was inspected and there was no bleeding found in this area. The packs were then removed in the right upper quadrant and no additional areas of bleeding were noted. The abdomen was irrigated with several liters of warm saline with no further blood loss noted. I then ran the bowel from the ligament of Treitz to the terminal ileum and fully inspected the colon and omentum. I did not note any areas of bleeding. The only site of bleeding that was noted was the tear in the liver capsule noted upon first entry. With the abdomen completely evacuated of blood and hematoma, the SRI drain was placed adjacent to the liver capsular tear and brought out through the right lateral abdominal wall. The midline fascia was then closed using a running #1 looped PDS suture and the skin was stapled shut. The drain was secured in placed using a 2-0 nylon. The patient remained critically ill throughout the case requiring pressors. Decision was made to leave him intubated and transport him to the recovery room for ongoing critical care management. All instrument, needle and sponge counts were correct at the end of the case.       Enzo Barragan MD      [id number]  D:  11/10/2020 21:22  T:  11/11/2020 8:28  JOB #:  [id number]

## 2020-11-11 NOTE — PROGRESS NOTES
[facility] Dosing Services: Antimicrobial Stewardship Daily Doc    Consult for antibiotic dosing of vancomycin by Dr. Ni Pitt  Indication: Sepsis of unknown etiology, febrile and remains intubated s/p ex lap  Day of Therapy: 3    Ht Readings from Last 1 Encounters:   11/08/20 180.3 cm (71\")        Wt Readings from Last 1 Encounters:   11/08/20 118.8 kg (262 lb)     Vancomycin therapy:  Current maintenance dose: 1750 mg IV every 12 hours  Dose calculated to approximate a therapeutic trough of 15-20 mcg/mL. Assessment/Plan:  SCr remains stable and is likely at baseline, no leukocytosis WBC = 6.7, procalcitonin on 11/10 = 1.09, multiple fever spikes in past 48 hours, cultures pending - noted moderate probable staph aureus on sputum culture, urine output is adequate  Vancomycin trough level is sub-therapeutic for target trough. Change to 1750 mg IV every 8 hours based on individual kinetics which predicts a trough of 14.5 mcg/mL with AUC greater than 400 - predicted trough is slightly lower than goal but anticipate some degree of accumulation with severe obesity. Will order repeat trough level prior to 4th total dose of new regimen which will be tomorrow 11/12 at 1300  CMP ordered daily by MD  Dose administration notes:   Doses given appropriately as scheduled    Date Dose & Interval Measured (mcg/mL) Extrapolated (mcg/mL)   ?11/11 at 0450 ?1750 mg IV q12h ?8.6 ? 7.1   ? ? ? ?   ? ? ? ?        Other Antimicrobial   (not dosed by pharmacist) Fluconazole 200 mg IV daily  Zosyn 3.375 g IV every 8 hours   Cultures 11/9 - MRSA nares - MRSA Present - FINAL  11/9 - Blood x 2 - NGTD x 2 days - Prelim  11/9 - Urine - No growth - FINAL  11/9 - Sputum - Moderate probable staph aureus - Prelim   Serum Creatinine Lab Results   Component Value Date/Time    Creatinine 0.69 (L) 11/11/2020 04:50 AM    Creatinine (POC) 1.1 02/09/2014 10:05 PM         Creatinine Clearance Estimated Creatinine Clearance: 165.5 mL/min (A) (by C-G formula based on SCr of 0.69 mg/dL (L)).      Temp Temp: 99.6 °F (37.6 °C)       WBC Lab Results   Component Value Date/Time    WBC 6.7 11/11/2020 04:50 AM        H/H Lab Results   Component Value Date/Time    HGB 6.9 (L) 11/11/2020 04:50 AM        Platelets    Lab Results   Component Value Date/Time    PLATELET 764 97/77/3995 04:50 AM          For Antifungals, Metronidazole, and Nafcillin:  ALT: 56 AST: 36 Alk Phos: 117 T Bili: 1.1    Pharmacist ARTEM Mcmahan Contact information:

## 2020-11-11 NOTE — OP NOTES
[facility] 79  OPERATIVE REPORT    Name:  Colleen Geiger  MR#:  [id number]  :  1970  ACCOUNT #:  [de-identified]  DATE OF SERVICE:  2020    CLINICAL SERVICE:  General Surgery    PREOPERATIVE DIAGNOSIS:  Acute cholecystitis. POSTOPERATIVE DIAGNOSIS:  Acute cholecystitis. PROCEDURE PERFORMED:  Da Alejandro-assisted laparoscopic cholecystectomy. SURGEON:  Wanda Mullins MD    ASSISTANT:  Juan Robin    ANESTHESIA:  General.    COMPLICATIONS:  None. SPECIMENS REMOVED:  Gallbladder. IMPLANTS:  None. ESTIMATED BLOOD LOSS:  Less than 50 mL. DRAINS/TUBES:  None. INDICATIONS:  The patient is a 79-year-old gentleman who presents to the emergency department with upper abdominal pain. Laboratory and imaging findings showed him to have acute cholecystitis. Decision was made to bring him to the operating room for cholecystectomy. PROCEDURE:  After obtaining informed consent, the patient was brought to the operating room where he was placed supine on the operating room table. After induction of general endotracheal anesthesia, the patient was prepped and draped in standard fashion. A surgical time-out was conducted. He received IV antibiotics in the emergency department and did not require additional preoperative dosing. With this complete, 0.25% Marcaine was infiltrated just below the umbilicus. An 11-blade scalpel was used to make an incision and a Veress needle was placed to allow for insufflation. The patient had had multiple previous surgeries, and after adequate insufflation, Optiview technique was used to enter the abdominal cavity with an 8-mm port. The abdomen was inspected and there did appear to be dense adhesions of omentum as well as the stomach to the anterior abdominal wall as well as the liver. There was a clear area in the right and left abdomen to place the additional 8-mm ports.   With these ports in place, approximately 30 minutes of lysis of adhesions was undertaken to take down the omentum as well as the stomach that was adherent to the anterior abdominal wall to allow for proper visualization. These adhesions were carefully taken down using a combination of blunt and sharp dissection. .   The stomach, although it was taken down from the anterior abdominal wall, was densely plastered to the left lobe of the liver. It was unable to be dissected free. The gallbladder was able to be retracted cephalad and laterally and dissection was carried out at the base of the gallbladder to identify the cystic duct and cystic artery. These structures were identified. Clips were placed proximally and distally on the cystic duct and cystic artery. The structures were then divided. Hook electrocautery was used to remove the gallbladder from the liver bed. The liver bed was inspected and did appear to be hemostatic. The gallbladder was placed in the EndoCatch bag. The gallbladder was removed through the right lateral incision site which had to be enlarged significantly to allow for removal of the gallbladder as it was significantly thickened and enlarged. With the gallbladder removed, the fascia of the right lateral port site was closed using interrupted figure-of-eight sutures. The remaining ports were removed. The abdomen was desufflated. The skin of the port sites was closed using a running 4-0 Monocryl in subcuticular fashion and the skin was sealed with Dermabond. The patient tolerated the procedure well. There were no complications. All instrument, needle and sponge counts were correct at the end of the count. The patient was transferred to the recovery room in stable condition.       Alek Samaniego MD [id number]  D:  11/10/2020 21:27  T:  11/11/2020 8:01  JOB #:  [id number]

## 2020-11-11 NOTE — OP NOTES
[facility] 79 
OPERATIVE REPORT Name:  Oscar Taylor 
MR#:  [id number] :  1970 ACCOUNT #:  [de-identified] DATE OF SERVICE:  2020 CLINICAL SERVICE:  General Surgery PREOPERATIVE DIAGNOSIS:  Postoperative bleed. POSTOPERATIVE DIAGNOSIS:  Postoperative bleed. PROCEDURE PERFORMED:  Exploratory laparotomy. SURGEON:  Red Mendoza MD 
 
ASSISTANT:  *** 
 
ANESTHESIA:  *** COMPLICATIONS:  None. SPECIMENS REMOVED:  None. IMPLANTS:  *** 
 
ESTIMATED BLOOD LOSS:  *** DRAINS/TUBES:  10 flat SRI INDICATIONS:  The patient is a 51-year-old gentleman who had undergone a robotic cholecystectomy earlier in the day. In the recovery room, he became profoundly hypotensive and labs that were drawn showed a significant anemia. Given the clinical picture, decision was made to bring him back to the operating room emergently for exploration. PROCEDURE:  After discussing with the wife at bed, proceeding with emergency confirmed. The patient was brought to the operating room, where he was placed on the operating room table. After induction of general endotracheal anesthesia, the patient was prepped and draped in standard fashion. Surgical timeout was conducted and a midline incision was made through his previous midline incision. Earlier in the day, there had been a significant amount of time spent on lysis of adhesions. The abdomen was distended carefully along the midline due to concern for any additional adhesions and knowing that the stomach was still somewhat adherent in the upper abdomen. A 10-blade scalpel was used to make that incision along the midline and Bovie electrocautery was used to dissect through subcutaneous tissue down to the level of the fascia. The fascia was sharply incised. Upon entering into the abdominal cavity, approximately 2 liters of dark blood was evacuated.   The abdomen was packed in four quadrants until the blood was completely evacuated. Careful inspection was then undertaken. The liver was first inspected and did appear to have bleeding from the liver edge just adjacent to where the stomach was still densely adherent and it appeared that there was a tear in the liver capsule in this location with significant oozing from that surface. This area was cauterized. The laparotomy pads that were packed in the abdomen were then slowly removed starting in the left upper quadrant. The left upper quadrant was inspected. The skin appeared to be intact. There was no obvious bleeding. The left lower quadrant was then inspected, and similarly, no bleeding was found. Right lower quadrant was inspected and there was no bleeding found in this area. The packs were then removed in the right upper quadrant and no additional areas of bleeding were noted. The abdomen was irrigated with several liters of warm saline with no further blood loss noted. I then ran the bowel from the ligament of Treitz to the terminal ileum and fully inspected the colon and omentum. I did not note any areas of bleeding. The only site of bleeding that was noted was the tear in the liver capsule noted upon first entry. With the abdomen completely evacuated of blood and hematoma, the SRI drain was placed adjacent to the liver capsular tear and brought out through the right lateral abdominal wall. The midline fascia was then closed using a running #1 looped PDS suture and the skin was stapled shut. The drain was secured in placed using a 2-0 nylon. The patient remained critically ill throughout the case requiring pressors. Decision was made to leave him intubated and transport him to the recovery room for ongoing critical care management. All instrument, needle and sponge counts were correct at the end of the case. Sarah Christianson MD [id number] 
D:  11/10/2020 21:22 
T:  11/11/2020 8:28 JOB #:  [id number]

## 2020-11-11 NOTE — PROGRESS NOTES
Physician Progress Note      Beverley Curry  [facility] #:                  [id number]  :                       1970  ADMIT DATE:       11/3/2020 10:51 PM  [address] [place] DATE:  RESPONDING  PROVIDER #:        Emily Katz MD          QUERY TEXT:    Dear Surgical Team,  Pt admitted with acute cholecystitis and has anemia documented. If possible, please document in progress notes and discharge summary further specificity regarding the acuity and type of anemia:    The medical record reflects the following:  Risk Factors: 52 yr. old male admitted with acute cholecystitis and had cholecystectomy. Clinical Indicators: tear in liver capsule, HGB 6.5   Treatment: Lab work, blood transfusion, evacuation of hematoma  Options provided:  -- Anemia due to acute blood loss  -- Anemia due to chronic blood loss  -- Anemia due to acute on chronic blood loss  -- Anemia due to iron deficiency  -- Anemia due to postoperative blood loss  -- Anemia due to chronic disease  -- Dilutional anemia  -- Other - I will add my own diagnosis  -- Disagree - Not applicable / Not valid  -- Disagree - Clinically unable to determine / Unknown  -- Refer to Clinical Documentation Reviewer    PROVIDER RESPONSE TEXT:    This patient has anemia due to postoperative blood loss.     Query created by: Joan Gaines on 2020 7:51 PM      Electronically signed by:  Emily Katz MD 2020 7:27 AM

## 2020-11-11 NOTE — PROGRESS NOTES
Bedside and Verbal shift change report given to Abbi Catherine (oncoming nurse) by Amie Giron (offgoing nurse). Report included the following information SBAR, Intake/Output, MAR, Cardiac Rhythm NSR and Alarm Parameters . Primary Nurse Todd Barrera RN and Amie Giron RN performed a dual skin assessment on this patient Impairment noted- see wound doc flow sheet  Jeffery score is 13    Neuro- patient anxious and agitated at shift change, follows commands, trying to sit up in bed  Cardiac- NSR, BP stable, pulses palpable  Resp- Vent fio2 70, PEEP 12, coarse and diminished lung sounds with moderate secretions in ETT  GI/- TF @ 20, hypoactive BS, GR of 35  Skin- surgical incisions    0800  Prop switched from PIV to central line, pt calming. Temp 103 this AM, cooling blanket and ice packs on pt, tylenol given with morning meds, Abx hung, Vent titrated to 60% and PEEP of 10, pt tolerated well. 1200  Vent fio2 40% PEEP 8. Assessment complete, ABX hung, Sputum cult positive, temp coming down, about 100.    1600  Vent @ 40%, PEEP 6, Assessment complete. 1 unit PRBC being given, K+ phos given, Tube feeds at 30, GR 45, CBC to be drawn at 0900    1800  PEEP increased to 8. Pt anxious when woken, begins breathing over the vent, RR increases, O2 boost given. Bedside and Verbal shift change report given to Jing (oncoming nurse) by Abbi Catherine (offgoing nurse). Report included the following information SBAR, Kardex, Intake/Output, MAR, Cardiac Rhythm NSR and Alarm Parameters .

## 2020-11-12 ENCOUNTER — APPOINTMENT (OUTPATIENT)
Dept: GENERAL RADIOLOGY | Age: 50
DRG: 417 | End: 2020-11-12
Attending: INTERNAL MEDICINE
Payer: COMMERCIAL

## 2020-11-12 LAB
ABO + RH BLD: NORMAL
ALBUMIN SERPL-MCNC: 1.8 G/DL (ref 3.5–5)
ALBUMIN/GLOB SERPL: 0.4 {RATIO} (ref 1.1–2.2)
ALP SERPL-CCNC: 125 U/L (ref 45–117)
ALT SERPL-CCNC: 49 U/L (ref 12–78)
ANION GAP SERPL CALC-SCNC: 2 MMOL/L (ref 5–15)
ARTERIAL PATENCY WRIST A: YES
AST SERPL-CCNC: 33 U/L (ref 15–37)
BACTERIA SPEC CULT: ABNORMAL
BACTERIA SPEC CULT: ABNORMAL
BASE EXCESS BLDA CALC-SCNC: 5.1 MMOL/L
BASOPHILS # BLD: 0 K/UL (ref 0–0.1)
BASOPHILS NFR BLD: 0 % (ref 0–1)
BDY SITE: ABNORMAL
BILIRUB SERPL-MCNC: 1.1 MG/DL (ref 0.2–1)
BLD PROD TYP BPU: NORMAL
BLOOD GROUP ANTIBODIES SERPL: NORMAL
BPU ID: NORMAL
BUN SERPL-MCNC: 10 MG/DL (ref 6–20)
BUN/CREAT SERPL: 13 (ref 12–20)
CALCIUM SERPL-MCNC: 7.9 MG/DL (ref 8.5–10.1)
CHLORIDE SERPL-SCNC: 107 MMOL/L (ref 97–108)
CO2 SERPL-SCNC: 33 MMOL/L (ref 21–32)
CREAT SERPL-MCNC: 0.78 MG/DL (ref 0.7–1.3)
CROSSMATCH RESULT,%XM: NORMAL
DATE LAST DOSE: ABNORMAL
DIFFERENTIAL METHOD BLD: ABNORMAL
EOSINOPHIL # BLD: 0 K/UL (ref 0–0.4)
EOSINOPHIL NFR BLD: 0 % (ref 0–7)
EPAP/CPAP/PEEP, PAPEEP: 8
ERYTHROCYTE [DISTWIDTH] IN BLOOD BY AUTOMATED COUNT: 14.5 % (ref 11.5–14.5)
FIO2 ON VENT: 70 %
GAS FLOW.O2 SETTING OXYMISER: 10 L/MIN
GLOBULIN SER CALC-MCNC: 4.3 G/DL (ref 2–4)
GLUCOSE SERPL-MCNC: 127 MG/DL (ref 65–100)
GRAM STN SPEC: ABNORMAL
HCO3 BLDA-SCNC: 29 MMOL/L (ref 22–26)
HCT VFR BLD AUTO: 25 % (ref 36.6–50.3)
HGB BLD-MCNC: 8 G/DL (ref 12.1–17)
IMM GRANULOCYTES # BLD AUTO: 0 K/UL
IMM GRANULOCYTES NFR BLD AUTO: 0 %
LYMPHOCYTES # BLD: 1 K/UL (ref 0.8–3.5)
LYMPHOCYTES NFR BLD: 16 % (ref 12–49)
MAGNESIUM SERPL-MCNC: 2.2 MG/DL (ref 1.6–2.4)
MCH RBC QN AUTO: 30 PG (ref 26–34)
MCHC RBC AUTO-ENTMCNC: 32 G/DL (ref 30–36.5)
MCV RBC AUTO: 93.6 FL (ref 80–99)
METAMYELOCYTES NFR BLD MANUAL: 2 %
MONOCYTES # BLD: 0.2 K/UL (ref 0–1)
MONOCYTES NFR BLD: 4 % (ref 5–13)
MYELOCYTES NFR BLD MANUAL: 2 %
NEUTS BAND NFR BLD MANUAL: 14 % (ref 0–6)
NEUTS SEG # BLD: 4.6 K/UL (ref 1.8–8)
NEUTS SEG NFR BLD: 62 % (ref 32–75)
NRBC # BLD: 0.03 K/UL (ref 0–0.01)
NRBC BLD-RTO: 0.5 PER 100 WBC
PCO2 BLDA: 40 MMHG (ref 35–45)
PH BLDA: 7.48 [PH] (ref 7.35–7.45)
PHOSPHATE SERPL-MCNC: 2.3 MG/DL (ref 2.6–4.7)
PLATELET # BLD AUTO: 242 K/UL (ref 150–400)
PMV BLD AUTO: 9.8 FL (ref 8.9–12.9)
PO2 BLDA: 97 MMHG (ref 80–100)
POTASSIUM SERPL-SCNC: 3.6 MMOL/L (ref 3.5–5.1)
PROT SERPL-MCNC: 6.1 G/DL (ref 6.4–8.2)
RBC # BLD AUTO: 2.67 M/UL (ref 4.1–5.7)
RBC MORPH BLD: ABNORMAL
REPORTED DOSE,DOSE: ABNORMAL UNITS
REPORTED DOSE/TIME,TMG: ABNORMAL
SAO2 % BLD: 98 % (ref 92–97)
SAO2% DEVICE SAO2% SENSOR NAME: ABNORMAL
SERVICE CMNT-IMP: ABNORMAL
SODIUM SERPL-SCNC: 142 MMOL/L (ref 136–145)
SPECIMEN EXP DATE BLD: NORMAL
SPECIMEN SITE: ABNORMAL
STATUS OF UNIT,%ST: NORMAL
UNIT DIVISION, %UDIV: 0
VANCOMYCIN TROUGH SERPL-MCNC: 18.3 UG/ML (ref 5–10)
VENTILATION MODE VENT: ABNORMAL
VT SETTING VENT: 550 ML
WBC # BLD AUTO: 6 K/UL (ref 4.1–11.1)

## 2020-11-12 PROCEDURE — 85025 COMPLETE CBC W/AUTO DIFF WBC: CPT

## 2020-11-12 PROCEDURE — 74011000250 HC RX REV CODE- 250: Performed by: INTERNAL MEDICINE

## 2020-11-12 PROCEDURE — 80202 ASSAY OF VANCOMYCIN: CPT

## 2020-11-12 PROCEDURE — C9113 INJ PANTOPRAZOLE SODIUM, VIA: HCPCS | Performed by: SURGERY

## 2020-11-12 PROCEDURE — 83735 ASSAY OF MAGNESIUM: CPT

## 2020-11-12 PROCEDURE — 2709999900 HC NON-CHARGEABLE SUPPLY

## 2020-11-12 PROCEDURE — 74011250636 HC RX REV CODE- 250/636: Performed by: SURGERY

## 2020-11-12 PROCEDURE — 65610000006 HC RM INTENSIVE CARE

## 2020-11-12 PROCEDURE — 94668 MNPJ CHEST WALL SBSQ: CPT

## 2020-11-12 PROCEDURE — 74018 RADEX ABDOMEN 1 VIEW: CPT

## 2020-11-12 PROCEDURE — 74011250636 HC RX REV CODE- 250/636: Performed by: INTERNAL MEDICINE

## 2020-11-12 PROCEDURE — 36415 COLL VENOUS BLD VENIPUNCTURE: CPT

## 2020-11-12 PROCEDURE — 94003 VENT MGMT INPAT SUBQ DAY: CPT

## 2020-11-12 PROCEDURE — 94640 AIRWAY INHALATION TREATMENT: CPT

## 2020-11-12 PROCEDURE — 84100 ASSAY OF PHOSPHORUS: CPT

## 2020-11-12 PROCEDURE — 94667 MNPJ CHEST WALL 1ST: CPT

## 2020-11-12 PROCEDURE — 74011250637 HC RX REV CODE- 250/637: Performed by: INTERNAL MEDICINE

## 2020-11-12 PROCEDURE — 80053 COMPREHEN METABOLIC PANEL: CPT

## 2020-11-12 PROCEDURE — 74011000258 HC RX REV CODE- 258: Performed by: SURGERY

## 2020-11-12 PROCEDURE — 77030018846 HC SOL IRR STRL H20 ICUM -A

## 2020-11-12 PROCEDURE — 71045 X-RAY EXAM CHEST 1 VIEW: CPT

## 2020-11-12 PROCEDURE — 82803 BLOOD GASES ANY COMBINATION: CPT

## 2020-11-12 RX ORDER — QUETIAPINE FUMARATE 25 MG/1
50 TABLET, FILM COATED ORAL 2 TIMES DAILY
Status: DISCONTINUED | OUTPATIENT
Start: 2020-11-12 | End: 2020-11-16

## 2020-11-12 RX ORDER — BUPROPION HYDROCHLORIDE 150 MG/1
150 TABLET, EXTENDED RELEASE ORAL 2 TIMES DAILY
Status: ON HOLD | COMMUNITY
End: 2020-11-16

## 2020-11-12 RX ORDER — SERTRALINE HYDROCHLORIDE 50 MG/1
50 TABLET, FILM COATED ORAL DAILY
Status: ON HOLD | COMMUNITY
End: 2020-11-16

## 2020-11-12 RX ADMIN — DEXMEDETOMIDINE HYDROCHLORIDE 1.5 MCG/KG/HR: 400 INJECTION INTRAVENOUS at 09:44

## 2020-11-12 RX ADMIN — SODIUM CHLORIDE 40 MG: 9 INJECTION INTRAMUSCULAR; INTRAVENOUS; SUBCUTANEOUS at 09:08

## 2020-11-12 RX ADMIN — ACETYLCYSTEINE 400 MG: 100 SOLUTION ORAL; RESPIRATORY (INHALATION) at 04:17

## 2020-11-12 RX ADMIN — PROPOFOL 50 MCG/KG/MIN: 10 INJECTION, EMULSION INTRAVENOUS at 06:34

## 2020-11-12 RX ADMIN — DEXMEDETOMIDINE HYDROCHLORIDE 1.5 MCG/KG/HR: 400 INJECTION INTRAVENOUS at 23:32

## 2020-11-12 RX ADMIN — PROPOFOL 40 MCG/KG/MIN: 10 INJECTION, EMULSION INTRAVENOUS at 12:56

## 2020-11-12 RX ADMIN — VANCOMYCIN HYDROCHLORIDE 1750 MG: 10 INJECTION, POWDER, LYOPHILIZED, FOR SOLUTION INTRAVENOUS at 13:14

## 2020-11-12 RX ADMIN — BUMETANIDE 2 MG: 0.25 INJECTION INTRAMUSCULAR; INTRAVENOUS at 17:07

## 2020-11-12 RX ADMIN — Medication 300 MCG/HR: at 02:48

## 2020-11-12 RX ADMIN — ACETYLCYSTEINE 400 MG: 100 SOLUTION ORAL; RESPIRATORY (INHALATION) at 15:10

## 2020-11-12 RX ADMIN — IPRATROPIUM BROMIDE AND ALBUTEROL SULFATE 3 ML: .5; 3 SOLUTION RESPIRATORY (INHALATION) at 11:52

## 2020-11-12 RX ADMIN — SODIUM CHLORIDE: 900 INJECTION, SOLUTION INTRAVENOUS at 14:10

## 2020-11-12 RX ADMIN — GUAIFENESIN 400 MG: 200 SOLUTION ORAL at 19:43

## 2020-11-12 RX ADMIN — GUAIFENESIN 400 MG: 200 SOLUTION ORAL at 09:01

## 2020-11-12 RX ADMIN — PIPERACILLIN AND TAZOBACTAM 3.38 G: 3; .375 INJECTION, POWDER, LYOPHILIZED, FOR SOLUTION INTRAVENOUS at 16:48

## 2020-11-12 RX ADMIN — Medication 300 MCG/HR: at 23:30

## 2020-11-12 RX ADMIN — DOCUSATE SODIUM 100 MG: 50 LIQUID ORAL at 09:02

## 2020-11-12 RX ADMIN — ACETAMINOPHEN ORAL SOLUTION 650 MG: 650 SOLUTION ORAL at 19:43

## 2020-11-12 RX ADMIN — GUAIFENESIN 400 MG: 200 SOLUTION ORAL at 16:23

## 2020-11-12 RX ADMIN — PROPOFOL 50 MCG/KG/MIN: 10 INJECTION, EMULSION INTRAVENOUS at 02:54

## 2020-11-12 RX ADMIN — DEXMEDETOMIDINE HYDROCHLORIDE 1.5 MCG/KG/HR: 400 INJECTION INTRAVENOUS at 18:38

## 2020-11-12 RX ADMIN — DEXMEDETOMIDINE HYDROCHLORIDE 1.5 MCG/KG/HR: 400 INJECTION INTRAVENOUS at 16:23

## 2020-11-12 RX ADMIN — PROPOFOL 45 MCG/KG/MIN: 10 INJECTION, EMULSION INTRAVENOUS at 19:33

## 2020-11-12 RX ADMIN — ACETYLCYSTEINE 400 MG: 100 SOLUTION ORAL; RESPIRATORY (INHALATION) at 11:52

## 2020-11-12 RX ADMIN — PROPOFOL 45 MCG/KG/MIN: 10 INJECTION, EMULSION INTRAVENOUS at 09:00

## 2020-11-12 RX ADMIN — GUAIFENESIN 400 MG: 200 SOLUTION ORAL at 04:15

## 2020-11-12 RX ADMIN — FLUCONAZOLE IN SODIUM CHLORIDE 400 MG: 2 INJECTION, SOLUTION INTRAVENOUS at 09:03

## 2020-11-12 RX ADMIN — DEXMEDETOMIDINE HYDROCHLORIDE 1.5 MCG/KG/HR: 400 INJECTION INTRAVENOUS at 14:12

## 2020-11-12 RX ADMIN — DOCUSATE SODIUM 100 MG: 50 LIQUID ORAL at 17:07

## 2020-11-12 RX ADMIN — IPRATROPIUM BROMIDE AND ALBUTEROL SULFATE 3 ML: .5; 3 SOLUTION RESPIRATORY (INHALATION) at 21:15

## 2020-11-12 RX ADMIN — PROPOFOL 45 MCG/KG/MIN: 10 INJECTION, EMULSION INTRAVENOUS at 23:03

## 2020-11-12 RX ADMIN — ENOXAPARIN SODIUM 40 MG: 40 INJECTION SUBCUTANEOUS at 21:06

## 2020-11-12 RX ADMIN — DEXMEDETOMIDINE HYDROCHLORIDE 1.5 MCG/KG/HR: 400 INJECTION INTRAVENOUS at 21:05

## 2020-11-12 RX ADMIN — BUMETANIDE 2 MG: 0.25 INJECTION INTRAMUSCULAR; INTRAVENOUS at 09:02

## 2020-11-12 RX ADMIN — IPRATROPIUM BROMIDE AND ALBUTEROL SULFATE 3 ML: .5; 3 SOLUTION RESPIRATORY (INHALATION) at 04:17

## 2020-11-12 RX ADMIN — DEXMEDETOMIDINE HYDROCHLORIDE 1.4 MCG/KG/HR: 400 INJECTION INTRAVENOUS at 11:53

## 2020-11-12 RX ADMIN — Medication 300 MCG/HR: at 18:12

## 2020-11-12 RX ADMIN — ACETYLCYSTEINE 400 MG: 100 SOLUTION ORAL; RESPIRATORY (INHALATION) at 08:11

## 2020-11-12 RX ADMIN — PROPOFOL 45 MCG/KG/MIN: 10 INJECTION, EMULSION INTRAVENOUS at 13:20

## 2020-11-12 RX ADMIN — QUETIAPINE FUMARATE 50 MG: 25 TABLET ORAL at 21:07

## 2020-11-12 RX ADMIN — VANCOMYCIN HYDROCHLORIDE 1750 MG: 10 INJECTION, POWDER, LYOPHILIZED, FOR SOLUTION INTRAVENOUS at 21:07

## 2020-11-12 RX ADMIN — PROPOFOL 45 MCG/KG/MIN: 10 INJECTION, EMULSION INTRAVENOUS at 16:24

## 2020-11-12 RX ADMIN — IPRATROPIUM BROMIDE AND ALBUTEROL SULFATE 3 ML: .5; 3 SOLUTION RESPIRATORY (INHALATION) at 15:10

## 2020-11-12 RX ADMIN — Medication 300 MCG/HR: at 12:56

## 2020-11-12 RX ADMIN — Medication 300 MCG/HR: at 07:58

## 2020-11-12 RX ADMIN — DEXMEDETOMIDINE HYDROCHLORIDE 1.5 MCG/KG/HR: 400 INJECTION INTRAVENOUS at 04:52

## 2020-11-12 RX ADMIN — ACETAMINOPHEN ORAL SOLUTION 650 MG: 650 SOLUTION ORAL at 14:17

## 2020-11-12 RX ADMIN — ACETYLCYSTEINE 400 MG: 100 SOLUTION ORAL; RESPIRATORY (INHALATION) at 21:15

## 2020-11-12 RX ADMIN — ACETAMINOPHEN ORAL SOLUTION 650 MG: 650 SOLUTION ORAL at 04:14

## 2020-11-12 RX ADMIN — QUETIAPINE FUMARATE 25 MG: 25 TABLET ORAL at 09:02

## 2020-11-12 RX ADMIN — DEXMEDETOMIDINE HYDROCHLORIDE 1.5 MCG/KG/HR: 400 INJECTION INTRAVENOUS at 07:30

## 2020-11-12 RX ADMIN — PIPERACILLIN AND TAZOBACTAM 3.38 G: 3; .375 INJECTION, POWDER, LYOPHILIZED, FOR SOLUTION INTRAVENOUS at 08:16

## 2020-11-12 RX ADMIN — GUAIFENESIN 400 MG: 200 SOLUTION ORAL at 13:00

## 2020-11-12 RX ADMIN — VANCOMYCIN HYDROCHLORIDE 1750 MG: 10 INJECTION, POWDER, LYOPHILIZED, FOR SOLUTION INTRAVENOUS at 04:58

## 2020-11-12 RX ADMIN — IPRATROPIUM BROMIDE AND ALBUTEROL SULFATE 3 ML: .5; 3 SOLUTION RESPIRATORY (INHALATION) at 08:10

## 2020-11-12 RX ADMIN — DEXMEDETOMIDINE HYDROCHLORIDE 1.5 MCG/KG/HR: 400 INJECTION INTRAVENOUS at 02:14

## 2020-11-12 NOTE — PROGRESS NOTES
[facility] Dosing Services: Antimicrobial Stewardship Daily Doc    Consult for antibiotic dosing of vancomycin by Dr. Pedro Joe  Indication: Sepsis of unknown etiology, febrile and remains intubated s/p ex lap  Day of Therapy: 4    Ht Readings from Last 1 Encounters:   11/08/20 180.3 cm (71\")        Wt Readings from Last 1 Encounters:   11/08/20 118.8 kg (262 lb)     Vancomycin therapy:  Current maintenance dose: 1750 mg IV every 8 hours  Dose calculated to approximate a therapeutic trough of 15-20 mcg/mL. Assessment/Plan:  SCr remains stable and is likely at baseline, no leukocytosis WBC = 6, procalcitonin on 11/10 = 1.09, multiple fever spikes in past 48 hours, MRSA growing on ET aspirate/sputum, urine output excellent (net positive 20 L though)  Vancomycin trough level is within target range (AUC/CITLALI exceeds 400 based on individual kinetics and CITLALI of MRSA isolate in ET aspirate/sputum). Continue current regimen. Plan to obtain repeat trough level in 2-3 days to ensure level remains therapeutic. Risk of accumulation with BMI of 36.54  CMP ordered daily by MD  Dose administration notes:   Doses given appropriately as scheduled    Date Dose & Interval Measured (mcg/mL) Extrapolated (mcg/mL)   ?11/11 at 0450 ?1750 mg IV q12h ?8.6 ? 7.1   ?11/12 at 1303 ?1750 mg IV q8h ?18.3 ?18.3   ? ? ? ? Other Antimicrobial   (not dosed by pharmacist) Fluconazole 400 mg IV daily  Zosyn 3.375 g IV every 8 hours   Cultures 11/9 - MRSA nares - MRSA Present - FINAL  11/9 - Blood x 2 - NGTD x 2 days - Prelim  11/9 - Urine - No growth - FINAL  11/9 - Sputum - Heavy MRSA - vancomycin CITLALI = 0.5 - FINAL   Serum Creatinine Lab Results   Component Value Date/Time    Creatinine 0.78 11/12/2020 03:44 AM    Creatinine (POC) 1.1 02/09/2014 10:05 PM         Creatinine Clearance Estimated Creatinine Clearance: 148.6 mL/min (based on SCr of 0.78 mg/dL).      Temp Temp: 99.8 °F (37.7 °C)       WBC Lab Results   Component Value Date/Time WBC 6.0 11/12/2020 03:44 AM        H/H Lab Results   Component Value Date/Time    HGB 8.0 (L) 11/12/2020 03:44 AM        Platelets    Lab Results   Component Value Date/Time    PLATELET 804 77/98/7546 03:44 AM          For Antifungals, Metronidazole, and Nafcillin:  ALT: 49 AST: 33 Alk Phos: 125 T Bili: 1.1    Pharmacist ARTEM Basurto Contact information:

## 2020-11-12 NOTE — PROGRESS NOTES
1920 Received report. 2006 Temp 101 , tylenol 650 mg ngt given. cooling blanket on.  2020 Patient restless,agitated , propofol drip increased to 50 mcg. ETT suctioned by RT.  2048 Patient restless, dilaudid 1 mg iv given. 2100 Patient vomiting tube feeds, tube feeds held. 2359  Ktfv191.6 , tylenol 650 mg ngt given. 0414 Temp 100.4 , tylenol 650 mg ngt  given. 0715 Bedside shift change report given to [facility] .  Report included the following information SBAR, Kardex, ED Summary, Intake/Output, MAR, Accordion, Recent Results, Med Rec Status and Cardiac Rhythm SR.

## 2020-11-12 NOTE — PROGRESS NOTES
[facility]     Name: Rahul Bernal MRN: [id number]   : 1970 Hospital: [facility]   Date: 2020        Impression Plan   1. Acute hypoxemic respiratory failure, intubated , tube exchanged   2. Hemorrhagic shock, off pressors   3. Acute cholecystitis s/p robotic assisted lap bailee w/ TAMIKO   4. Post-op bleeding/tear in liver capsule s/p ex-lap and evacuation of hematoma   5. Acute blood loss anemia  6. MRSA pneumonia  7. transaminitis  8. H/o seizures  9. REBECCA                 · Continue vent support  · Wean FiO2/PEEP to keep sats > 88%  · Continue vanc, zosyn and flucon  · Follow cultures  · continue scheduled duonebs, mucinex, mucomyst and chest PT  · Trend Hgb, transfuse to keep > 7  · Continue bumex  · Replete lytes  · Check TG and PCT in am  · EKG in am to monitor QTc  · KUB  · Post-op mgmt as per surgery    DVT ppx: lovenox  GI ppx: protonix  NPO. TF held due to vomiting    Pt is critically ill and at high risk of decompensation  CCT: 34 minutes         Radiology  ( personally reviewed) CXR reviewed: no infiltrates, ETT   ABG No results for input(s): PHI, PO2I, PCO2I in the last 72 hours. Subjective       49 yo with cholecystits s/p lab bailee complicated by laceration of the liver capsule and hemorrhagic shock. Hematoma evacuated- 2L blood. Currently on daniel 130 and Levophed 8.  Pt intubated and sedated- unable to provide any further hx.      - intubated     - bronch, tube exchange       - echo: EF 55-60%, RV normal    *sputum culture () - MRSA  *all other cultures NGTD    Interval history:  Tm 103  Secretions/suctioning requirements improving  Vomited overnight, TF now off  desats when sedation weaned    Past Medical History:   Diagnosis Date    GERD (gastroesophageal reflux disease)     Panic attack     Psychiatric disorder     anxiety    Seizures ([facility] [address]     5 yo   was told it was a stress seizure, never had another one  Sleep apnea     uses CPAP      Past Surgical History:   Procedure Laterality Date    ABDOMEN SURGERY PROC UNLISTED  1977    hernia(abdomen)- open    HX GI  2008    esophagus repair narrow- balloon dilation and scar tissue removal    HX HERNIA REPAIR      inguinal hernia    HX OTHER SURGICAL  11years of age    tonsils    HX OTHER SURGICAL  2009    hernia (hiatal and abdomen)-open    HX OTHER SURGICAL  23years of age    wisdom teeth extraction under anesthesia    HX OTHER SURGICAL  8 years     distended testicle     HX OTHER SURGICAL  1/16/15    Laparoscopic ventral hernia repair with mesh      Prior to Admission medications    Medication Sig Start Date End Date Taking? Authorizing Provider   citalopram (CELEXA) 40 mg tablet Take 40 mg by mouth daily. Yes Provider, Historical   docusate sodium (COLACE) 100 mg capsule Take 1 Cap by mouth two (2) times a day for 10 days. 11/4/20 11/14/20 Yes Sarah Sharpe MD   lisinopril (PRINIVIL, ZESTRIL) 40 mg tablet Take 40 mg by mouth every evening. Yes Provider, Historical   simvastatin (ZOCOR) 40 mg tablet Take 40 mg by mouth nightly. Yes Provider, Historical   omeprazole (PRILOSEC) 40 mg capsule Take 40 mg by mouth daily. Yes Provider, Historical     Current Facility-Administered Medications   Medication Dose Route Frequency    vancomycin (VANCOCIN) 1,750 mg in 0.9% sodium chloride 500 mL IVPB  1,750 mg IntraVENous Q8H    Vancomycin - Please draw trough level prior to dose due on 11/12 at 1300. Thanks!    Other ONCE    bumetanide (BUMEX) injection 2 mg  2 mg IntraVENous BID    fluconazole (DIFLUCAN) 400mg/200 mL IVPB (premix)  400 mg IntraVENous DAILY    QUEtiapine (SEROquel) tablet 25 mg  25 mg Oral BID    propofol (DIPRIVAN) 10 mg/mL infusion  0-50 mcg/kg/min IntraVENous TITRATE    albuterol-ipratropium (DUO-NEB) 2.5 MG-0.5 MG/3 ML  3 mL Nebulization Q4H RT    acetylcysteine (MUCOMYST) 100 mg/mL (10 %) nebulizer solution 400 mg  4 mL Nebulization Q4H RT    docusate (COLACE) 50 mg/5 mL oral liquid 100 mg  100 mg Oral BID    guaiFENesin (ROBITUSSIN) 100 mg/5 mL oral liquid 400 mg  400 mg Oral Q4H    fentaNYL (PF) 1,500 mcg/30 mL (50 mcg/mL) infusion  0-300 mcg/hr IntraVENous TITRATE    dexmedeTOMidine in 0.9 % NaCl (PRECEDEX) 400 mcg/100 mL (4 mcg/mL) infusion soln  0.1-1.5 mcg/kg/hr IntraVENous TITRATE    piperacillin-tazobactam (ZOSYN) 3.375 g in 0.9% sodium chloride (MBP/ADV) 100 mL  3.375 g IntraVENous Q8H    enoxaparin (LOVENOX) injection 40 mg  40 mg SubCUTAneous Q24H    pantoprazole (PROTONIX) 40 mg in 0.9% sodium chloride 10 mL injection  40 mg IntraVENous DAILY     Allergies   Allergen Reactions    Pcn [Penicillins] Other (comments)     Reaction was at age 6, itching and eyes swollen; denies any sob or tongue swelling. Has been on Zosyn in the hospital with no problems    Codeine Nausea and Vomiting      Social History     Tobacco Use    Smoking status: Never Smoker    Smokeless tobacco: Never Used   Substance Use Topics    Alcohol use: Yes     Alcohol/week: 1.7 standard drinks     Types: 2 Cans of beer per week     Comment: 1 or 2 beer every two weeks      Family History   Problem Relation Age of Onset    Diabetes Mother     Hypertension Mother     Stroke Mother         copd    Cancer Father         skin    Heart Disease Father     Diabetes Father     Heart Attack Father           Laboratory: I have personally reviewed the critical care flowsheet and labs.      Recent Labs     11/12/20  0344 11/11/20  2210 11/11/20  0450   WBC 6.0 6.4 6.7   HGB 8.0* 8.0* 6.9*   HCT 25.0* 25.0* 21.9*    204 196     Recent Labs     11/12/20  0344 11/11/20  0450 11/10/20  0413    140 143   K 3.6 3.7 3.8    107 109*   CO2 33* 30 29   * 120* 136*   BUN 10 11 12   CREA 0.78 0.69* 0.78   CA 7.9* 7.6* 7.5*   MG 2.2 2.3 2.4   PHOS 2.3* 2.1* 2.4*   ALB 1.8* 1.7* 1.8*   ALT 49 56 69       Objective:     Mode Rate Tidal Volume Pressure FiO2 PEEP   Assist control   500 ml(per md glasgow)  5 cm H2O 50 % 8 cm H20     Vital Signs:    Ventilator Pressures  PC Set: 16  Pressure Support (cm H2O): 5 cm H2O  PIP Observed (cm H2O): 20 cm H2O  Plateau Pressure (cm H2O): 24 cm H2O  MAP (cm H2O): 11  PEEP/VENT (cm H2O): 8 cm B68HIDA(24)Ventilator Pressures  PC Set: 16  Pressure Support (cm H2O): 5 cm H2O  PIP Observed (cm H2O): 20 cm H2O  Plateau Pressure (cm H2O): 24 cm H2O  MAP (cm H2O): 11  PEEP/VENT (cm H2O): 8 cm H20  Safety & Alarms  Circuit Temperature: (hme)  Backup Mode Checked/Apnea: Yes  Pressure Max: 50 cm H2O  Pressure Min: 13 cm H2O  Ve Min: 2  Ve Max: 20  Vt Min: 200 ml  Vt Max: 1000 ml  RR Min: 10  RR Max: 45  Intake/Output:   Last shift:      Ventilator Volumes  Vt Set (ml): 500 ml(per md glasgow)  Vt Exhaled (Machine Breath) (ml): 600 ml  Vt Spont (ml): 786 ml  Ve Observed (l/min): 11 l/min  Last 3 shifts: 11/12 0701 - 11/12 1900  In: 141.6 [I.V.:141.6]  Out: 4743 [Urine:1525; Drains:20]RRIOLAST3    Intake/Output Summary (Last 24 hours) at 11/12/2020 1251  Last data filed at 11/12/2020 1140  Gross per 24 hour   Intake 3903.2 ml   Output 8055 ml   Net -4151.8 ml     EXAM:   GENERAL: intubated, sedated, HEENT:  PERRL, EOMI, no alar flaring or epistaxis, oral mucosa moist without cyanosis, NECK:  no jugular vein distention, no retractions, no thyromegaly or masses, LUNGS: CTA, no w/r/r , HEART:  Regular rate and rhythm with no MGR; no edema is present, ABDOMEN:  soft s, bowel sounds absent, EXTREMITIES:  warm with no cyanosis, SKIN:  no jaundice or ecchymosis and NEUROLOGIC: RASS -3    Lianet Sheikh MD  [facility]

## 2020-11-12 NOTE — PROGRESS NOTES
6943 received report from off going RN, Katherine Mabry. Plan of care reviewed. Dual skin completed, see flowsheet. IV medications verified. Shift Summary:   Neuro: pt awake, responds to voice. Follows simple commands. Periods of increase agitation. Pupils 3B/R. +cough/gag. Currently on propofol, precedex and fentanyl. Bilateral wrist restraints intact. Tmax 102.5, tylenol and placed on cooling blanket. CV: NSR. Blood pressure WNL. +pulses. Edema noted. Pt receiving Bumex pushes. Resp: ETROX [email or web address]. Lungs are coarse. Small-scant white-tan thick secretions. CXR done this shift. GI/: TF on hold due to pt vomiting on previous shift. Residuals checked about 30 cc black drainage noted. NO BM noted this shift. Cook draining without difficulty. KUB done. Wife at bedside this shift and updated on care. turns and repositioned q2h/prn.   1900 Report given to oncoming RN.

## 2020-11-12 NOTE — PROGRESS NOTES
SURGERY PROGRESS NOTE      Admit Date: 11/3/2020    POD 8 Days Post-Op    Procedure: Procedure(s):  LAPAROTOMY EXPLORATORY, evacuation of hematoma. Subjective:   Remain intubated  Good diuresis  Remains febrile       Objective:     Visit Vitals  BP (!) 175/91   Pulse (!) 110   Temp 99.8 °F (37.7 °C)   Resp 29   Ht 5' 11\" (1.803 m)   Wt 118.8 kg (262 lb)   SpO2 100%   BMI 36.54 kg/m²        Temp (24hrs), Av.4 °F (37.4 °C), Min:97.2 °F (36.2 °C), Max:102 °F (38.9 °C)      Physical Exam:     Abdomen:  Soft. Non-tender, non-distended.   Incision C/D/I. SRI serous        Lab Results   Component Value Date/Time    WBC 6.0 2020 03:44 AM    HGB 8.0 (L) 2020 03:44 AM    Hemoglobin (POC) 6.8 (L) 2020 03:42 PM    Hemoglobin (POC) 13.3 2014 10:05 PM    HCT 25.0 (L) 2020 03:44 AM    Hematocrit (POC) 39 2014 10:05 PM    PLATELET 855 7658 03:44 AM    MCV 93.6 2020 03:44 AM     Lab Results   Component Value Date/Time    GFR est non-AA >60 2020 03:44 AM    GFRNA, POC >60 2014 10:05 PM    GFR est AA >60 2020 03:44 AM    GFRAA, POC >60 2014 10:05 PM    Creatinine 0.78 2020 03:44 AM    Creatinine (POC) 1.1 2014 10:05 PM    BUN 10 2020 03:44 AM    BUN (POC) 15 2014 10:05 PM    Sodium 142 2020 03:44 AM    Sodium (POC) 141 2014 10:05 PM    Potassium 3.6 2020 03:44 AM    Potassium (POC) 3.5 2014 10:05 PM    Chloride 107 2020 03:44 AM    Chloride (POC) 106 2014 10:05 PM    CO2 33 (H) 2020 03:44 AM    Magnesium 2.2 2020 03:44 AM    Phosphorus 2.3 (L) 2020 03:44 AM       Assessment:     Active Problems:    Biliary colic ()      Cholecystitis (2020)        Plan/Recommendations/Medical Decision Making:   continue to wean vent  continue diuresis  If remains febrile consider CT abd to eval source

## 2020-11-12 NOTE — PROGRESS NOTES
Transition of care note:    RUR 18%    Pt is POD # 8 s/p ex lap,hematoma evacuation. In IDR,it was discussed that pt .'s tube feeds were stopped vomiting and high residuals. Pt is MRSA +.-on modified contact precautions. I am continuing to follow pt for discharge needs.     Rebekah Carlos

## 2020-11-12 NOTE — PROGRESS NOTES
Comprehensive Nutrition Assessment    Type and Reason for Visit: Reassess    Nutrition Recommendations/Plan:     1. Resume EN via OGT while pt remains intubated. TF REC's:  Vital 1.2 at 10ml/hr, increase by 10 ml q8h as pt tolerates to goal rate of 45 ml/hr with 1 packet of ProSourceTF added TID and 100 ml h20 flush q4h. (Goal TF will provide 2355 kcals including 939 kcals from Propofol, 114 gm protein, 1865 ml fluid - this will meet 83% pt's kcal needs, 97% protein needs)     2. Once extubated, Recommend advancing diet order to GI lite as medically appropriate. Nutrition Assessment:      11/12: F/u. Remains on vent. Propofol resumed, at 50mcg/kg/min (providing 939 kcals/d). NGT became coiled, was replaced with OGT. TF was started and running as Vital 1.2 at 40ml/hr. Residuals documented as 20-45ml over last 24h. Pt vomited TF last night so TF placed on hold. Concerning that no BM since admission. 11/9: F/u. Pt remains on vent. Off pressors and Propofol. NGT has remained to suction. GI placed consult today to start trickle TF.  NPO x 6 days. Labs: K+ 3.2. Meds: Precedex. LR running at 125ml/hr. 11/6: 47 yo male admitted for cholecystitis. PMhx: GERD, hernia repairs, esophageal dilation. Class II obese per BMI of 36.6. Weight hx indicates weight gain PTA. S/P cholecystectomy and lysis of adhesions on 11/4. Intubated for procedure and remains on vent today. Being sedated with Propofol at 50mcg/kg/min (providing 863 kcals/d). Requiring pressor support: levophed at 2mcg/min and William at 165. NPO x 3 days. NGT to suction with 600 ml output today. MD notes plan to wean off pressors and extubated. LR running at 125 ml/hr. Labs: . Meds: propofol, Lvophed, William. Malnutrition Assessment:  Unable to assess at this time. Estimated Daily Nutrient Needs:  Energy (kcal): 2818(PAL 2349 x 1.2);  Weight Used for Energy Requirements: Current  Protein (g): 117-140(1.5-1.8gm/Kg IBW/d while intubated); Weight Used for Protein Requirements: Ideal  Fluid (ml/day): 2818; Method Used for Fluid Requirements: 1 ml/kcal      Nutrition Related Findings:  LBM 11/3; Edema: non-pitting generalized, 1+ to all extremities      Wounds:    Surgical incision(ABD)       Current Nutrition Therapies:  DIET TUBE FEEDING    Anthropometric Measures:  · Height:  5' 11\" (180.3 cm)  · Current Body Wt:  118.9 kg (262 lb 2 oz)   · Admission Body Wt:       · Usual Body Wt:        · Ideal Body Wt:   :      · Adjusted Body Weight:   ; Weight Adjustment for: No adjustment   · Adjusted BMI:       · BMI Category:  Obese class 2 (BMI 35.0-39. 9)       Nutrition Diagnosis:   · Inadequate protein-energy intake related to inadequate protein-energy intake as evidenced by NPO or clear liquid status due to medical condition, intubation    11/9: Nutrition Dx continues - NPO.  11/12: Nutrition Dx continues - TF on hold. Nutrition Interventions:   Food and/or Nutrient Delivery: Continue tube feeding  Nutrition Education and Counseling: No recommendations at this time  Coordination of Nutrition Care: Continue to monitor while inpatient    Goals:  Meet > 60% EEN's with EN while intubated within next 3-4 days       Nutrition Monitoring and Evaluation:   Behavioral-Environmental Outcomes:    Food/Nutrient Intake Outcomes: Enteral nutrition intake/tolerance  Physical Signs/Symptoms Outcomes: GI status, Biochemical data, Weight    Discharge Planning:     Too soon to determine     Electronically signed by Dwayne Calloway, [address] [place] [address] on 11/12/2020     Contact: [phone]

## 2020-11-13 ENCOUNTER — APPOINTMENT (OUTPATIENT)
Dept: CT IMAGING | Age: 50
DRG: 417 | End: 2020-11-13
Attending: INTERNAL MEDICINE
Payer: COMMERCIAL

## 2020-11-13 ENCOUNTER — APPOINTMENT (OUTPATIENT)
Dept: GENERAL RADIOLOGY | Age: 50
DRG: 417 | End: 2020-11-13
Attending: INTERNAL MEDICINE
Payer: COMMERCIAL

## 2020-11-13 LAB
ALBUMIN SERPL-MCNC: 1.9 G/DL (ref 3.5–5)
ALBUMIN/GLOB SERPL: 0.4 {RATIO} (ref 1.1–2.2)
ALP SERPL-CCNC: 131 U/L (ref 45–117)
ALT SERPL-CCNC: 46 U/L (ref 12–78)
ANION GAP SERPL CALC-SCNC: 2 MMOL/L (ref 5–15)
ARTERIAL PATENCY WRIST A: YES
AST SERPL-CCNC: 29 U/L (ref 15–37)
ATRIAL RATE: 98 BPM
BASE EXCESS BLDA CALC-SCNC: 0.2 MMOL/L
BASOPHILS # BLD: 0 K/UL (ref 0–0.1)
BASOPHILS NFR BLD: 0 % (ref 0–1)
BDY SITE: ABNORMAL
BILIRUB SERPL-MCNC: 1 MG/DL (ref 0.2–1)
BUN SERPL-MCNC: 10 MG/DL (ref 6–20)
BUN/CREAT SERPL: 13 (ref 12–20)
CALCIUM SERPL-MCNC: 8.5 MG/DL (ref 8.5–10.1)
CALCULATED P AXIS, ECG09: 56 DEGREES
CALCULATED R AXIS, ECG10: 99 DEGREES
CALCULATED T AXIS, ECG11: 35 DEGREES
CHLORIDE SERPL-SCNC: 107 MMOL/L (ref 97–108)
CO2 SERPL-SCNC: 32 MMOL/L (ref 21–32)
CREAT SERPL-MCNC: 0.75 MG/DL (ref 0.7–1.3)
DIAGNOSIS, 93000: NORMAL
DIFFERENTIAL METHOD BLD: ABNORMAL
EOSINOPHIL # BLD: 0.2 K/UL (ref 0–0.4)
EOSINOPHIL NFR BLD: 3 % (ref 0–7)
EPAP/CPAP/PEEP, PAPEEP: 7
ERYTHROCYTE [DISTWIDTH] IN BLOOD BY AUTOMATED COUNT: 14.4 % (ref 11.5–14.5)
FIO2 ON VENT: 40 %
GAS FLOW.O2 SETTING OXYMISER: 10 L/MIN
GLOBULIN SER CALC-MCNC: 4.7 G/DL (ref 2–4)
GLUCOSE SERPL-MCNC: 132 MG/DL (ref 65–100)
HCO3 BLDA-SCNC: 26 MMOL/L (ref 22–26)
HCT VFR BLD AUTO: 28.8 % (ref 36.6–50.3)
HGB BLD-MCNC: 9 G/DL (ref 12.1–17)
IMM GRANULOCYTES # BLD AUTO: 0 K/UL
IMM GRANULOCYTES NFR BLD AUTO: 0 %
LIPASE SERPL-CCNC: 205 U/L (ref 73–393)
LYMPHOCYTES # BLD: 1.3 K/UL (ref 0.8–3.5)
LYMPHOCYTES NFR BLD: 22 % (ref 12–49)
MAGNESIUM SERPL-MCNC: 2.4 MG/DL (ref 1.6–2.4)
MCH RBC QN AUTO: 29.4 PG (ref 26–34)
MCHC RBC AUTO-ENTMCNC: 31.3 G/DL (ref 30–36.5)
MCV RBC AUTO: 94.1 FL (ref 80–99)
METAMYELOCYTES NFR BLD MANUAL: 1 %
MONOCYTES # BLD: 0.4 K/UL (ref 0–1)
MONOCYTES NFR BLD: 7 % (ref 5–13)
MYELOCYTES NFR BLD MANUAL: 1 %
NEUTS BAND NFR BLD MANUAL: 15 % (ref 0–6)
NEUTS SEG # BLD: 3.8 K/UL (ref 1.8–8)
NEUTS SEG NFR BLD: 51 % (ref 32–75)
NRBC # BLD: 0.03 K/UL (ref 0–0.01)
NRBC BLD-RTO: 0.5 PER 100 WBC
P-R INTERVAL, ECG05: 142 MS
PCO2 BLDA: 44 MMHG (ref 35–45)
PH BLDA: 7.38 [PH] (ref 7.35–7.45)
PHOSPHATE SERPL-MCNC: 2.7 MG/DL (ref 2.6–4.7)
PLATELET # BLD AUTO: 339 K/UL (ref 150–400)
PMV BLD AUTO: 9.7 FL (ref 8.9–12.9)
PO2 BLDA: 78 MMHG (ref 80–100)
POTASSIUM SERPL-SCNC: 3.4 MMOL/L (ref 3.5–5.1)
PROCALCITONIN SERPL-MCNC: 1.49 NG/ML
PROT SERPL-MCNC: 6.6 G/DL (ref 6.4–8.2)
Q-T INTERVAL, ECG07: 364 MS
QRS DURATION, ECG06: 94 MS
QTC CALCULATION (BEZET), ECG08: 464 MS
RBC # BLD AUTO: 3.06 M/UL (ref 4.1–5.7)
RBC MORPH BLD: ABNORMAL
SAO2 % BLD: 95 % (ref 92–97)
SAO2% DEVICE SAO2% SENSOR NAME: ABNORMAL
SODIUM SERPL-SCNC: 141 MMOL/L (ref 136–145)
SPECIMEN SITE: ABNORMAL
TRIGL SERPL-MCNC: 624 MG/DL (ref ?–150)
VENTILATION MODE VENT: ABNORMAL
VENTRICULAR RATE, ECG03: 98 BPM
VT SETTING VENT: 500 ML
WBC # BLD AUTO: 5.8 K/UL (ref 4.1–11.1)

## 2020-11-13 PROCEDURE — C9113 INJ PANTOPRAZOLE SODIUM, VIA: HCPCS | Performed by: SURGERY

## 2020-11-13 PROCEDURE — 74011000258 HC RX REV CODE- 258: Performed by: INTERNAL MEDICINE

## 2020-11-13 PROCEDURE — 74011250636 HC RX REV CODE- 250/636: Performed by: INTERNAL MEDICINE

## 2020-11-13 PROCEDURE — 36415 COLL VENOUS BLD VENIPUNCTURE: CPT

## 2020-11-13 PROCEDURE — 84145 PROCALCITONIN (PCT): CPT

## 2020-11-13 PROCEDURE — 74011250637 HC RX REV CODE- 250/637: Performed by: INTERNAL MEDICINE

## 2020-11-13 PROCEDURE — 74011000250 HC RX REV CODE- 250: Performed by: INTERNAL MEDICINE

## 2020-11-13 PROCEDURE — 80053 COMPREHEN METABOLIC PANEL: CPT

## 2020-11-13 PROCEDURE — 94003 VENT MGMT INPAT SUBQ DAY: CPT

## 2020-11-13 PROCEDURE — 83735 ASSAY OF MAGNESIUM: CPT

## 2020-11-13 PROCEDURE — 94640 AIRWAY INHALATION TREATMENT: CPT

## 2020-11-13 PROCEDURE — 82803 BLOOD GASES ANY COMBINATION: CPT

## 2020-11-13 PROCEDURE — 36600 WITHDRAWAL OF ARTERIAL BLOOD: CPT

## 2020-11-13 PROCEDURE — 71045 X-RAY EXAM CHEST 1 VIEW: CPT

## 2020-11-13 PROCEDURE — 65610000006 HC RM INTENSIVE CARE

## 2020-11-13 PROCEDURE — 85025 COMPLETE CBC W/AUTO DIFF WBC: CPT

## 2020-11-13 PROCEDURE — 71260 CT THORAX DX C+: CPT

## 2020-11-13 PROCEDURE — 74011000258 HC RX REV CODE- 258: Performed by: SURGERY

## 2020-11-13 PROCEDURE — 74011250636 HC RX REV CODE- 250/636: Performed by: SURGERY

## 2020-11-13 PROCEDURE — 74011000636 HC RX REV CODE- 636: Performed by: SURGERY

## 2020-11-13 PROCEDURE — 74011250637 HC RX REV CODE- 250/637: Performed by: SURGERY

## 2020-11-13 PROCEDURE — 93005 ELECTROCARDIOGRAM TRACING: CPT

## 2020-11-13 PROCEDURE — 83690 ASSAY OF LIPASE: CPT

## 2020-11-13 PROCEDURE — 84478 ASSAY OF TRIGLYCERIDES: CPT

## 2020-11-13 PROCEDURE — 2709999900 HC NON-CHARGEABLE SUPPLY

## 2020-11-13 PROCEDURE — 84100 ASSAY OF PHOSPHORUS: CPT

## 2020-11-13 RX ORDER — MIDAZOLAM IN 0.9 % SOD.CHLORID 1 MG/ML
0-10 PLASTIC BAG, INJECTION (ML) INTRAVENOUS
Status: DISCONTINUED | OUTPATIENT
Start: 2020-11-13 | End: 2020-11-18

## 2020-11-13 RX ORDER — MIDAZOLAM HYDROCHLORIDE 5 MG/ML
2 INJECTION INTRAMUSCULAR; INTRAVENOUS
Status: DISCONTINUED | OUTPATIENT
Start: 2020-11-13 | End: 2020-11-20

## 2020-11-13 RX ORDER — DEXMEDETOMIDINE HYDROCHLORIDE 4 UG/ML
.1-1.5 INJECTION, SOLUTION INTRAVENOUS
Status: DISCONTINUED | OUTPATIENT
Start: 2020-11-13 | End: 2020-11-18

## 2020-11-13 RX ORDER — POTASSIUM CHLORIDE 29.8 MG/ML
20 INJECTION INTRAVENOUS
Status: COMPLETED | OUTPATIENT
Start: 2020-11-13 | End: 2020-11-13

## 2020-11-13 RX ADMIN — MEROPENEM 500 MG: 500 INJECTION, POWDER, FOR SOLUTION INTRAVENOUS at 20:21

## 2020-11-13 RX ADMIN — IPRATROPIUM BROMIDE AND ALBUTEROL SULFATE 3 ML: .5; 3 SOLUTION RESPIRATORY (INHALATION) at 08:22

## 2020-11-13 RX ADMIN — PIPERACILLIN AND TAZOBACTAM 3.38 G: 3; .375 INJECTION, POWDER, LYOPHILIZED, FOR SOLUTION INTRAVENOUS at 09:04

## 2020-11-13 RX ADMIN — DEXMEDETOMIDINE HYDROCHLORIDE 1.5 MCG/KG/HR: 400 INJECTION INTRAVENOUS at 06:44

## 2020-11-13 RX ADMIN — ACETYLCYSTEINE 400 MG: 100 SOLUTION ORAL; RESPIRATORY (INHALATION) at 15:42

## 2020-11-13 RX ADMIN — Medication 300 MCG/HR: at 18:22

## 2020-11-13 RX ADMIN — HYDROMORPHONE HYDROCHLORIDE 1 MG: 2 INJECTION, SOLUTION INTRAMUSCULAR; INTRAVENOUS; SUBCUTANEOUS at 06:33

## 2020-11-13 RX ADMIN — GUAIFENESIN 400 MG: 200 SOLUTION ORAL at 20:17

## 2020-11-13 RX ADMIN — DEXMEDETOMIDINE HYDROCHLORIDE 1.5 MCG/KG/HR: 400 INJECTION INTRAVENOUS at 20:47

## 2020-11-13 RX ADMIN — ACETYLCYSTEINE 400 MG: 100 SOLUTION ORAL; RESPIRATORY (INHALATION) at 05:10

## 2020-11-13 RX ADMIN — MEROPENEM 500 MG: 500 INJECTION, POWDER, FOR SOLUTION INTRAVENOUS at 15:13

## 2020-11-13 RX ADMIN — ACETYLCYSTEINE 400 MG: 100 SOLUTION ORAL; RESPIRATORY (INHALATION) at 00:08

## 2020-11-13 RX ADMIN — ENOXAPARIN SODIUM 40 MG: 40 INJECTION SUBCUTANEOUS at 20:21

## 2020-11-13 RX ADMIN — BUMETANIDE 2 MG: 0.25 INJECTION INTRAMUSCULAR; INTRAVENOUS at 08:27

## 2020-11-13 RX ADMIN — QUETIAPINE FUMARATE 50 MG: 25 TABLET ORAL at 20:17

## 2020-11-13 RX ADMIN — BUMETANIDE 2 MG: 0.25 INJECTION INTRAMUSCULAR; INTRAVENOUS at 18:20

## 2020-11-13 RX ADMIN — ACETAMINOPHEN ORAL SOLUTION 650 MG: 650 SOLUTION ORAL at 00:00

## 2020-11-13 RX ADMIN — VANCOMYCIN HYDROCHLORIDE 1750 MG: 10 INJECTION, POWDER, LYOPHILIZED, FOR SOLUTION INTRAVENOUS at 20:21

## 2020-11-13 RX ADMIN — ACETAMINOPHEN ORAL SOLUTION 650 MG: 650 SOLUTION ORAL at 20:21

## 2020-11-13 RX ADMIN — PIPERACILLIN AND TAZOBACTAM 3.38 G: 3; .375 INJECTION, POWDER, LYOPHILIZED, FOR SOLUTION INTRAVENOUS at 00:02

## 2020-11-13 RX ADMIN — DEXMEDETOMIDINE HYDROCHLORIDE 1.5 MCG/KG/HR: 400 INJECTION INTRAVENOUS at 23:38

## 2020-11-13 RX ADMIN — IPRATROPIUM BROMIDE AND ALBUTEROL SULFATE 3 ML: .5; 3 SOLUTION RESPIRATORY (INHALATION) at 19:53

## 2020-11-13 RX ADMIN — MIDAZOLAM HYDROCHLORIDE 2 MG: 5 INJECTION INTRAMUSCULAR; INTRAVENOUS at 13:59

## 2020-11-13 RX ADMIN — Medication 300 MCG/HR: at 10:07

## 2020-11-13 RX ADMIN — IPRATROPIUM BROMIDE AND ALBUTEROL SULFATE 3 ML: .5; 3 SOLUTION RESPIRATORY (INHALATION) at 11:50

## 2020-11-13 RX ADMIN — ACETYLCYSTEINE 400 MG: 100 SOLUTION ORAL; RESPIRATORY (INHALATION) at 11:50

## 2020-11-13 RX ADMIN — IPRATROPIUM BROMIDE AND ALBUTEROL SULFATE 3 ML: .5; 3 SOLUTION RESPIRATORY (INHALATION) at 15:39

## 2020-11-13 RX ADMIN — IOPAMIDOL 100 ML: 755 INJECTION, SOLUTION INTRAVENOUS at 17:00

## 2020-11-13 RX ADMIN — MIDAZOLAM HYDROCHLORIDE 4 MG/HR: 5 INJECTION, SOLUTION INTRAMUSCULAR; INTRAVENOUS at 09:54

## 2020-11-13 RX ADMIN — PROPOFOL 45 MCG/KG/MIN: 10 INJECTION, EMULSION INTRAVENOUS at 02:41

## 2020-11-13 RX ADMIN — VANCOMYCIN HYDROCHLORIDE 1750 MG: 10 INJECTION, POWDER, LYOPHILIZED, FOR SOLUTION INTRAVENOUS at 05:32

## 2020-11-13 RX ADMIN — GUAIFENESIN 400 MG: 200 SOLUTION ORAL at 00:01

## 2020-11-13 RX ADMIN — DEXMEDETOMIDINE HYDROCHLORIDE 1.5 MCG/KG/HR: 400 INJECTION INTRAVENOUS at 11:08

## 2020-11-13 RX ADMIN — DEXMEDETOMIDINE HYDROCHLORIDE 1.5 MCG/KG/HR: 400 INJECTION INTRAVENOUS at 18:53

## 2020-11-13 RX ADMIN — MIDAZOLAM HYDROCHLORIDE 2 MG: 5 INJECTION INTRAMUSCULAR; INTRAVENOUS at 23:06

## 2020-11-13 RX ADMIN — DEXMEDETOMIDINE HYDROCHLORIDE 1.5 MCG/KG/HR: 400 INJECTION INTRAVENOUS at 01:54

## 2020-11-13 RX ADMIN — DOCUSATE SODIUM 100 MG: 50 LIQUID ORAL at 08:26

## 2020-11-13 RX ADMIN — DEXMEDETOMIDINE HYDROCHLORIDE 1.5 MCG/KG/HR: 400 INJECTION INTRAVENOUS at 04:24

## 2020-11-13 RX ADMIN — QUETIAPINE FUMARATE 50 MG: 25 TABLET ORAL at 08:27

## 2020-11-13 RX ADMIN — GUAIFENESIN 400 MG: 200 SOLUTION ORAL at 15:13

## 2020-11-13 RX ADMIN — Medication 300 MCG/HR: at 20:25

## 2020-11-13 RX ADMIN — DEXMEDETOMIDINE HYDROCHLORIDE 1.5 MCG/KG/HR: 400 INJECTION INTRAVENOUS at 18:21

## 2020-11-13 RX ADMIN — FLUCONAZOLE IN SODIUM CHLORIDE 400 MG: 2 INJECTION, SOLUTION INTRAVENOUS at 08:27

## 2020-11-13 RX ADMIN — IPRATROPIUM BROMIDE AND ALBUTEROL SULFATE 3 ML: .5; 3 SOLUTION RESPIRATORY (INHALATION) at 00:08

## 2020-11-13 RX ADMIN — MIDAZOLAM HYDROCHLORIDE 2 MG: 5 INJECTION INTRAMUSCULAR; INTRAVENOUS at 09:47

## 2020-11-13 RX ADMIN — ACETYLCYSTEINE 400 MG: 100 SOLUTION ORAL; RESPIRATORY (INHALATION) at 08:22

## 2020-11-13 RX ADMIN — ACETYLCYSTEINE 400 MG: 100 SOLUTION ORAL; RESPIRATORY (INHALATION) at 19:53

## 2020-11-13 RX ADMIN — MIDAZOLAM HYDROCHLORIDE 7 MG/HR: 5 INJECTION, SOLUTION INTRAMUSCULAR; INTRAVENOUS at 19:35

## 2020-11-13 RX ADMIN — GUAIFENESIN 400 MG: 200 SOLUTION ORAL at 08:26

## 2020-11-13 RX ADMIN — DEXMEDETOMIDINE HYDROCHLORIDE 1.5 MCG/KG/HR: 400 INJECTION INTRAVENOUS at 16:15

## 2020-11-13 RX ADMIN — Medication 300 MCG/HR: at 15:11

## 2020-11-13 RX ADMIN — Medication 300 MCG/HR: at 04:52

## 2020-11-13 RX ADMIN — MIDAZOLAM HYDROCHLORIDE 7 MG/HR: 5 INJECTION, SOLUTION INTRAMUSCULAR; INTRAVENOUS at 13:37

## 2020-11-13 RX ADMIN — GUAIFENESIN 400 MG: 200 SOLUTION ORAL at 11:08

## 2020-11-13 RX ADMIN — POTASSIUM CHLORIDE 20 MEQ: 400 INJECTION, SOLUTION INTRAVENOUS at 15:25

## 2020-11-13 RX ADMIN — GUAIFENESIN 400 MG: 200 SOLUTION ORAL at 04:07

## 2020-11-13 RX ADMIN — SODIUM CHLORIDE 40 MG: 9 INJECTION INTRAMUSCULAR; INTRAVENOUS; SUBCUTANEOUS at 08:27

## 2020-11-13 RX ADMIN — DOCUSATE SODIUM 100 MG: 50 LIQUID ORAL at 18:20

## 2020-11-13 RX ADMIN — DEXMEDETOMIDINE HYDROCHLORIDE 1.5 MCG/KG/HR: 400 INJECTION INTRAVENOUS at 13:40

## 2020-11-13 RX ADMIN — IPRATROPIUM BROMIDE AND ALBUTEROL SULFATE 3 ML: .5; 3 SOLUTION RESPIRATORY (INHALATION) at 05:10

## 2020-11-13 RX ADMIN — ACETAMINOPHEN 650 MG: 650 SUPPOSITORY RECTAL at 08:25

## 2020-11-13 RX ADMIN — VANCOMYCIN HYDROCHLORIDE 1750 MG: 10 INJECTION, POWDER, LYOPHILIZED, FOR SOLUTION INTRAVENOUS at 13:15

## 2020-11-13 NOTE — PROGRESS NOTES
Problem: Falls - Risk of  Goal: *Absence of Falls  Description: Document Ry Saravia Fall Risk and appropriate interventions in the flowsheet. Outcome: Not Progressing Towards Goal  Note: Fall Risk Interventions:       Mentation Interventions: Adequate sleep, hydration, pain control, Bed/chair exit alarm, Door open when patient unattended, Evaluate medications/consider consulting pharmacy, Increase mobility, Reorient patient, More frequent rounding, Room close to nurse's station    Medication Interventions: Bed/chair exit alarm, Evaluate medications/consider consulting pharmacy, Patient to call before getting OOB, Teach patient to arise slowly    Elimination Interventions: Bed/chair exit alarm, Toileting schedule/hourly rounds              Problem: Patient Education: Go to Patient Education Activity  Goal: Patient/Family Education  Outcome: Not Progressing Towards Goal     Problem: Ventilator Management  Goal: *Adequate oxygenation and ventilation  Outcome: Not Progressing Towards Goal  Goal: *Patient maintains clear airway/free of aspiration  Outcome: Not Progressing Towards Goal  Goal: *Absence of infection signs and symptoms  Outcome: Not Progressing Towards Goal  Goal: *Normal spontaneous ventilation  Outcome: Not Progressing Towards Goal     Problem: Patient Education: Go to Patient Education Activity  Goal: Patient/Family Education  Outcome: Not Progressing Towards Goal     Problem: Pressure Injury - Risk of  Goal: *Prevention of pressure injury  Description: Document Jeffery Scale and appropriate interventions in the flowsheet.   Outcome: Not Progressing Towards Goal  Note: Pressure Injury Interventions:  Sensory Interventions: Assess changes in LOC, Check visual cues for pain, Float heels, Maintain/enhance activity level, Keep linens dry and wrinkle-free, Minimize linen layers, Monitor skin under medical devices, Pad between skin to skin, Pressure redistribution bed/mattress (bed type), Turn and reposition approx. every two hours (pillows and wedges if needed)    Moisture Interventions: Check for incontinence Q2 hours and as needed, Internal/External urinary devices, Maintain skin hydration (lotion/cream), Minimize layers    Activity Interventions: Assess need for specialty bed, Pressure redistribution bed/mattress(bed type)    Mobility Interventions: Assess need for specialty bed, Float heels, HOB 30 degrees or less, Pressure redistribution bed/mattress (bed type), PT/OT evaluation, Turn and reposition approx.  every two hours(pillow and wedges)    Nutrition Interventions: Document food/fluid/supplement intake    Friction and Shear Interventions: Lift sheet, HOB 30 degrees or less, Lift team/patient mobility team, Minimize layers, Transferring/repositioning devices                Problem: Patient Education: Go to Patient Education Activity  Goal: Patient/Family Education  Outcome: Not Progressing Towards Goal     Problem: Infection - Risk of, Central Venous Catheter-Associated Bloodstream Infection  Goal: *Absence of infection signs and symptoms  Outcome: Not Progressing Towards Goal     Problem: Patient Education: Go to Patient Education Activity  Goal: Patient/Family Education  Outcome: Not Progressing Towards Goal     Problem: Surgical Pathway Day of Surgery  Goal: Off Pathway (Use only if patient is Off Pathway)  Outcome: Not Progressing Towards Goal  Goal: Activity/Safety  Outcome: Not Progressing Towards Goal  Goal: Consults, if ordered  Outcome: Not Progressing Towards Goal  Goal: Nutrition/Diet  Outcome: Not Progressing Towards Goal  Goal: Medications  Outcome: Not Progressing Towards Goal  Goal: Respiratory  Outcome: Not Progressing Towards Goal  Goal: Treatments/Interventions/Procedures  Outcome: Not Progressing Towards Goal  Goal: Psychosocial  Outcome: Not Progressing Towards Goal  Goal: *No signs and symptoms of infection or wound complications  Outcome: Not Progressing Towards Goal  Goal: *Optimal pain control at patient's stated goal  Outcome: Not Progressing Towards Goal  Goal: *Adequate urinary output (equal to or greater than 30 milliliters/hour)  Description: Ambulatory Surgery patients voiding without difficulty.   Outcome: Not Progressing Towards Goal  Goal: *Hemodynamically stable  Outcome: Not Progressing Towards Goal  Goal: *Tolerating diet  Outcome: Not Progressing Towards Goal  Goal: *Demonstrates progressive activity  Outcome: Not Progressing Towards Goal     Problem: Surgical Pathway Post-Op Day 1  Goal: Off Pathway (Use only if patient is Off Pathway)  Outcome: Not Progressing Towards Goal  Goal: Activity/Safety  Outcome: Not Progressing Towards Goal  Goal: Diagnostic Test/Procedures  Outcome: Not Progressing Towards Goal  Goal: Nutrition/Diet  Outcome: Not Progressing Towards Goal  Goal: Discharge Planning  Outcome: Not Progressing Towards Goal  Goal: Medications  Outcome: Not Progressing Towards Goal  Goal: Respiratory  Outcome: Not Progressing Towards Goal  Goal: Treatments/Interventions/Procedures  Outcome: Not Progressing Towards Goal  Goal: Psychosocial  Outcome: Not Progressing Towards Goal  Goal: *No signs and symptoms of infection or wound complications  Outcome: Not Progressing Towards Goal  Goal: *Optimal pain control at patient's stated goal  Outcome: Not Progressing Towards Goal  Goal: *Adequate urinary output (equal to or greater than 30 milliliters/hour)  Outcome: Not Progressing Towards Goal  Goal: *Hemodynamically stable  Outcome: Not Progressing Towards Goal  Goal: *Tolerating diet  Outcome: Not Progressing Towards Goal  Goal: *Demonstrates progressive activity  Outcome: Not Progressing Towards Goal  Goal: *Lungs clear or at baseline  Outcome: Not Progressing Towards Goal     Problem: Non-Violent Restraints  Goal: *Removal from restraints as soon as assessed to be safe  Outcome: Not Progressing Towards Goal  Goal: *No harm/injury to patient while restraints in use  Outcome: Not Progressing Towards Goal  Goal: *Patient's dignity will be maintained  Outcome: Not Progressing Towards Goal  Goal: *Patient Specific Goal (EDIT GOAL, INSERT TEXT)  Outcome: Not Progressing Towards Goal  Goal: Non-violent Restaints:Standard Interventions  Outcome: Not Progressing Towards Goal  Goal: Non-violent Restraints:Patient Interventions  Outcome: Not Progressing Towards Goal  Goal: Patient/Family Education  Outcome: Not Progressing Towards Goal     Problem: Risk for Spread of Infection  Goal: Prevent transmission of infectious organism to others  Description: Prevent the transmission of infectious organisms to other patients, staff members, and visitors.   Outcome: Not Progressing Towards Goal     Problem: Patient Education:  Go to Education Activity  Goal: Patient/Family Education  Outcome: Not Progressing Towards Goal

## 2020-11-13 NOTE — PROGRESS NOTES
[facility]     Name: Yvette Preston MRN: [id number]   : 1970 Hospital: [address] [place] [address] Dr   Date: 2020        Impression Plan   1. Acute hypoxemic respiratory failure, intubated , tube exchanged   2. Hemorrhagic shock, off pressors   3. Acute cholecystitis s/p robotic assisted lap bailee w/ TAMIKO   4. Post-op bleeding/tear in liver capsule s/p ex-lap and evacuation of hematoma   5. Acute blood loss anemia  6. MRSA pneumonia  7. Persistent fevers  8. H/o seizures  9. REBECCA                 · Continue vent support  · Wean FiO2/PEEP to keep sats > 88%  · Stop propofol. Change to versed gtt  · Continue vanc, flucon. Change zosyn to alvaro due to ongoing fevers and rising PCT  · Follow cultures  · continue scheduled duonebs, mucinex, mucomyst and chest PT  · Trend Hgb, transfuse to keep > 7  · Continue bumex  · Replete lytes  · Will get CT chest/abd/pelvis for persistent fevers  · Continue seroquel  · Post-op mgmt as per surgery    DVT ppx: lovenox  GI ppx: protonix  NPO. Resume TF after CT    Pt is critically ill and at high risk of decompensation  CCT: 41 minutes         Radiology  ( personally reviewed) CXR reviewed: no infiltrates, ETT   ABG No results for input(s): PHI, PO2I, PCO2I in the last 72 hours. Subjective       47 yo with cholecystits s/p lab bailee complicated by laceration of the liver capsule and hemorrhagic shock. Hematoma evacuated- 2L blood. Currently on daniel 130 and Levophed 8.  Pt intubated and sedated- unable to provide any further hx.      - intubated     - bronch, tube exchange       - echo: EF 55-60%, RV normal    *sputum culture () - MRSA  *all other cultures NGTD  *MRSA screen positive    **stopped propofol due to high TG    Interval history:  Tm 102.5  Secretions/suctioning requirements improving  Agitated/desats when sedation weaned  Negative 2.7L. still 16L positive overall  Suctioning pink tinged secretions    Past Medical History:   Diagnosis Date    GERD (gastroesophageal reflux disease)     Panic attack     Psychiatric disorder     anxiety    Seizures ([facility] [address]     7 yo   was told it was a stress seizure, never had another one    Sleep apnea     uses CPAP      Past Surgical History:   Procedure Laterality Date    ABDOMEN SURGERY PROC UNLISTED  1977    hernia(abdomen)- open    HX GI  2008    esophagus repair narrow- balloon dilation and scar tissue removal    HX HERNIA REPAIR      inguinal hernia    HX OTHER SURGICAL  11years of age    tonsils    HX OTHER SURGICAL  2009    hernia (hiatal and abdomen)-open    HX OTHER SURGICAL  23years of age    wisdom teeth extraction under anesthesia    HX OTHER SURGICAL  8 years     distended testicle     HX OTHER SURGICAL  1/16/15    Laparoscopic ventral hernia repair with mesh      Prior to Admission medications    Medication Sig Start Date End Date Taking? Authorizing Provider   buPROPion [facility] SR) 150 mg SR tablet Take 150 mg by mouth two (2) times a day. Yes Provider, Historical   sertraline (Zoloft) 50 mg tablet Take 50 mg by mouth daily. Yes Provider, Historical   citalopram (CELEXA) 40 mg tablet Take 40 mg by mouth daily. Yes Provider, Historical   docusate sodium (COLACE) 100 mg capsule Take 1 Cap by mouth two (2) times a day for 10 days. 11/4/20 11/14/20 Yes Luis Desai MD   lisinopril (PRINIVIL, ZESTRIL) 40 mg tablet Take 40 mg by mouth every evening. Yes Provider, Historical   simvastatin (ZOCOR) 40 mg tablet Take 40 mg by mouth nightly. Yes Provider, Historical   omeprazole (PRILOSEC) 40 mg capsule Take 40 mg by mouth daily.    Yes Provider, Historical     Current Facility-Administered Medications   Medication Dose Route Frequency    piperacillin-tazobactam (ZOSYN) 3.375 g in 0.9% sodium chloride (MBP/ADV) 100 mL MBP  3.375 g IntraVENous Q8H    midazolam in normal saline (VERSED) 1 mg/mL infusion  0-10 mg/hr IntraVENous TITRATE    QUEtiapine (SEROquel) tablet 50 mg  50 mg Oral BID    vancomycin (VANCOCIN) 1,750 mg in 0.9% sodium chloride 500 mL IVPB  1,750 mg IntraVENous Q8H    bumetanide (BUMEX) injection 2 mg  2 mg IntraVENous BID    fluconazole (DIFLUCAN) 400mg/200 mL IVPB (premix)  400 mg IntraVENous DAILY    albuterol-ipratropium (DUO-NEB) 2.5 MG-0.5 MG/3 ML  3 mL Nebulization Q4H RT    acetylcysteine (MUCOMYST) 100 mg/mL (10 %) nebulizer solution 400 mg  4 mL Nebulization Q4H RT    docusate (COLACE) 50 mg/5 mL oral liquid 100 mg  100 mg Oral BID    guaiFENesin (ROBITUSSIN) 100 mg/5 mL oral liquid 400 mg  400 mg Oral Q4H    fentaNYL (PF) 1,500 mcg/30 mL (50 mcg/mL) infusion  0-300 mcg/hr IntraVENous TITRATE    dexmedeTOMidine in 0.9 % NaCl (PRECEDEX) 400 mcg/100 mL (4 mcg/mL) infusion soln  0.1-1.5 mcg/kg/hr IntraVENous TITRATE    enoxaparin (LOVENOX) injection 40 mg  40 mg SubCUTAneous Q24H    pantoprazole (PROTONIX) 40 mg in 0.9% sodium chloride 10 mL injection  40 mg IntraVENous DAILY     Allergies   Allergen Reactions    Pcn [Penicillins] Other (comments)     Reaction was at age 6, itching and eyes swollen; denies any sob or tongue swelling. Has been on Zosyn in the hospital with no problems    Codeine Nausea and Vomiting      Social History     Tobacco Use    Smoking status: Never Smoker    Smokeless tobacco: Never Used   Substance Use Topics    Alcohol use: Yes     Alcohol/week: 1.7 standard drinks     Types: 2 Cans of beer per week     Comment: 1 or 2 beer every two weeks      Family History   Problem Relation Age of Onset    Diabetes Mother     Hypertension Mother     Stroke Mother         copd    Cancer Father         skin    Heart Disease Father     Diabetes Father     Heart Attack Father           Laboratory: I have personally reviewed the critical care flowsheet and labs.      Recent Labs     11/13/20  0417 11/12/20  0344 11/11/20  2210   WBC 5.8 6.0 6.4   HGB 9.0* 8.0* 8.0*   HCT 28.8* 25.0* 25.0*   PLT 339 242 204     Recent Labs     11/13/20  0417 11/12/20  0344 11/11/20  0450    142 140   K 3.4* 3.6 3.7    107 107   CO2 32 33* 30   * 127* 120*   BUN 10 10 11   CREA 0.75 0.78 0.69*   CA 8.5 7.9* 7.6*   MG 2.4 2.2 2.3   PHOS 2.7 2.3* 2.1*   ALB 1.9* 1.8* 1.7*   ALT 46 49 56       Objective:     Mode Rate Tidal Volume Pressure FiO2 PEEP   Assist control   500 ml  5 cm H2O 50 % 7 cm H20     Vital Signs:    Ventilator Pressures  PC Set: 16  Pressure Support (cm H2O): 5 cm H2O  PIP Observed (cm H2O): 30 cm H2O  Plateau Pressure (cm H2O): 25 cm H2O  MAP (cm H2O): 11  PEEP/VENT (cm H2O): 7 cm [id number](24)Ventilator Pressures  PC Set: 16  Pressure Support (cm H2O): 5 cm H2O  PIP Observed (cm H2O): 30 cm H2O  Plateau Pressure (cm H2O): 25 cm H2O  MAP (cm H2O): 11  PEEP/VENT (cm H2O): 7 cm H20  Safety & Alarms  Circuit Temperature: (HME)  Backup Mode Checked/Apnea: Yes  Pressure Max: 55 cm H2O  Pressure Min: 13 cm H2O  Ve Min: 2  Ve Max: 22  Vt Min: 200 ml  Vt Max: 1000 ml  RR Min: 14  RR Max: 45  Intake/Output:   Last shift:      Ventilator Volumes  Vt Set (ml): 500 ml  Vt Exhaled (Machine Breath) (ml): 443 ml  Vt Spont (ml): 485 ml  Ve Observed (l/min): 11.3 l/min  Last 3 shifts: 11/13 0701 - 11/13 1900  In: 250   Out: 2155 [Urine:2150]RRIOLAST3    Intake/Output Summary (Last 24 hours) at 11/13/2020 1313  Last data filed at 11/13/2020 1100  Gross per 24 hour   Intake 4405.65 ml   Output 7085 ml   Net -2679.35 ml     EXAM:   GENERAL: intubated, sedated  HEENT:  Anicteric sclera, orally intubated  NECK:  Trachea midline  LUNGS: diminished, few rhonchi  HEART: Regular rate and rhythm   ABDOMEN:  Soft, hypoactive bowel sounds  EXTREMITIES: edema   NEUROLOGIC: RASS -3    Debbie Marie MD  [facility]

## 2020-11-13 NOTE — PROGRESS NOTES
SURGERY PROGRESS NOTE      Admit Date: 11/3/2020    POD 9 Days Post-Op    Procedure: Procedure(s):  LAPAROTOMY EXPLORATORY, evacuation of hematoma. Subjective:   Remains intubated  Remains febrile  Good diuresis yesterday      Objective:     Visit Vitals  BP (!) 121/59   Pulse 85   Temp (!) 100.6 °F (38.1 °C)   Resp 20   Ht 5' 11\" (1.803 m)   Wt 118.8 kg (262 lb)   SpO2 90%   BMI 36.54 kg/m²        Temp (24hrs), Av.2 °F (38.4 °C), Min:99.8 °F (37.7 °C), Max:102.5 °F (39.2 °C)      Physical Exam:     Abdomen:  Soft. Non-tender, non-distended.   Incision C/D/I. SRI serous        Lab Results   Component Value Date/Time    WBC 5.8 2020 04:17 AM    HGB 9.0 (L) 2020 04:17 AM    Hemoglobin (POC) 6.8 (L) 2020 03:42 PM    Hemoglobin (POC) 13.3 2014 10:05 PM    HCT 28.8 (L) 2020 04:17 AM    Hematocrit (POC) 39 2014 10:05 PM    PLATELET 370  04:17 AM    MCV 94.1 2020 04:17 AM     Lab Results   Component Value Date/Time    GFR est non-AA >60 2020 04:17 AM    GFRNA, POC >60 2014 10:05 PM    GFR est AA >60 2020 04:17 AM    GFRAA, POC >60 2014 10:05 PM    Creatinine 0.75 2020 04:17 AM    Creatinine (POC) 1.1 2014 10:05 PM    BUN 10 2020 04:17 AM    BUN (POC) 15 2014 10:05 PM    Sodium 141 2020 04:17 AM    Sodium (POC) 141 2014 10:05 PM    Potassium 3.4 (L) 2020 04:17 AM    Potassium (POC) 3.5 2014 10:05 PM    Chloride 107 2020 04:17 AM    Chloride (POC) 106 2014 10:05 PM    CO2 32 2020 04:17 AM    Magnesium 2.4 2020 04:17 AM    Phosphorus 2.7 2020 04:17 AM       Assessment:     Active Problems:    Biliary colic ()      Cholecystitis (2020)        Plan/Recommendations/Medical Decision Making:   CT abdomen today to r/o intrabdominal source of fever   Wean vent as able  Continue diruresis   Ok to resume tube feeds

## 2020-11-13 NOTE — PROGRESS NOTES
8026: Report from Nidia Morales, [address] [facility]. Client is resting comfortably. No distress. Vitals stable. All drip/sedation rates verified. 0825: Tylenol provided for fever. 0830: Spoke with Dr. Vesna Forman regarding client's sedation. Discussed all three sedatives are currently maxxed out and client regularly wakes up and begins to breath more rapidly. Dr. Vesna Forman to put in PRN Versed. 0915: Versed drip to be order and Sanjana Mess stopped due to high triglycerides. 1200: Informed Dr. Vesna Forman that Verserd drip was increased to calm increased work of breathing client was presently at a RASS of -1, but it may be necessary to increased versed further to control work of breathing and RASS may be lower than -1. She approved titration to control work of breathing. 1340: Work of breathing is still labored. Versed increased to 7mg/hr. 1500: RTLIJ dressing changed. No distress. 1710: Client off of unit for CT.     1745: Client back on to unit. No distress. During CT or transport to and from. Client adjusted in bed. All chucks and cooling blanket adjusted. 1900: Report to [facility], RN.

## 2020-11-13 NOTE — PROGRESS NOTES
1920 Received report. 1943  Temp 102.2 , tylenol 650 mg ngt given. 0000 Temp 100.3 , tylenol 650 mg ngt given. 0100 ETT suctioned pink tinged thick secretion. 0530 Temp 100, patient restless , agitated , trying to cough, ETT suctioned. 5742 Dilaudid 1 mg iv given as patient restless. 0700 Patient resting.  0720 Bedside shift change report given to [facility] SYS . Report included the following information SBAR, Kardex, ED Summary, OR Summary, Procedure Summary, Intake/Output, MAR, Accordion, Recent Results, Med Rec Status and Cardiac Rhythm SR/ST.

## 2020-11-14 ENCOUNTER — APPOINTMENT (OUTPATIENT)
Dept: GENERAL RADIOLOGY | Age: 50
DRG: 417 | End: 2020-11-14
Attending: INTERNAL MEDICINE
Payer: COMMERCIAL

## 2020-11-14 LAB
ALBUMIN SERPL-MCNC: 1.8 G/DL (ref 3.5–5)
ALBUMIN/GLOB SERPL: 0.5 {RATIO} (ref 1.1–2.2)
ALP SERPL-CCNC: 153 U/L (ref 45–117)
ALT SERPL-CCNC: 38 U/L (ref 12–78)
ANION GAP SERPL CALC-SCNC: 6 MMOL/L (ref 5–15)
ARTERIAL PATENCY WRIST A: YES
AST SERPL-CCNC: 29 U/L (ref 15–37)
BASE EXCESS BLDA CALC-SCNC: 3.5 MMOL/L
BASOPHILS # BLD: 0 K/UL (ref 0–0.1)
BASOPHILS NFR BLD: 1 % (ref 0–1)
BDY SITE: ABNORMAL
BILIRUB SERPL-MCNC: 0.9 MG/DL (ref 0.2–1)
BUN SERPL-MCNC: 12 MG/DL (ref 6–20)
BUN/CREAT SERPL: 21 (ref 12–20)
CALCIUM SERPL-MCNC: 8.1 MG/DL (ref 8.5–10.1)
CHLORIDE SERPL-SCNC: 104 MMOL/L (ref 97–108)
CO2 SERPL-SCNC: 31 MMOL/L (ref 21–32)
CREAT SERPL-MCNC: 0.56 MG/DL (ref 0.7–1.3)
DIFFERENTIAL METHOD BLD: ABNORMAL
EOSINOPHIL # BLD: 0.2 K/UL (ref 0–0.4)
EOSINOPHIL NFR BLD: 2 % (ref 0–7)
EPAP/CPAP/PEEP, PAPEEP: 5
ERYTHROCYTE [DISTWIDTH] IN BLOOD BY AUTOMATED COUNT: 14 % (ref 11.5–14.5)
FIO2 ON VENT: 40 %
GAS FLOW.O2 SETTING OXYMISER: 10 L/MIN
GLOBULIN SER CALC-MCNC: 3.6 G/DL (ref 2–4)
GLUCOSE SERPL-MCNC: 127 MG/DL (ref 65–100)
HCO3 BLDA-SCNC: 28 MMOL/L (ref 22–26)
HCT VFR BLD AUTO: 26.6 % (ref 36.6–50.3)
HGB BLD-MCNC: 8.3 G/DL (ref 12.1–17)
IMM GRANULOCYTES # BLD AUTO: 0.1 K/UL (ref 0–0.04)
IMM GRANULOCYTES NFR BLD AUTO: 1 % (ref 0–0.5)
LYMPHOCYTES # BLD: 1 K/UL (ref 0.8–3.5)
LYMPHOCYTES NFR BLD: 12 % (ref 12–49)
MAGNESIUM SERPL-MCNC: 2.3 MG/DL (ref 1.6–2.4)
MCH RBC QN AUTO: 29.5 PG (ref 26–34)
MCHC RBC AUTO-ENTMCNC: 31.2 G/DL (ref 30–36.5)
MCV RBC AUTO: 94.7 FL (ref 80–99)
MONOCYTES # BLD: 0.5 K/UL (ref 0–1)
MONOCYTES NFR BLD: 7 % (ref 5–13)
NEUTS SEG # BLD: 6 K/UL (ref 1.8–8)
NEUTS SEG NFR BLD: 77 % (ref 32–75)
NRBC # BLD: 0 K/UL (ref 0–0.01)
NRBC BLD-RTO: 0 PER 100 WBC
PCO2 BLDA: 43 MMHG (ref 35–45)
PH BLDA: 7.43 [PH] (ref 7.35–7.45)
PHOSPHATE SERPL-MCNC: 2.7 MG/DL (ref 2.6–4.7)
PLATELET # BLD AUTO: 362 K/UL (ref 150–400)
PMV BLD AUTO: 9 FL (ref 8.9–12.9)
PO2 BLDA: 87 MMHG (ref 80–100)
POTASSIUM SERPL-SCNC: 3.7 MMOL/L (ref 3.5–5.1)
PROT SERPL-MCNC: 5.4 G/DL (ref 6.4–8.2)
RBC # BLD AUTO: 2.81 M/UL (ref 4.1–5.7)
SAO2 % BLD: 97 % (ref 92–97)
SAO2% DEVICE SAO2% SENSOR NAME: ABNORMAL
SODIUM SERPL-SCNC: 141 MMOL/L (ref 136–145)
SPECIMEN SITE: ABNORMAL
VENTILATION MODE VENT: ABNORMAL
VT SETTING VENT: 500 ML
WBC # BLD AUTO: 7.7 K/UL (ref 4.1–11.1)

## 2020-11-14 PROCEDURE — 82803 BLOOD GASES ANY COMBINATION: CPT

## 2020-11-14 PROCEDURE — 74011250636 HC RX REV CODE- 250/636: Performed by: INTERNAL MEDICINE

## 2020-11-14 PROCEDURE — 2709999900 HC NON-CHARGEABLE SUPPLY

## 2020-11-14 PROCEDURE — 94003 VENT MGMT INPAT SUBQ DAY: CPT

## 2020-11-14 PROCEDURE — 74011250636 HC RX REV CODE- 250/636: Performed by: SURGERY

## 2020-11-14 PROCEDURE — 71045 X-RAY EXAM CHEST 1 VIEW: CPT

## 2020-11-14 PROCEDURE — 74011250637 HC RX REV CODE- 250/637: Performed by: SURGERY

## 2020-11-14 PROCEDURE — 77030040922 HC BLNKT HYPOTHRM STRY -A

## 2020-11-14 PROCEDURE — 74011000250 HC RX REV CODE- 250: Performed by: INTERNAL MEDICINE

## 2020-11-14 PROCEDURE — 74011250637 HC RX REV CODE- 250/637: Performed by: INTERNAL MEDICINE

## 2020-11-14 PROCEDURE — 65610000006 HC RM INTENSIVE CARE

## 2020-11-14 PROCEDURE — 80053 COMPREHEN METABOLIC PANEL: CPT

## 2020-11-14 PROCEDURE — 94640 AIRWAY INHALATION TREATMENT: CPT

## 2020-11-14 PROCEDURE — 83735 ASSAY OF MAGNESIUM: CPT

## 2020-11-14 PROCEDURE — 74011000258 HC RX REV CODE- 258: Performed by: INTERNAL MEDICINE

## 2020-11-14 PROCEDURE — 36415 COLL VENOUS BLD VENIPUNCTURE: CPT

## 2020-11-14 PROCEDURE — 84100 ASSAY OF PHOSPHORUS: CPT

## 2020-11-14 PROCEDURE — C9113 INJ PANTOPRAZOLE SODIUM, VIA: HCPCS | Performed by: SURGERY

## 2020-11-14 PROCEDURE — 85025 COMPLETE CBC W/AUTO DIFF WBC: CPT

## 2020-11-14 RX ADMIN — DEXMEDETOMIDINE HYDROCHLORIDE 1.2 MCG/KG/HR: 400 INJECTION INTRAVENOUS at 22:54

## 2020-11-14 RX ADMIN — HYDROMORPHONE HYDROCHLORIDE 1 MG: 2 INJECTION, SOLUTION INTRAMUSCULAR; INTRAVENOUS; SUBCUTANEOUS at 01:22

## 2020-11-14 RX ADMIN — BUMETANIDE 2 MG: 0.25 INJECTION INTRAMUSCULAR; INTRAVENOUS at 17:59

## 2020-11-14 RX ADMIN — ENOXAPARIN SODIUM 40 MG: 40 INJECTION SUBCUTANEOUS at 20:43

## 2020-11-14 RX ADMIN — ACETYLCYSTEINE 400 MG: 100 SOLUTION ORAL; RESPIRATORY (INHALATION) at 00:11

## 2020-11-14 RX ADMIN — Medication 300 MCG/HR: at 17:45

## 2020-11-14 RX ADMIN — ACETAMINOPHEN ORAL SOLUTION 650 MG: 650 SOLUTION ORAL at 12:00

## 2020-11-14 RX ADMIN — DEXMEDETOMIDINE HYDROCHLORIDE 1.5 MCG/KG/HR: 400 INJECTION INTRAVENOUS at 06:31

## 2020-11-14 RX ADMIN — QUETIAPINE FUMARATE 50 MG: 25 TABLET ORAL at 08:52

## 2020-11-14 RX ADMIN — MEROPENEM 500 MG: 500 INJECTION, POWDER, FOR SOLUTION INTRAVENOUS at 04:13

## 2020-11-14 RX ADMIN — DEXMEDETOMIDINE HYDROCHLORIDE 1 MCG/KG/HR: 400 INJECTION INTRAVENOUS at 20:44

## 2020-11-14 RX ADMIN — DEXMEDETOMIDINE HYDROCHLORIDE 1.5 MCG/KG/HR: 400 INJECTION INTRAVENOUS at 04:23

## 2020-11-14 RX ADMIN — IPRATROPIUM BROMIDE AND ALBUTEROL SULFATE 3 ML: .5; 3 SOLUTION RESPIRATORY (INHALATION) at 03:49

## 2020-11-14 RX ADMIN — IPRATROPIUM BROMIDE AND ALBUTEROL SULFATE 3 ML: .5; 3 SOLUTION RESPIRATORY (INHALATION) at 19:26

## 2020-11-14 RX ADMIN — ACETYLCYSTEINE 400 MG: 100 SOLUTION ORAL; RESPIRATORY (INHALATION) at 12:04

## 2020-11-14 RX ADMIN — BUMETANIDE 2 MG: 0.25 INJECTION INTRAMUSCULAR; INTRAVENOUS at 08:51

## 2020-11-14 RX ADMIN — ACETYLCYSTEINE 400 MG: 100 SOLUTION ORAL; RESPIRATORY (INHALATION) at 19:27

## 2020-11-14 RX ADMIN — DEXMEDETOMIDINE HYDROCHLORIDE 1.4 MCG/KG/HR: 400 INJECTION INTRAVENOUS at 11:38

## 2020-11-14 RX ADMIN — DEXMEDETOMIDINE HYDROCHLORIDE 1 MCG/KG/HR: 400 INJECTION INTRAVENOUS at 17:45

## 2020-11-14 RX ADMIN — DEXMEDETOMIDINE HYDROCHLORIDE 1.4 MCG/KG/HR: 400 INJECTION INTRAVENOUS at 09:09

## 2020-11-14 RX ADMIN — MIDAZOLAM HYDROCHLORIDE 2 MG: 5 INJECTION INTRAMUSCULAR; INTRAVENOUS at 01:09

## 2020-11-14 RX ADMIN — DOCUSATE SODIUM 100 MG: 50 LIQUID ORAL at 17:59

## 2020-11-14 RX ADMIN — MIDAZOLAM HYDROCHLORIDE 2 MG: 5 INJECTION INTRAMUSCULAR; INTRAVENOUS at 22:52

## 2020-11-14 RX ADMIN — MEROPENEM 500 MG: 500 INJECTION, POWDER, FOR SOLUTION INTRAVENOUS at 08:51

## 2020-11-14 RX ADMIN — VANCOMYCIN HYDROCHLORIDE 1750 MG: 10 INJECTION, POWDER, LYOPHILIZED, FOR SOLUTION INTRAVENOUS at 04:23

## 2020-11-14 RX ADMIN — MEROPENEM 500 MG: 500 INJECTION, POWDER, FOR SOLUTION INTRAVENOUS at 20:44

## 2020-11-14 RX ADMIN — ACETYLCYSTEINE 400 MG: 100 SOLUTION ORAL; RESPIRATORY (INHALATION) at 15:27

## 2020-11-14 RX ADMIN — ACETYLCYSTEINE 400 MG: 100 SOLUTION ORAL; RESPIRATORY (INHALATION) at 03:49

## 2020-11-14 RX ADMIN — VANCOMYCIN HYDROCHLORIDE 1750 MG: 10 INJECTION, POWDER, LYOPHILIZED, FOR SOLUTION INTRAVENOUS at 20:44

## 2020-11-14 RX ADMIN — IPRATROPIUM BROMIDE AND ALBUTEROL SULFATE 3 ML: .5; 3 SOLUTION RESPIRATORY (INHALATION) at 15:27

## 2020-11-14 RX ADMIN — IPRATROPIUM BROMIDE AND ALBUTEROL SULFATE 3 ML: .5; 3 SOLUTION RESPIRATORY (INHALATION) at 12:04

## 2020-11-14 RX ADMIN — Medication 300 MCG/HR: at 12:27

## 2020-11-14 RX ADMIN — ACETYLCYSTEINE 400 MG: 100 SOLUTION ORAL; RESPIRATORY (INHALATION) at 23:38

## 2020-11-14 RX ADMIN — HYDROMORPHONE HYDROCHLORIDE 1 MG: 2 INJECTION, SOLUTION INTRAMUSCULAR; INTRAVENOUS; SUBCUTANEOUS at 21:36

## 2020-11-14 RX ADMIN — MIDAZOLAM HYDROCHLORIDE 9 MG/HR: 5 INJECTION, SOLUTION INTRAMUSCULAR; INTRAVENOUS at 05:51

## 2020-11-14 RX ADMIN — IPRATROPIUM BROMIDE AND ALBUTEROL SULFATE 3 ML: .5; 3 SOLUTION RESPIRATORY (INHALATION) at 00:11

## 2020-11-14 RX ADMIN — GUAIFENESIN 400 MG: 200 SOLUTION ORAL at 12:31

## 2020-11-14 RX ADMIN — QUETIAPINE FUMARATE 50 MG: 25 TABLET ORAL at 20:43

## 2020-11-14 RX ADMIN — FLUCONAZOLE IN SODIUM CHLORIDE 400 MG: 2 INJECTION, SOLUTION INTRAVENOUS at 08:52

## 2020-11-14 RX ADMIN — GUAIFENESIN 400 MG: 200 SOLUTION ORAL at 08:51

## 2020-11-14 RX ADMIN — DEXMEDETOMIDINE HYDROCHLORIDE 1.2 MCG/KG/HR: 400 INJECTION INTRAVENOUS at 14:15

## 2020-11-14 RX ADMIN — IPRATROPIUM BROMIDE AND ALBUTEROL SULFATE 3 ML: .5; 3 SOLUTION RESPIRATORY (INHALATION) at 07:55

## 2020-11-14 RX ADMIN — VANCOMYCIN HYDROCHLORIDE 1750 MG: 10 INJECTION, POWDER, LYOPHILIZED, FOR SOLUTION INTRAVENOUS at 13:13

## 2020-11-14 RX ADMIN — IPRATROPIUM BROMIDE AND ALBUTEROL SULFATE 3 ML: .5; 3 SOLUTION RESPIRATORY (INHALATION) at 23:38

## 2020-11-14 RX ADMIN — Medication 300 MCG/HR: at 01:45

## 2020-11-14 RX ADMIN — ACETAMINOPHEN 650 MG: 650 SUPPOSITORY RECTAL at 20:44

## 2020-11-14 RX ADMIN — ACETAMINOPHEN 650 MG: 650 SUPPOSITORY RECTAL at 00:54

## 2020-11-14 RX ADMIN — GUAIFENESIN 400 MG: 200 SOLUTION ORAL at 16:54

## 2020-11-14 RX ADMIN — ACETYLCYSTEINE 400 MG: 100 SOLUTION ORAL; RESPIRATORY (INHALATION) at 07:55

## 2020-11-14 RX ADMIN — MEROPENEM 500 MG: 500 INJECTION, POWDER, FOR SOLUTION INTRAVENOUS at 15:35

## 2020-11-14 RX ADMIN — SODIUM CHLORIDE 40 MG: 9 INJECTION INTRAMUSCULAR; INTRAVENOUS; SUBCUTANEOUS at 08:51

## 2020-11-14 RX ADMIN — Medication 300 MCG/HR: at 07:04

## 2020-11-14 RX ADMIN — DOCUSATE SODIUM 100 MG: 50 LIQUID ORAL at 08:51

## 2020-11-14 RX ADMIN — DEXMEDETOMIDINE HYDROCHLORIDE 1.5 MCG/KG/HR: 400 INJECTION INTRAVENOUS at 01:45

## 2020-11-14 RX ADMIN — Medication 300 MCG/HR: at 23:01

## 2020-11-14 NOTE — PROGRESS NOTES
[facility]     Name: Josiah Benites MRN: [id number]   : 1970 Hospital: [address]   Date: 2020        Impression Plan   1. Acute hypoxemic respiratory failure, intubated , tube exchanged   2. Hemorrhagic shock, off pressors   3. Acute cholecystitis s/p robotic assisted lap bailee w/ TAMIKO   4. Post-op bleeding/tear in liver capsule s/p ex-lap and evacuation of hematoma   5. Acute blood loss anemia  6. MRSA pneumonia  7. Persistent fevers  8. H/o seizures  9. REBECCA                 · Continue vent support,  · Wean FiO2/PEEP to keep sats > 88%  · Wean sedation for goal RASS of -1 to 0  · No propofol. Wean versed gtt off first.   · Continue vanc, flucon and cont alvaro (started )  · Blood cultures NG  · continue scheduled duonebs, mucinex, mucomyst and chest PT  · Trend Hgb, transfuse to keep > 7  · Continue bumex  · Replete lytes  · CT chest/abd/pelvis negative for acute process in abdomen. Known PNA noted   · Continue seroquel  · Post-op mgmt as per surgery    DVT ppx: lovenox  GI ppx: protonix  NPO. Resume TF after CT    Pt is critically ill and at high risk of decompensation  CCT: 37 minutes         Radiology  ( personally reviewed) CT chest reviewed: Bilateral PNA   ABG No results for input(s): PHI, PO2I, PCO2I in the last 72 hours. Subjective       49 yo with cholecystits s/p lab bailee complicated by laceration of the liver capsule and hemorrhagic shock. Hematoma evacuated- 2L blood. Currently on daniel 130 and Levophed 8.  Pt intubated and sedated- unable to provide any further hx.      - intubated     - bronch, tube exchange       - echo: EF 55-60%, RV normal    *sputum culture () - MRSA  *all other cultures NGTD  *MRSA screen positive    **stopped propofol due to high TG    Interval history:  Tm 100.7, T low 95.3  Per nursing, unable to pass suction catheter more than an inch- ETT looks clamped down springer and bite guard.  I/O: -65  Furrows his brow to voice      Past Medical History:   Diagnosis Date    GERD (gastroesophageal reflux disease)     Panic attack     Psychiatric disorder     anxiety    Seizures ([facility] [address]     7 yo   was told it was a stress seizure, never had another one    Sleep apnea     uses CPAP      Past Surgical History:   Procedure Laterality Date    ABDOMEN SURGERY PROC UNLISTED  1977    hernia(abdomen)- open    HX GI  2008    esophagus repair narrow- balloon dilation and scar tissue removal    HX HERNIA REPAIR      inguinal hernia    HX OTHER SURGICAL  11years of age    tonsils    HX OTHER SURGICAL  2009    hernia (hiatal and abdomen)-open    HX OTHER SURGICAL  23years of age    wisdom teeth extraction under anesthesia    HX OTHER SURGICAL  8 years     distended testicle     HX OTHER SURGICAL  1/16/15    Laparoscopic ventral hernia repair with mesh      Prior to Admission medications    Medication Sig Start Date End Date Taking? Authorizing Provider   buPROPion SR [facility] SR) 150 mg SR tablet Take 150 mg by mouth two (2) times a day. Yes Provider, Historical   sertraline (Zoloft) 50 mg tablet Take 50 mg by mouth daily. Yes Provider, Historical   citalopram (CELEXA) 40 mg tablet Take 40 mg by mouth daily. Yes Provider, Historical   docusate sodium (COLACE) 100 mg capsule Take 1 Cap by mouth two (2) times a day for 10 days. 11/4/20 11/14/20 Yes Choco Perea MD   lisinopril (PRINIVIL, ZESTRIL) 40 mg tablet Take 40 mg by mouth every evening. Yes Provider, Historical   simvastatin (ZOCOR) 40 mg tablet Take 40 mg by mouth nightly. Yes Provider, Historical   omeprazole (PRILOSEC) 40 mg capsule Take 40 mg by mouth daily.    Yes Provider, Historical     Current Facility-Administered Medications   Medication Dose Route Frequency    midazolam in normal saline (VERSED) 1 mg/mL infusion  0-10 mg/hr IntraVENous TITRATE    meropenem (MERREM) 500 mg in sterile water (preservative free) 10 mL IV syringe  0.5 g IntraVENous Q6H    dexmedeTOMidine in 0.9 % NaCl (PRECEDEX) 400 mcg/100 mL (4 mcg/mL) infusion soln  0.1-1.5 mcg/kg/hr IntraVENous TITRATE    fentaNYL (PF) 1,500 mcg/30 mL (50 mcg/mL) infusion  0-300 mcg/hr IntraVENous TITRATE    QUEtiapine (SEROquel) tablet 50 mg  50 mg Oral BID    vancomycin (VANCOCIN) 1,750 mg in 0.9% sodium chloride 500 mL IVPB  1,750 mg IntraVENous Q8H    bumetanide (BUMEX) injection 2 mg  2 mg IntraVENous BID    fluconazole (DIFLUCAN) 400mg/200 mL IVPB (premix)  400 mg IntraVENous DAILY    albuterol-ipratropium (DUO-NEB) 2.5 MG-0.5 MG/3 ML  3 mL Nebulization Q4H RT    acetylcysteine (MUCOMYST) 100 mg/mL (10 %) nebulizer solution 400 mg  4 mL Nebulization Q4H RT    docusate (COLACE) 50 mg/5 mL oral liquid 100 mg  100 mg Oral BID    guaiFENesin (ROBITUSSIN) 100 mg/5 mL oral liquid 400 mg  400 mg Oral Q4H    enoxaparin (LOVENOX) injection 40 mg  40 mg SubCUTAneous Q24H    pantoprazole (PROTONIX) 40 mg in 0.9% sodium chloride 10 mL injection  40 mg IntraVENous DAILY     Allergies   Allergen Reactions    Pcn [Penicillins] Other (comments)     Reaction was at age 6, itching and eyes swollen; denies any sob or tongue swelling. Has been on Zosyn in the hospital with no problems    Codeine Nausea and Vomiting      Social History     Tobacco Use    Smoking status: Never Smoker    Smokeless tobacco: Never Used   Substance Use Topics    Alcohol use: Yes     Alcohol/week: 1.7 standard drinks     Types: 2 Cans of beer per week     Comment: 1 or 2 beer every two weeks      Family History   Problem Relation Age of Onset    Diabetes Mother     Hypertension Mother     Stroke Mother         copd    Cancer Father         skin    Heart Disease Father     Diabetes Father     Heart Attack Father           Laboratory: I have personally reviewed the critical care flowsheet and labs.      Recent Labs     11/14/20  0400 11/13/20  0417 11/12/20  0344   WBC 7.7 5.8 6.0   HGB 8. 3* 9.0* 8.0*   HCT 26.6* 28.8* 25.0*    339 242     Recent Labs     11/14/20  0400 11/13/20  0417 11/12/20  0344    141 142   K 3.7 3.4* 3.6    107 107   CO2 31 32 33*   * 132* 127*   BUN 12 10 10   CREA 0.56* 0.75 0.78   CA 8.1* 8.5 7.9*   MG 2.3 2.4 2.2   PHOS 2.7 2.7 2.3*   ALB 1.8* 1.9* 1.8*   ALT 38 46 49       Objective:     Mode Rate Tidal Volume Pressure FiO2 PEEP   Assist control   500 ml  5 cm H2O 40 % 5 cm H20     Vital Signs:    Ventilator Pressures  PC Set: 16  Pressure Support (cm H2O): 5 cm H2O  PIP Observed (cm H2O): 33 cm H2O  Plateau Pressure (cm H2O): 14 cm H2O  MAP (cm H2O): 13  PEEP/VENT (cm H2O): 5 cm A60FLAE(24)Ventilator Pressures  PC Set: 16  Pressure Support (cm H2O): 5 cm H2O  PIP Observed (cm H2O): 33 cm H2O  Plateau Pressure (cm H2O): 14 cm H2O  MAP (cm H2O): 13  PEEP/VENT (cm H2O): 5 cm H20  Safety & Alarms  Circuit Temperature: (HME)  Backup Mode Checked/Apnea: Yes  Pressure Max: 55 cm H2O  Pressure Min: 13 cm H2O  Ve Min: 2  Ve Max: 22  Vt Min: 200 ml  Vt Max: 1000 ml  RR Min: 10  RR Max: 45  Intake/Output:   Last shift:      Ventilator Volumes  Vt Set (ml): 500 ml  Vt Exhaled (Machine Breath) (ml): 505 ml  Vt Spont (ml): 396 ml  Ve Observed (l/min): 8.91 l/min  Last 3 shifts: No intake/output data recorded. RRIOLAST3    Intake/Output Summary (Last 24 hours) at 11/14/2020 9448  Last data filed at 11/14/2020 0700  Gross per 24 hour   Intake 3439.68 ml   Output 6932 ml   Net -3492.32 ml     EXAM:   GENERAL: intubated, sedated, pupils pinpoint  HEENT:  Anicteric sclera, orally intubated  NECK:  Trachea midline  LUNGS: diminished, CTA  HEART: Regular rate and rhythm   ABDOMEN:  Soft, hypoactive bowel sounds, incision c/d/i  EXTREMITIES: edema   NEUROLOGIC: RASS -2    Terrell Cornelius MD  [facility]

## 2020-11-14 NOTE — PROGRESS NOTES
Physician Progress Note      [facility]  [facility]                  [id number]  :                       1970  ADMIT DATE:       11/3/2020 10:51 PM  [address] DATE:  RESPONDING  PROVIDER #:        Jassi Harrell MD          QUERY TEXT:    Dear Dr. Kristen Cruz,  Pt. admitted with acute cholecystitis. Pt noted to have post-operative bleeding. If possible, please document in progress notes and discharge summary:    The medical record reflects the following:  Risk Factors: 52 yr. old male admitted with acute cholecystitis. .  Clinical Indicators: Postoperative bleeding noted with EBL of 2,000ml. Tear in liver capsule noted in operative report. \"The abdomen was inspected and there did appear to be dense adhesions of omentum as well as the stomach to the anterior abdominal wall as well as the liver. The stomach, although it was taken down from the anterior abdominal wall, was densely plastered to the left lobe of the liver. It was unable to be dissected free. \"  \"The liver was first inspected and did appear to have bleeding from the liver edge just adjacent to where the stomach was still densely adherent and it appeared that there was a tear in the liver capsule in this location with significant oozing from that surface. \"  Treatment: Exploratory laparotomy, with, cauterized. Blood transfusion  Options provided:  -- Tear in liver capsule as an postoperative complication  -- Tear in liver capsule as an expected/inherent condition that occurred postoperatively and not a complication  -- Tear in liver capsule related to other incidental risk factor (please specify) occurring following surgery and not a complication, Please document incidental risk factor.   -- Other - I will add my own diagnosis  -- Disagree - Not applicable / Not valid  -- Disagree - Clinically unable to determine / Unknown  -- Refer to Clinical Documentation Reviewer    PROVIDER RESPONSE TEXT:    Patient has tear in liver capsule as a postoperative complication.     Query created by: Mayank Kauffman on 11/13/2020 2:56 PM      Electronically signed by:  Mable Guzman MD 11/14/2020 10:09 AM

## 2020-11-14 NOTE — PROGRESS NOTES
1900: Bedside and Verbal shift change report given to Marty Chavez, student RN and Rebeca Dobson RN (oncoming nurse) by Radha Redd RN (offgoing nurse). Report included the following information SBAR, Kardex, ED Summary, Procedure Summary, Intake/Output, MAR, Recent Results and Cardiac Rhythm NSR to Sinus Tachy. 2000: Patient assessed. Flowsheet updated. Patient provided ice packs to help with increasing fever. 5034-3275: Patient's BP and HR increasing. Increased Versed gtt to 8 mg/hr. Increased Abdominal breathing. Moving L leg towards abd. Grimacing. Patient given Versed push 2mg. Will continue to monitor. Patient febrile, turned on cooling blanket. 0000: Patient reassessed. Changes documented in flowsheet. 1364-7129: Following chest pt, patient vomited moderate amount of coffee ground emesis. Held guaifenesin due to pt condition. Turned continuous suction on low. Pt vomited again while turning to clean. Pt pulse and BP high. Gave tylenol for fever. Increased versed to 9 mg/hr. Gave 2 mg Versed push. Patient abdominally breathing and RR over 30. Patient given 1 mg dilaudid. 0400: Patient reassessed. Held guaifenesin again due to suction needed. 0600: Patient's inline suction catheter unable to thread to suction while doing oral care. Called RT. RT tried to lavage and suction. She recommended a completely new ETT tube. Will pass along message to day shift. Patient temperature lower - placed extra blankets on patient. 0630: Patient temperature dropping below 96. Placed sheryl hugger on patient. 0700: Bedside and Verbal shift change report given to HIWOT Cole (oncoming nurse) by Brooklyn Gonzalez, HIWOT and Marty Chavez student RN (offgoing nurse). Report included the following information SBAR, Kardex, ED Summary, OR Summary, Intake/Output, MAR, Recent Results and Cardiac Rhythm NSR to Sinus Tachy.

## 2020-11-14 NOTE — PROGRESS NOTES
0700- Bedside and Verbal shift change report given to Alfie Suarez (oncoming nurse) by Julia Machuca RN (offgoing nurse). Report included the following information SBAR, Kardex, ED Summary, OR Summary, Procedure Summary, Intake/Output, MAR, Accordion, Recent Results, Cardiac Rhythm NSR and Alarm Parameters    0800- Patient assessment completed; see flowsheet   VAP bundle performed   Restraints assessed; ROM performed; no s/s of injury   Turn team for turn documentation   Weaning sedation per Zaria Mijares MD to plan for SBT; see MAR   1000 VAP bundle performed   Restraints assessed; ROM performed; no s/s of injury   1200 - Pt. Reassessment; see flowsheet   Pt. Febrile 101.1 F; PRN tylenol administered; see flowsheet   VAP bundle performed   Restraints assessed; ROM performed; no s/s of injury   1400 VAP bundle performed   Restraints assessed; ROM performed; no s/s of injury   1600 - Pt. Reassessment performed; see flowsheet   VAP bundle performed   Restraints assessed; ROM performed; no s/s of injury   1800 VAP bundle performed   Restraints assessed; ROM performed; no s/s of injury   1900- Bedside and Verbal shift change report given to Nimesh (oncoming nurse) by Hernandez Antonie RN (offgoing nurse). Report included the following information SBAR, Kardex, ED Summary, OR Summary, Intake/Output, MAR, Recent Results, Med Rec Status and Cardiac Rhythm NSR.

## 2020-11-14 NOTE — PROGRESS NOTES
SURGERY PROGRESS NOTE      Admit Date: 11/3/2020    POD 10 Days Post-Op    Procedure: Procedure(s):  LAPAROTOMY EXPLORATORY, evacuation of hematoma. Subjective:   Remains intubated and sedated      Objective:     Visit Vitals  BP (!) 97/55   Pulse 84   Temp 98.6 °F (37 °C)   Resp 17   Ht 5' 11\" (1.803 m)   Wt 118.8 kg (262 lb)   SpO2 93%   BMI 36.54 kg/m²        Temp (24hrs), Av.7 °F (37.1 °C), Min:95.3 °F (35.2 °C), Max:100.6 °F (38.1 °C)      Physical Exam:     Abdomen:  Soft. Non-tender, non-distended. Incision C/D/I.         Lab Results   Component Value Date/Time    WBC 7.7 2020 04:00 AM    HGB 8.3 (L) 2020 04:00 AM    Hemoglobin (POC) 6.8 (L) 2020 03:42 PM    Hemoglobin (POC) 13.3 2014 10:05 PM    HCT 26.6 (L) 2020 04:00 AM    Hematocrit (POC) 39 2014 10:05 PM    PLATELET 280  04:00 AM    MCV 94.7 2020 04:00 AM     Lab Results   Component Value Date/Time    GFR est non-AA >60 2020 04:00 AM    GFRNA, POC >60 2014 10:05 PM    GFR est AA >60 2020 04:00 AM    GFRAA, POC >60 2014 10:05 PM    Creatinine 0.56 (L) 2020 04:00 AM    Creatinine (POC) 1.1 2014 10:05 PM    BUN 12 2020 04:00 AM    BUN (POC) 15 2014 10:05 PM    Sodium 141 2020 04:00 AM    Sodium (POC) 141 2014 10:05 PM    Potassium 3.7 2020 04:00 AM    Potassium (POC) 3.5 2014 10:05 PM    Chloride 104 2020 04:00 AM    Chloride (POC) 106 2014 10:05 PM    CO2 31 2020 04:00 AM    Magnesium 2.3 2020 04:00 AM    Phosphorus 2.7 2020 04:00 AM       Assessment:     Active Problems:    Biliary colic ()      Cholecystitis (2020)        Plan/Recommendations/Medical Decision Making:   Continue to wean vent as able  Tube feeds on hold as he is not tolerating  CT looks ok

## 2020-11-14 NOTE — PROGRESS NOTES
Change of Shift Report:    1900:  Bedside and Verbal shift change report given to Rebeca Newton RN and France Pierce student RN (oncoming nurse) by Dario Damian RN (offgoing nurse). Report included the following information SBAR, Kardex, ED Summary, Intake/Output, MAR, Accordion, Recent Results and Cardiac Rhythm NSR. Shift Summary:    2000:  Patient assessment completed. Patient intubated and sedated. Patient able to open eyes to pain. Precedex going at 1.5 mcg/kg/hr, fentanyl going at 300 mcg/hr, versed going at 7 mg/hr. Patient's heart rates in the 90s, BPs maintaining MAPs >65. All documentation and charting to be completed by nursing studentFrance. End of Shift Report:    0700:  Bedside and Verbal shift change report given to Woody Payan RN (oncoming nurse) by Dot Dickerson RN and student RNFrance (offgoing nurse). Report included the following information SBAR, Kardex, ED Summary, Intake/Output, MAR, Accordion, Recent Results and Cardiac Rhythm NSR.

## 2020-11-14 NOTE — PROGRESS NOTES
Problem: Falls - Risk of  Goal: *Absence of Falls  Description: Document Richard Merlin Fall Risk and appropriate interventions in the flowsheet. Outcome: Progressing Towards Goal  Note: Fall Risk Interventions:       Mentation Interventions: Adequate sleep, hydration, pain control, Bed/chair exit alarm, Door open when patient unattended, Room close to nurse's station    Medication Interventions: Evaluate medications/consider consulting pharmacy, Bed/chair exit alarm    Elimination Interventions: Toileting schedule/hourly rounds              Problem: Patient Education: Go to Patient Education Activity  Goal: Patient/Family Education  Outcome: Progressing Towards Goal     Problem: Ventilator Management  Goal: *Adequate oxygenation and ventilation  Outcome: Progressing Towards Goal  Goal: *Patient maintains clear airway/free of aspiration  Outcome: Progressing Towards Goal  Goal: *Absence of infection signs and symptoms  Outcome: Progressing Towards Goal  Goal: *Normal spontaneous ventilation  Outcome: Progressing Towards Goal     Problem: Patient Education: Go to Patient Education Activity  Goal: Patient/Family Education  Outcome: Progressing Towards Goal     Problem: Pressure Injury - Risk of  Goal: *Prevention of pressure injury  Description: Document Jeffery Scale and appropriate interventions in the flowsheet.   Outcome: Progressing Towards Goal  Note: Pressure Injury Interventions:  Sensory Interventions: Assess changes in LOC, Assess need for specialty bed, Check visual cues for pain, Float heels    Moisture Interventions: Absorbent underpads, Apply protective barrier, creams and emollients, Assess need for specialty bed    Activity Interventions: Assess need for specialty bed, Pressure redistribution bed/mattress(bed type)    Mobility Interventions: Float heels, HOB 30 degrees or less    Nutrition Interventions: Document food/fluid/supplement intake    Friction and Shear Interventions: HOB 30 degrees or less, Minimize layers                Problem: Patient Education: Go to Patient Education Activity  Goal: Patient/Family Education  Outcome: Progressing Towards Goal     Problem: Infection - Risk of, Central Venous Catheter-Associated Bloodstream Infection  Goal: *Absence of infection signs and symptoms  Outcome: Progressing Towards Goal     Problem: Patient Education: Go to Patient Education Activity  Goal: Patient/Family Education  Outcome: Progressing Towards Goal     Problem: Surgical Pathway Day of Surgery  Goal: Off Pathway (Use only if patient is Off Pathway)  Outcome: Progressing Towards Goal  Goal: Activity/Safety  Outcome: Progressing Towards Goal  Goal: Consults, if ordered  Outcome: Progressing Towards Goal  Goal: Nutrition/Diet  Outcome: Progressing Towards Goal  Goal: Medications  Outcome: Progressing Towards Goal  Goal: Respiratory  Outcome: Progressing Towards Goal  Goal: Treatments/Interventions/Procedures  Outcome: Progressing Towards Goal  Goal: Psychosocial  Outcome: Progressing Towards Goal  Goal: *No signs and symptoms of infection or wound complications  Outcome: Progressing Towards Goal  Goal: *Optimal pain control at patient's stated goal  Outcome: Progressing Towards Goal  Goal: *Adequate urinary output (equal to or greater than 30 milliliters/hour)  Description: Ambulatory Surgery patients voiding without difficulty.   Outcome: Progressing Towards Goal  Goal: *Hemodynamically stable  Outcome: Progressing Towards Goal  Goal: *Tolerating diet  Outcome: Progressing Towards Goal  Goal: *Demonstrates progressive activity  Outcome: Progressing Towards Goal     Problem: Surgical Pathway Post-Op Day 1  Goal: Off Pathway (Use only if patient is Off Pathway)  Outcome: Progressing Towards Goal  Goal: Activity/Safety  Outcome: Progressing Towards Goal  Goal: Diagnostic Test/Procedures  Outcome: Progressing Towards Goal  Goal: Nutrition/Diet  Outcome: Progressing Towards Goal  Goal: Discharge Planning  Outcome: Progressing Towards Goal  Goal: Medications  Outcome: Progressing Towards Goal  Goal: Respiratory  Outcome: Progressing Towards Goal  Goal: Treatments/Interventions/Procedures  Outcome: Progressing Towards Goal  Goal: Psychosocial  Outcome: Progressing Towards Goal  Goal: *No signs and symptoms of infection or wound complications  Outcome: Progressing Towards Goal  Goal: *Optimal pain control at patient's stated goal  Outcome: Progressing Towards Goal  Goal: *Adequate urinary output (equal to or greater than 30 milliliters/hour)  Outcome: Progressing Towards Goal  Goal: *Hemodynamically stable  Outcome: Progressing Towards Goal  Goal: *Tolerating diet  Outcome: Progressing Towards Goal  Goal: *Demonstrates progressive activity  Outcome: Progressing Towards Goal  Goal: *Lungs clear or at baseline  Outcome: Progressing Towards Goal     Problem: Non-Violent Restraints  Goal: *Removal from restraints as soon as assessed to be safe  Outcome: Progressing Towards Goal  Goal: *No harm/injury to patient while restraints in use  Outcome: Progressing Towards Goal  Goal: *Patient's dignity will be maintained  Outcome: Progressing Towards Goal  Goal: *Patient Specific Goal (EDIT GOAL, INSERT TEXT)  Outcome: Progressing Towards Goal  Goal: Non-violent Restaints:Standard Interventions  Outcome: Progressing Towards Goal  Goal: Non-violent Restraints:Patient Interventions  Outcome: Progressing Towards Goal  Goal: Patient/Family Education  Outcome: Progressing Towards Goal     Problem: Risk for Spread of Infection  Goal: Prevent transmission of infectious organism to others  Description: Prevent the transmission of infectious organisms to other patients, staff members, and visitors.   Outcome: Progressing Towards Goal     Problem: Patient Education:  Go to Education Activity  Goal: Patient/Family Education  Outcome: Progressing Towards Goal

## 2020-11-15 ENCOUNTER — APPOINTMENT (OUTPATIENT)
Dept: GENERAL RADIOLOGY | Age: 50
DRG: 417 | End: 2020-11-15
Attending: INTERNAL MEDICINE
Payer: COMMERCIAL

## 2020-11-15 LAB
ALBUMIN SERPL-MCNC: 2 G/DL (ref 3.5–5)
ALBUMIN/GLOB SERPL: 0.5 {RATIO} (ref 1.1–2.2)
ALP SERPL-CCNC: 171 U/L (ref 45–117)
ALT SERPL-CCNC: 37 U/L (ref 12–78)
ANION GAP SERPL CALC-SCNC: 5 MMOL/L (ref 5–15)
ARTERIAL PATENCY WRIST A: YES
AST SERPL-CCNC: 33 U/L (ref 15–37)
BACTERIA SPEC CULT: NORMAL
BACTERIA SPEC CULT: NORMAL
BASE EXCESS BLDA CALC-SCNC: 4.2 MMOL/L
BASOPHILS # BLD: 0.1 K/UL (ref 0–0.1)
BASOPHILS NFR BLD: 1 % (ref 0–1)
BDY SITE: ABNORMAL
BILIRUB SERPL-MCNC: 0.7 MG/DL (ref 0.2–1)
BUN SERPL-MCNC: 12 MG/DL (ref 6–20)
BUN/CREAT SERPL: 19 (ref 12–20)
CALCIUM SERPL-MCNC: 8 MG/DL (ref 8.5–10.1)
CHLORIDE SERPL-SCNC: 103 MMOL/L (ref 97–108)
CO2 SERPL-SCNC: 32 MMOL/L (ref 21–32)
CREAT SERPL-MCNC: 0.64 MG/DL (ref 0.7–1.3)
DATE LAST DOSE: ABNORMAL
DIFFERENTIAL METHOD BLD: ABNORMAL
EOSINOPHIL # BLD: 0.2 K/UL (ref 0–0.4)
EOSINOPHIL NFR BLD: 3 % (ref 0–7)
EPAP/CPAP/PEEP, PAPEEP: 5
ERYTHROCYTE [DISTWIDTH] IN BLOOD BY AUTOMATED COUNT: 13.8 % (ref 11.5–14.5)
FIO2 ON VENT: 40 %
GAS FLOW.O2 SETTING OXYMISER: 10 L/MIN
GLOBULIN SER CALC-MCNC: 3.7 G/DL (ref 2–4)
GLUCOSE SERPL-MCNC: 114 MG/DL (ref 65–100)
HCO3 BLDA-SCNC: 29 MMOL/L (ref 22–26)
HCT VFR BLD AUTO: 28 % (ref 36.6–50.3)
HGB BLD-MCNC: 8.8 G/DL (ref 12.1–17)
IMM GRANULOCYTES # BLD AUTO: 0.1 K/UL (ref 0–0.04)
IMM GRANULOCYTES NFR BLD AUTO: 1 % (ref 0–0.5)
LYMPHOCYTES # BLD: 1.2 K/UL (ref 0.8–3.5)
LYMPHOCYTES NFR BLD: 16 % (ref 12–49)
MAGNESIUM SERPL-MCNC: 2.3 MG/DL (ref 1.6–2.4)
MCH RBC QN AUTO: 29.3 PG (ref 26–34)
MCHC RBC AUTO-ENTMCNC: 31.4 G/DL (ref 30–36.5)
MCV RBC AUTO: 93.3 FL (ref 80–99)
MONOCYTES # BLD: 0.5 K/UL (ref 0–1)
MONOCYTES NFR BLD: 7 % (ref 5–13)
NEUTS SEG # BLD: 5.6 K/UL (ref 1.8–8)
NEUTS SEG NFR BLD: 72 % (ref 32–75)
NRBC # BLD: 0 K/UL (ref 0–0.01)
NRBC BLD-RTO: 0 PER 100 WBC
PCO2 BLDA: 45 MMHG (ref 35–45)
PH BLDA: 7.43 [PH] (ref 7.35–7.45)
PHOSPHATE SERPL-MCNC: 2.4 MG/DL (ref 2.6–4.7)
PLATELET # BLD AUTO: 405 K/UL (ref 150–400)
PMV BLD AUTO: 8.9 FL (ref 8.9–12.9)
PO2 BLDA: 89 MMHG (ref 80–100)
POTASSIUM SERPL-SCNC: 3.8 MMOL/L (ref 3.5–5.1)
PROT SERPL-MCNC: 5.7 G/DL (ref 6.4–8.2)
RBC # BLD AUTO: 3 M/UL (ref 4.1–5.7)
REPORTED DOSE,DOSE: ABNORMAL UNITS
REPORTED DOSE/TIME,TMG: 2100
SAO2 % BLD: 97 % (ref 92–97)
SAO2% DEVICE SAO2% SENSOR NAME: ABNORMAL
SERVICE CMNT-IMP: NORMAL
SERVICE CMNT-IMP: NORMAL
SODIUM SERPL-SCNC: 140 MMOL/L (ref 136–145)
SPECIMEN SITE: ABNORMAL
VANCOMYCIN TROUGH SERPL-MCNC: 19.1 UG/ML (ref 5–10)
VENTILATION MODE VENT: ABNORMAL
VT SETTING VENT: 500 ML
WBC # BLD AUTO: 7.7 K/UL (ref 4.1–11.1)

## 2020-11-15 PROCEDURE — 36600 WITHDRAWAL OF ARTERIAL BLOOD: CPT

## 2020-11-15 PROCEDURE — C9113 INJ PANTOPRAZOLE SODIUM, VIA: HCPCS | Performed by: SURGERY

## 2020-11-15 PROCEDURE — 84100 ASSAY OF PHOSPHORUS: CPT

## 2020-11-15 PROCEDURE — 71045 X-RAY EXAM CHEST 1 VIEW: CPT

## 2020-11-15 PROCEDURE — 85025 COMPLETE CBC W/AUTO DIFF WBC: CPT

## 2020-11-15 PROCEDURE — 83735 ASSAY OF MAGNESIUM: CPT

## 2020-11-15 PROCEDURE — 74011250637 HC RX REV CODE- 250/637: Performed by: SURGERY

## 2020-11-15 PROCEDURE — 36415 COLL VENOUS BLD VENIPUNCTURE: CPT

## 2020-11-15 PROCEDURE — 82803 BLOOD GASES ANY COMBINATION: CPT

## 2020-11-15 PROCEDURE — 74011250637 HC RX REV CODE- 250/637: Performed by: INTERNAL MEDICINE

## 2020-11-15 PROCEDURE — 94003 VENT MGMT INPAT SUBQ DAY: CPT

## 2020-11-15 PROCEDURE — 94640 AIRWAY INHALATION TREATMENT: CPT

## 2020-11-15 PROCEDURE — 74011250636 HC RX REV CODE- 250/636: Performed by: SURGERY

## 2020-11-15 PROCEDURE — 74011000250 HC RX REV CODE- 250: Performed by: INTERNAL MEDICINE

## 2020-11-15 PROCEDURE — 80202 ASSAY OF VANCOMYCIN: CPT

## 2020-11-15 PROCEDURE — 80053 COMPREHEN METABOLIC PANEL: CPT

## 2020-11-15 PROCEDURE — 74011250636 HC RX REV CODE- 250/636: Performed by: INTERNAL MEDICINE

## 2020-11-15 PROCEDURE — 65610000006 HC RM INTENSIVE CARE

## 2020-11-15 PROCEDURE — 74011000258 HC RX REV CODE- 258: Performed by: INTERNAL MEDICINE

## 2020-11-15 RX ORDER — SENNOSIDES 8.8 MG/5ML
10 LIQUID ORAL 2 TIMES DAILY
Status: DISCONTINUED | OUTPATIENT
Start: 2020-11-15 | End: 2020-11-18

## 2020-11-15 RX ADMIN — DEXMEDETOMIDINE HYDROCHLORIDE 1.5 MCG/KG/HR: 400 INJECTION INTRAVENOUS at 09:00

## 2020-11-15 RX ADMIN — Medication 225 MCG/HR: at 15:32

## 2020-11-15 RX ADMIN — DEXMEDETOMIDINE HYDROCHLORIDE 1.5 MCG/KG/HR: 400 INJECTION INTRAVENOUS at 11:39

## 2020-11-15 RX ADMIN — GUAIFENESIN 400 MG: 200 SOLUTION ORAL at 15:44

## 2020-11-15 RX ADMIN — MIDAZOLAM HYDROCHLORIDE 2 MG: 5 INJECTION INTRAMUSCULAR; INTRAVENOUS at 05:11

## 2020-11-15 RX ADMIN — MEROPENEM 500 MG: 500 INJECTION, POWDER, FOR SOLUTION INTRAVENOUS at 20:56

## 2020-11-15 RX ADMIN — GUAIFENESIN 400 MG: 200 SOLUTION ORAL at 08:59

## 2020-11-15 RX ADMIN — IPRATROPIUM BROMIDE AND ALBUTEROL SULFATE 3 ML: .5; 3 SOLUTION RESPIRATORY (INHALATION) at 23:42

## 2020-11-15 RX ADMIN — ACETAMINOPHEN 650 MG: 650 SUPPOSITORY RECTAL at 16:26

## 2020-11-15 RX ADMIN — SENNOSIDES 17.6 MG: 8.8 LIQUID ORAL at 15:44

## 2020-11-15 RX ADMIN — VANCOMYCIN HYDROCHLORIDE 1750 MG: 10 INJECTION, POWDER, LYOPHILIZED, FOR SOLUTION INTRAVENOUS at 05:53

## 2020-11-15 RX ADMIN — ENOXAPARIN SODIUM 40 MG: 40 INJECTION SUBCUTANEOUS at 20:55

## 2020-11-15 RX ADMIN — MIDAZOLAM HYDROCHLORIDE 1 MG/HR: 5 INJECTION, SOLUTION INTRAMUSCULAR; INTRAVENOUS at 05:00

## 2020-11-15 RX ADMIN — IPRATROPIUM BROMIDE AND ALBUTEROL SULFATE 3 ML: .5; 3 SOLUTION RESPIRATORY (INHALATION) at 20:14

## 2020-11-15 RX ADMIN — Medication 300 MCG/HR: at 05:03

## 2020-11-15 RX ADMIN — IPRATROPIUM BROMIDE AND ALBUTEROL SULFATE 3 ML: .5; 3 SOLUTION RESPIRATORY (INHALATION) at 03:03

## 2020-11-15 RX ADMIN — IPRATROPIUM BROMIDE AND ALBUTEROL SULFATE 3 ML: .5; 3 SOLUTION RESPIRATORY (INHALATION) at 15:45

## 2020-11-15 RX ADMIN — DOCUSATE SODIUM 100 MG: 50 LIQUID ORAL at 18:36

## 2020-11-15 RX ADMIN — DEXMEDETOMIDINE HYDROCHLORIDE 1.5 MCG/KG/HR: 400 INJECTION INTRAVENOUS at 23:41

## 2020-11-15 RX ADMIN — MIDAZOLAM HYDROCHLORIDE 4 MG/HR: 5 INJECTION, SOLUTION INTRAMUSCULAR; INTRAVENOUS at 22:48

## 2020-11-15 RX ADMIN — ACETYLCYSTEINE 400 MG: 100 SOLUTION ORAL; RESPIRATORY (INHALATION) at 03:03

## 2020-11-15 RX ADMIN — IPRATROPIUM BROMIDE AND ALBUTEROL SULFATE 3 ML: .5; 3 SOLUTION RESPIRATORY (INHALATION) at 12:15

## 2020-11-15 RX ADMIN — BUMETANIDE 2 MG: 0.25 INJECTION INTRAMUSCULAR; INTRAVENOUS at 18:36

## 2020-11-15 RX ADMIN — ACETYLCYSTEINE 400 MG: 100 SOLUTION ORAL; RESPIRATORY (INHALATION) at 07:46

## 2020-11-15 RX ADMIN — HYDROMORPHONE HYDROCHLORIDE 1 MG: 2 INJECTION, SOLUTION INTRAMUSCULAR; INTRAVENOUS; SUBCUTANEOUS at 21:17

## 2020-11-15 RX ADMIN — MEROPENEM 500 MG: 500 INJECTION, POWDER, FOR SOLUTION INTRAVENOUS at 08:59

## 2020-11-15 RX ADMIN — VANCOMYCIN HYDROCHLORIDE 1750 MG: 10 INJECTION, POWDER, LYOPHILIZED, FOR SOLUTION INTRAVENOUS at 22:45

## 2020-11-15 RX ADMIN — MEROPENEM 500 MG: 500 INJECTION, POWDER, FOR SOLUTION INTRAVENOUS at 02:39

## 2020-11-15 RX ADMIN — FLUCONAZOLE IN SODIUM CHLORIDE 400 MG: 2 INJECTION, SOLUTION INTRAVENOUS at 08:59

## 2020-11-15 RX ADMIN — DOCUSATE SODIUM 100 MG: 50 LIQUID ORAL at 09:00

## 2020-11-15 RX ADMIN — GUAIFENESIN 400 MG: 200 SOLUTION ORAL at 01:15

## 2020-11-15 RX ADMIN — DEXMEDETOMIDINE HYDROCHLORIDE 1.5 MCG/KG/HR: 400 INJECTION INTRAVENOUS at 19:05

## 2020-11-15 RX ADMIN — GUAIFENESIN 400 MG: 200 SOLUTION ORAL at 04:17

## 2020-11-15 RX ADMIN — DEXMEDETOMIDINE HYDROCHLORIDE 1.4 MCG/KG/HR: 400 INJECTION INTRAVENOUS at 00:12

## 2020-11-15 RX ADMIN — QUETIAPINE FUMARATE 50 MG: 25 TABLET ORAL at 08:59

## 2020-11-15 RX ADMIN — MIDAZOLAM HYDROCHLORIDE 2 MG: 5 INJECTION INTRAMUSCULAR; INTRAVENOUS at 21:51

## 2020-11-15 RX ADMIN — GUAIFENESIN 400 MG: 200 SOLUTION ORAL at 20:55

## 2020-11-15 RX ADMIN — Medication 275 MCG/HR: at 09:25

## 2020-11-15 RX ADMIN — SENNOSIDES 17.6 MG: 8.8 LIQUID ORAL at 18:36

## 2020-11-15 RX ADMIN — DEXMEDETOMIDINE HYDROCHLORIDE 1.5 MCG/KG/HR: 400 INJECTION INTRAVENOUS at 13:59

## 2020-11-15 RX ADMIN — HYDROMORPHONE HYDROCHLORIDE 1 MG: 2 INJECTION, SOLUTION INTRAMUSCULAR; INTRAVENOUS; SUBCUTANEOUS at 17:59

## 2020-11-15 RX ADMIN — DEXMEDETOMIDINE HYDROCHLORIDE 1.5 MCG/KG/HR: 400 INJECTION INTRAVENOUS at 21:33

## 2020-11-15 RX ADMIN — QUETIAPINE FUMARATE 50 MG: 25 TABLET ORAL at 20:56

## 2020-11-15 RX ADMIN — GUAIFENESIN 400 MG: 200 SOLUTION ORAL at 11:39

## 2020-11-15 RX ADMIN — ACETAMINOPHEN 650 MG: 650 SUPPOSITORY RECTAL at 02:39

## 2020-11-15 RX ADMIN — DEXMEDETOMIDINE HYDROCHLORIDE 1.5 MCG/KG/HR: 400 INJECTION INTRAVENOUS at 06:22

## 2020-11-15 RX ADMIN — IPRATROPIUM BROMIDE AND ALBUTEROL SULFATE 3 ML: .5; 3 SOLUTION RESPIRATORY (INHALATION) at 07:46

## 2020-11-15 RX ADMIN — ACETYLCYSTEINE 400 MG: 100 SOLUTION ORAL; RESPIRATORY (INHALATION) at 20:14

## 2020-11-15 RX ADMIN — ACETYLCYSTEINE 400 MG: 100 SOLUTION ORAL; RESPIRATORY (INHALATION) at 15:45

## 2020-11-15 RX ADMIN — ACETYLCYSTEINE 400 MG: 100 SOLUTION ORAL; RESPIRATORY (INHALATION) at 23:42

## 2020-11-15 RX ADMIN — ACETYLCYSTEINE 400 MG: 100 SOLUTION ORAL; RESPIRATORY (INHALATION) at 12:16

## 2020-11-15 RX ADMIN — MIDAZOLAM HYDROCHLORIDE 2 MG: 5 INJECTION INTRAMUSCULAR; INTRAVENOUS at 02:28

## 2020-11-15 RX ADMIN — VANCOMYCIN HYDROCHLORIDE 1750 MG: 10 INJECTION, POWDER, LYOPHILIZED, FOR SOLUTION INTRAVENOUS at 13:16

## 2020-11-15 RX ADMIN — Medication 225 MCG/HR: at 23:00

## 2020-11-15 RX ADMIN — HYDROMORPHONE HYDROCHLORIDE 1 MG: 2 INJECTION, SOLUTION INTRAMUSCULAR; INTRAVENOUS; SUBCUTANEOUS at 01:19

## 2020-11-15 RX ADMIN — MEROPENEM 500 MG: 500 INJECTION, POWDER, FOR SOLUTION INTRAVENOUS at 15:44

## 2020-11-15 RX ADMIN — BUMETANIDE 2 MG: 0.25 INJECTION INTRAMUSCULAR; INTRAVENOUS at 08:59

## 2020-11-15 RX ADMIN — SODIUM CHLORIDE 40 MG: 9 INJECTION INTRAMUSCULAR; INTRAVENOUS; SUBCUTANEOUS at 08:59

## 2020-11-15 NOTE — PROGRESS NOTES
[facility]     Name: Yvette Preston MRN: [id number]   : 1970 Hospital: [address]   Date: 11/15/2020        Impression Plan   1. Acute hypoxemic respiratory failure, intubated , tube exchanged   2. Hemorrhagic shock, off pressors   3. Acute cholecystitis s/p robotic assisted lap bailee w/ TAMIKO   4. Post-op bleeding/tear in liver capsule s/p ex-lap and evacuation of hematoma   5. Acute blood loss anemia  6. MRSA pneumonia  7. Persistent fevers  8. H/o seizures  9. REBECCA                 · Continue to wean vent support--changed to PSV, okay to keep on PSV as long as he is tolerating  · Off Versed. Next step is to start weaning fentanyl and Precedex. No propofol  · Will need to continue to check gastric output/residuals--if extubated then ?need to change OG to NG--nursing to ask surgery team  · Add senna to bowel regimen  · Continue vanc, flucon and cont alvaro (started )  · Blood cultures NG  · continue scheduled duonebs, mucinex, mucomyst and chest PT  · Trend Hgb, transfuse to keep > 7  · Continue bumex  · Replete lytes  · Continue seroquel  · Post-op mgmt as per surgery    DVT ppx: lovenox  GI ppx: protonix  NPO. Pt is critically ill and at high risk of decompensation  CCT: 35 minutes         Radiology  ( personally reviewed) CT chest reviewed: Bilateral PNA   ABG No results for input(s): PHI, PO2I, PCO2I in the last 72 hours. Subjective       49 yo with cholecystits s/p lab bailee complicated by laceration of the liver capsule and hemorrhagic shock. Hematoma evacuated- 2L blood. Currently on daniel 130 and Levophed 8. Pt intubated and sedated- unable to provide any further hx.      - intubated     - bronch, tube exchange       - echo: EF 55-60%, RV normal    *sputum culture () - MRSA  *all other cultures NGTD  *MRSA screen positive    **stopped propofol due to high TG    Interval history:  Continues to spike fevers.   OG residual 100cc dark fluid when checked by nursing this morning. Off Versed and remains encephalopathic but calm. Changed to PSV. Past Medical History:   Diagnosis Date    GERD (gastroesophageal reflux disease)     Panic attack     Psychiatric disorder     anxiety    Seizures ([address]     7 yo   was told it was a stress seizure, never had another one    Sleep apnea     uses CPAP      Past Surgical History:   Procedure Laterality Date    ABDOMEN SURGERY PROC UNLISTED  1977    hernia(abdomen)- open    HX GI  2008    esophagus repair narrow- balloon dilation and scar tissue removal    HX HERNIA REPAIR      inguinal hernia    HX OTHER SURGICAL  11years of age    tonsils    HX OTHER SURGICAL  2009    hernia (hiatal and abdomen)-open    HX OTHER SURGICAL  23years of age    wisdom teeth extraction under anesthesia    HX OTHER SURGICAL  8 years     distended testicle     HX OTHER SURGICAL  1/16/15    Laparoscopic ventral hernia repair with mesh      Prior to Admission medications    Medication Sig Start Date End Date Taking? Authorizing Provider   buPROPion SR [facility] SR) 150 mg SR tablet Take 150 mg by mouth two (2) times a day. Yes Provider, Historical   sertraline (Zoloft) 50 mg tablet Take 50 mg by mouth daily. Yes Provider, Historical   citalopram (CELEXA) 40 mg tablet Take 40 mg by mouth daily. Yes Provider, Historical   docusate sodium (COLACE) 100 mg capsule Take 1 Cap by mouth two (2) times a day for 10 days. 11/4/20 11/14/20 Yes Kiesha Ash MD   lisinopril (PRINIVIL, ZESTRIL) 40 mg tablet Take 40 mg by mouth every evening. Yes Provider, Historical   simvastatin (ZOCOR) 40 mg tablet Take 40 mg by mouth nightly. Yes Provider, Historical   omeprazole (PRILOSEC) 40 mg capsule Take 40 mg by mouth daily.    Yes Provider, Historical     Current Facility-Administered Medications   Medication Dose Route Frequency    midazolam in normal saline (VERSED) 1 mg/mL infusion  0-10 mg/hr IntraVENous TITRATE    meropenem (MERREM) 500 mg in sterile water (preservative free) 10 mL IV syringe  0.5 g IntraVENous Q6H    dexmedeTOMidine in 0.9 % NaCl (PRECEDEX) 400 mcg/100 mL (4 mcg/mL) infusion soln  0.1-1.5 mcg/kg/hr IntraVENous TITRATE    fentaNYL (PF) 1,500 mcg/30 mL (50 mcg/mL) infusion  0-300 mcg/hr IntraVENous TITRATE    QUEtiapine (SEROquel) tablet 50 mg  50 mg Oral BID    vancomycin (VANCOCIN) 1,750 mg in 0.9% sodium chloride 500 mL IVPB  1,750 mg IntraVENous Q8H    bumetanide (BUMEX) injection 2 mg  2 mg IntraVENous BID    fluconazole (DIFLUCAN) 400mg/200 mL IVPB (premix)  400 mg IntraVENous DAILY    albuterol-ipratropium (DUO-NEB) 2.5 MG-0.5 MG/3 ML  3 mL Nebulization Q4H RT    acetylcysteine (MUCOMYST) 100 mg/mL (10 %) nebulizer solution 400 mg  4 mL Nebulization Q4H RT    docusate (COLACE) 50 mg/5 mL oral liquid 100 mg  100 mg Oral BID    guaiFENesin (ROBITUSSIN) 100 mg/5 mL oral liquid 400 mg  400 mg Oral Q4H    enoxaparin (LOVENOX) injection 40 mg  40 mg SubCUTAneous Q24H    pantoprazole (PROTONIX) 40 mg in 0.9% sodium chloride 10 mL injection  40 mg IntraVENous DAILY     Allergies   Allergen Reactions    Pcn [Penicillins] Other (comments)     Reaction was at age 6, itching and eyes swollen; denies any sob or tongue swelling. Has been on Zosyn in the hospital with no problems    Codeine Nausea and Vomiting      Social History     Tobacco Use    Smoking status: Never Smoker    Smokeless tobacco: Never Used   Substance Use Topics    Alcohol use: Yes     Alcohol/week: 1.7 standard drinks     Types: 2 Cans of beer per week     Comment: 1 or 2 beer every two weeks      Family History   Problem Relation Age of Onset    Diabetes Mother     Hypertension Mother     Stroke Mother         copd    Cancer Father         skin    Heart Disease Father     Diabetes Father     Heart Attack Father           Laboratory: I have personally reviewed the critical care flowsheet and labs.      Recent Labs     11/15/20  0350 11/14/20  0400 11/13/20  0417   WBC 7.7 7.7 5.8   HGB 8.8* 8.3* 9.0*   HCT 28.0* 26.6* 28.8*   * 362 339     Recent Labs     11/15/20  0350 11/14/20  0400 11/13/20 0417    141 141   K 3.8 3.7 3.4*    104 107   CO2 32 31 32   * 127* 132*   BUN 12 12 10   CREA 0.64* 0.56* 0.75   CA 8.0* 8.1* 8.5   MG 2.3 2.3 2.4   PHOS 2.4* 2.7 2.7   ALB 2.0* 1.8* 1.9*   ALT 37 38 46       Objective:     Mode Rate Tidal Volume Pressure FiO2 PEEP   Assist control   500 ml  5 cm H2O 40 % 5 cm H20     Vital Signs:    Ventilator Pressures  PC Set: 16  Pressure Support (cm H2O): 5 cm H2O  PIP Observed (cm H2O): 32 cm H2O  Plateau Pressure (cm H2O): 14 cm H2O  MAP (cm H2O): 11  PEEP/VENT (cm H2O): 5 cm L18HBLL(24)Ventilator Pressures  PC Set: 16  Pressure Support (cm H2O): 5 cm H2O  PIP Observed (cm H2O): 32 cm H2O  Plateau Pressure (cm H2O): 14 cm H2O  MAP (cm H2O): 11  PEEP/VENT (cm H2O): 5 cm H20  Safety & Alarms  Circuit Temperature: (HME)  Backup Mode Checked/Apnea: Yes  Pressure Max: 55 cm H2O  Pressure Min: 13 cm H2O  Ve Min: 2  Ve Max: 22  Vt Min: 200 ml  Vt Max: 1000 ml  RR Min: 10  RR Max: 45  Intake/Output:   Last shift:      Ventilator Volumes  Vt Set (ml): 500 ml  Vt Exhaled (Machine Breath) (ml): 521 ml  Vt Spont (ml): 877 ml  Ve Observed (l/min): 9.2 l/min  Last 3 shifts: No intake/output data recorded. RRIOLAST3    Intake/Output Summary (Last 24 hours) at 11/15/2020 0806  Last data filed at 11/15/2020 0600  Gross per 24 hour   Intake 3014.07 ml   Output 4070 ml   Net -1055.93 ml     EXAM:   GENERAL: intubated, sedated, pupils pinpoint  HEENT:  Anicteric sclera, orally intubated  NECK:  Trachea midline  LUNGS: diminished, CTA  HEART: Regular rate and rhythm   ABDOMEN:  Soft, hypoactive bowel sounds, incision c/d/i  EXTREMITIES: edema   NEUROLOGIC: RASS -2    Nuris Cortez MD  [facility] [place]

## 2020-11-15 NOTE — PROGRESS NOTES
Bedside and Verbal shift change report given to itzel Abreu RN and Rebeca Bee RN (oncoming nurse) by Odell Dance, RN (offgoing nurse). Report included the following information SBAR, Kardex, ED Summary, Procedure Summary, Intake/Output, MAR, Recent Results, Med Rec Status and Cardiac Rhythm NSR.     2000: Patient assessed - applied ice packs and cooling blanket to try and reduce temperature - 101.3. Given rectal tylenol. 2130: Patient crying, grimacing, and moving all extremities. Increased BP and HR. Adult nonverbal pain scale 8/10. Given 1 mg Dilaudid. 2200: Patient continuing to be awake and trying to pull ETT. Increased precedex to 1.1    2230: Spoke with wife, Wen Sanchez and updated her on patient condition. 2250: Patient awake and restless, gave versed push. Wiped down with cool cloths and removed extra layers. 0000: Patient reassessed. Remains febrile. Awake and restless, trying to move around with tachycardia and high BP. Increased precedex. 0400: Patient reassessed. Shift summary: Patient had periods of restlessness that were treated with PRN versed and dilaudid. Patient was agitated during the 0500 hour gave patient PRN versed push. Will continue to monitor. 0700: Bedside and Verbal shift change report given to Odell Dance, RN (oncoming nurse) by Catracho Saucedo student RN and Rebeca Bee RN (offgoing nurse). Report included the following information SBAR, Kardex, Intake/Output, MAR, Accordion, Recent Results and Cardiac Rhythm NSR and Sinus Tach.

## 2020-11-15 NOTE — PROGRESS NOTES
0700- Bedside and Verbal shift change report given to [address] [place] [address] (oncoming nurse) by Isra Vo RN and Chavez Colon (student) (offgoing nurse). Report included the following information SBAR, Kardex, OR Summary, Procedure Summary, Intake/Output, MAR, Accordion, Recent Results, Cardiac Rhythm NSR and Alarm Parameters . IV drip rates verified. 0800- Pt assessment performed; see flowsheet  VAP bundle performed   Restraints assessed; ROM performed; no s/s of injury   Pt. Turned and repositioned   Bed percussion performed   Liz Navarrete MD at bedside to assess pt. condition and plan of care. SBT 10/5   Discussed with Marky Jalloh MD gastric residual and if Eliza OROURKE would want an NG tube for after extubation. Plan to continue to wean sedation to prepare for extubation. CXR results show OG tube \"barely in the stomach\" with recommendation to advance. OG tube advanced and auscultated for placement. Was at 48, now advanced back to 61 which was original positioning. OG tube clamped. 56- Eliza OROURKE at bedside to assess pt. And update plan of care. Does not recommend needing an NG tube and planning to move towards extubation. VAP bundle performed   Restraints assessed; ROM performed; no s/s of injury   Pt. Turned and repositioned   1200- pt. Reassessment performed; no changes from previous assessment; see flowsheet   VAP bundle performed   Restraints assessed; ROM performed; no s/s of injury   Pt. Turned and repositioned   CXR ordered to confirm placement of OG tube   1350- CXR shows OG tube in stomach. 1400- VAP bundle performed   Restraints assessed; ROM performed; no s/s of injury   Pt. Turned and repositioned   1600- VAP bundle performed   Restraints assessed; ROM performed; no s/s of injury   Pt. Turned and repositioned   1620- Administered PRN tylenol suppository for fever 100.8   1800- VAP bundle performed   Restraints assessed; ROM performed; no s/s of injury; pt resless attempting to pull at lines.    Pt. Turned and repositioned   1837- Fentanyl increased dt agitation, increased HR, HTN   1900- Bedside and Verbal shift change report given to 1200 El Magalie Thornton (oncoming nurse) by Susana Samano RN (offgoing nurse). Report included the following information SBAR, Kardex, ED Summary, OR Summary, Intake/Output, MAR, Accordion, Recent Results, Cardiac Rhythm ST and Alarm Parameters .

## 2020-11-15 NOTE — PROGRESS NOTES
SURGERY PROGRESS NOTE      Admit Date: 11/3/2020    POD 11 Days Post-Op    Procedure: Procedure(s):  LAPAROTOMY EXPLORATORY, evacuation of hematoma. Subjective:   SBT this am - seems to be tolerating  Weaning sedation      Objective:     Visit Vitals  BP (!) 142/82   Pulse 92   Temp 98.9 °F (37.2 °C)   Resp 17   Ht 5' 11\" (1.803 m)   Wt 118.8 kg (262 lb)   SpO2 99%   BMI 36.54 kg/m²        Temp (24hrs), Av.6 °F (38.1 °C), Min:98.9 °F (37.2 °C), Max:101.8 °F (38.8 °C)      Physical Exam:     Abdomen:  Soft. Non-tender, non-distended.   Incision C/D/I. SRI serous        Lab Results   Component Value Date/Time    WBC 7.7 11/15/2020 03:50 AM    HGB 8.8 (L) 11/15/2020 03:50 AM    Hemoglobin (POC) 6.8 (L) 2020 03:42 PM    Hemoglobin (POC) 13.3 2014 10:05 PM    HCT 28.0 (L) 11/15/2020 03:50 AM    Hematocrit (POC) 39 2014 10:05 PM    PLATELET 844 (H)  03:50 AM    MCV 93.3 11/15/2020 03:50 AM     Lab Results   Component Value Date/Time    GFR est non-AA >60 11/15/2020 03:50 AM    GFRNA, POC >60 2014 10:05 PM    GFR est AA >60 11/15/2020 03:50 AM    GFRAA, POC >60 2014 10:05 PM    Creatinine 0.64 (L) 11/15/2020 03:50 AM    Creatinine (POC) 1.1 2014 10:05 PM    BUN 12 11/15/2020 03:50 AM    BUN (POC) 15 2014 10:05 PM    Sodium 140 11/15/2020 03:50 AM    Sodium (POC) 141 2014 10:05 PM    Potassium 3.8 11/15/2020 03:50 AM    Potassium (POC) 3.5 2014 10:05 PM    Chloride 103 11/15/2020 03:50 AM    Chloride (POC) 106 2014 10:05 PM    CO2 32 11/15/2020 03:50 AM    Magnesium 2.3 11/15/2020 03:50 AM    Phosphorus 2.4 (L) 11/15/2020 03:50 AM       Assessment:     Active Problems:    Biliary colic ()      Cholecystitis (2020)        Plan/Recommendations/Medical Decision Making:   Wean to extubate as able  Ok to remove OG with extubation - will attempt clears when extubated

## 2020-11-15 NOTE — PROGRESS NOTES
Change of Shift Report:    1915:  Bedside and Verbal shift change report given to Rebeca Messina, HIOWT and itzel Terrell RN (oncoming nurse) by Karen Lopez RN (offgoing nurse). Report included the following information SBAR, Kardex, ED Summary, Intake/Output, MAR, Accordion, Recent Results and Cardiac Rhythm NSR. Shift Summary:    2000:  Patient assessment completed. Patient intubated and sedated. Patient able to open eyes to voice. Precedex going at 1 mcgkg/hr, fentanyl going at 300 mcg/hr, versed going at 1 mg/hr. Patient's heart rates in the 80s, BPs maintaining MAPs >65. All documentation and charting to be completed by nursing student, Mary Vilchis, and cosigned by myself. End of Shift Report:    0700:  Bedside and Verbal shift change report given to Karen Lopez RN (oncoming nurse) by Zulema Glover RN and itzel Terrell RN (offgoing nurse). Report included the following information SBAR, Kardex, ED Summary, Intake/Output, MAR, Accordion, Recent Results and Cardiac Rhythm NSR.

## 2020-11-15 NOTE — PROGRESS NOTES
[facility] Vancom[facility]  Indication: MRSA pneumonia, remains intubated s/p ex lap  Day of Therapy: 7    Current maintenance dose: 1750 (mg) every 8 hours   Dose calculated to approximate a therapeutic trough of 15-20 mcg/mL. Last trough level 19.1 mcg/ml drawn 1 hr early. Corrected trough 17 mcg/ml. Plan :therapeutic - continue current dose    Pharmacy to follow daily.   Pharmacist Lainey Perkins                     [phone]

## 2020-11-15 NOTE — PROGRESS NOTES
Problem: Falls - Risk of  Goal: *Absence of Falls  Description: Document Susan He Fall Risk and appropriate interventions in the flowsheet. Outcome: Progressing Towards Goal  Note: Fall Risk Interventions:       Mentation Interventions: Adequate sleep, hydration, pain control, Bed/chair exit alarm, Door open when patient unattended, Evaluate medications/consider consulting pharmacy, Eyeglasses and hearing aids, Family/sitter at bedside, More frequent rounding, Reorient patient, Room close to nurse's station, Update white board    Medication Interventions: Bed/chair exit alarm, Patient to call before getting OOB, Evaluate medications/consider consulting pharmacy    Elimination Interventions: Bed/chair exit alarm, Call light in reach, Patient to call for help with toileting needs              Problem: Patient Education: Go to Patient Education Activity  Goal: Patient/Family Education  Outcome: Progressing Towards Goal     Problem: Ventilator Management  Goal: *Adequate oxygenation and ventilation  Outcome: Progressing Towards Goal  Goal: *Patient maintains clear airway/free of aspiration  Outcome: Progressing Towards Goal  Goal: *Absence of infection signs and symptoms  Outcome: Progressing Towards Goal  Goal: *Normal spontaneous ventilation  Outcome: Progressing Towards Goal     Problem: Patient Education: Go to Patient Education Activity  Goal: Patient/Family Education  Outcome: Progressing Towards Goal     Problem: Pressure Injury - Risk of  Goal: *Prevention of pressure injury  Description: Document Jeffery Scale and appropriate interventions in the flowsheet.   Outcome: Progressing Towards Goal  Note: Pressure Injury Interventions:  Sensory Interventions: Assess changes in LOC, Assess need for specialty bed, Check visual cues for pain, Float heels    Moisture Interventions: Absorbent underpads    Activity Interventions: Assess need for specialty bed, Pressure redistribution bed/mattress(bed type)    Mobility Interventions: Assess need for specialty bed, Float heels, HOB 30 degrees or less    Nutrition Interventions: Document food/fluid/supplement intake    Friction and Shear Interventions: Apply protective barrier, creams and emollients, Feet elevated on foot rest, HOB 30 degrees or less                Problem: Patient Education: Go to Patient Education Activity  Goal: Patient/Family Education  Outcome: Progressing Towards Goal     Problem: Infection - Risk of, Central Venous Catheter-Associated Bloodstream Infection  Goal: *Absence of infection signs and symptoms  Outcome: Progressing Towards Goal     Problem: Patient Education: Go to Patient Education Activity  Goal: Patient/Family Education  Outcome: Progressing Towards Goal     Problem: Surgical Pathway Day of Surgery  Goal: Off Pathway (Use only if patient is Off Pathway)  Outcome: Progressing Towards Goal  Goal: Activity/Safety  Outcome: Progressing Towards Goal  Goal: Consults, if ordered  Outcome: Progressing Towards Goal  Goal: Nutrition/Diet  Outcome: Progressing Towards Goal  Goal: Medications  Outcome: Progressing Towards Goal  Goal: Respiratory  Outcome: Progressing Towards Goal  Goal: Treatments/Interventions/Procedures  Outcome: Progressing Towards Goal  Goal: Psychosocial  Outcome: Progressing Towards Goal  Goal: *No signs and symptoms of infection or wound complications  Outcome: Progressing Towards Goal  Goal: *Optimal pain control at patient's stated goal  Outcome: Progressing Towards Goal  Goal: *Adequate urinary output (equal to or greater than 30 milliliters/hour)  Description: Ambulatory Surgery patients voiding without difficulty.   Outcome: Progressing Towards Goal  Goal: *Hemodynamically stable  Outcome: Progressing Towards Goal  Goal: *Tolerating diet  Outcome: Progressing Towards Goal  Goal: *Demonstrates progressive activity  Outcome: Progressing Towards Goal     Problem: Surgical Pathway Post-Op Day 1  Goal: Off Pathway (Use only if patient is Off Pathway)  Outcome: Progressing Towards Goal  Goal: Activity/Safety  Outcome: Progressing Towards Goal  Goal: Diagnostic Test/Procedures  Outcome: Progressing Towards Goal  Goal: Nutrition/Diet  Outcome: Progressing Towards Goal  Goal: Discharge Planning  Outcome: Progressing Towards Goal  Goal: Medications  Outcome: Progressing Towards Goal  Goal: Respiratory  Outcome: Progressing Towards Goal  Goal: Treatments/Interventions/Procedures  Outcome: Progressing Towards Goal  Goal: Psychosocial  Outcome: Progressing Towards Goal  Goal: *No signs and symptoms of infection or wound complications  Outcome: Progressing Towards Goal  Goal: *Optimal pain control at patient's stated goal  Outcome: Progressing Towards Goal  Goal: *Adequate urinary output (equal to or greater than 30 milliliters/hour)  Outcome: Progressing Towards Goal  Goal: *Hemodynamically stable  Outcome: Progressing Towards Goal  Goal: *Tolerating diet  Outcome: Progressing Towards Goal  Goal: *Demonstrates progressive activity  Outcome: Progressing Towards Goal  Goal: *Lungs clear or at baseline  Outcome: Progressing Towards Goal     Problem: Non-Violent Restraints  Goal: *Removal from restraints as soon as assessed to be safe  Outcome: Progressing Towards Goal  Goal: *No harm/injury to patient while restraints in use  Outcome: Progressing Towards Goal  Goal: *Patient's dignity will be maintained  Outcome: Progressing Towards Goal  Goal: *Patient Specific Goal (EDIT GOAL, INSERT TEXT)  Outcome: Progressing Towards Goal  Goal: Non-violent Restaints:Standard Interventions  Outcome: Progressing Towards Goal  Goal: Non-violent Restraints:Patient Interventions  Outcome: Progressing Towards Goal  Goal: Patient/Family Education  Outcome: Progressing Towards Goal     Problem: Risk for Spread of Infection  Goal: Prevent transmission of infectious organism to others  Description: Prevent the transmission of infectious organisms to other patients, staff members, and visitors.   Outcome: Progressing Towards Goal     Problem: Patient Education:  Go to Education Activity  Goal: Patient/Family Education  Outcome: Progressing Towards Goal     Problem: Non-Violent Restraints  Goal: *Removal from restraints as soon as assessed to be safe  Outcome: Progressing Towards Goal  Goal: *No harm/injury to patient while restraints in use  Outcome: Progressing Towards Goal  Goal: *Patient's dignity will be maintained  Outcome: Progressing Towards Goal  Goal: *Patient Specific Goal (EDIT GOAL, INSERT TEXT)  Outcome: Progressing Towards Goal  Goal: Non-violent Restaints:Standard Interventions  Outcome: Progressing Towards Goal  Goal: Non-violent Restraints:Patient Interventions  Outcome: Progressing Towards Goal  Goal: Patient/Family Education  Outcome: Progressing Towards Goal     Problem: Infection - Risk of, Urinary Catheter-Associated Urinary Tract Infection  Goal: *Absence of infection signs and symptoms  Outcome: Progressing Towards Goal     Problem: Patient Education: Go to Patient Education Activity  Goal: Patient/Family Education  Outcome: Progressing Towards Goal

## 2020-11-16 ENCOUNTER — APPOINTMENT (OUTPATIENT)
Dept: GENERAL RADIOLOGY | Age: 50
DRG: 417 | End: 2020-11-16
Attending: INTERNAL MEDICINE
Payer: COMMERCIAL

## 2020-11-16 LAB
ALBUMIN SERPL-MCNC: 2.1 G/DL (ref 3.5–5)
ALBUMIN/GLOB SERPL: 0.5 {RATIO} (ref 1.1–2.2)
ALP SERPL-CCNC: 178 U/L (ref 45–117)
ALT SERPL-CCNC: 37 U/L (ref 12–78)
AMMONIA PLAS-SCNC: <10 UMOL/L
ANION GAP SERPL CALC-SCNC: 8 MMOL/L (ref 5–15)
APPEARANCE UR: ABNORMAL
ARTERIAL PATENCY WRIST A: YES
AST SERPL-CCNC: 35 U/L (ref 15–37)
BACTERIA URNS QL MICRO: NEGATIVE /HPF
BASE DEFICIT BLDA-SCNC: 3 MMOL/L
BASOPHILS # BLD: 0 K/UL (ref 0–0.1)
BASOPHILS NFR BLD: 0 % (ref 0–1)
BDY SITE: ABNORMAL
BILIRUB SERPL-MCNC: 0.7 MG/DL (ref 0.2–1)
BILIRUB UR QL: NEGATIVE
BUN SERPL-MCNC: 12 MG/DL (ref 6–20)
BUN/CREAT SERPL: 16 (ref 12–20)
CALCIUM SERPL-MCNC: 8.5 MG/DL (ref 8.5–10.1)
CHLORIDE SERPL-SCNC: 101 MMOL/L (ref 97–108)
CK SERPL-CCNC: 71 U/L (ref 39–308)
CO2 SERPL-SCNC: 31 MMOL/L (ref 21–32)
COLOR UR: ABNORMAL
CREAT SERPL-MCNC: 0.74 MG/DL (ref 0.7–1.3)
DIFFERENTIAL METHOD BLD: ABNORMAL
EOSINOPHIL # BLD: 0.1 K/UL (ref 0–0.4)
EOSINOPHIL NFR BLD: 1 % (ref 0–7)
EPAP/CPAP/PEEP, PAPEEP: 5
EPITH CASTS URNS QL MICRO: ABNORMAL /LPF
ERYTHROCYTE [DISTWIDTH] IN BLOOD BY AUTOMATED COUNT: 13.6 % (ref 11.5–14.5)
FIO2 ON VENT: 40 %
GAS FLOW.O2 SETTING OXYMISER: 10 L/MIN
GLOBULIN SER CALC-MCNC: 4.5 G/DL (ref 2–4)
GLUCOSE SERPL-MCNC: 129 MG/DL (ref 65–100)
GLUCOSE UR STRIP.AUTO-MCNC: NEGATIVE MG/DL
HCO3 BLDA-SCNC: 20 MMOL/L (ref 22–26)
HCT VFR BLD AUTO: 27.8 % (ref 36.6–50.3)
HGB BLD-MCNC: 8.7 G/DL (ref 12.1–17)
HGB UR QL STRIP: NEGATIVE
HYALINE CASTS URNS QL MICRO: ABNORMAL /LPF (ref 0–5)
IMM GRANULOCYTES # BLD AUTO: 0.1 K/UL (ref 0–0.04)
IMM GRANULOCYTES NFR BLD AUTO: 1 % (ref 0–0.5)
KETONES UR QL STRIP.AUTO: 15 MG/DL
LACTATE SERPL-SCNC: 0.9 MMOL/L (ref 0.4–2)
LEUKOCYTE ESTERASE UR QL STRIP.AUTO: NEGATIVE
LYMPHOCYTES # BLD: 0.9 K/UL (ref 0.8–3.5)
LYMPHOCYTES NFR BLD: 9 % (ref 12–49)
MAGNESIUM SERPL-MCNC: 2.2 MG/DL (ref 1.6–2.4)
MCH RBC QN AUTO: 28.8 PG (ref 26–34)
MCHC RBC AUTO-ENTMCNC: 31.3 G/DL (ref 30–36.5)
MCV RBC AUTO: 92.1 FL (ref 80–99)
MONOCYTES # BLD: 0.6 K/UL (ref 0–1)
MONOCYTES NFR BLD: 6 % (ref 5–13)
NEUTS SEG # BLD: 7.8 K/UL (ref 1.8–8)
NEUTS SEG NFR BLD: 83 % (ref 32–75)
NITRITE UR QL STRIP.AUTO: NEGATIVE
NRBC # BLD: 0.02 K/UL (ref 0–0.01)
NRBC BLD-RTO: 0.2 PER 100 WBC
PCO2 BLDA: 30 MMHG (ref 35–45)
PH BLDA: 7.44 [PH] (ref 7.35–7.45)
PH UR STRIP: 6.5 [PH] (ref 5–8)
PHOSPHATE SERPL-MCNC: 2.2 MG/DL (ref 2.6–4.7)
PLATELET # BLD AUTO: 395 K/UL (ref 150–400)
PMV BLD AUTO: 9.1 FL (ref 8.9–12.9)
PO2 BLDA: 106 MMHG (ref 80–100)
POTASSIUM SERPL-SCNC: 3.7 MMOL/L (ref 3.5–5.1)
PROCALCITONIN SERPL-MCNC: 0.76 NG/ML
PROT SERPL-MCNC: 6.6 G/DL (ref 6.4–8.2)
PROT UR STRIP-MCNC: 30 MG/DL
RBC # BLD AUTO: 3.02 M/UL (ref 4.1–5.7)
RBC #/AREA URNS HPF: ABNORMAL /HPF (ref 0–5)
SAO2 % BLD: 98 % (ref 92–97)
SAO2% DEVICE SAO2% SENSOR NAME: ABNORMAL
SODIUM SERPL-SCNC: 140 MMOL/L (ref 136–145)
SP GR UR REFRACTOMETRY: 1.02 (ref 1–1.03)
SPECIMEN SITE: ABNORMAL
TROPONIN I SERPL-MCNC: <0.05 NG/ML
UA: UC IF INDICATED,UAUC: ABNORMAL
UROBILINOGEN UR QL STRIP.AUTO: 1 EU/DL (ref 0.2–1)
VENTILATION MODE VENT: ABNORMAL
VT SETTING VENT: 500 ML
WBC # BLD AUTO: 9.5 K/UL (ref 4.1–11.1)
WBC URNS QL MICRO: ABNORMAL /HPF (ref 0–4)

## 2020-11-16 PROCEDURE — C9113 INJ PANTOPRAZOLE SODIUM, VIA: HCPCS | Performed by: SURGERY

## 2020-11-16 PROCEDURE — 94664 DEMO&/EVAL PT USE INHALER: CPT

## 2020-11-16 PROCEDURE — 36415 COLL VENOUS BLD VENIPUNCTURE: CPT

## 2020-11-16 PROCEDURE — 82140 ASSAY OF AMMONIA: CPT

## 2020-11-16 PROCEDURE — 84484 ASSAY OF TROPONIN QUANT: CPT

## 2020-11-16 PROCEDURE — 65610000006 HC RM INTENSIVE CARE

## 2020-11-16 PROCEDURE — 87077 CULTURE AEROBIC IDENTIFY: CPT

## 2020-11-16 PROCEDURE — 94640 AIRWAY INHALATION TREATMENT: CPT

## 2020-11-16 PROCEDURE — 74011000250 HC RX REV CODE- 250: Performed by: INTERNAL MEDICINE

## 2020-11-16 PROCEDURE — 87070 CULTURE OTHR SPECIMN AEROBIC: CPT

## 2020-11-16 PROCEDURE — 94003 VENT MGMT INPAT SUBQ DAY: CPT

## 2020-11-16 PROCEDURE — 83605 ASSAY OF LACTIC ACID: CPT

## 2020-11-16 PROCEDURE — 87186 SC STD MICRODIL/AGAR DIL: CPT

## 2020-11-16 PROCEDURE — 82550 ASSAY OF CK (CPK): CPT

## 2020-11-16 PROCEDURE — 36600 WITHDRAWAL OF ARTERIAL BLOOD: CPT

## 2020-11-16 PROCEDURE — 87040 BLOOD CULTURE FOR BACTERIA: CPT

## 2020-11-16 PROCEDURE — 85025 COMPLETE CBC W/AUTO DIFF WBC: CPT

## 2020-11-16 PROCEDURE — 80053 COMPREHEN METABOLIC PANEL: CPT

## 2020-11-16 PROCEDURE — 83735 ASSAY OF MAGNESIUM: CPT

## 2020-11-16 PROCEDURE — 84100 ASSAY OF PHOSPHORUS: CPT

## 2020-11-16 PROCEDURE — 81001 URINALYSIS AUTO W/SCOPE: CPT

## 2020-11-16 PROCEDURE — 71045 X-RAY EXAM CHEST 1 VIEW: CPT

## 2020-11-16 PROCEDURE — 74011250636 HC RX REV CODE- 250/636: Performed by: SURGERY

## 2020-11-16 PROCEDURE — 82803 BLOOD GASES ANY COMBINATION: CPT

## 2020-11-16 PROCEDURE — 74011250637 HC RX REV CODE- 250/637: Performed by: INTERNAL MEDICINE

## 2020-11-16 PROCEDURE — 84145 PROCALCITONIN (PCT): CPT

## 2020-11-16 PROCEDURE — 74011250636 HC RX REV CODE- 250/636: Performed by: INTERNAL MEDICINE

## 2020-11-16 RX ORDER — FACIAL-BODY WIPES
10 EACH TOPICAL DAILY
Status: DISCONTINUED | OUTPATIENT
Start: 2020-11-16 | End: 2020-11-18

## 2020-11-16 RX ORDER — QUETIAPINE FUMARATE 25 MG/1
75 TABLET, FILM COATED ORAL 2 TIMES DAILY
Status: DISCONTINUED | OUTPATIENT
Start: 2020-11-16 | End: 2020-11-18

## 2020-11-16 RX ORDER — CLONIDINE HYDROCHLORIDE 0.1 MG/1
0.1 TABLET ORAL EVERY 8 HOURS
Status: DISCONTINUED | OUTPATIENT
Start: 2020-11-16 | End: 2020-11-18

## 2020-11-16 RX ADMIN — MIDAZOLAM HYDROCHLORIDE 2 MG: 5 INJECTION INTRAMUSCULAR; INTRAVENOUS at 05:05

## 2020-11-16 RX ADMIN — IPRATROPIUM BROMIDE AND ALBUTEROL SULFATE 3 ML: .5; 3 SOLUTION RESPIRATORY (INHALATION) at 11:41

## 2020-11-16 RX ADMIN — Medication 225 MCG/HR: at 12:58

## 2020-11-16 RX ADMIN — ACETYLCYSTEINE 400 MG: 100 SOLUTION ORAL; RESPIRATORY (INHALATION) at 03:26

## 2020-11-16 RX ADMIN — BUMETANIDE 2 MG: 0.25 INJECTION INTRAMUSCULAR; INTRAVENOUS at 08:33

## 2020-11-16 RX ADMIN — SODIUM CHLORIDE 40 MG: 9 INJECTION INTRAMUSCULAR; INTRAVENOUS; SUBCUTANEOUS at 08:34

## 2020-11-16 RX ADMIN — ONDANSETRON 4 MG: 2 INJECTION INTRAMUSCULAR; INTRAVENOUS at 08:33

## 2020-11-16 RX ADMIN — CLONIDINE HYDROCHLORIDE 0.1 MG: 0.1 TABLET ORAL at 15:23

## 2020-11-16 RX ADMIN — DEXMEDETOMIDINE HYDROCHLORIDE 1.5 MCG/KG/HR: 400 INJECTION INTRAVENOUS at 12:57

## 2020-11-16 RX ADMIN — IPRATROPIUM BROMIDE AND ALBUTEROL SULFATE 3 ML: .5; 3 SOLUTION RESPIRATORY (INHALATION) at 08:05

## 2020-11-16 RX ADMIN — Medication 225 MCG/HR: at 06:10

## 2020-11-16 RX ADMIN — GUAIFENESIN 400 MG: 200 SOLUTION ORAL at 20:36

## 2020-11-16 RX ADMIN — DEXMEDETOMIDINE HYDROCHLORIDE 1.5 MCG/KG/HR: 400 INJECTION INTRAVENOUS at 08:39

## 2020-11-16 RX ADMIN — FLUCONAZOLE IN SODIUM CHLORIDE 400 MG: 2 INJECTION, SOLUTION INTRAVENOUS at 08:31

## 2020-11-16 RX ADMIN — DOCUSATE SODIUM 100 MG: 50 LIQUID ORAL at 08:32

## 2020-11-16 RX ADMIN — CLONIDINE HYDROCHLORIDE 0.1 MG: 0.1 TABLET ORAL at 21:39

## 2020-11-16 RX ADMIN — DEXMEDETOMIDINE HYDROCHLORIDE 1.5 MCG/KG/HR: 400 INJECTION INTRAVENOUS at 04:11

## 2020-11-16 RX ADMIN — DEXMEDETOMIDINE HYDROCHLORIDE 1.5 MCG/KG/HR: 400 INJECTION INTRAVENOUS at 22:53

## 2020-11-16 RX ADMIN — GUAIFENESIN 400 MG: 200 SOLUTION ORAL at 00:25

## 2020-11-16 RX ADMIN — ACETAMINOPHEN ORAL SOLUTION 650 MG: 650 SOLUTION ORAL at 04:50

## 2020-11-16 RX ADMIN — DEXMEDETOMIDINE HYDROCHLORIDE 1.5 MCG/KG/HR: 400 INJECTION INTRAVENOUS at 17:35

## 2020-11-16 RX ADMIN — MEROPENEM 500 MG: 500 INJECTION, POWDER, FOR SOLUTION INTRAVENOUS at 02:42

## 2020-11-16 RX ADMIN — DEXMEDETOMIDINE HYDROCHLORIDE 1.5 MCG/KG/HR: 400 INJECTION INTRAVENOUS at 10:46

## 2020-11-16 RX ADMIN — SODIUM CHLORIDE: 900 INJECTION, SOLUTION INTRAVENOUS at 15:23

## 2020-11-16 RX ADMIN — IPRATROPIUM BROMIDE AND ALBUTEROL SULFATE 3 ML: .5; 3 SOLUTION RESPIRATORY (INHALATION) at 16:27

## 2020-11-16 RX ADMIN — DEXMEDETOMIDINE HYDROCHLORIDE 1.5 MCG/KG/HR: 400 INJECTION INTRAVENOUS at 20:31

## 2020-11-16 RX ADMIN — QUETIAPINE FUMARATE 50 MG: 25 TABLET ORAL at 08:34

## 2020-11-16 RX ADMIN — ACETYLCYSTEINE 400 MG: 100 SOLUTION ORAL; RESPIRATORY (INHALATION) at 21:43

## 2020-11-16 RX ADMIN — IPRATROPIUM BROMIDE AND ALBUTEROL SULFATE 3 ML: .5; 3 SOLUTION RESPIRATORY (INHALATION) at 03:26

## 2020-11-16 RX ADMIN — BUMETANIDE 2 MG: 0.25 INJECTION INTRAMUSCULAR; INTRAVENOUS at 17:36

## 2020-11-16 RX ADMIN — VANCOMYCIN HYDROCHLORIDE 1750 MG: 10 INJECTION, POWDER, LYOPHILIZED, FOR SOLUTION INTRAVENOUS at 22:06

## 2020-11-16 RX ADMIN — SENNOSIDES 17.6 MG: 8.8 LIQUID ORAL at 08:34

## 2020-11-16 RX ADMIN — MEROPENEM 500 MG: 500 INJECTION, POWDER, FOR SOLUTION INTRAVENOUS at 15:23

## 2020-11-16 RX ADMIN — VANCOMYCIN HYDROCHLORIDE 1750 MG: 10 INJECTION, POWDER, LYOPHILIZED, FOR SOLUTION INTRAVENOUS at 04:51

## 2020-11-16 RX ADMIN — HYDROMORPHONE HYDROCHLORIDE 1 MG: 2 INJECTION, SOLUTION INTRAMUSCULAR; INTRAVENOUS; SUBCUTANEOUS at 02:42

## 2020-11-16 RX ADMIN — BISACODYL 10 MG: 10 SUPPOSITORY RECTAL at 12:41

## 2020-11-16 RX ADMIN — DEXMEDETOMIDINE HYDROCHLORIDE 1.5 MCG/KG/HR: 400 INJECTION INTRAVENOUS at 15:25

## 2020-11-16 RX ADMIN — Medication 200 MCG/HR: at 20:29

## 2020-11-16 RX ADMIN — DEXMEDETOMIDINE HYDROCHLORIDE 1.5 MCG/KG/HR: 400 INJECTION INTRAVENOUS at 02:00

## 2020-11-16 RX ADMIN — DEXMEDETOMIDINE HYDROCHLORIDE 1.5 MCG/KG/HR: 400 INJECTION INTRAVENOUS at 06:15

## 2020-11-16 RX ADMIN — ACETYLCYSTEINE 400 MG: 100 SOLUTION ORAL; RESPIRATORY (INHALATION) at 08:05

## 2020-11-16 RX ADMIN — GUAIFENESIN 400 MG: 200 SOLUTION ORAL at 04:50

## 2020-11-16 RX ADMIN — DOCUSATE SODIUM 100 MG: 50 LIQUID ORAL at 17:35

## 2020-11-16 RX ADMIN — IPRATROPIUM BROMIDE AND ALBUTEROL SULFATE 3 ML: .5; 3 SOLUTION RESPIRATORY (INHALATION) at 21:43

## 2020-11-16 RX ADMIN — QUETIAPINE FUMARATE 75 MG: 25 TABLET ORAL at 21:39

## 2020-11-16 RX ADMIN — ACETYLCYSTEINE 400 MG: 100 SOLUTION ORAL; RESPIRATORY (INHALATION) at 16:27

## 2020-11-16 RX ADMIN — ENOXAPARIN SODIUM 40 MG: 40 INJECTION SUBCUTANEOUS at 22:00

## 2020-11-16 RX ADMIN — MEROPENEM 500 MG: 500 INJECTION, POWDER, FOR SOLUTION INTRAVENOUS at 22:01

## 2020-11-16 RX ADMIN — ACETYLCYSTEINE 400 MG: 100 SOLUTION ORAL; RESPIRATORY (INHALATION) at 11:41

## 2020-11-16 RX ADMIN — VANCOMYCIN HYDROCHLORIDE 1750 MG: 10 INJECTION, POWDER, LYOPHILIZED, FOR SOLUTION INTRAVENOUS at 12:42

## 2020-11-16 RX ADMIN — GUAIFENESIN 400 MG: 200 SOLUTION ORAL at 12:41

## 2020-11-16 RX ADMIN — ACETAMINOPHEN ORAL SOLUTION 650 MG: 650 SOLUTION ORAL at 12:58

## 2020-11-16 RX ADMIN — GUAIFENESIN 400 MG: 200 SOLUTION ORAL at 08:32

## 2020-11-16 RX ADMIN — SENNOSIDES 17.6 MG: 8.8 LIQUID ORAL at 17:35

## 2020-11-16 RX ADMIN — GUAIFENESIN 400 MG: 200 SOLUTION ORAL at 15:23

## 2020-11-16 RX ADMIN — MEROPENEM 500 MG: 500 INJECTION, POWDER, FOR SOLUTION INTRAVENOUS at 08:34

## 2020-11-16 RX ADMIN — MIDAZOLAM HYDROCHLORIDE 2 MG: 5 INJECTION INTRAMUSCULAR; INTRAVENOUS at 17:36

## 2020-11-16 RX ADMIN — MIDAZOLAM HYDROCHLORIDE 2 MG: 5 INJECTION INTRAMUSCULAR; INTRAVENOUS at 08:33

## 2020-11-16 NOTE — PROGRESS NOTES
2000 Bedside and Verbal shift change report given to Shantell Schwarz RN. (oncoming nurse) by Lewis Felix RN. (offgoing nurse). Report included the following information SBAR, Kardex, Procedure Summary, Intake/Output, MAR and Recent Results. Primary Nurse Magdi Renteira RN and Lewis Felix RN performed a dual skin assessment on this patient Impairment noted- see wound doc flow sheet. Jeffery score is 13 Pt has surgical wound and SRI drain to abdomen. Pt. Is on the vent. Suction set up at bedside. Patient is on Precedex and Fentanyl drip. Wife at bedside. Pt HR, BP, RR and temp is up. Tylenol and cooling blanket applied by day shift nurse. Per day shift Versed cut off this AM per pulmonologist. Pt agitated. Administered prn dilaudid at 2100 and scheduled seroquel. Pt remains agitated. 2300 Administered prn Versed earlier see MAR. Paged Dr. Aracelis Pineda. Updated MD on patient using SBAR format. Per MD restart Versed drip and cut off in the morning. Will continue to monitor. 0500 Pt remained on Versed drip. Ice packs added to cooling blanket and tylenol administered for temp of 103. Pt also got dilaudid and versed push see MAR.  0630 Patient weaned down and off Versed per Dr. Aracelis Pineda. Pt temp 102 and remains on cooling blanket and ice packs.

## 2020-11-16 NOTE — PROGRESS NOTES
[facility]MON[facility]OC[facility]     Name: Flavia Loaiza MRN: [id number]   : 1970 Hospital: [facility]   Date: 2020        Impression Plan   1. Acute hypoxemic respiratory failure, intubated , tube exchanged   2. Hemorrhagic shock, off pressors   3. Acute cholecystitis s/p robotic assisted lap bailee w/ TAMIKO   4. Post-op bleeding/tear in liver capsule s/p ex-lap and evacuation of hematoma   5. Acute blood loss anemia  6. MRSA pneumonia  7. Persistent fevers  8. H/o seizures  9. REBECCA                 · Tolerating SBT; however, suctioning requirements preclude extubation  · Titrate FiO2/PEEP to keep sats > 88%  · Continue vanc and alvaro (started ). Can stop flucon. PCT downtrending  · Send repeat cultures today for ongoing fevers  · continue scheduled duonebs, mucinex, mucomyst and chest PT  · Trend Hgb, transfuse to keep > 7  · Continue bumex as long as BP and renal function tolerate  · Add scheduled clonidine to help wean precedex  · Replete lytes  · increase seroquel  · Continue bowel regimen. Added dulcolax today  · Post-op mgmt as per surgery    DVT ppx: lovenox  GI ppx: protonix  NPO    Pt is critically ill and at high risk of decompensation  CCT: 37 minutes         Radiology  ( personally reviewed) CT chest reviewed: Bilateral PNA   ABG No results for input(s): PHI, PO2I, PCO2I in the last 72 hours. Subjective       47 yo with cholecystits s/p lap bailee complicated by laceration of the liver capsule and hemorrhagic shock. Hematoma evacuated- 2L blood. Currently on daniel 130 and Levophed 8.  Pt intubated and sedated- unable to provide any further hx.      - intubated     - bronch, tube exchange       - echo: EF 55-60%, RV normal     - CT chest: small effusions, patchy bilateral asdz/lower lobe consolidations            - CT abd/pelvis: 2 fluid collections in upper abdomen    *sputum culture () - MRSA  *all other cultures NGTD  *MRSA screen positive    **stopped propofol due to high TG    Interval history:  Tm 101. 6. PCT downtrending  On SBT now and pulling adequate volumes; however, is requiring q1h suctioning  Intermittently agitated w/ sedation wean. Was back on versed gtt overnight      Past Medical History:   Diagnosis Date    GERD (gastroesophageal reflux disease)     Panic attack     Psychiatric disorder     anxiety    Seizures ([facility] [address]     7 yo   was told it was a stress seizure, never had another one    Sleep apnea     uses CPAP      Past Surgical History:   Procedure Laterality Date    ABDOMEN SURGERY PROC UNLISTED  1977    hernia(abdomen)- open    HX GI  2008    esophagus repair narrow- balloon dilation and scar tissue removal    HX HERNIA REPAIR      inguinal hernia    HX OTHER SURGICAL  11years of age    tonsils    HX OTHER SURGICAL  2009    hernia (hiatal and abdomen)-open    HX OTHER SURGICAL  23years of age    wisdom teeth extraction under anesthesia    HX OTHER SURGICAL  8 years     distended testicle     HX OTHER SURGICAL  1/16/15    Laparoscopic ventral hernia repair with mesh      Prior to Admission medications    Medication Sig Start Date End Date Taking? Authorizing Provider   citalopram (CELEXA) 40 mg tablet Take 40 mg by mouth daily. Yes Provider, Historical   lisinopril (PRINIVIL, ZESTRIL) 40 mg tablet Take 40 mg by mouth every evening. Yes Provider, Historical   simvastatin (ZOCOR) 40 mg tablet Take 40 mg by mouth nightly. Yes Provider, Historical   omeprazole (PRILOSEC) 40 mg capsule Take 40 mg by mouth daily.    Yes Provider, Historical     Current Facility-Administered Medications   Medication Dose Route Frequency    [START ON 11/17/2020] Vancomycin- Level at 430am on 11/17/20   Other ONCE    bisacodyL (DULCOLAX) suppository 10 mg  10 mg Rectal DAILY    sennosides (SENOKOT) 8.8 mg/5 mL syrup 17.6 mg  10 mL Per G Tube BID    midazolam in normal saline (VERSED) 1 mg/mL infusion  0-10 mg/hr IntraVENous TITRATE    meropenem (MERREM) 500 mg in sterile water (preservative free) 10 mL IV syringe  0.5 g IntraVENous Q6H    dexmedeTOMidine in 0.9 % NaCl (PRECEDEX) 400 mcg/100 mL (4 mcg/mL) infusion soln  0.1-1.5 mcg/kg/hr IntraVENous TITRATE    fentaNYL (PF) 1,500 mcg/30 mL (50 mcg/mL) infusion  0-300 mcg/hr IntraVENous TITRATE    QUEtiapine (SEROquel) tablet 50 mg  50 mg Oral BID    vancomycin (VANCOCIN) 1,750 mg in 0.9% sodium chloride 500 mL IVPB  1,750 mg IntraVENous Q8H    bumetanide (BUMEX) injection 2 mg  2 mg IntraVENous BID    fluconazole (DIFLUCAN) 400mg/200 mL IVPB (premix)  400 mg IntraVENous DAILY    albuterol-ipratropium (DUO-NEB) 2.5 MG-0.5 MG/3 ML  3 mL Nebulization Q4H RT    acetylcysteine (MUCOMYST) 100 mg/mL (10 %) nebulizer solution 400 mg  4 mL Nebulization Q4H RT    docusate (COLACE) 50 mg/5 mL oral liquid 100 mg  100 mg Oral BID    guaiFENesin (ROBITUSSIN) 100 mg/5 mL oral liquid 400 mg  400 mg Oral Q4H    enoxaparin (LOVENOX) injection 40 mg  40 mg SubCUTAneous Q24H    pantoprazole (PROTONIX) 40 mg in 0.9% sodium chloride 10 mL injection  40 mg IntraVENous DAILY     Allergies   Allergen Reactions    Pcn [Penicillins] Other (comments)     Reaction was at age 6, itching and eyes swollen; denies any sob or tongue swelling. Has been on Zosyn in the hospital with no problems    Codeine Nausea and Vomiting      Social History     Tobacco Use    Smoking status: Never Smoker    Smokeless tobacco: Never Used   Substance Use Topics    Alcohol use: Yes     Alcohol/week: 1.7 standard drinks     Types: 2 Cans of beer per week     Comment: 1 or 2 beer every two weeks      Family History   Problem Relation Age of Onset    Diabetes Mother     Hypertension Mother     Stroke Mother         copd    Cancer Father         skin    Heart Disease Father     Diabetes Father     Heart Attack Father           Laboratory: I have personally reviewed the critical care flowsheet and labs. Recent Labs     11/16/20  0550 11/15/20  0350 11/14/20  0400   WBC 9.5 7.7 7.7   HGB 8.7* 8.8* 8.3*   HCT 27.8* 28.0* 26.6*    405* 362     Recent Labs     11/16/20  0550 11/15/20  0350 11/14/20  0400    140 141   K 3.7 3.8 3.7    103 104   CO2 31 32 31   * 114* 127*   BUN 12 12 12   CREA 0.74 0.64* 0.56*   CA 8.5 8.0* 8.1*   MG 2.2 2.3 2.3   PHOS 2.2* 2.4* 2.7   ALB 2.1* 2.0* 1.8*   ALT 37 37 38       Objective:     Mode Rate Tidal Volume Pressure FiO2 PEEP   Spontaneous   500 ml  5 cm H2O 40 % 5 cm H20     Vital Signs:    Ventilator Pressures  PC Set: 16  Pressure Support (cm H2O): 5 cm H2O  PIP Observed (cm H2O): 12 cm H2O  Plateau Pressure (cm H2O): 14 cm H2O  MAP (cm H2O): 8  PEEP/VENT (cm H2O): 5 cm H20  Auto PEEP Observed (cm H2O): 0.1 cm Q5KVFRO(24)Ventilator Pressures  PC Set: 16  Pressure Support (cm H2O): 5 cm H2O  PIP Observed (cm H2O): 12 cm H2O  Plateau Pressure (cm H2O): 14 cm H2O  MAP (cm H2O): 8  PEEP/VENT (cm H2O): 5 cm H20  Auto PEEP Observed (cm H2O): 0.1 cm H2O  Safety & Alarms  Circuit Temperature: (HME)  Backup Mode Checked/Apnea: Yes  Pressure Max: 65 cm H2O  Pressure Min: 13 cm H2O  Ve Min: 2  Ve Max: 22  Vt Min: 200 ml  Vt Max: 1000 ml  RR Min: 10  RR Max: 45  Intake/Output:   Last shift:      Ventilator Volumes  Vt Set (ml): 500 ml  Vt Exhaled (Machine Breath) (ml): 618 ml  Vt Spont (ml): 480 ml  Ve Observed (l/min): 15.5 l/min  Last 3 shifts: 11/16 0701 - 11/16 1900  In: 557.3 [I.V.:377.3]  Out: 2950 [Urine:2950]RRIOLAST3    Intake/Output Summary (Last 24 hours) at 11/16/2020 1258  Last data filed at 11/16/2020 1242  Gross per 24 hour   Intake 1560.47 ml   Output 6500 ml   Net -4939.53 ml     EXAM:   GENERAL: awake  HEENT:  Anicteric sclera, orally intubated  NECK:  Trachea midline  LUNGS: diminished, CTA  HEART: Regular rate and rhythm   ABDOMEN:  Soft, absent bowel sounds, incision c/d/i  EXTREMITIES: edema   NEUROLOGIC: RASS +1    Heavenly Look, MD  [facility]

## 2020-11-16 NOTE — PROGRESS NOTES
Problem: Falls - Risk of  Goal: *Absence of Falls  Description: Document Tiffanie Otto Fall Risk and appropriate interventions in the flowsheet. Outcome: Progressing Towards Goal  Note: Fall Risk Interventions:       Mentation Interventions: Adequate sleep, hydration, pain control    Medication Interventions: Bed/chair exit alarm    Elimination Interventions: Call light in reach              Problem: Ventilator Management  Goal: *Adequate oxygenation and ventilation  Outcome: Progressing Towards Goal     Problem: Pressure Injury - Risk of  Goal: *Prevention of pressure injury  Description: Document Jeffery Scale and appropriate interventions in the flowsheet.   Outcome: Progressing Towards Goal  Note: Pressure Injury Interventions:  Sensory Interventions: Assess changes in LOC    Moisture Interventions: Absorbent underpads    Activity Interventions: Assess need for specialty bed    Mobility Interventions: Assess need for specialty bed    Nutrition Interventions: Discuss nutritional consult with provider    Friction and Shear Interventions: Apply protective barrier, creams and emollients                Problem: Non-Violent Restraints  Goal: *Removal from restraints as soon as assessed to be safe  Outcome: Progressing Towards Goal  Goal: *No harm/injury to patient while restraints in use  Outcome: Progressing Towards Goal  Goal: *Patient's dignity will be maintained  Outcome: Progressing Towards Goal

## 2020-11-16 NOTE — PROGRESS NOTES
SURGERY PROGRESS NOTE      Admit Date: 11/3/2020    POD 12 Days Post-Op    Procedure: Procedure(s):  LAPAROTOMY EXPLORATORY, evacuation of hematoma. Subjective:   Remains intubated but improving      Objective:     Visit Vitals  /71   Pulse 80   Temp (!) 100.7 °F (38.2 °C)   Resp 18   Ht 5' 11\" (1.803 m)   Wt 118.8 kg (262 lb)   SpO2 100%   BMI 36.54 kg/m²        Temp (24hrs), Av.1 °F (38.4 °C), Min:100.4 °F (38 °C), Max:101.6 °F (38.7 °C)      Physical Exam:     Abdomen:  Soft. Non-tender, non-distended. Incision C/D/I.         Lab Results   Component Value Date/Time    WBC 9.5 2020 05:50 AM    HGB 8.7 (L) 2020 05:50 AM    Hemoglobin (POC) 6.8 (L) 2020 03:42 PM    Hemoglobin (POC) 13.3 2014 10:05 PM    HCT 27.8 (L) 2020 05:50 AM    Hematocrit (POC) 39 2014 10:05 PM    PLATELET 813 10/47/8955 05:50 AM    MCV 92.1 2020 05:50 AM     Lab Results   Component Value Date/Time    GFR est non-AA >60 2020 05:50 AM    GFRNA, POC >60 2014 10:05 PM    GFR est AA >60 2020 05:50 AM    GFRAA, POC >60 2014 10:05 PM    Creatinine 0.74 2020 05:50 AM    Creatinine (POC) 1.1 2014 10:05 PM    BUN 12 2020 05:50 AM    BUN (POC) 15 2014 10:05 PM    Sodium 140 2020 05:50 AM    Sodium (POC) 141 2014 10:05 PM    Potassium 3.7 2020 05:50 AM    Potassium (POC) 3.5 2014 10:05 PM    Chloride 101 2020 05:50 AM    Chloride (POC) 106 2014 10:05 PM    CO2 31 2020 05:50 AM    Magnesium 2.2 2020 05:50 AM    Phosphorus 2.2 (L) 2020 05:50 AM       Assessment:     Active Problems:    Biliary colic ()      Cholecystitis (2020)        Plan/Recommendations/Medical Decision Making:   Wean to extubate hopefully in in 24-48 hours  [id number] Vanda Valdez for clears once extubated   Continue IV abx  Will need PT/OT once extubated

## 2020-11-16 NOTE — PROGRESS NOTES
0700 - Assumed care of patient. Pt resting in bed but attempts to pull at restraints and does not follow commands. Pt has temp which is not new. Cooling blanket in use. 0800 - Pt vomited around ETT. PT given PRN medications. Pt has numerous clots and increased sections from ETT. Dr. Steven Tinsley aware. 0900 - Dr. Steven Tinsley at bedside. New orders received. Pt not ready for extubation at this time    1500 - Obtained blood cultures and urine culture with an exchange of cooley. Pt tolerated well. Temp down and wife continues to be at bedside. 1750  -  Sputum collected. Pt agitated and attempts to get out of bed. Versed given as orderd. Pt back on ac due to apneic event. Vss. Will continue to monitor.     1900 - Reported to [employer]

## 2020-11-16 NOTE — PROGRESS NOTES
Comprehensive Nutrition Assessment    Type and Reason for Visit: Reassess    Nutrition Recommendations/Plan:     1. NPO x 5 days now. If pt remains intubated, recommend resuming EN via OGT. TF REC's:  Vital 1.2 at 10ml/hr, increase by 10 ml q8h as pt tolerates to goal rate of 60 ml/hr with 100 ml h20 flush q4h. (Goal TF will provide 1728 kcals, 108 gm protein, 1767 ml fluid - this will meet 61% pt's kcal needs, 92% protein needs)     2. Once extubated, Recommend advancing diet order to GI lite as medically appropriate. Nutrition Assessment:      11/16:  F/u. Remains on vent. Propofol off. MD notes possible extubation tomorrow. Originally planning to extubate today but pt with too many secretions. TF has remained on hold since last assessment 11/12 (x 5 days now). Noted to have 120 ml black residuals yesterday from OGT. Surgery notes plans to advance PO diet once extubated. Labs: . Meds: Precedex, Colace, Senokol. 11/12: F/u. Remains on vent. Propofol resumed, at 50mcg/kg/min (providing 939 kcals/d). NGT became coiled, was replaced with OGT. TF was started and running as Vital 1.2 at 40ml/hr. Residuals documented as 20-45ml over last 24h. Pt vomited TF last night so TF placed on hold. Concerning that no BM since admission. 11/9: F/u. Pt remains on vent. Off pressors and Propofol. NGT has remained to suction. GI placed consult today to start trickle TF.  NPO x 6 days. Labs: K+ 3.2. Meds: Precedex. LR running at 125ml/hr. 11/6: 47 yo male admitted for cholecystitis. PMhx: GERD, hernia repairs, esophageal dilation. Class II obese per BMI of 36.6. Weight hx indicates weight gain PTA. S/P cholecystectomy and lysis of adhesions on 11/4. Intubated for procedure and remains on vent today. Being sedated with Propofol at 50mcg/kg/min (providing 863 kcals/d). Requiring pressor support: levophed at 2mcg/min and William at 165. NPO x 3 days.   NGT to suction with 600 ml output today. MD notes plan to wean off pressors and extubated. LR running at 125 ml/hr. Labs: . Meds: propofol, Lvophed, William. Malnutrition Assessment:  Unable to assess at this time. Estimated Daily Nutrient Needs:  Energy (kcal): 2818(PAL 2349 x 1.2); Weight Used for Energy Requirements: Current  Protein (g): 117-140(1.5-1.8gm/Kg IBW/d while intubated); Weight Used for Protein Requirements: Ideal  Fluid (ml/day): 2818; Method Used for Fluid Requirements: 1 ml/kcal      Nutrition Related Findings:  LBM 11/13; Edema: generalized, 2+ to all extremities      Wounds:    Surgical incision(ABD)       Current Nutrition Therapies:  DIET TUBE FEEDING    Anthropometric Measures:  · Height:  5' 11\" (180.3 cm)  · Current Body Wt:  118.9 kg (262 lb 2 oz)   · Admission Body Wt:       · Usual Body Wt:        · Ideal Body Wt:   :      · Adjusted Body Weight:   ; Weight Adjustment for: No adjustment   · Adjusted BMI:       · BMI Category:  Obese class 2 (BMI 35.0-39. 9)       Nutrition Diagnosis:   · Inadequate protein-energy intake related to inadequate protein-energy intake as evidenced by NPO or clear liquid status due to medical condition, intubation    11/9: Nutrition Dx continues - NPO.  11/12: Nutrition Dx continues - TF on hold. 11/16: Nutrition Dx continues - TF on hold/NPO. Nutrition Interventions:   Food and/or Nutrient Delivery: Start oral diet, Start tube feeding  Nutrition Education and Counseling: No recommendations at this time  Coordination of Nutrition Care: Continue to monitor while inpatient    Goals:  Resume EN to meet > 60% EEN's while intubated or advance PO diet with intakes > 50% once extubated within next 2-3 days       Nutrition Monitoring and Evaluation:   Behavioral-Environmental Outcomes:    Food/Nutrient Intake Outcomes: Enteral nutrition intake/tolerance  Physical Signs/Symptoms Outcomes: GI status, Biochemical data, Weight    Discharge Planning:     Too soon to determine Electronically signed by Oskar Oh, [address] on 11/16/2020     Contact: [phone]

## 2020-11-17 ENCOUNTER — APPOINTMENT (OUTPATIENT)
Dept: GENERAL RADIOLOGY | Age: 50
DRG: 417 | End: 2020-11-17
Attending: INTERNAL MEDICINE
Payer: COMMERCIAL

## 2020-11-17 LAB
ALBUMIN SERPL-MCNC: 2.2 G/DL (ref 3.5–5)
ALBUMIN/GLOB SERPL: 0.5 {RATIO} (ref 1.1–2.2)
ALP SERPL-CCNC: 142 U/L (ref 45–117)
ALT SERPL-CCNC: 38 U/L (ref 12–78)
ANION GAP SERPL CALC-SCNC: 5 MMOL/L (ref 5–15)
ARTERIAL PATENCY WRIST A: ABNORMAL
ARTERIAL PATENCY WRIST A: YES
AST SERPL-CCNC: 34 U/L (ref 15–37)
BASE EXCESS BLDA CALC-SCNC: 2.6 MMOL/L
BASE EXCESS BLDA CALC-SCNC: 6.1 MMOL/L
BASOPHILS # BLD: 0 K/UL (ref 0–0.1)
BASOPHILS NFR BLD: 1 % (ref 0–1)
BDY SITE: ABNORMAL
BDY SITE: ABNORMAL
BILIRUB SERPL-MCNC: 0.7 MG/DL (ref 0.2–1)
BUN SERPL-MCNC: 12 MG/DL (ref 6–20)
BUN/CREAT SERPL: 16 (ref 12–20)
CALCIUM SERPL-MCNC: 8.7 MG/DL (ref 8.5–10.1)
CHLORIDE SERPL-SCNC: 103 MMOL/L (ref 97–108)
CO2 SERPL-SCNC: 32 MMOL/L (ref 21–32)
CREAT SERPL-MCNC: 0.75 MG/DL (ref 0.7–1.3)
DATE LAST DOSE: ABNORMAL
DIFFERENTIAL METHOD BLD: ABNORMAL
EOSINOPHIL # BLD: 0.2 K/UL (ref 0–0.4)
EOSINOPHIL NFR BLD: 3 % (ref 0–7)
EPAP/CPAP/PEEP, PAPEEP: 5
ERYTHROCYTE [DISTWIDTH] IN BLOOD BY AUTOMATED COUNT: 13.5 % (ref 11.5–14.5)
FIO2 ON VENT: 40 %
FIO2 ON VENT: 40 %
GAS FLOW.O2 SETTING OXYMISER: 10 L/MIN
GLOBULIN SER CALC-MCNC: 4.2 G/DL (ref 2–4)
GLUCOSE SERPL-MCNC: 136 MG/DL (ref 65–100)
HCO3 BLDA-SCNC: 27 MMOL/L (ref 22–26)
HCO3 BLDA-SCNC: 30 MMOL/L (ref 22–26)
HCT VFR BLD AUTO: 25.8 % (ref 36.6–50.3)
HGB BLD-MCNC: 8.2 G/DL (ref 12.1–17)
IMM GRANULOCYTES # BLD AUTO: 0.1 K/UL (ref 0–0.04)
IMM GRANULOCYTES NFR BLD AUTO: 1 % (ref 0–0.5)
LYMPHOCYTES # BLD: 1.1 K/UL (ref 0.8–3.5)
LYMPHOCYTES NFR BLD: 19 % (ref 12–49)
MAGNESIUM SERPL-MCNC: 2.3 MG/DL (ref 1.6–2.4)
MCH RBC QN AUTO: 28.9 PG (ref 26–34)
MCHC RBC AUTO-ENTMCNC: 31.8 G/DL (ref 30–36.5)
MCV RBC AUTO: 90.8 FL (ref 80–99)
MONOCYTES # BLD: 0.6 K/UL (ref 0–1)
MONOCYTES NFR BLD: 9 % (ref 5–13)
NEUTS SEG # BLD: 4 K/UL (ref 1.8–8)
NEUTS SEG NFR BLD: 67 % (ref 32–75)
NRBC # BLD: 0 K/UL (ref 0–0.01)
NRBC BLD-RTO: 0 PER 100 WBC
PCO2 BLDA: 39 MMHG (ref 35–45)
PCO2 BLDA: 43 MMHG (ref 35–45)
PEEP MAX SETTING VENT: 5 CM[H2O]
PH BLDA: 7.43 [PH] (ref 7.35–7.45)
PH BLDA: 7.5 [PH] (ref 7.35–7.45)
PHOSPHATE SERPL-MCNC: 2.3 MG/DL (ref 2.6–4.7)
PLATELET # BLD AUTO: 360 K/UL (ref 150–400)
PMV BLD AUTO: 9.4 FL (ref 8.9–12.9)
PO2 BLDA: 133 MMHG (ref 80–100)
PO2 BLDA: 85 MMHG (ref 80–100)
POTASSIUM SERPL-SCNC: 3.4 MMOL/L (ref 3.5–5.1)
PRESSURE SUPPORT SETTING VENT: 5 CM[H2O]
PROT SERPL-MCNC: 6.4 G/DL (ref 6.4–8.2)
RBC # BLD AUTO: 2.84 M/UL (ref 4.1–5.7)
REPORTED DOSE,DOSE: ABNORMAL UNITS
REPORTED DOSE/TIME,TMG: ABNORMAL
SAO2 % BLD: 97 % (ref 92–97)
SAO2 % BLD: 99 % (ref 92–97)
SAO2% DEVICE SAO2% SENSOR NAME: ABNORMAL
SAO2% DEVICE SAO2% SENSOR NAME: ABNORMAL
SERVICE CMNT-IMP: ABNORMAL
SODIUM SERPL-SCNC: 140 MMOL/L (ref 136–145)
SPECIMEN SITE: ABNORMAL
SPECIMEN SITE: ABNORMAL
VANCOMYCIN TROUGH SERPL-MCNC: 25.9 UG/ML (ref 5–10)
VENTILATION MODE VENT: ABNORMAL
VENTILATION MODE VENT: ABNORMAL
VT SETTING VENT: 500 ML
WBC # BLD AUTO: 6 K/UL (ref 4.1–11.1)

## 2020-11-17 PROCEDURE — 94664 DEMO&/EVAL PT USE INHALER: CPT

## 2020-11-17 PROCEDURE — 65610000006 HC RM INTENSIVE CARE

## 2020-11-17 PROCEDURE — 71045 X-RAY EXAM CHEST 1 VIEW: CPT

## 2020-11-17 PROCEDURE — 94640 AIRWAY INHALATION TREATMENT: CPT

## 2020-11-17 PROCEDURE — 36600 WITHDRAWAL OF ARTERIAL BLOOD: CPT

## 2020-11-17 PROCEDURE — 77030029684 HC NEB SM VOL KT MONA -A

## 2020-11-17 PROCEDURE — 36415 COLL VENOUS BLD VENIPUNCTURE: CPT

## 2020-11-17 PROCEDURE — 85025 COMPLETE CBC W/AUTO DIFF WBC: CPT

## 2020-11-17 PROCEDURE — 74011250637 HC RX REV CODE- 250/637: Performed by: SURGERY

## 2020-11-17 PROCEDURE — 84100 ASSAY OF PHOSPHORUS: CPT

## 2020-11-17 PROCEDURE — 83735 ASSAY OF MAGNESIUM: CPT

## 2020-11-17 PROCEDURE — 94003 VENT MGMT INPAT SUBQ DAY: CPT

## 2020-11-17 PROCEDURE — 74011250637 HC RX REV CODE- 250/637: Performed by: INTERNAL MEDICINE

## 2020-11-17 PROCEDURE — 74011000250 HC RX REV CODE- 250: Performed by: INTERNAL MEDICINE

## 2020-11-17 PROCEDURE — 80053 COMPREHEN METABOLIC PANEL: CPT

## 2020-11-17 PROCEDURE — C9113 INJ PANTOPRAZOLE SODIUM, VIA: HCPCS | Performed by: SURGERY

## 2020-11-17 PROCEDURE — 2709999900 HC NON-CHARGEABLE SUPPLY

## 2020-11-17 PROCEDURE — 80202 ASSAY OF VANCOMYCIN: CPT

## 2020-11-17 PROCEDURE — 82803 BLOOD GASES ANY COMBINATION: CPT

## 2020-11-17 PROCEDURE — 74011250636 HC RX REV CODE- 250/636: Performed by: INTERNAL MEDICINE

## 2020-11-17 PROCEDURE — 74011250636 HC RX REV CODE- 250/636: Performed by: SURGERY

## 2020-11-17 RX ORDER — METOPROLOL TARTRATE 5 MG/5ML
5 INJECTION INTRAVENOUS EVERY 6 HOURS
Status: DISCONTINUED | OUTPATIENT
Start: 2020-11-17 | End: 2020-11-18

## 2020-11-17 RX ORDER — POTASSIUM CHLORIDE 29.8 MG/ML
20 INJECTION INTRAVENOUS ONCE
Status: COMPLETED | OUTPATIENT
Start: 2020-11-17 | End: 2020-11-17

## 2020-11-17 RX ADMIN — IPRATROPIUM BROMIDE AND ALBUTEROL SULFATE 3 ML: .5; 3 SOLUTION RESPIRATORY (INHALATION) at 11:12

## 2020-11-17 RX ADMIN — ACETYLCYSTEINE 400 MG: 100 SOLUTION ORAL; RESPIRATORY (INHALATION) at 11:12

## 2020-11-17 RX ADMIN — GUAIFENESIN 400 MG: 200 SOLUTION ORAL at 01:09

## 2020-11-17 RX ADMIN — Medication 200 MCG/HR: at 03:50

## 2020-11-17 RX ADMIN — MEROPENEM 500 MG: 500 INJECTION, POWDER, FOR SOLUTION INTRAVENOUS at 20:31

## 2020-11-17 RX ADMIN — HYDROMORPHONE HYDROCHLORIDE 1 MG: 2 INJECTION, SOLUTION INTRAMUSCULAR; INTRAVENOUS; SUBCUTANEOUS at 14:22

## 2020-11-17 RX ADMIN — IPRATROPIUM BROMIDE AND ALBUTEROL SULFATE 3 ML: .5; 3 SOLUTION RESPIRATORY (INHALATION) at 15:00

## 2020-11-17 RX ADMIN — SODIUM CHLORIDE: 900 INJECTION, SOLUTION INTRAVENOUS at 14:24

## 2020-11-17 RX ADMIN — MEROPENEM 500 MG: 500 INJECTION, POWDER, FOR SOLUTION INTRAVENOUS at 14:44

## 2020-11-17 RX ADMIN — GUAIFENESIN 400 MG: 200 SOLUTION ORAL at 20:31

## 2020-11-17 RX ADMIN — HYDROMORPHONE HYDROCHLORIDE 1 MG: 2 INJECTION, SOLUTION INTRAMUSCULAR; INTRAVENOUS; SUBCUTANEOUS at 10:55

## 2020-11-17 RX ADMIN — METOPROLOL TARTRATE 5 MG: 5 INJECTION INTRAVENOUS at 17:23

## 2020-11-17 RX ADMIN — MIDAZOLAM HYDROCHLORIDE 2 MG: 5 INJECTION INTRAMUSCULAR; INTRAVENOUS at 05:49

## 2020-11-17 RX ADMIN — HYDROMORPHONE HYDROCHLORIDE 1 MG: 2 INJECTION, SOLUTION INTRAMUSCULAR; INTRAVENOUS; SUBCUTANEOUS at 05:49

## 2020-11-17 RX ADMIN — GUAIFENESIN 400 MG: 200 SOLUTION ORAL at 03:54

## 2020-11-17 RX ADMIN — ACETYLCYSTEINE 400 MG: 100 SOLUTION ORAL; RESPIRATORY (INHALATION) at 04:16

## 2020-11-17 RX ADMIN — DEXMEDETOMIDINE HYDROCHLORIDE 1.5 MCG/KG/HR: 400 INJECTION INTRAVENOUS at 06:17

## 2020-11-17 RX ADMIN — ACETAMINOPHEN ORAL SOLUTION 650 MG: 650 SOLUTION ORAL at 13:20

## 2020-11-17 RX ADMIN — MIDAZOLAM HYDROCHLORIDE 2 MG: 5 INJECTION INTRAMUSCULAR; INTRAVENOUS at 12:31

## 2020-11-17 RX ADMIN — ONDANSETRON 4 MG: 2 INJECTION INTRAMUSCULAR; INTRAVENOUS at 09:32

## 2020-11-17 RX ADMIN — CLONIDINE HYDROCHLORIDE 0.1 MG: 0.1 TABLET ORAL at 13:20

## 2020-11-17 RX ADMIN — DEXMEDETOMIDINE HYDROCHLORIDE 0.8 MCG/KG/HR: 400 INJECTION INTRAVENOUS at 20:30

## 2020-11-17 RX ADMIN — POTASSIUM CHLORIDE 20 MEQ: 400 INJECTION, SOLUTION INTRAVENOUS at 13:20

## 2020-11-17 RX ADMIN — DEXMEDETOMIDINE HYDROCHLORIDE 1.5 MCG/KG/HR: 400 INJECTION INTRAVENOUS at 01:16

## 2020-11-17 RX ADMIN — QUETIAPINE FUMARATE 75 MG: 25 TABLET ORAL at 20:31

## 2020-11-17 RX ADMIN — BUMETANIDE 2 MG: 0.25 INJECTION INTRAMUSCULAR; INTRAVENOUS at 10:14

## 2020-11-17 RX ADMIN — METOPROLOL TARTRATE 5 MG: 5 INJECTION INTRAVENOUS at 23:27

## 2020-11-17 RX ADMIN — DEXMEDETOMIDINE HYDROCHLORIDE 1.5 MCG/KG/HR: 400 INJECTION INTRAVENOUS at 08:42

## 2020-11-17 RX ADMIN — CLONIDINE HYDROCHLORIDE 0.1 MG: 0.1 TABLET ORAL at 06:19

## 2020-11-17 RX ADMIN — DEXMEDETOMIDINE HYDROCHLORIDE 1.5 MCG/KG/HR: 400 INJECTION INTRAVENOUS at 17:22

## 2020-11-17 RX ADMIN — IPRATROPIUM BROMIDE AND ALBUTEROL SULFATE 3 ML: .5; 3 SOLUTION RESPIRATORY (INHALATION) at 07:38

## 2020-11-17 RX ADMIN — ACETYLCYSTEINE 400 MG: 100 SOLUTION ORAL; RESPIRATORY (INHALATION) at 00:33

## 2020-11-17 RX ADMIN — DEXMEDETOMIDINE HYDROCHLORIDE 1.5 MCG/KG/HR: 400 INJECTION INTRAVENOUS at 14:43

## 2020-11-17 RX ADMIN — METOPROLOL TARTRATE 5 MG: 5 INJECTION INTRAVENOUS at 12:07

## 2020-11-17 RX ADMIN — ACETYLCYSTEINE 400 MG: 100 SOLUTION ORAL; RESPIRATORY (INHALATION) at 07:39

## 2020-11-17 RX ADMIN — BISACODYL 10 MG: 10 SUPPOSITORY RECTAL at 10:56

## 2020-11-17 RX ADMIN — MEROPENEM 500 MG: 500 INJECTION, POWDER, FOR SOLUTION INTRAVENOUS at 08:02

## 2020-11-17 RX ADMIN — IPRATROPIUM BROMIDE AND ALBUTEROL SULFATE 3 ML: .5; 3 SOLUTION RESPIRATORY (INHALATION) at 04:16

## 2020-11-17 RX ADMIN — VANCOMYCIN HYDROCHLORIDE 1750 MG: 10 INJECTION, POWDER, LYOPHILIZED, FOR SOLUTION INTRAVENOUS at 21:41

## 2020-11-17 RX ADMIN — OXYCODONE HYDROCHLORIDE 5 MG: 5 TABLET ORAL at 07:36

## 2020-11-17 RX ADMIN — ACETAMINOPHEN ORAL SOLUTION 650 MG: 650 SOLUTION ORAL at 01:09

## 2020-11-17 RX ADMIN — DEXMEDETOMIDINE HYDROCHLORIDE 1.5 MCG/KG/HR: 400 INJECTION INTRAVENOUS at 03:44

## 2020-11-17 RX ADMIN — IPRATROPIUM BROMIDE AND ALBUTEROL SULFATE 3 ML: .5; 3 SOLUTION RESPIRATORY (INHALATION) at 00:33

## 2020-11-17 RX ADMIN — ACETYLCYSTEINE 400 MG: 100 SOLUTION ORAL; RESPIRATORY (INHALATION) at 15:00

## 2020-11-17 RX ADMIN — DEXMEDETOMIDINE HYDROCHLORIDE 1.5 MCG/KG/HR: 400 INJECTION INTRAVENOUS at 12:05

## 2020-11-17 RX ADMIN — BUMETANIDE 2 MG: 0.25 INJECTION INTRAMUSCULAR; INTRAVENOUS at 17:23

## 2020-11-17 RX ADMIN — OXYCODONE HYDROCHLORIDE 5 MG: 5 TABLET ORAL at 15:40

## 2020-11-17 RX ADMIN — CLONIDINE HYDROCHLORIDE 0.1 MG: 0.1 TABLET ORAL at 21:42

## 2020-11-17 RX ADMIN — MIDAZOLAM HYDROCHLORIDE 2 MG: 5 INJECTION INTRAMUSCULAR; INTRAVENOUS at 02:41

## 2020-11-17 RX ADMIN — GUAIFENESIN 400 MG: 200 SOLUTION ORAL at 23:27

## 2020-11-17 RX ADMIN — SODIUM CHLORIDE 40 MG: 9 INJECTION INTRAMUSCULAR; INTRAVENOUS; SUBCUTANEOUS at 10:56

## 2020-11-17 RX ADMIN — ENOXAPARIN SODIUM 40 MG: 40 INJECTION SUBCUTANEOUS at 20:31

## 2020-11-17 RX ADMIN — IPRATROPIUM BROMIDE AND ALBUTEROL SULFATE 3 ML: .5; 3 SOLUTION RESPIRATORY (INHALATION) at 21:09

## 2020-11-17 RX ADMIN — OXYCODONE HYDROCHLORIDE 5 MG: 5 TABLET ORAL at 11:55

## 2020-11-17 RX ADMIN — MEROPENEM 500 MG: 500 INJECTION, POWDER, FOR SOLUTION INTRAVENOUS at 02:40

## 2020-11-17 RX ADMIN — MIDAZOLAM HYDROCHLORIDE 2 MG: 5 INJECTION INTRAMUSCULAR; INTRAVENOUS at 10:13

## 2020-11-17 RX ADMIN — HYDROMORPHONE HYDROCHLORIDE 1 MG: 2 INJECTION, SOLUTION INTRAMUSCULAR; INTRAVENOUS; SUBCUTANEOUS at 17:30

## 2020-11-17 RX ADMIN — VANCOMYCIN HYDROCHLORIDE 1750 MG: 10 INJECTION, POWDER, LYOPHILIZED, FOR SOLUTION INTRAVENOUS at 05:13

## 2020-11-17 NOTE — PROGRESS NOTES
1915 Received report. 2100 Patient restless , frequently repositioned in bed.  0109 Temp 100.4 ,tylenol 650 mg ngt given. 4364 Patient agitated , restless, versed 2 mg iv given. 4537 Patient agitated , versed 2 mg iv, dilaudid 1 mg iv given. 0630 Patient agitated, hitting nurse while repositioning in bed.  0700 Nurse in room, patient awake and alert,resting in bed,   0720  Bedside shift change report given to Gilford Brew . Report included the following information SBAR, Kardex, ED Summary, OR Summary, Procedure Summary, Intake/Output, MAR, Accordion, Recent Results, Med Rec Status and Cardiac Rhythm SR/ST.

## 2020-11-17 NOTE — PROGRESS NOTES
[facility] Dosing Services: Antimicrobial Stewardship Daily Doc    Consult for antibiotic dosing of vancomycin by Dr. Elio Smalls  Indication: MRSA pneumonia, remains intubated s/p ex lap  Day of Therapy: 9    Vancomycin therapy:  Current maintenance dose: 1750 (mg) every 8 hours   Dose calculated to approximate a therapeutic trough of 15-20 mcg/mL. Last trough level   Date Dose & Interval Measured (mcg/mL) Extrapolated (mcg/mL)   ?11/11/2020 ?1750 mg Q12 ?8.6 ? 7.1   ?11/12/2020 ?1750 mg Q8 ?18.3 ?--   ?11/15/2020 ?1750 mg Q8 ?19.1 ?17       11/17/2020      1750 mg Q8     25.9     22.8     Plan:  Slightly supratherapeutic. SCr stable and UOP is good. Reduced the dose to  1750mg every 12 hrs and delayed the start the next dose by 16 hrs to allow for extra elimination. Dose administration notes:   Doses given appropriately as scheduled      Other Antimicrobial   (not dosed by pharmacist)   Meropenem 500 mg every 6 hours    Cultures 11/16: Blood x2- NGTD- pending  11/16: Sputum- NGTD- pending  11/9 - MRSA nares - MRSA Present - FINAL  11/9 - Blood x 2 - NG FINAL  11/9 - Urine - No growth - FINAL  11/9 - Sputum - Heavy MRSA - V CITLALI = 0.5 - FINAL  11/3 Blood: NG F   Serum Creatinine Lab Results   Component Value Date/Time    Creatinine 0.75 11/17/2020 04:24 AM    Creatinine (POC) 1.1 02/09/2014 10:05 PM         Creatinine Clearance Estimated Creatinine Clearance: 154.5 mL/min (based on SCr of 0.75 mg/dL). Temp Temp: 98.8 °F (37.1 °C)       WBC Lab Results   Component Value Date/Time    WBC 6.0 11/17/2020 04:24 AM        H/H Lab Results   Component Value Date/Time    HGB 8.2 (L) 11/17/2020 04:24 AM        Platelets    Lab Results   Component Value Date/Time    PLATELET 915 25/36/4436 04:24 AM              For Antifungals, Metronidazole, and Nafcillin:  ALT:        AST:      Alk Phos:      T Bili:    Pharmacist Santos Munroe

## 2020-11-17 NOTE — PROGRESS NOTES
visited Mr. Danny Reeves for a follow up visit in the ICU. He is intubated, but awake, made good eye contact with the , and indicated through hand motions that he was doing alright. His wife, Wang Garcia, explained that he was having a breathing trial and was hopeful that he would be able to be extubated later today. Wang Garcia requested  provide prayer and  provided prayer as requested. Mr. Danny Reeves indicated that he want to communicate further with the  via writing, so Wang Garcia helped him set up his white board. Mr. Danny Reeves wrote many thoughts on the board, but  had a difficult time reading them. This caused much distress and frustration to Mr. Danny Reeves and  apologized for the difficulty we were having in our attempts to communicate. Mr. Danny Reeves closed his eyes and Wang Garcia encouraged him to continue to relax. Wang Garica thanked the  for her visit and expressed no additional needs at this time. 's will follow up as able and/or needed  [email or web address].      Paging Service: [phone])

## 2020-11-17 NOTE — PROGRESS NOTES
SURGERY PROGRESS NOTE      Admit Date: 11/3/2020    POD 13 Days Post-Op    Procedure: Procedure(s):  LAPAROTOMY EXPLORATORY, evacuation of hematoma. Subjective:   Awake and appropriate this am  SBT this am      Objective:     Visit Vitals  BP (!) 146/79   Pulse 95   Temp 98.8 °F (37.1 °C)   Resp 17   Ht 5' 11\" (1.803 m)   Wt 118.8 kg (262 lb)   SpO2 98%   BMI 36.54 kg/m²        Temp (24hrs), Av.7 °F (37.6 °C), Min:98.7 °F (37.1 °C), Max:101.3 °F (38.5 °C)      Physical Exam:     Abdomen:  Soft. Non-tender, non-distended.   Incision C/D/I. SRI serous        Lab Results   Component Value Date/Time    WBC 6.0 2020 04:24 AM    HGB 8.2 (L) 2020 04:24 AM    Hemoglobin (POC) 6.8 (L) 2020 03:42 PM    Hemoglobin (POC) 13.3 2014 10:05 PM    HCT 25.8 (L) 2020 04:24 AM    Hematocrit (POC) 39 2014 10:05 PM    PLATELET 793  04:24 AM    MCV 90.8 2020 04:24 AM     Lab Results   Component Value Date/Time    GFR est non-AA >60 2020 04:24 AM    GFRNA, POC >60 2014 10:05 PM    GFR est AA >60 2020 04:24 AM    GFRAA, POC >60 2014 10:05 PM    Creatinine 0.75 2020 04:24 AM    Creatinine (POC) 1.1 2014 10:05 PM    BUN 12 2020 04:24 AM    BUN (POC) 15 2014 10:05 PM    Sodium 140 2020 04:24 AM    Sodium (POC) 141 2014 10:05 PM    Potassium 3.4 (L) 2020 04:24 AM    Potassium (POC) 3.5 2014 10:05 PM    Chloride 103 2020 04:24 AM    Chloride (POC) 106 2014 10:05 PM    CO2 32 2020 04:24 AM    Magnesium 2.3 2020 04:24 AM    Phosphorus 2.3 (L) 2020 04:24 AM       Assessment:     Active Problems:    Biliary colic (451)      Cholecystitis (2020)        Plan/Recommendations/Medical Decision Making:   Possible extubation today  Clears as able after extubation  PT/OT

## 2020-11-17 NOTE — PROGRESS NOTES
0720: Assumed care of patient. Patient resting in bed, currently on fentanyl, precedex gtt. 0740: SBT started. 0945: failed SBT. Low oxygen level, elevated HR, elevated BP.       0930: Zofran given. Patient vomiting     1100: patient vomiting. OG hooked up to suction. Patient states he does not feel nauseous. 1320: SBT tried again. Wife at bedside.

## 2020-11-17 NOTE — PROGRESS NOTES
[facility]     Name: Nikita Diaz MRN: [id number]   : 1970 Hospital: [address]   Date: 2020        Impression Plan   1. Acute hypoxemic respiratory failure, intubated , tube exchanged   2. Hemorrhagic shock, off pressors   3. Acute cholecystitis s/p robotic assisted lap bailee w/ TAMIKO   4. Post-op bleeding/tear in liver capsule s/p ex-lap and evacuation of hematoma   5. Acute blood loss anemia  6. MRSA pneumonia  7. Persistent fevers  8. H/o seizures  9. REBECCA                 · Daily SBT. Try again this afternoon  · Titrate FiO2/PEEP to keep sats > 88%  · Continue vanc and alvaro (started )  · Follow cultures  · continue scheduled duonebs, mucinex, mucomyst and chest PT  · Trend Hgb, transfuse to keep > 7  · Continue bumex as long as BP and renal function tolerate  · Adedd scheduled clonidine to help wean precedex  · Add scheduled IV metoprolol  · Replete lytes  · Continue seroquel  · Continue bowel regimen  · Post-op mgmt as per surgery    DVT ppx: lovenox  GI ppx: protonix  NPO    Pt is critically ill and at high risk of decompensation  CCT: 36 minutes         Radiology  ( personally reviewed) CT chest reviewed: Bilateral PNA   ABG No results for input(s): PHI, PO2I, PCO2I in the last 72 hours. Subjective       49 yo with cholecystits s/p lap bailee complicated by laceration of the liver capsule and hemorrhagic shock. Hematoma evacuated- 2L blood. Currently on daniel 130 and Levophed 8.  Pt intubated and sedated- unable to provide any further hx.      - intubated     - bronch, tube exchange       - echo: EF 55-60%, RV normal     - CT chest: small effusions, patchy bilateral asdz/lower lobe consolidations            - CT abd/pelvis: 2 fluid collections in upper abdomen    *sputum culture () - MRSA  *all other cultures NGTD  *MRSA screen positive    **stopped propofol due to high TG    Interval history:  Tm 101.3  Failed SBT this am due to hypoxemia (low 80s) and tachycardia (HR 130s)  Suctioning frequency improved though still pink tinged secretions  Still having occasional vomiting      Past Medical History:   Diagnosis Date    GERD (gastroesophageal reflux disease)     Panic attack     Psychiatric disorder     anxiety    Seizures ([facility] [address]     7 yo   was told it was a stress seizure, never had another one    Sleep apnea     uses CPAP      Past Surgical History:   Procedure Laterality Date    ABDOMEN SURGERY PROC UNLISTED  1977    hernia(abdomen)- open    HX GI  2008    esophagus repair narrow- balloon dilation and scar tissue removal    HX HERNIA REPAIR      inguinal hernia    HX OTHER SURGICAL  11years of age    tonsils    HX OTHER SURGICAL  2009    hernia (hiatal and abdomen)-open    HX OTHER SURGICAL  23years of age    wisdom teeth extraction under anesthesia    HX OTHER SURGICAL  8 years     distended testicle     HX OTHER SURGICAL  1/16/15    Laparoscopic ventral hernia repair with mesh      Prior to Admission medications    Medication Sig Start Date End Date Taking? Authorizing Provider   citalopram (CELEXA) 40 mg tablet Take 40 mg by mouth daily. Yes Provider, Historical   lisinopril (PRINIVIL, ZESTRIL) 40 mg tablet Take 40 mg by mouth every evening. Yes Provider, Historical   simvastatin (ZOCOR) 40 mg tablet Take 40 mg by mouth nightly. Yes Provider, Historical   omeprazole (PRILOSEC) 40 mg capsule Take 40 mg by mouth daily.    Yes Provider, Historical     Current Facility-Administered Medications   Medication Dose Route Frequency    vancomycin (VANCOCIN) 1,750 mg in 0.9% sodium chloride 500 mL IVPB  1,750 mg IntraVENous Q12H    metoprolol (LOPRESSOR) injection 5 mg  5 mg IntraVENous Q6H    potassium chloride 20 mEq in 50 ml IVPB  20 mEq IntraVENous ONCE    potassium phosphate 15 mmol in 0.9% sodium chloride 250 mL infusion   IntraVENous ONCE    bisacodyL (DULCOLAX) suppository 10 mg  10 mg Rectal DAILY    QUEtiapine (SEROquel) tablet 75 mg  75 mg Oral BID    cloNIDine HCL (CATAPRES) tablet 0.1 mg  0.1 mg Oral Q8H    sennosides (SENOKOT) 8.8 mg/5 mL syrup 17.6 mg  10 mL Per G Tube BID    midazolam in normal saline (VERSED) 1 mg/mL infusion  0-10 mg/hr IntraVENous TITRATE    meropenem (MERREM) 500 mg in sterile water (preservative free) 10 mL IV syringe  0.5 g IntraVENous Q6H    dexmedeTOMidine in 0.9 % NaCl (PRECEDEX) 400 mcg/100 mL (4 mcg/mL) infusion soln  0.1-1.5 mcg/kg/hr IntraVENous TITRATE    fentaNYL (PF) 1,500 mcg/30 mL (50 mcg/mL) infusion  0-300 mcg/hr IntraVENous TITRATE    bumetanide (BUMEX) injection 2 mg  2 mg IntraVENous BID    albuterol-ipratropium (DUO-NEB) 2.5 MG-0.5 MG/3 ML  3 mL Nebulization Q4H RT    acetylcysteine (MUCOMYST) 100 mg/mL (10 %) nebulizer solution 400 mg  4 mL Nebulization Q4H RT    docusate (COLACE) 50 mg/5 mL oral liquid 100 mg  100 mg Oral BID    guaiFENesin (ROBITUSSIN) 100 mg/5 mL oral liquid 400 mg  400 mg Oral Q4H    enoxaparin (LOVENOX) injection 40 mg  40 mg SubCUTAneous Q24H    pantoprazole (PROTONIX) 40 mg in 0.9% sodium chloride 10 mL injection  40 mg IntraVENous DAILY     Allergies   Allergen Reactions    Pcn [Penicillins] Other (comments)     Reaction was at age 6, itching and eyes swollen; denies any sob or tongue swelling. Has been on Zosyn in the hospital with no problems    Codeine Nausea and Vomiting      Social History     Tobacco Use    Smoking status: Never Smoker    Smokeless tobacco: Never Used   Substance Use Topics    Alcohol use:  Yes     Alcohol/week: 1.7 standard drinks     Types: 2 Cans of beer per week     Comment: 1 or 2 beer every two weeks      Family History   Problem Relation Age of Onset    Diabetes Mother     Hypertension Mother     Stroke Mother         copd    Cancer Father         skin    Heart Disease Father     Diabetes Father     Heart Attack Father           Laboratory: I have personally reviewed the critical care flowsheet and labs.      Recent Labs     11/17/20  0424 11/16/20  0550 11/15/20  0350   WBC 6.0 9.5 7.7   HGB 8.2* 8.7* 8.8*   HCT 25.8* 27.8* 28.0*    395 405*     Recent Labs     11/17/20  0424 11/16/20  0550 11/15/20  0350    140 140   K 3.4* 3.7 3.8    101 103   CO2 32 31 32   * 129* 114*   BUN 12 12 12   CREA 0.75 0.74 0.64*   CA 8.7 8.5 8.0*   MG 2.3 2.2 2.3   PHOS 2.3* 2.2* 2.4*   ALB 2.2* 2.1* 2.0*   ALT 38 37 37       Objective:     Mode Rate Tidal Volume Pressure FiO2 PEEP   Assist control   500 ml  5 cm H2O 40 % 5 cm H20     Vital Signs:    Ventilator Pressures  PC Set: 16  Pressure Support (cm H2O): 5 cm H2O  PIP Observed (cm H2O): 21 cm H2O  Plateau Pressure (cm H2O): 20 cm H2O  MAP (cm H2O): 9.3  PEEP/VENT (cm H2O): 5 cm H20  Auto PEEP Observed (cm H2O): 0 cm [id number](24)Ventilator Pressures  PC Set: 16  Pressure Support (cm H2O): 5 cm H2O  PIP Observed (cm H2O): 21 cm H2O  Plateau Pressure (cm H2O): 20 cm H2O  MAP (cm H2O): 9.3  PEEP/VENT (cm H2O): 5 cm H20  Auto PEEP Observed (cm H2O): 0 cm H2O  Safety & Alarms  Circuit Temperature: (hme)  Backup Mode Checked/Apnea: Yes  Pressure Max: 60 cm H2O  Pressure Min: 13 cm H2O  Ve Min: 4  Ve Max: 22  Vt Min: 200 ml  Vt Max: 1000 ml  RR Min: 10  RR Max: 45  Intake/Output:   Last shift:      Ventilator Volumes  Vt Set (ml): 500 ml  Vt Exhaled (Machine Breath) (ml): 602 ml  Vt Spont (ml): 747 ml  Ve Observed (l/min): 11.2 l/min  Last 3 shifts: 11/17 0701 - 11/17 1900  In: 139.1 [I.V.:139.1]  Out: 7118 [Urine:1750; Drains:5][id number]    Intake/Output Summary (Last 24 hours) at 11/17/2020 1235  Last data filed at 11/17/2020 1200  Gross per 24 hour   Intake 2307.42 ml   Output 5815 ml   Net -3507.58 ml     EXAM:   GENERAL: awake  HEENT:  Anicteric sclera, orally intubated  NECK:  Trachea midline  LUNGS: diminished, CTA  HEART: Regular rate and rhythm   ABDOMEN:  Soft, absent bowel sounds, incision c/d/i  EXTREMITIES: edema NEUROLOGIC: RASS +1    Christina Palma MD  [facility]

## 2020-11-17 NOTE — PROGRESS NOTES
Following in ICU care, critically ill and at high risk of decompensation per pulmonologist.  Intubated and sedated. Working to possibly extubate today.     1- possible extubate today  2- PT and OT evals  3- cm to follow for discharge planning needs

## 2020-11-17 NOTE — PROGRESS NOTES
1500- Bedside and Verbal shift change report given to Nandini Burnham RN (oncoming nurse) by Amelie Connors RN (offgoing nurse). Report included the following information SBAR, Kardex, ED Summary, Procedure Summary, Intake/Output, MAR, Recent Results, Cardiac Rhythm Sinus Tach and Alarm Parameters . Primary Nurse Екатерина Ordoñez and Amelie Connors RN, RN performed a dual skin assessment on this patient Impairment noted- see wound doc flow sheet  Jeffery score is 14  Received patient intubated with wife at bedside, able to make simple needs known to staff by writing. On spontaneous breathing trial at this time, tolerating well; Respirations even easy unlabored. ET Tube 26 @ lips. OG Tube @ 55, suction held while tylenol has time to digest. Patient earlier was having vomiting. Abdomen softtender slightly distended, +BS noted x4 quadrants. Midline noted- ROB with staples. Cook catheter in place, below level of bladder, draining clear yellow urine. Call bell in reach, bed locked in lowest position. 12- Dr. Wil Aguilera on unit- to get ABG then see about extubation. 1520- update provided to wife on current plan. 1600- Extubation successful. Patient on nasal cannula @ 6L. Restraint discontinued. Gown changed, patient wiped down from old vomit, ice packs placed per patient's request.  1610- Pt now resting, tolerating 6L nasal cannula. 1700- Pt provided with new ice packs, tolerating nasal cannula well @ 6L. 1730- Reducing Precedex to 1.4, providing with dilaudid for pain 8/10 to abdomen. Pt still confused at times, but easily redirectable. Wife at bedside. 1800- Temp 99.3 axillary. Dilaudid helped with pain management now 6/10. Precedex reducing to 1.3. Pt still confused, but answering questions and calm, easily reoriented. 1850- Attempted new IV- unsuccessful at this time. 1900- Bedside and Verbal shift change report given to Almond Carrel RN (oncoming nurse) by Robin Luther (offgoing nurse).  Report included the following information SBAR, Kardex, ED Summary, Procedure Summary, Intake/Output, MAR, Recent Results, Cardiac Rhythm NSR and Alarm Parameters .

## 2020-11-18 ENCOUNTER — APPOINTMENT (OUTPATIENT)
Dept: GENERAL RADIOLOGY | Age: 50
DRG: 417 | End: 2020-11-18
Attending: INTERNAL MEDICINE
Payer: COMMERCIAL

## 2020-11-18 LAB
ALBUMIN SERPL-MCNC: 2.2 G/DL (ref 3.5–5)
ALBUMIN/GLOB SERPL: 0.5 {RATIO} (ref 1.1–2.2)
ALP SERPL-CCNC: 127 U/L (ref 45–117)
ALT SERPL-CCNC: 37 U/L (ref 12–78)
ANION GAP SERPL CALC-SCNC: 5 MMOL/L (ref 5–15)
AST SERPL-CCNC: 34 U/L (ref 15–37)
BASOPHILS # BLD: 0 K/UL (ref 0–0.1)
BASOPHILS NFR BLD: 0 % (ref 0–1)
BILIRUB SERPL-MCNC: 0.6 MG/DL (ref 0.2–1)
BUN SERPL-MCNC: 13 MG/DL (ref 6–20)
BUN/CREAT SERPL: 17 (ref 12–20)
CALCIUM SERPL-MCNC: 8.4 MG/DL (ref 8.5–10.1)
CHLORIDE SERPL-SCNC: 106 MMOL/L (ref 97–108)
CO2 SERPL-SCNC: 29 MMOL/L (ref 21–32)
CREAT SERPL-MCNC: 0.75 MG/DL (ref 0.7–1.3)
DIFFERENTIAL METHOD BLD: ABNORMAL
EOSINOPHIL # BLD: 0 K/UL (ref 0–0.4)
EOSINOPHIL NFR BLD: 1 % (ref 0–7)
ERYTHROCYTE [DISTWIDTH] IN BLOOD BY AUTOMATED COUNT: 13.6 % (ref 11.5–14.5)
GLOBULIN SER CALC-MCNC: 4.4 G/DL (ref 2–4)
GLUCOSE SERPL-MCNC: 119 MG/DL (ref 65–100)
HCT VFR BLD AUTO: 26.2 % (ref 36.6–50.3)
HGB BLD-MCNC: 8.3 G/DL (ref 12.1–17)
IMM GRANULOCYTES # BLD AUTO: 0.1 K/UL (ref 0–0.04)
IMM GRANULOCYTES NFR BLD AUTO: 1 % (ref 0–0.5)
LYMPHOCYTES # BLD: 1 K/UL (ref 0.8–3.5)
LYMPHOCYTES NFR BLD: 14 % (ref 12–49)
MAGNESIUM SERPL-MCNC: 2.4 MG/DL (ref 1.6–2.4)
MCH RBC QN AUTO: 28.7 PG (ref 26–34)
MCHC RBC AUTO-ENTMCNC: 31.7 G/DL (ref 30–36.5)
MCV RBC AUTO: 90.7 FL (ref 80–99)
MONOCYTES # BLD: 0.7 K/UL (ref 0–1)
MONOCYTES NFR BLD: 10 % (ref 5–13)
NEUTS SEG # BLD: 5.3 K/UL (ref 1.8–8)
NEUTS SEG NFR BLD: 74 % (ref 32–75)
NRBC # BLD: 0 K/UL (ref 0–0.01)
NRBC BLD-RTO: 0 PER 100 WBC
PHOSPHATE SERPL-MCNC: 2.5 MG/DL (ref 2.6–4.7)
PLATELET # BLD AUTO: 395 K/UL (ref 150–400)
PMV BLD AUTO: 9.4 FL (ref 8.9–12.9)
POTASSIUM SERPL-SCNC: 3.2 MMOL/L (ref 3.5–5.1)
PROT SERPL-MCNC: 6.6 G/DL (ref 6.4–8.2)
RBC # BLD AUTO: 2.89 M/UL (ref 4.1–5.7)
SODIUM SERPL-SCNC: 140 MMOL/L (ref 136–145)
WBC # BLD AUTO: 7.2 K/UL (ref 4.1–11.1)

## 2020-11-18 PROCEDURE — 77030040831 HC BAG URINE DRNG MDII -A

## 2020-11-18 PROCEDURE — 71045 X-RAY EXAM CHEST 1 VIEW: CPT

## 2020-11-18 PROCEDURE — 94664 DEMO&/EVAL PT USE INHALER: CPT

## 2020-11-18 PROCEDURE — 65660000000 HC RM CCU STEPDOWN

## 2020-11-18 PROCEDURE — 97530 THERAPEUTIC ACTIVITIES: CPT

## 2020-11-18 PROCEDURE — C9113 INJ PANTOPRAZOLE SODIUM, VIA: HCPCS | Performed by: SURGERY

## 2020-11-18 PROCEDURE — 94640 AIRWAY INHALATION TREATMENT: CPT

## 2020-11-18 PROCEDURE — 74011000250 HC RX REV CODE- 250: Performed by: INTERNAL MEDICINE

## 2020-11-18 PROCEDURE — 77030013140 HC MSK NEB VYRM -A

## 2020-11-18 PROCEDURE — 2709999900 HC NON-CHARGEABLE SUPPLY

## 2020-11-18 PROCEDURE — 85025 COMPLETE CBC W/AUTO DIFF WBC: CPT

## 2020-11-18 PROCEDURE — 74011250637 HC RX REV CODE- 250/637: Performed by: SURGERY

## 2020-11-18 PROCEDURE — 80053 COMPREHEN METABOLIC PANEL: CPT

## 2020-11-18 PROCEDURE — 84100 ASSAY OF PHOSPHORUS: CPT

## 2020-11-18 PROCEDURE — 74011250637 HC RX REV CODE- 250/637: Performed by: INTERNAL MEDICINE

## 2020-11-18 PROCEDURE — 97162 PT EVAL MOD COMPLEX 30 MIN: CPT

## 2020-11-18 PROCEDURE — 74011250636 HC RX REV CODE- 250/636: Performed by: INTERNAL MEDICINE

## 2020-11-18 PROCEDURE — 36415 COLL VENOUS BLD VENIPUNCTURE: CPT

## 2020-11-18 PROCEDURE — 83735 ASSAY OF MAGNESIUM: CPT

## 2020-11-18 PROCEDURE — 97535 SELF CARE MNGMENT TRAINING: CPT

## 2020-11-18 PROCEDURE — 65270000029 HC RM PRIVATE

## 2020-11-18 PROCEDURE — 77030019905 HC CATH URETH INTMIT MDII -A

## 2020-11-18 PROCEDURE — 97165 OT EVAL LOW COMPLEX 30 MIN: CPT

## 2020-11-18 PROCEDURE — 77030029065 HC DRSG HEMO QCLOT ZMED -B

## 2020-11-18 PROCEDURE — 74011250636 HC RX REV CODE- 250/636: Performed by: SURGERY

## 2020-11-18 PROCEDURE — 51798 US URINE CAPACITY MEASURE: CPT

## 2020-11-18 PROCEDURE — 77010033678 HC OXYGEN DAILY

## 2020-11-18 RX ORDER — SENNOSIDES 8.6 MG/1
1 TABLET ORAL DAILY
Status: DISCONTINUED | OUTPATIENT
Start: 2020-11-18 | End: 2020-11-25 | Stop reason: HOSPADM

## 2020-11-18 RX ORDER — IPRATROPIUM BROMIDE AND ALBUTEROL SULFATE 2.5; .5 MG/3ML; MG/3ML
3 SOLUTION RESPIRATORY (INHALATION)
Status: DISCONTINUED | OUTPATIENT
Start: 2020-11-18 | End: 2020-11-21

## 2020-11-18 RX ORDER — POTASSIUM CHLORIDE 750 MG/1
40 TABLET, FILM COATED, EXTENDED RELEASE ORAL
Status: ACTIVE | OUTPATIENT
Start: 2020-11-18 | End: 2020-11-19

## 2020-11-18 RX ORDER — GUAIFENESIN 600 MG/1
600 TABLET, EXTENDED RELEASE ORAL 2 TIMES DAILY
Status: DISCONTINUED | OUTPATIENT
Start: 2020-11-18 | End: 2020-11-18

## 2020-11-18 RX ORDER — BUMETANIDE 0.25 MG/ML
1 INJECTION INTRAMUSCULAR; INTRAVENOUS 2 TIMES DAILY
Status: DISCONTINUED | OUTPATIENT
Start: 2020-11-18 | End: 2020-11-20

## 2020-11-18 RX ORDER — LISINOPRIL 20 MG/1
20 TABLET ORAL DAILY
Status: DISCONTINUED | OUTPATIENT
Start: 2020-11-19 | End: 2020-11-19

## 2020-11-18 RX ORDER — PANTOPRAZOLE SODIUM 40 MG/1
40 TABLET, DELAYED RELEASE ORAL
Status: DISCONTINUED | OUTPATIENT
Start: 2020-11-19 | End: 2020-11-25 | Stop reason: HOSPADM

## 2020-11-18 RX ORDER — GUAIFENESIN 600 MG/1
1200 TABLET, EXTENDED RELEASE ORAL 2 TIMES DAILY
Status: DISCONTINUED | OUTPATIENT
Start: 2020-11-18 | End: 2020-11-25 | Stop reason: HOSPADM

## 2020-11-18 RX ORDER — CITALOPRAM 20 MG/1
40 TABLET, FILM COATED ORAL DAILY
Status: DISCONTINUED | OUTPATIENT
Start: 2020-11-19 | End: 2020-11-25 | Stop reason: HOSPADM

## 2020-11-18 RX ORDER — DOCUSATE SODIUM 100 MG/1
100 CAPSULE, LIQUID FILLED ORAL 2 TIMES DAILY
Status: DISCONTINUED | OUTPATIENT
Start: 2020-11-18 | End: 2020-11-25 | Stop reason: HOSPADM

## 2020-11-18 RX ORDER — METOPROLOL TARTRATE 25 MG/1
25 TABLET, FILM COATED ORAL 2 TIMES DAILY
Status: DISCONTINUED | OUTPATIENT
Start: 2020-11-18 | End: 2020-11-25 | Stop reason: HOSPADM

## 2020-11-18 RX ORDER — ATORVASTATIN CALCIUM 10 MG/1
10 TABLET, FILM COATED ORAL DAILY
Status: DISCONTINUED | OUTPATIENT
Start: 2020-11-19 | End: 2020-11-25 | Stop reason: HOSPADM

## 2020-11-18 RX ORDER — QUETIAPINE FUMARATE 25 MG/1
25 TABLET, FILM COATED ORAL
Status: DISCONTINUED | OUTPATIENT
Start: 2020-11-18 | End: 2020-11-25 | Stop reason: HOSPADM

## 2020-11-18 RX ADMIN — ACETYLCYSTEINE 400 MG: 100 SOLUTION ORAL; RESPIRATORY (INHALATION) at 11:26

## 2020-11-18 RX ADMIN — POTASSIUM PHOSPHATE, MONOBASIC AND POTASSIUM PHOSPHATE, DIBASIC: 224; 236 INJECTION, SOLUTION, CONCENTRATE INTRAVENOUS at 16:15

## 2020-11-18 RX ADMIN — METOPROLOL TARTRATE 25 MG: 25 TABLET, FILM COATED ORAL at 17:26

## 2020-11-18 RX ADMIN — MEROPENEM 500 MG: 500 INJECTION, POWDER, FOR SOLUTION INTRAVENOUS at 14:51

## 2020-11-18 RX ADMIN — MEROPENEM 500 MG: 500 INJECTION, POWDER, FOR SOLUTION INTRAVENOUS at 20:37

## 2020-11-18 RX ADMIN — IPRATROPIUM BROMIDE AND ALBUTEROL SULFATE 3 ML: .5; 3 SOLUTION RESPIRATORY (INHALATION) at 03:55

## 2020-11-18 RX ADMIN — CLONIDINE HYDROCHLORIDE 0.1 MG: 0.1 TABLET ORAL at 14:53

## 2020-11-18 RX ADMIN — GUAIFENESIN 600 MG: 600 TABLET ORAL at 11:09

## 2020-11-18 RX ADMIN — ONDANSETRON 4 MG: 2 INJECTION INTRAMUSCULAR; INTRAVENOUS at 15:49

## 2020-11-18 RX ADMIN — IPRATROPIUM BROMIDE AND ALBUTEROL SULFATE 3 ML: .5; 3 SOLUTION RESPIRATORY (INHALATION) at 08:20

## 2020-11-18 RX ADMIN — METOPROLOL TARTRATE 5 MG: 5 INJECTION INTRAVENOUS at 05:10

## 2020-11-18 RX ADMIN — METOPROLOL TARTRATE 5 MG: 5 INJECTION INTRAVENOUS at 11:09

## 2020-11-18 RX ADMIN — IPRATROPIUM BROMIDE AND ALBUTEROL SULFATE 3 ML: .5; 3 SOLUTION RESPIRATORY (INHALATION) at 11:26

## 2020-11-18 RX ADMIN — IPRATROPIUM BROMIDE AND ALBUTEROL SULFATE 3 ML: .5; 3 SOLUTION RESPIRATORY (INHALATION) at 00:13

## 2020-11-18 RX ADMIN — STANDARDIZED SENNA CONCENTRATE 8.6 MG: 8.6 TABLET ORAL at 11:09

## 2020-11-18 RX ADMIN — BUMETANIDE 2 MG: 0.25 INJECTION INTRAMUSCULAR; INTRAVENOUS at 08:31

## 2020-11-18 RX ADMIN — ACETYLCYSTEINE 400 MG: 100 SOLUTION ORAL; RESPIRATORY (INHALATION) at 08:20

## 2020-11-18 RX ADMIN — MEROPENEM 500 MG: 500 INJECTION, POWDER, FOR SOLUTION INTRAVENOUS at 02:09

## 2020-11-18 RX ADMIN — BUMETANIDE 1 MG: 0.25 INJECTION, SOLUTION INTRAMUSCULAR; INTRAVENOUS at 17:26

## 2020-11-18 RX ADMIN — VANCOMYCIN HYDROCHLORIDE 1750 MG: 10 INJECTION, POWDER, LYOPHILIZED, FOR SOLUTION INTRAVENOUS at 08:45

## 2020-11-18 RX ADMIN — IPRATROPIUM BROMIDE AND ALBUTEROL SULFATE 3 ML: .5; 3 SOLUTION RESPIRATORY (INHALATION) at 20:45

## 2020-11-18 RX ADMIN — OXYCODONE HYDROCHLORIDE 5 MG: 5 TABLET ORAL at 00:57

## 2020-11-18 RX ADMIN — CLONIDINE HYDROCHLORIDE 0.1 MG: 0.1 TABLET ORAL at 05:10

## 2020-11-18 RX ADMIN — SODIUM CHLORIDE 40 MG: 9 INJECTION INTRAMUSCULAR; INTRAVENOUS; SUBCUTANEOUS at 08:31

## 2020-11-18 RX ADMIN — ENOXAPARIN SODIUM 40 MG: 40 INJECTION SUBCUTANEOUS at 20:37

## 2020-11-18 RX ADMIN — MEROPENEM 500 MG: 500 INJECTION, POWDER, FOR SOLUTION INTRAVENOUS at 08:31

## 2020-11-18 RX ADMIN — GUAIFENESIN 1200 MG: 600 TABLET ORAL at 17:26

## 2020-11-18 RX ADMIN — QUETIAPINE FUMARATE 75 MG: 25 TABLET ORAL at 08:34

## 2020-11-18 NOTE — PROGRESS NOTES
SURGERY PROGRESS NOTE      Admit Date: 11/3/2020    POD 14 Days Post-Op    Procedure: Procedure(s):  LAPAROTOMY EXPLORATORY, evacuation of hematoma. Subjective:   Extubated yesterday  Doing well this am      Objective:     Visit Vitals  /67   Pulse 92   Temp 98.5 °F (36.9 °C)   Resp 20   Ht 5' 11\" (1.803 m)   Wt 118.8 kg (262 lb)   SpO2 100%   BMI 36.54 kg/m²        Temp (24hrs), Av.8 °F (37.7 °C), Min:98.5 °F (36.9 °C), Max:101.2 °F (38.4 °C)      Physical Exam:     Abdomen:  Soft. Non-tender, non-distended. Incision C/D/I.         Lab Results   Component Value Date/Time    WBC 7.2 2020 03:44 AM    HGB 8.3 (L) 2020 03:44 AM    Hemoglobin (POC) 6.8 (L) 2020 03:42 PM    Hemoglobin (POC) 13.3 2014 10:05 PM    HCT 26.2 (L) 2020 03:44 AM    Hematocrit (POC) 39 2014 10:05 PM    PLATELET 957  03:44 AM    MCV 90.7 2020 03:44 AM     Lab Results   Component Value Date/Time    GFR est non-AA >60 2020 03:44 AM    GFRNA, POC >60 2014 10:05 PM    GFR est AA >60 2020 03:44 AM    GFRAA, POC >60 2014 10:05 PM    Creatinine 0.75 2020 03:44 AM    Creatinine (POC) 1.1 2014 10:05 PM    BUN 13 2020 03:44 AM    BUN (POC) 15 2014 10:05 PM    Sodium 140 2020 03:44 AM    Sodium (POC) 141 2014 10:05 PM    Potassium 3.2 (L) 2020 03:44 AM    Potassium (POC) 3.5 2014 10:05 PM    Chloride 106 2020 03:44 AM    Chloride (POC) 106 2014 10:05 PM    CO2 29 2020 03:44 AM    Magnesium 2.4 2020 03:44 AM    Phosphorus 2.5 (L) 2020 03:44 AM       Assessment:     Active Problems:    Biliary colic ()      Cholecystitis (2020)        Plan/Recommendations/Medical Decision Making:   Clears as tolerates today  D/c cooley  PT/OT for mobilization  OOB as able  Continue IV abx  Ok for transfer out of ICU

## 2020-11-18 NOTE — PROGRESS NOTES
0700- Bedside and Verbal shift change report given to [address] [place] [address] (oncoming nurse) by Nemo Samayoa RN (offgoing nurse). Report included the following information SBAR, Kardex, ED Summary, Procedure Summary, Intake/Output, MAR, Recent Results, Cardiac Rhythm NSR and Alarm Parameters . Primary Nurse Moriah Gillespie and Nemo Samayoa., RN performed a dual skin assessment on this patient Impairment noted- see wound doc flow sheet  Jeffery score is 15  Received patient resting, able to make needs known to staff clearly. Awake, alert oriented to person, place, time, confusion on situation, but easily reoriented. Respirations even easy unlabored. No SOB or cough noted. 4L Nasal cannula in place. Abdomen soft slightly tender non-distended, +BS noted x4 quadrants. Midline C/D/I. Cooley catheter in place, below level of bladder, draining clear yellow urine. Pt passed STAND overnight, will work again with patient, and when surgery is in to see what type of diet patient can start. Call bell in reach, bed locked in lowest position. 0730- Pt had BM, cleaned up. 0800- No changes noted at this time. Pt turned by turn team.  2181- Holding stool softners at this time due to large BM this morning. Will have MD review meds to make either pill or discontinue if not needed since off ventilator. 0840- Pt did well with swallowing water and medication. Reducing oxygen to 3L.  0900- Pt tolerating 3Ls well. Will try to wean down again. 0930- Removed Right TLC, laying flat. Cath tip intact. 6130- Surgery rounding- OK to transfer to med-surg after lunch and is tolerating clears well. Can remove cooley catheter. Continue Bumex to continue removing fluid from patient. 1000- No changes noted at this time. TLC site C/D/I.  1035- Spoke to Dr. Nathan Dixon regarding changing liquid meds to PO now that he is taking oral meds/fluids. 1050- Cooley catheter removed. Condom cath placed. 1100- Holding turn at this time due to moving to other floor. TRANSFER - OUT REPORT:    Verbal report given to Barbara RN(name) on Dorian Andrew  being transferred to [facility](unit) for routine progression of care       Report consisted of patients Situation, Background, Assessment and   Recommendations(SBAR). Information from the following report(s) SBAR, Kardex, ED Summary, Procedure Summary, Intake/Output, MAR, Recent Results, Cardiac Rhythm NSR and Alarm Parameters  was reviewed with the receiving nurse. Lines:   Peripheral IV 11/17/20 Anterior;Left;Proximal Forearm (Active)   Site Assessment Clean, dry, & intact 11/18/20 0730   Phlebitis Assessment 0 11/18/20 0730   Infiltration Assessment 0 11/18/20 0730   Dressing Status Clean, dry, & intact 11/18/20 0730   Dressing Type Transparent 11/18/20 0730   Hub Color/Line Status Pink;Capped;Flushed 11/18/20 0730   Action Taken Open ports on tubing capped 11/18/20 0730   Alcohol Cap Used Yes 11/18/20 0730        Opportunity for questions and clarification was provided. Patient transported with:   Monitor  O2 @ 3 liters  Tech   1135- Wife Jordan Comp made aware of moving to [facility].

## 2020-11-18 NOTE — PROGRESS NOTES
Assessment complete. See interventions. VSS. T-99.1 F oral. No complaints. Very forgetful and having a few visual hallucinations regarding work. L FA SL placed for CVC dc prep. Tolerated well. O2 Sat 97-99% and no resp. Distress. Weaned to 4L/M NC. RT in to assess for home cpap use. Wife called to check on pt status/update given.

## 2020-11-18 NOTE — PROGRESS NOTES
1738-RN bladder scanned patient, as patient has not voided since cooley removal earlier today (approx. 1100). Bladder scan showed 474. Jose Guadalupe Marie MD paged. 1750-MD Eliza ordered for patient to be straight cathed at this time. .    1815-Straight cath performed. Karthik Colmenares RN at bedside as secondary RN. 700 mL of clear yellow urine drained. Patient tolerated procedure well.

## 2020-11-18 NOTE — PROGRESS NOTES
Problem: Self Care Deficits Care Plan (Adult)  Goal: *Acute Goals and Plan of Care (Insert Text)  Description:   FUNCTIONAL STATUS PRIOR TO ADMISSION: Patient was independent and active without use of DME.     HOME SUPPORT: The patient lived with wife but did not require assist.    Occupational Therapy Goals  Initiated 11/18/2020  1. Patient will perform grooming with supervision/set-up from supported sitting position within 7 day(s). 2.  Patient will perform upper body dressing and bathing with minimal assistance within 7 day(s). 3.  Patient will perform lower body dressing and bathing with moderate assistance  within 7 day(s). 4.  Patient will perform toilet transfers to [facility] with moderate assistance x1 within 7 day(s). 5.  Patient will perform all aspects of toileting with moderate assistance  within 7 day(s). 6.  Patient will participate in upper extremity therapeutic exercise/activities with supervision/set-up for 10 minutes within 7 day(s). 7.  Patient will utilize energy conservation techniques during functional activities with verbal cues within 7 day(s). Outcome: Progressing Towards Goal   OCCUPATIONAL THERAPY EVALUATION  Patient: Faisal Loera (24 y.o. male)  Date: 11/18/2020  Primary Diagnosis: Biliary colic [G99.42]  Cholecystitis [K81.9]  Procedure(s) (LRB):  LAPAROTOMY EXPLORATORY, evacuation of hematoma. (N/A) 14 Days Post-Op   Precautions:  WBAT, Fall, contact (modified)    ASSESSMENT  Based on the objective data described below, the patient presents with decreased activity tolerance, generalized weakness, impaired balance, impaired cognition, and c/o abdominal discomfort following admission on 11/3 for cholecystitis with c/o abdominal pain, SOB, and vomiting. Patient with prolonged complex hospital course now POD 14 from ex lap with evacuation of hematoma, intubated on 11/4, and extubated on 11/18.   Patient weaned off sedation medication (Precedex) this AM but he still presents with cognitive impairments. He is slow to process and respond verbally and physically, needs additional cuing (verbal + visual) for improved carryover, safety awareness and insight impaired, and he is unable to clearly answer questions consistently; Kylah Troy is initially on topic but becomes tangential.  He was able to come to sitting EOB and perform 2x sit to stand transfers and take several steps to [facility] with x2 person assist.  Patient engaged in grooming tasks while sitting EOB with max A needed overall. He needs cues for sequencing and thoroughness and hand over hand assist + proximal support with fatigue. Patient was received on 2L O2 but weaned to room air with SpO2 remaining >90% with activity. Patient's wife was present for family training and supportive throughout tx; She had questions regarding cognition and provided strategies for providing appropriate stimulation, reorientation, establishing day/night schedule, facilitating patient involvement in care, and providing appropriate choices. Patient would benefit from continued skilled OT to progress towards goals and improve overall independence. Current Level of Function Impacting Discharge (ADLs/self-care): Patient required mod to max A x2 for functional mobility. He required mod to max A for UB ADLs and total A for LB ADLs. Functional Outcome Measure: The patient scored Total: 10/100 on the Barthel Index outcome measure. Patient will benefit from skilled therapy intervention to address the above noted impairments. PLAN :  Recommendations and Planned Interventions: self care training, functional mobility training, therapeutic exercise, balance training, therapeutic activities, endurance activities, patient education, home safety training, and family training/education    Frequency/Duration: Patient will be followed by occupational therapy 5 times a week to address goals.     Recommendation for discharge: (in order for the patient to meet his/her long term goals)  Therapy up to 5 days/week    This discharge recommendation:  Has been made in collaboration with the attending provider and/or case management    IF patient discharges home will need the following DME: none       SUBJECTIVE:   Patient stated My stomach hurts.     OBJECTIVE DATA SUMMARY:   HISTORY:   Past Medical History:   Diagnosis Date    GERD (gastroesophageal reflux disease)     Panic attack     Psychiatric disorder     anxiety    Seizures ([facility] [address]     5 yo   was told it was a stress seizure, never had another one    Sleep apnea     uses CPAP     Past Surgical History:   Procedure Laterality Date    ABDOMEN SURGERY PROC UNLISTED  1977    hernia(abdomen)- open    HX GI  2008    esophagus repair narrow- balloon dilation and scar tissue removal    HX HERNIA REPAIR      inguinal hernia    HX OTHER SURGICAL  11years of age    tonsils    HX OTHER SURGICAL  2009    hernia (hiatal and abdomen)-open    HX OTHER SURGICAL  23years of age    wisdom teeth extraction under anesthesia    HX OTHER SURGICAL  8 years     distended testicle     HX OTHER SURGICAL  1/16/15    Laparoscopic ventral hernia repair with mesh       Expanded or extensive additional review of patient history:     Home Situation  Home Environment: Private residence  # Steps to Enter: 3  Rails to Enter: Yes  Wheelchair Ramp: No  One/Two Story Residence: Two story  # of Interior Steps: 12  Interior Rails: Right  Living Alone: No  Support Systems: Spouse/Significant Other/Partner  Patient Expects to be Discharged to[de-identified] Rehabilitation facility  Current DME Used/Available at Home: None  Tub or Shower Type: Tub/Shower combination    Hand dominance: Right    EXAMINATION OF PERFORMANCE DEFICITS:  Cognitive/Behavioral Status:  Neurologic State: Alert  Orientation Level: Oriented to person;Oriented to place; Disoriented to time;Disoriented to situation  Cognition: Decreased attention/concentration  Perception: Cues to maintain midline in standing  Perseveration: No perseveration noted  Safety/Judgement: Decreased awareness of environment;Decreased awareness of need for safety;Decreased awareness of need for assistance;Decreased insight into deficits    Skin: Intact in the uppers     Edema: None noted in the uppers    Hearing: Auditory  Auditory Impairment: None    Vision/Perceptual:    Tracking: Able to track stimulus in all quadrants w/o difficulty    Diplopia: No    Acuity: Within Defined Limits;Able to read clock/calendar on wall without difficulty    Corrective Lenses: Glasses    Range of Motion:  AROM: Generally decreased, functional in the uppers  PROM: Generally decreased, functional in the uppers    Strength:  Decreased but functional in the uppers    Coordination:  Fine Motor Skills-Upper: Left Intact; Right Intact(slowevd movements however intact)    Gross Motor Skills-Upper: Left Intact; Right Intact(slowed movements however intact)    Tone & Sensation:  Tone: normal  Sensation: intact     Balance:  Sitting: Impaired  Sitting - Static: Fair (occasional)  Sitting - Dynamic: Fair (occasional)  Standing: Impaired  Standing - Static: Poor  Standing - Dynamic : Poor    Functional Mobility and Transfers for ADLs:  Bed Mobility:  Rolling: Moderate assistance;Assist x2  Supine to Sit: Moderate assistance;Assist x2  Sit to Supine: Moderate assistance;Assist x2    Transfers:  Sit to Stand: Moderate assistance;Assist x2  Stand to Sit: Moderate assistance;Assist x2  Bed to Chair: Maximum assistance;Assist x2    ADL Assessment:  Feeding: Moderate assistance    Oral Facial Hygiene/Grooming: Maximum assistance    Bathing: Total assistance    Upper Body Dressing: Maximum assistance    Lower Body Dressing: Total assistance    Toileting:  Total assistance    Cognitive Retraining  Safety/Judgement: Decreased awareness of environment;Decreased awareness of need for safety;Decreased awareness of need for assistance;Decreased insight into deficits    Functional Measure:  Barthel Index:    Bathin  Bladder: 0  Bowels: 5  Groomin  Dressin  Feedin  Mobility: 0  Stairs: 0  Toilet Use: 0  Transfer (Bed to Chair and Back): 5  Total: 10/100        The Barthel ADL Index: Guidelines  1. The index should be used as a record of what a patient does, not as a record of what a patient could do. 2. The main aim is to establish degree of independence from any help, physical or verbal, however minor and for whatever reason. 3. The need for supervision renders the patient not independent. 4. A patient's performance should be established using the best available evidence. Asking the patient, friends/relatives and nurses are the usual sources, but direct observation and common sense are also important. However direct testing is not needed. 5. Usually the patient's performance over the preceding 24-48 hours is important, but occasionally longer periods will be relevant. 6. Middle categories imply that the patient supplies over 50 per cent of the effort. 7. Use of aids to be independent is allowed. Vaishali Crawford., Barthel, D.W. (4477). Functional evaluation: the Barthel Index. [address] [facility] (14)2. Mery Barboza madina GLO Gutierrez, Linda Berger, Augustine Merchant., [place], [address] [place] [address] (). Measuring the change indisability after inpatient rehabilitation; comparison of the responsiveness of the Barthel Index and Functional [place] Measure. Journal of Neurology, Neurosurgery, and Psychiatry, 66(4), 426-290. Richmond King, N.[place].A, PATRICK Land, & Melissa Watson M.A. (2004.) Assessment of post-stroke quality of life in cost-effectiveness studies: The usefulness of the Barthel Index and the EuroQoL-5D.  Quality of Life Research, 15, 531-36         Occupational Therapy Evaluation Charge Determination   History Examination Decision-Making   LOW Complexity : Brief history review  LOW Complexity : 1-3 performance deficits relating to physical, cognitive , or psychosocial skils that result in activity limitations and / or participation restrictions  LOW Complexity : No comorbidities that affect functional and no verbal or physical assistance needed to complete eval tasks       Based on the above components, the patient evaluation is determined to be of the following complexity level: LOW     Activity Tolerance:   Good    After treatment patient left in no apparent distress:    Call bell within reach, Bed / chair alarm activated, Caregiver / family present, and bed in semi modified chair position. COMMUNICATION/EDUCATION:   The patients plan of care was discussed with: Physical therapist, Registered nurse, and patient and wife . Home safety education was provided and the patient/caregiver indicated understanding., Patient/family have participated as able in goal setting and plan of care. , and Patient/family agree to work toward stated goals and plan of care. This patients plan of care is appropriate for delegation to [facility].     Thank you for this referral.  Becky Crook, OTR/L  Time Calculation: 46 mins

## 2020-11-18 NOTE — PROGRESS NOTES
[facility]     Name: Arthur Red MRN: [id number]   : 1970 Hospital: [facility], [place]   Date: 2020        Impression Plan   1. Acute hypoxemic respiratory failure, intubated , tube exchanged , extubated   2. Hemorrhagic shock, off pressors   3. Acute cholecystitis s/p robotic assisted lap bailee w/ TAMIKO   4. Post-op bleeding/tear in liver capsule s/p ex-lap and evacuation of hematoma   5. Acute blood loss anemia  6. MRSA pneumonia, completed course of vanc   7. Volume overload  8. Persistent fevers  9. H/o seizures  10. REBECCA  11. delirium                 · Supplemental O2 as needed to keep sats > 88%  · cpap whenever sleeping  · Continue alvaro (day 6 of 7). Can stop vanc  · Follow cultures  · continue scheduled duonebs, mucinex. Can stop mucomyst  · Trend Hgb, transfuse to keep > 7  · Continue bumex as long as BP and renal function tolerate though will decrease dose  · Stop clonidine. Restart lisinopril (home med)  · Continue metoprolol - change to PO  · Replete lytes  · Change seroquel to prn  · Restart celexa  · Restart statin  · Continue bowel regimen  · Remove CVC and cooley  · IS, flutter valve  · Post-op mgmt as per surgery    DVT ppx: lovenox  GI ppx: protonix - change to PO  Advance diet as per surgery           Radiology  ( personally reviewed) CT chest reviewed: Bilateral PNA   ABG No results for input(s): PHI, PO2I, PCO2I in the last 72 hours. Subjective       47 yo with cholecystits s/p lap bailee complicated by laceration of the liver capsule and hemorrhagic shock. Hematoma evacuated- 2L blood. Currently on daniel 130 and Levophed 8.  Pt intubated and sedated- unable to provide any further hx.      - intubated     - bronch, tube exchange       - echo: EF 55-60%, RV normal     - CT chest: small effusions, patchy bilateral asdz/lower lobe consolidations            - CT abd/pelvis: 2 fluid collections in upper abdomen    11/17 - extubated    *sputum culture (11/9) - MRSA  *all other cultures NGTD  *MRSA screen positive    **stopped propofol due to high TG    Interval history:  Tm 101.2  Extubated yesterday  Sleepy but oriented x 3. Was having some hallucinations overnight  Denies shortness of breath or abdominal pain    Past Medical History:   Diagnosis Date    GERD (gastroesophageal reflux disease)     Panic attack     Psychiatric disorder     anxiety    Seizures ([facility] [address]     7 yo   was told it was a stress seizure, never had another one    Sleep apnea     uses CPAP      Past Surgical History:   Procedure Laterality Date    ABDOMEN SURGERY [address] [place] [address]    hernia(abdomen)- open    HX GI  2008    esophagus repair narrow- balloon dilation and scar tissue removal    HX HERNIA REPAIR      inguinal hernia    HX OTHER SURGICAL  11years of age    tonsils    HX OTHER SURGICAL  2009    hernia (hiatal and abdomen)-open    HX OTHER SURGICAL  23years of age    wisdom teeth extraction under anesthesia    HX OTHER SURGICAL  8 years     distended testicle     HX OTHER SURGICAL  1/16/15    Laparoscopic ventral hernia repair with mesh      Prior to Admission medications    Medication Sig Start Date End Date Taking? Authorizing Provider   citalopram (CELEXA) 40 mg tablet Take 40 mg by mouth daily. Yes Provider, Historical   lisinopril (PRINIVIL, ZESTRIL) 40 mg tablet Take 40 mg by mouth every evening. Yes Provider, Historical   simvastatin (ZOCOR) 40 mg tablet Take 40 mg by mouth nightly. Yes Provider, Historical   omeprazole (PRILOSEC) 40 mg capsule Take 40 mg by mouth daily.    Yes Provider, Historical     Current Facility-Administered Medications   Medication Dose Route Frequency    [START ON 11/19/2020] Vancomycin- Level at 830am on 11/19/20   Other ONCE    docusate sodium (COLACE) capsule 100 mg  100 mg Oral BID    guaiFENesin ER (MUCINEX) tablet 600 mg  600 mg Oral BID    senna (SENOKOT) tablet 8.6 mg  1 Tab Oral DAILY    vancomycin (VANCOCIN) 1,750 mg in 0.9% sodium chloride 500 mL IVPB  1,750 mg IntraVENous Q12H    metoprolol (LOPRESSOR) injection 5 mg  5 mg IntraVENous Q6H    QUEtiapine (SEROquel) tablet 75 mg  75 mg Oral BID    cloNIDine HCL (CATAPRES) tablet 0.1 mg  0.1 mg Oral Q8H    meropenem (MERREM) 500 mg in sterile water (preservative free) 10 mL IV syringe  0.5 g IntraVENous Q6H    bumetanide (BUMEX) injection 2 mg  2 mg IntraVENous BID    albuterol-ipratropium (DUO-NEB) 2.5 MG-0.5 MG/3 ML  3 mL Nebulization Q4H RT    acetylcysteine (MUCOMYST) 100 mg/mL (10 %) nebulizer solution 400 mg  4 mL Nebulization Q4H RT    enoxaparin (LOVENOX) injection 40 mg  40 mg SubCUTAneous Q24H    pantoprazole (PROTONIX) 40 mg in 0.9% sodium chloride 10 mL injection  40 mg IntraVENous DAILY     Allergies   Allergen Reactions    Pcn [Penicillins] Other (comments)     Reaction was at age 6, itching and eyes swollen; denies any sob or tongue swelling. Has been on Zosyn in the hospital with no problems    Codeine Nausea and Vomiting      Social History     Tobacco Use    Smoking status: Never Smoker    Smokeless tobacco: Never Used   Substance Use Topics    Alcohol use: Yes     Alcohol/week: 1.7 standard drinks     Types: 2 Cans of beer per week     Comment: 1 or 2 beer every two weeks      Family History   Problem Relation Age of Onset    Diabetes Mother     Hypertension Mother     Stroke Mother         copd    Cancer Father         skin    Heart Disease Father     Diabetes Father     Heart Attack Father           Laboratory: I have personally reviewed the critical care flowsheet and labs.      Recent Labs     11/18/20 0344 11/17/20 0424 11/16/20  0550   WBC 7.2 6.0 9.5   HGB 8.3* 8.2* 8.7*   HCT 26.2* 25.8* 27.8*    360 395     Recent Labs     11/18/20 0344 11/17/20 0424 11/16/20  0550    140 140   K 3.2* 3.4* 3.7    103 101   CO2 29 32 31   * 136* 129*   BUN 13 12 12   CREA 0. 75 0.75 0.74   CA 8.4* 8.7 8.5   MG 2.4 2.3 2.2   PHOS 2.5* 2.3* 2.2*   ALB 2.2* 2.2* 2.1*   ALT 37 38 37       Objective:     Mode Rate Tidal Volume Pressure FiO2 PEEP   Spontaneous   500 ml  5 cm H2O 46 % 5 cm H20     Vital Signs:    Ventilator Pressures  PC Set: 16  Pressure Support (cm H2O): 5 cm H2O  PIP Observed (cm H2O): 26 cm H2O  Plateau Pressure (cm H2O): 20 cm H2O  MAP (cm H2O): 7.8  PEEP/VENT (cm H2O): 5 cm H20  Auto PEEP Observed (cm H2O): 0.4 cm A0VCEWB(24)Ventilator Pressures  PC Set: 16  Pressure Support (cm H2O): 5 cm H2O  PIP Observed (cm H2O): 26 cm H2O  Plateau Pressure (cm H2O): 20 cm H2O  MAP (cm H2O): 7.8  PEEP/VENT (cm H2O): 5 cm H20  Auto PEEP Observed (cm H2O): 0.4 cm H2O  Safety & Alarms  Circuit Temperature: (HME)  Backup Mode Checked/Apnea: Yes  Pressure Max: 60 cm H2O  Pressure Min: 13 cm H2O  Ve Min: 4  Ve Max: 22  Vt Min: 200 ml  Vt Max: 1000 ml  RR Min: 10  RR Max: 45  Intake/Output:   Last shift:      Ventilator Volumes  Vt Set (ml): 500 ml  Vt Exhaled (Machine Breath) (ml): 602 ml  Vt Spont (ml): 495 ml  Ve Observed (l/min): 12.4 l/min  Last 3 shifts: 11/18 0701 - 11/18 1900  In: 1477.6 [P.O.:240;  I.V.:1237.6]  Out: 1185 [Urine:1185]RRIOLAST3    Intake/Output Summary (Last 24 hours) at 11/18/2020 1503  Last data filed at 11/18/2020 1113  Gross per 24 hour   Intake 1951.58 ml   Output 3830 ml   Net -1878.42 ml     EXAM:   GENERAL: awake  HEENT:  Anicteric sclera  NECK:  Trachea midline  LUNGS: diminished, CTA  HEART: Regular rate and rhythm   ABDOMEN:  Soft, absent bowel sounds, incision c/d/i  EXTREMITIES: edema   NEUROLOGIC: oriented x 3    Joe Lee MD  [facility] [address]

## 2020-11-18 NOTE — PROGRESS NOTES
Problem: Mobility Impaired (Adult and Pediatric)  Goal: *Acute Goals and Plan of Care (Insert Text)  Description: FUNCTIONAL STATUS PRIOR TO ADMISSION: Patient was independent and active without use of DME.    HOME SUPPORT PRIOR TO ADMISSION: The patient lived with wife but did not require assist.    Physical Therapy Goals  Initiated 11/18/2020  1. Patient will move from supine to sit and sit to supine  in bed with moderate assistance x1 within 7 day(s). 2.  Patient will transfer from bed to chair and chair to bed with moderate assistance x1 using the least restrictive device within 7 day(s). 3.  Patient will perform sit to stand with moderate assistance x1 within 7 day(s). 4.  Patient will ambulate with moderate assistance x1 for 50 feet with the least restrictive device within 7 day(s). 5.  Patient will ascend/descend 4 stairs with 2 handrail(s) with minimal assistance/contact guard assist within 7 day(s). Outcome: Progressing Towards Goal  Note:   PHYSICAL THERAPY EVALUATION  Patient: Vicente Hernandez (58 y.o. male)  Date: 11/18/2020  Primary Diagnosis: Biliary colic [L97.19]  Cholecystitis [K81.9]  Procedure(s) (LRB):  LAPAROTOMY EXPLORATORY, evacuation of hematoma. (N/A) 14 Days Post-Op   Precautions:   WBAT, Fall    ASSESSMENT  Based on the objective data described below, the patient presents with prolonged hospital stay initial admitted for cholecystitis and then ex lap with evacuation of hematoma and initially intubated on 11/4 and extubated 11/18. Patient still requires increased time for processing due to cognitive lag (was on precedex this AM), increased time for completion, overall MOD A x2 for all mobility. Able to sit at edge of bed perform ADL task with OT, then able to perform sit-stand and side stepping and returned to supine. Increased abdominal discomfort with standing, but able to complete.     Current Level of Function Impacting Discharge (mobility/balance): MOD A x2    Functional Outcome Measure: The patient scored 15/100 on the barthel index outcome measure. Other factors to consider for discharge:      Patient will benefit from skilled therapy intervention to address the above noted impairments. PLAN :  Recommendations and Planned Interventions: bed mobility training, transfer training, gait training, therapeutic exercises, and therapeutic activities      Frequency/Duration: Patient will be followed by physical therapy:  5 times a week to address goals.     Recommendation for discharge: (in order for the patient to meet his/her long term goals)  To be determined: rehab pending progress with upright activity    This discharge recommendation:  Has been made in collaboration with the attending provider and/or case management    IF patient discharges home will need the following DME: to be determined (TBD)         SUBJECTIVE:   Patient stated .    OBJECTIVE DATA SUMMARY:   HISTORY:    Past Medical History:   Diagnosis Date    GERD (gastroesophageal reflux disease)     Panic attack     Psychiatric disorder     anxiety    Seizures ([facility] [address]     5 yo   was told it was a stress seizure, never had another one    Sleep apnea     uses CPAP     Past Surgical History:   Procedure Laterality Date    ABDOMEN SURGERY PROC UNLISTED  1977    hernia(abdomen)- open    HX GI  2008    esophagus repair narrow- balloon dilation and scar tissue removal    HX HERNIA REPAIR      inguinal hernia    HX OTHER SURGICAL  11years of age    tonsils    HX OTHER SURGICAL  2009    hernia (hiatal and abdomen)-open    HX OTHER SURGICAL  23years of age    wisdom teeth extraction under anesthesia    HX OTHER SURGICAL  8 years     distended testicle     HX OTHER SURGICAL  1/16/15    Laparoscopic ventral hernia repair with mesh       Personal factors and/or comorbidities impacting plan of care:     Home Situation  Home Environment: Private residence  # Steps to Enter: 3  Rails to Enter: Yes  Wheelchair Ramp: No  One/Two Story Residence: Two story  # of Interior Steps: 12  Interior Rails: Right  Living Alone: No  Support Systems: Spouse/Significant Other/Partner  Patient Expects to be Discharged to[de-identified] Rehabilitation facility  Current DME Used/Available at Home: None  Tub or Shower Type: Tub/Shower combination    EXAMINATION/PRESENTATION/DECISION MAKING:   Critical Behavior:  Neurologic State: Alert  Orientation Level: Oriented to person, Oriented to place, Oriented to time, Disoriented to situation  Cognition: Follows commands  Safety/Judgement: Awareness of environment, Fall prevention  Hearing: Auditory  Auditory Impairment: None    Range Of Motion:  AROM: Generally decreased, functional           PROM: Generally decreased, functional           Strength:    Strength: Grossly decreased, non-functional                    Tone & Sensation:                                  Coordination:  Coordination: Grossly decreased, non-functional  Vision:      Functional Mobility:  Bed Mobility:  Rolling: Moderate assistance;Assist x2  Supine to Sit: Moderate assistance;Assist x2  Sit to Supine: Moderate assistance;Assist x2     Transfers:  Sit to Stand: Moderate assistance;Assist x2  Stand to Sit: Moderate assistance;Assist x2        Bed to Chair: Maximum assistance;Assist x2              Balance:      Ambulation/Gait Training:                Functional Measure:  Barthel Index:    Bathin  Bladder: 0  Bowels: 5  Groomin  Dressin  Feedin  Mobility: 0  Stairs: 0  Toilet Use: 0  Transfer (Bed to Chair and Back): 5  Total: 15/100       The Barthel ADL Index: Guidelines  1. The index should be used as a record of what a patient does, not as a record of what a patient could do. 2. The main aim is to establish degree of independence from any help, physical or verbal, however minor and for whatever reason. 3. The need for supervision renders the patient not independent.   4. A patient's performance should be established using the best available evidence. Asking the patient, friends/relatives and nurses are the usual sources, but direct observation and common sense are also important. However direct testing is not needed. 5. Usually the patient's performance over the preceding 24-48 hours is important, but occasionally longer periods will be relevant. 6. Middle categories imply that the patient supplies over 50 per cent of the effort. 7. Use of aids to be independent is allowed. Ramin Montiel., Barthel, D.W. (5108). Functional evaluation: the Barthel Index. [facility] (14)2. Sahara Hazel madina GLO Gutierrez, Jb Rai., Bharat Brian., Lynda Gallo, [address] (1999). Measuring the change indisability after inpatient rehabilitation; comparison of the responsiveness of the Barthel Index and Functional [place] Measure. Journal of Neurology, Neurosurgery, and Psychiatry, 66(4), 679-379. Swetha Galvan, NEduJ.A, PATRICK Land, & Yariel Corado M.A. (2004.) Assessment of post-stroke quality of life in cost-effectiveness studies: The usefulness of the Barthel Index and the EuroQoL-5D. Quality of Life Research, 15, 506-33           Physical Therapy Evaluation Charge Determination   History Examination Presentation Decision-Making   HIGH Complexity :3+ comorbidities / personal factors will impact the outcome/ POC  HIGH Complexity : 4+ Standardized tests and measures addressing body structure, function, activity limitation and / or participation in recreation  HIGH Complexity : Unstable and unpredictable characteristics  Other outcome measures barthel index  HIGH       Based on the above components, the patient evaluation is determined to be of the following complexity level: HIGH     Pain Rating:   At abdominal incision    Activity Tolerance:   Fair    After treatment patient left in no apparent distress:   Supine in bed, Bed / chair alarm activated, Caregiver / family present, Side rails x 3, and bed in chair position    COMMUNICATION/EDUCATION:   The patients plan of care was discussed with: Registered nurse. Fall prevention education was provided and the patient/caregiver indicated understanding., Patient/family have participated as able in goal setting and plan of care. , and Patient/family agree to work toward stated goals and plan of care.     Thank you for this referral.  Michelle Dye, PT, DPT   Time Calculation: 46 mins

## 2020-11-18 NOTE — PROGRESS NOTES
Patient extubated. Moving out of ICU soon. Transition of Care Plan  RUR  20% Moderate risk    1- cm to follow and assist wife in discharge planning  2- Pt will benefit from PT/OT evals to determine skilled needs at discharge   3- DC when stable to home, coordinate follow up needs  4- transport TBD pending progress. TRANSFER - OUT REPORT:    Verbal report given to Sam Lepe on Ly Barron   being transferred to 5th floor for routine progression of care. Report consisted of patients Situation, Background, Assessment and   Recommendations(SBAR).

## 2020-11-18 NOTE — PROGRESS NOTES
TRANSFER - IN REPORT:    Verbal report received from Morgan Graciela, [address] [place] [address] (name) on Juan Manuel Wootenimer  being received from ICU (unit) for routine progression of care      Report consisted of patients Situation, Background, Assessment and   Recommendations(SBAR). Information from the following report(s) SBAR, Kardex, Intake/Output, MAR, Accordion and Recent Results was reviewed with the receiving nurse. Opportunity for questions and clarification was provided. Assessment completed upon patients arrival to unit and care assumed.

## 2020-11-19 LAB
ALBUMIN SERPL-MCNC: 2.6 G/DL (ref 3.5–5)
ALBUMIN/GLOB SERPL: 0.6 {RATIO} (ref 1.1–2.2)
ALP SERPL-CCNC: 135 U/L (ref 45–117)
ALT SERPL-CCNC: 60 U/L (ref 12–78)
ANION GAP SERPL CALC-SCNC: 3 MMOL/L (ref 5–15)
AST SERPL-CCNC: 66 U/L (ref 15–37)
BASOPHILS # BLD: 0.1 K/UL (ref 0–0.1)
BASOPHILS NFR BLD: 1 % (ref 0–1)
BILIRUB SERPL-MCNC: 0.6 MG/DL (ref 0.2–1)
BUN SERPL-MCNC: 16 MG/DL (ref 6–20)
BUN/CREAT SERPL: 24 (ref 12–20)
CALCIUM SERPL-MCNC: 8.7 MG/DL (ref 8.5–10.1)
CHLORIDE SERPL-SCNC: 104 MMOL/L (ref 97–108)
CO2 SERPL-SCNC: 33 MMOL/L (ref 21–32)
COMMENT, HOLDF: NORMAL
CREAT SERPL-MCNC: 0.68 MG/DL (ref 0.7–1.3)
DIFFERENTIAL METHOD BLD: ABNORMAL
EOSINOPHIL # BLD: 0.1 K/UL (ref 0–0.4)
EOSINOPHIL NFR BLD: 1 % (ref 0–7)
ERYTHROCYTE [DISTWIDTH] IN BLOOD BY AUTOMATED COUNT: 13.6 % (ref 11.5–14.5)
GLOBULIN SER CALC-MCNC: 4.1 G/DL (ref 2–4)
GLUCOSE SERPL-MCNC: 131 MG/DL (ref 65–100)
HCT VFR BLD AUTO: 30.7 % (ref 36.6–50.3)
HGB BLD-MCNC: 9.4 G/DL (ref 12.1–17)
IMM GRANULOCYTES # BLD AUTO: 0.1 K/UL (ref 0–0.04)
IMM GRANULOCYTES NFR BLD AUTO: 2 % (ref 0–0.5)
LYMPHOCYTES # BLD: 1.2 K/UL (ref 0.8–3.5)
LYMPHOCYTES NFR BLD: 17 % (ref 12–49)
MAGNESIUM SERPL-MCNC: 2.7 MG/DL (ref 1.6–2.4)
MCH RBC QN AUTO: 28.7 PG (ref 26–34)
MCHC RBC AUTO-ENTMCNC: 30.6 G/DL (ref 30–36.5)
MCV RBC AUTO: 93.9 FL (ref 80–99)
MONOCYTES # BLD: 0.7 K/UL (ref 0–1)
MONOCYTES NFR BLD: 10 % (ref 5–13)
NEUTS SEG # BLD: 4.7 K/UL (ref 1.8–8)
NEUTS SEG NFR BLD: 69 % (ref 32–75)
NRBC # BLD: 0 K/UL (ref 0–0.01)
NRBC BLD-RTO: 0 PER 100 WBC
PHOSPHATE SERPL-MCNC: 2.4 MG/DL (ref 2.6–4.7)
PLATELET # BLD AUTO: 400 K/UL (ref 150–400)
PMV BLD AUTO: 9.8 FL (ref 8.9–12.9)
POTASSIUM SERPL-SCNC: 4.1 MMOL/L (ref 3.5–5.1)
PROCALCITONIN SERPL-MCNC: 0.3 NG/ML
PROT SERPL-MCNC: 6.7 G/DL (ref 6.4–8.2)
RBC # BLD AUTO: 3.27 M/UL (ref 4.1–5.7)
RBC MORPH BLD: ABNORMAL
SAMPLES BEING HELD,HOLD: NORMAL
SODIUM SERPL-SCNC: 140 MMOL/L (ref 136–145)
WBC # BLD AUTO: 6.9 K/UL (ref 4.1–11.1)

## 2020-11-19 PROCEDURE — 97110 THERAPEUTIC EXERCISES: CPT

## 2020-11-19 PROCEDURE — 83735 ASSAY OF MAGNESIUM: CPT

## 2020-11-19 PROCEDURE — 74011250637 HC RX REV CODE- 250/637: Performed by: INTERNAL MEDICINE

## 2020-11-19 PROCEDURE — 85025 COMPLETE CBC W/AUTO DIFF WBC: CPT

## 2020-11-19 PROCEDURE — 80053 COMPREHEN METABOLIC PANEL: CPT

## 2020-11-19 PROCEDURE — 65660000000 HC RM CCU STEPDOWN

## 2020-11-19 PROCEDURE — 74011250636 HC RX REV CODE- 250/636: Performed by: SURGERY

## 2020-11-19 PROCEDURE — 84145 PROCALCITONIN (PCT): CPT

## 2020-11-19 PROCEDURE — 97535 SELF CARE MNGMENT TRAINING: CPT

## 2020-11-19 PROCEDURE — 97530 THERAPEUTIC ACTIVITIES: CPT

## 2020-11-19 PROCEDURE — 74011000250 HC RX REV CODE- 250: Performed by: INTERNAL MEDICINE

## 2020-11-19 PROCEDURE — 74011250637 HC RX REV CODE- 250/637: Performed by: NURSE PRACTITIONER

## 2020-11-19 PROCEDURE — 94640 AIRWAY INHALATION TREATMENT: CPT

## 2020-11-19 PROCEDURE — 94760 N-INVAS EAR/PLS OXIMETRY 1: CPT

## 2020-11-19 PROCEDURE — 77010033678 HC OXYGEN DAILY

## 2020-11-19 PROCEDURE — 74011250636 HC RX REV CODE- 250/636: Performed by: INTERNAL MEDICINE

## 2020-11-19 PROCEDURE — 84100 ASSAY OF PHOSPHORUS: CPT

## 2020-11-19 PROCEDURE — 36415 COLL VENOUS BLD VENIPUNCTURE: CPT

## 2020-11-19 RX ORDER — LISINOPRIL 20 MG/1
40 TABLET ORAL EVERY EVENING
Status: DISCONTINUED | OUTPATIENT
Start: 2020-11-19 | End: 2020-11-25 | Stop reason: HOSPADM

## 2020-11-19 RX ORDER — TAMSULOSIN HYDROCHLORIDE 0.4 MG/1
0.4 CAPSULE ORAL DAILY
Status: DISCONTINUED | OUTPATIENT
Start: 2020-11-19 | End: 2020-11-25 | Stop reason: HOSPADM

## 2020-11-19 RX ORDER — PROCHLORPERAZINE EDISYLATE 5 MG/ML
10 INJECTION INTRAMUSCULAR; INTRAVENOUS
Status: DISCONTINUED | OUTPATIENT
Start: 2020-11-19 | End: 2020-11-21

## 2020-11-19 RX ADMIN — DIBASIC SODIUM PHOSPHATE, MONOBASIC POTASSIUM PHOSPHATE AND MONOBASIC SODIUM PHOSPHATE 1 TABLET: 852; 155; 130 TABLET ORAL at 18:21

## 2020-11-19 RX ADMIN — DOCUSATE SODIUM 100 MG: 100 CAPSULE, LIQUID FILLED ORAL at 08:24

## 2020-11-19 RX ADMIN — BUMETANIDE 1 MG: 0.25 INJECTION, SOLUTION INTRAMUSCULAR; INTRAVENOUS at 18:22

## 2020-11-19 RX ADMIN — TAMSULOSIN HYDROCHLORIDE 0.4 MG: 0.4 CAPSULE ORAL at 14:33

## 2020-11-19 RX ADMIN — LISINOPRIL 20 MG: 20 TABLET ORAL at 08:25

## 2020-11-19 RX ADMIN — DIBASIC SODIUM PHOSPHATE, MONOBASIC POTASSIUM PHOSPHATE AND MONOBASIC SODIUM PHOSPHATE 1 TABLET: 852; 155; 130 TABLET ORAL at 14:33

## 2020-11-19 RX ADMIN — BUMETANIDE 1 MG: 0.25 INJECTION, SOLUTION INTRAMUSCULAR; INTRAVENOUS at 08:25

## 2020-11-19 RX ADMIN — GUAIFENESIN 1200 MG: 600 TABLET ORAL at 18:22

## 2020-11-19 RX ADMIN — ATORVASTATIN CALCIUM 10 MG: 10 TABLET, FILM COATED ORAL at 08:25

## 2020-11-19 RX ADMIN — MEROPENEM 500 MG: 500 INJECTION, POWDER, FOR SOLUTION INTRAVENOUS at 20:28

## 2020-11-19 RX ADMIN — DOCUSATE SODIUM 100 MG: 100 CAPSULE, LIQUID FILLED ORAL at 18:22

## 2020-11-19 RX ADMIN — MEROPENEM 500 MG: 500 INJECTION, POWDER, FOR SOLUTION INTRAVENOUS at 02:54

## 2020-11-19 RX ADMIN — LISINOPRIL 40 MG: 20 TABLET ORAL at 18:22

## 2020-11-19 RX ADMIN — ENOXAPARIN SODIUM 40 MG: 40 INJECTION SUBCUTANEOUS at 20:28

## 2020-11-19 RX ADMIN — IPRATROPIUM BROMIDE AND ALBUTEROL SULFATE 3 ML: .5; 3 SOLUTION RESPIRATORY (INHALATION) at 07:49

## 2020-11-19 RX ADMIN — HYDROMORPHONE HYDROCHLORIDE 1 MG: 2 INJECTION, SOLUTION INTRAMUSCULAR; INTRAVENOUS; SUBCUTANEOUS at 13:20

## 2020-11-19 RX ADMIN — MEROPENEM 500 MG: 500 INJECTION, POWDER, FOR SOLUTION INTRAVENOUS at 14:33

## 2020-11-19 RX ADMIN — METOPROLOL TARTRATE 25 MG: 25 TABLET, FILM COATED ORAL at 08:30

## 2020-11-19 RX ADMIN — GUAIFENESIN 1200 MG: 600 TABLET ORAL at 08:24

## 2020-11-19 RX ADMIN — IPRATROPIUM BROMIDE AND ALBUTEROL SULFATE 3 ML: .5; 3 SOLUTION RESPIRATORY (INHALATION) at 15:17

## 2020-11-19 RX ADMIN — CITALOPRAM HYDROBROMIDE 40 MG: 20 TABLET ORAL at 08:24

## 2020-11-19 RX ADMIN — PANTOPRAZOLE SODIUM 40 MG: 40 TABLET, DELAYED RELEASE ORAL at 06:50

## 2020-11-19 RX ADMIN — MEROPENEM 500 MG: 500 INJECTION, POWDER, FOR SOLUTION INTRAVENOUS at 08:25

## 2020-11-19 RX ADMIN — STANDARDIZED SENNA CONCENTRATE 8.6 MG: 8.6 TABLET ORAL at 08:24

## 2020-11-19 RX ADMIN — METOPROLOL TARTRATE 25 MG: 25 TABLET, FILM COATED ORAL at 18:22

## 2020-11-19 RX ADMIN — ONDANSETRON 4 MG: 2 INJECTION INTRAMUSCULAR; INTRAVENOUS at 13:17

## 2020-11-19 NOTE — PROGRESS NOTES
[facility] bladder scanned patient, as patient has not voided since cooley removal earlier today (approx. 1100). Bladder scan showed 441.  Mayte Stovall MD paged.      [id number] Eliza ordered RN to place cooley for acute urinary retention     0003-Cooley Catheter placed by RN

## 2020-11-19 NOTE — PROGRESS NOTES
Problem: Mobility Impaired (Adult and Pediatric)  Goal: *Acute Goals and Plan of Care (Insert Text)  Description: FUNCTIONAL STATUS PRIOR TO ADMISSION: Patient was independent and active without use of DME.    HOME SUPPORT PRIOR TO ADMISSION: The patient lived with wife but did not require assist.    Physical Therapy Goals  Initiated 11/18/2020  1. Patient will move from supine to sit and sit to supine  in bed with moderate assistance x1 within 7 day(s). 2.  Patient will transfer from bed to chair and chair to bed with moderate assistance x1 using the least restrictive device within 7 day(s). 3.  Patient will perform sit to stand with moderate assistance x1 within 7 day(s). 4.  Patient will ambulate with moderate assistance x1 for 50 feet with the least restrictive device within 7 day(s). 5.  Patient will ascend/descend 4 stairs with 2 handrail(s) with minimal assistance/contact guard assist within 7 day(s). Note:   PHYSICAL THERAPY TREATMENT  Patient: Tamika Dave (89 y.o. male)  Date: 11/19/2020  Diagnosis: Biliary colic [H80.23]  Cholecystitis [K81.9]   <principal problem not specified>  Procedure(s) (LRB):  LAPAROTOMY EXPLORATORY, evacuation of hematoma. (N/A) 15 Days Post-Op  Precautions: WBAT, Fall  Chart, physical therapy assessment, plan of care and goals were reviewed. ASSESSMENT  Patient continues with skilled PT services. Pt comes to sit with mod assist of 2. Pt to stand with min to mod assist of 2. Pt took four half steps forward and backwards with RW min to mod assist of 2. Pt struggles to advance feet. Pt then took 4 side steps to head of bed with RW. Pt back to bed mod assist of 2. Pt fatigues quickly with mobility. Pt progressing slowly. Continue goals. Current Level of Function Impacting Discharge (mobility/balance): Pr mod assist of 2 for bed mobility. PLAN :  Patient continues to benefit from skilled intervention to address the above impairments.   Continue treatment per established plan of care. to address goals. Recommendation for discharge: (in order for the patient to meet his/her long term goals)  To be determined    This discharge recommendation:  Has been made in collaboration with the attending provider and/or case management    IF patient discharges home will need the following DME: rolling walker       SUBJECTIVE:       OBJECTIVE DATA SUMMARY:   Critical Behavior:  Neurologic State: Alert  Orientation Level: Oriented to person  Cognition: Decreased attention/concentration  Safety/Judgement: Decreased awareness of environment, Decreased awareness of need for safety, Decreased awareness of need for assistance, Decreased insight into deficits  Functional Mobility Training:  Bed Mobility:     Supine to Sit: Moderate assistance;Assist x2  Sit to Supine: Moderate assistance;Assist x2           Transfers:   Sit to stand with min to mod assist of 2. Balance:  Sitting: Intact  Sitting - Static: Fair (occasional)  Standing: With support  Ambulation/Gait Training:  Distance (ft): 3 Feet (ft)  Assistive Device: Gait belt;Walker, rolling  Ambulation - Level of Assistance: Minimal assistance; Moderate assistance;Assist x2        Gait Abnormalities: Decreased step clearance        Base of Support: Narrowed     Speed/Syl: Pace decreased (<100 feet/min)  Step Length: Right shortened;Left shortened                 Activity Tolerance:   Fair    After treatment patient left in no apparent distress:   Supine in bed    COMMUNICATION/COLLABORATION:   The patients plan of care was discussed with: Physical therapist.     Beti Rashid PTA   Time Calculation: 23 mins

## 2020-11-19 NOTE — PROGRESS NOTES
11/19/2020   CARE MANAGEMENT NOTE:  Pt transferred from ICU to the 5th floor. EMR reviewed and handoff received from previous  (Samuel Grimes.). Pt was admitted with biliary cholecystitis and he is s/p robotic assisted lap bailee on 11/4 and s/p ex-lap and evacuation of hematoma on 11/4. Pt was intubated on 11/4 and extubated on 11/17. Reportedly, pt resides with his wife Charisse Sandoval ([phone]; cell [phone]). RUR 17%; LOS 15 days    Transition Plan of Care:  1. PT/OT evals complete; AROM and PROM: generally decreased and rehab 5 days per week was recommended. CM will discuss inpt rehab option with pt/wife. 2.  [facility] will be needed  3. COVID 19 test for placement will be required within 24 to 48 hours of discharge     CM will continue to follow pt for rehab placement.   Allen

## 2020-11-19 NOTE — PROGRESS NOTES
Comprehensive Nutrition Assessment    Type and Reason for Visit: Reassess    Nutrition Recommendations/Plan:   1. Continue regular diet. 2. Continue Ensure HP TID to promote adequate intake. Nutrition Assessment:      11/19: F/u. Pt extubated 11/17. Pt on CLD at time of visit. Pt consumed couple bites of jello for breakfast. Says he was having nausea after eating breakfast yesterday, but says meds helped and he was able to eat a little yesterday. Nausea how now subsided. No c/o pain. Pt agreeable to trying Ensure Clear while on CLD, but will continue ordered Ensure HP now that diet is advanced and monitor acceptance. Labs- Mg 2.7, phos 2.4. Med- Bumex, colace, Merrem, Protoinx, senokot. 11/16:  F/u. Remains on vent. Propofol off. MD notes possible extubation tomorrow. Originally planning to extubate today but pt with too many secretions. TF has remained on hold since last assessment 11/12 (x 5 days now). Noted to have 120 ml black residuals yesterday from OGT. Surgery notes plans to advance PO diet once extubated. Labs: . Meds: Precedex, Colace, Senokol. 11/12: F/u. Remains on vent. Propofol resumed, at 50mcg/kg/min (providing 939 kcals/d). NGT became coiled, was replaced with OGT. TF was started and running as Vital 1.2 at 40ml/hr. Residuals documented as 20-45ml over last 24h. Pt vomited TF last night so TF placed on hold. Concerning that no BM since admission. 11/9: F/u. Pt remains on vent. Off pressors and Propofol. NGT has remained to suction. GI placed consult today to start trickle TF.  NPO x 6 days. Labs: K+ 3.2. Meds: Precedex. LR running at 125ml/hr. 11/6: 49 yo male admitted for cholecystitis. PMhx: GERD, hernia repairs, esophageal dilation. Class II obese per BMI of 36.6. Weight hx indicates weight gain PTA. S/P cholecystectomy and lysis of adhesions on 11/4. Intubated for procedure and remains on vent today.  Being sedated with Propofol at 50mcg/kg/min (providing 863 kcals/d). Requiring pressor support: levophed at 2mcg/min and William at 165. NPO x 3 days. NGT to suction with 600 ml output today. MD notes plan to wean off pressors and extubated. LR running at 125 ml/hr. Labs: . Meds: propofol, Lvophed, William. Malnutrition Assessment:  Unable to assess at this time. Estimated Daily Nutrient Needs:  Energy (kcal): 2818(PAL 2349 x 1.2); Weight Used for Energy Requirements: Current  Protein (g): 117-140(1.5-1.8gm/Kg IBW/d while intubated); Weight Used for Protein Requirements: Ideal  Fluid (ml/day): 2818; Method Used for Fluid Requirements: 1 ml/kcal    Nutrition Related Findings:  LBM 11/18; trace edema      Wounds:    Surgical incision(ABD)       Current Nutrition Therapies:  DIET CLEAR LIQUID  DIET NUTRITIONAL SUPPLEMENTS Lunch, Breakfast; Ensure Clear    Anthropometric Measures:  · Height:  5' 11\" (180.3 cm)  · Current Body Wt:  101.4 kg (223 lb 8.7 oz)   · Admission Body Wt:       · Usual Body Wt:        · Ideal Body Wt:   :      · Adjusted Body Weight:   ; Weight Adjustment for: No adjustment   · BMI Category:  Obese class 1 (BMI 30.0-34. 9)       Nutrition Diagnosis:   · Inadequate protein-energy intake related to inadequate protein-energy intake as evidenced by NPO or clear liquid status due to medical condition, intubation    11/9: Nutrition Dx continues - NPO.  11/12: Nutrition Dx continues - TF on hold. 11/16: Nutrition Dx continues - TF on hold/NPO.  11/19: Nutrition dx continues- CLD.     Nutrition Interventions:   Food and/or Nutrient Delivery: Modify current diet, Start oral nutrition supplement  Nutrition Education and Counseling: No recommendations at this time  Coordination of Nutrition Care: Continue to monitor while inpatient    Goals:  Diet advancement with po intake >50% meals + ONS next 2-4 days       Nutrition Monitoring and Evaluation:   Food/Nutrient Intake Outcomes: Food and nutrient intake, Supplement intake, Diet advancement/tolerance  Physical Signs/Symptoms Outcomes: GI status, Biochemical data, Weight    Discharge Planning:     Too soon to determine     Electronically signed by Tobe Claude, RD on 11/19/2020     Contact: [phone]

## 2020-11-19 NOTE — PROGRESS NOTES
Occupational Therapy Note: Pt recently had pain medication and pts wife stating he is having nausea and asked if therapies could return later. Will cont to follow.

## 2020-11-19 NOTE — PROGRESS NOTES
SURGERY PROGRESS NOTE      Admit Date: 11/3/2020    POD 14 Days Post-Op    Procedure: Procedure(s):  LAPAROTOMY EXPLORATORY, evacuation of hematoma. Subjective:   Doing well. Weak as expected. Worked with PT and go to side of bed yesterday. Tolerating clears. Cook removed, but no output so replaced overnight. States he stooled  Orting, but nothing today. Pain midline at incision site only. Objective:     Visit Vitals  BP (!) 160/76 (BP 1 Location: Right arm, BP Patient Position: At rest)   Pulse 78   Temp (!) 96.5 °F (35.8 °C)   Resp 21   Ht 5' 11\" (1.803 m)   Wt 101.4 kg (223 lb 9.6 oz)   SpO2 100%   BMI 31.19 kg/m²        Temp (24hrs), Av.9 °F (36.6 °C), Min:96.5 °F (35.8 °C), Max:98.7 °F (37.1 °C)      Physical Exam:     Abdomen:  Soft. Non-tender, non-distended. Incision C/D/I.  SRI in place with serous fluid only.           Lab Results   Component Value Date/Time    WBC 6.9 2020 06:34 AM    HGB 9.4 (L) 2020 06:34 AM    Hemoglobin (POC) 6.8 (L) 2020 03:42 PM    Hemoglobin (POC) 13.3 2014 10:05 PM    HCT 30.7 (L) 2020 06:34 AM    Hematocrit (POC) 39 2014 10:05 PM    PLATELET 855  06:34 AM    MCV 93.9 2020 06:34 AM     Lab Results   Component Value Date/Time    GFR est non-AA >60 2020 06:34 AM    GFRNA, POC >60 2014 10:05 PM    GFR est AA >60 2020 06:34 AM    GFRAA, POC >60 2014 10:05 PM    Creatinine 0.68 (L) 2020 06:34 AM    Creatinine (POC) 1.1 2014 10:05 PM    BUN 16 2020 06:34 AM    BUN (POC) 15 2014 10:05 PM    Sodium 140 2020 06:34 AM    Sodium (POC) 141 2014 10:05 PM    Potassium 4.1 2020 06:34 AM    Potassium (POC) 3.5 2014 10:05 PM    Chloride 104 2020 06:34 AM    Chloride (POC) 106 2014 10:05 PM    CO2 33 (H) 2020 06:34 AM    Magnesium 2.7 (H) 2020 06:34 AM    Phosphorus 2.4 (L) 2020 06:34 AM       Assessment:     Active Problems:    Biliary colic (57/6/2384)      Cholecystitis (11/4/2020)        Plan/Recommendations/Medical Decision Making:   Keep Cook  Add flomax  Lisinopril for Bps  Cont bumex for now  D/C SRI drain  Diet advanced to regular with high protein supplements  Up and OOB with PT  IS        Pt seen and plan discussed with Dr Brittany Allison.   Antonia Mcginnis, NP    Pt with nausea and vomiting  abd soft ntnd, incisions c/d/i  Cook replaced overnight leave today - start flomax  OOB as able   PT/OT    Electronically signed by:  Andrea Dang MD  [address] [facility]  Office: [phone]  Fax: [phone]

## 2020-11-19 NOTE — PROGRESS NOTES
Bedside and Verbal shift change report given to Breezy Oneal, [facility] (oncoming nurse) by Dario Aquino RN (offgoing nurse). Report included the following information SBAR, ED Summary, MAR, Accordion and Recent Results.

## 2020-11-19 NOTE — PROGRESS NOTES
Bedside and Verbal shift change report given to Addie Valero RN (oncoming nurse) by Tammy Hodges RN (offgoing nurse). Report included the following information SBAR, Kardex, OR Summary, Intake/Output and Recent Results.

## 2020-11-19 NOTE — PROGRESS NOTES
[facility]     Name: Lucina Closs MRN: [id number]   : 1970 Hospital: [address]   Date: 2020        Impression Plan   1. Acute hypoxemic respiratory failure, intubated , tube exchanged , extubated   2. Hemorrhagic shock, off pressors   3. Acute cholecystitis s/p robotic assisted lap bailee w/ TAMIKO   4. Post-op bleeding/tear in liver capsule s/p ex-lap and evacuation of hematoma   5. Acute blood loss anemia  6. MRSA pneumonia, completed course of vanc   7. Volume overload  8. Persistent fevers  9. H/o seizures  10. REBECCA  11. delirium                 · Supplemental O2 as needed to keep sats > 88%  · cpap whenever sleeping  · Continue chandan (day 7 of 7). Can stop vanc. Stop Chandan after today. · Follow cultures  · continue scheduled duonebs, mucinex. · Trend Hgb, transfuse to keep > 7  · Continue bumex as long as BP and renal function tolerate though will decrease dose  · Continue Lisinopril  · Continue metoprolol - change to PO  · Replete lytes: Phos today  · PRN Seroquel  · Continue bowel regimen  · IS, flutter valve  · Post-op mgmt as per surgery    DVT ppx: lovenox  GI ppx: protonix - change to PO  Advance diet as per surgery           Radiology  ( personally reviewed) CT chest reviewed: Bilateral PNA   ABG No results for input(s): PHI, PO2I, PCO2I in the last 72 hours. Subjective       47 yo with cholecystits s/p lap bailee complicated by laceration of the liver capsule and hemorrhagic shock. Hematoma evacuated- 2L blood. Currently on daniel 130 and Levophed 8.  Pt intubated and sedated- unable to provide any further hx.      - intubated     - bronch, tube exchange       - echo: EF 55-60%, RV normal     - CT chest: small effusions, patchy bilateral asdz/lower lobe consolidations            - CT abd/pelvis: 2 fluid collections in upper abdomen     - extubated    *sputum culture () - MRSA  *all other cultures NGTD  *MRSA screen positive    **stopped propofol due to high TG    Interval history  Afebrile overnight  BP elevated  Sats 97% on RA; wore CPAP overnight  PCT .3; better  Phos 2.4  Respiratory culture with scant possible staph; prelim    ROS:  Feels okay from breathing standpoint, just feels \"blah\". Has a dry cough. No fever or chills. Past Medical History:   Diagnosis Date    GERD (gastroesophageal reflux disease)     Panic attack     Psychiatric disorder     anxiety    Seizures ([facility] [address]     7 yo   was told it was a stress seizure, never had another one    Sleep apnea     uses CPAP      Past Surgical History:   Procedure Laterality Date    ABDOMEN SURGERY PROC UNLISTED  1977    hernia(abdomen)- open    HX GI  2008    esophagus repair narrow- balloon dilation and scar tissue removal    HX HERNIA REPAIR      inguinal hernia    HX OTHER SURGICAL  11years of age    tonsils    HX OTHER SURGICAL  2009    hernia (hiatal and abdomen)-open    HX OTHER SURGICAL  23years of age    wisdom teeth extraction under anesthesia    HX OTHER SURGICAL  8 years     distended testicle     HX OTHER SURGICAL  1/16/15    Laparoscopic ventral hernia repair with mesh      Prior to Admission medications    Medication Sig Start Date End Date Taking? Authorizing Provider   citalopram (CELEXA) 40 mg tablet Take 40 mg by mouth daily. Yes Provider, Historical   lisinopril (PRINIVIL, ZESTRIL) 40 mg tablet Take 40 mg by mouth every evening. Yes Provider, Historical   simvastatin (ZOCOR) 40 mg tablet Take 40 mg by mouth nightly. Yes Provider, Historical   omeprazole (PRILOSEC) 40 mg capsule Take 40 mg by mouth daily.    Yes Provider, Historical     Current Facility-Administered Medications   Medication Dose Route Frequency    docusate sodium (COLACE) capsule 100 mg  100 mg Oral BID    senna (SENOKOT) tablet 8.6 mg  1 Tab Oral DAILY    albuterol-ipratropium (DUO-NEB) 2.5 MG-0.5 MG/3 ML  3 mL Nebulization Q6HWA RT    bumetanide (BUMEX) injection 1 mg  1 mg IntraVENous BID    guaiFENesin ER (MUCINEX) tablet 1,200 mg  1,200 mg Oral BID    atorvastatin (LIPITOR) tablet 10 mg  10 mg Oral DAILY    lisinopriL (PRINIVIL, ZESTRIL) tablet 20 mg  20 mg Oral DAILY    metoprolol tartrate (LOPRESSOR) tablet 25 mg  25 mg Oral BID    pantoprazole (PROTONIX) tablet 40 mg  40 mg Oral ACB    citalopram (CELEXA) tablet 40 mg  40 mg Oral DAILY    meropenem (MERREM) 500 mg in sterile water (preservative free) 10 mL IV syringe  0.5 g IntraVENous Q6H    enoxaparin (LOVENOX) injection 40 mg  40 mg SubCUTAneous Q24H     Allergies   Allergen Reactions    Pcn [Penicillins] Other (comments)     Reaction was at age 6, itching and eyes swollen; denies any sob or tongue swelling. Has been on Zosyn in the hospital with no problems    Codeine Nausea and Vomiting      Social History     Tobacco Use    Smoking status: Never Smoker    Smokeless tobacco: Never Used   Substance Use Topics    Alcohol use: Yes     Alcohol/week: 1.7 standard drinks     Types: 2 Cans of beer per week     Comment: 1 or 2 beer every two weeks      Family History   Problem Relation Age of Onset    Diabetes Mother     Hypertension Mother     Stroke Mother         copd    Cancer Father         skin    Heart Disease Father     Diabetes Father     Heart Attack Father           Laboratory: I have personally reviewed the critical care flowsheet and labs.      Recent Labs     11/19/20  0634 11/18/20  0344 11/17/20  0424   WBC 6.9 7.2 6.0   HGB 9.4* 8.3* 8.2*   HCT 30.7* 26.2* 25.8*    395 360     Recent Labs     11/19/20  0634 11/18/20  0344 11/17/20  0424    140 140   K 4.1 3.2* 3.4*    106 103   CO2 33* 29 32   * 119* 136*   BUN 16 13 12   CREA 0.68* 0.75 0.75   CA 8.7 8.4* 8.7   MG 2.7* 2.4 2.3   PHOS 2.4* 2.5* 2.3*   ALB 2.6* 2.2* 2.2*   ALT 60 37 38       Objective:     Mode Rate Tidal Volume Pressure FiO2 PEEP   Spontaneous   500 ml  5 cm H2O 46 % 5 cm H20 Intake/Output Summary (Last 24 hours) at 11/19/2020 1108  Last data filed at 11/19/2020 0815  Gross per 24 hour   Intake 834.87 ml   Output 1300 ml   Net -465.13 ml     EXAM:   GENERAL: awake  HEENT:  Anicteric sclera  NECK:  Trachea midline  LUNGS: diminished, CTA, no distress  HEART: Regular rate and rhythm   ABDOMEN:  Soft, hypoactive bowel sounds, incision c/d/i  EXTREMITIES: trace edema  NEUROLOGIC: oriented x 3    Anca Gonsalves NP  [facility] [place]

## 2020-11-20 ENCOUNTER — APPOINTMENT (OUTPATIENT)
Dept: GENERAL RADIOLOGY | Age: 50
DRG: 417 | End: 2020-11-20
Attending: SURGERY
Payer: COMMERCIAL

## 2020-11-20 LAB
ALBUMIN SERPL-MCNC: 2.8 G/DL (ref 3.5–5)
ALBUMIN/GLOB SERPL: 0.6 {RATIO} (ref 1.1–2.2)
ALP SERPL-CCNC: 138 U/L (ref 45–117)
ALT SERPL-CCNC: 73 U/L (ref 12–78)
ANION GAP SERPL CALC-SCNC: 4 MMOL/L (ref 5–15)
AST SERPL-CCNC: 44 U/L (ref 15–37)
BACTERIA SPEC CULT: ABNORMAL
BACTERIA SPEC CULT: ABNORMAL
BASOPHILS # BLD: 0.1 K/UL (ref 0–0.1)
BASOPHILS NFR BLD: 1 % (ref 0–1)
BILIRUB SERPL-MCNC: 0.6 MG/DL (ref 0.2–1)
BUN SERPL-MCNC: 19 MG/DL (ref 6–20)
BUN/CREAT SERPL: 21 (ref 12–20)
CALCIUM SERPL-MCNC: 9.2 MG/DL (ref 8.5–10.1)
CHLORIDE SERPL-SCNC: 104 MMOL/L (ref 97–108)
CO2 SERPL-SCNC: 32 MMOL/L (ref 21–32)
CREAT SERPL-MCNC: 0.89 MG/DL (ref 0.7–1.3)
DIFFERENTIAL METHOD BLD: ABNORMAL
EOSINOPHIL # BLD: 0 K/UL (ref 0–0.4)
EOSINOPHIL NFR BLD: 0 % (ref 0–7)
ERYTHROCYTE [DISTWIDTH] IN BLOOD BY AUTOMATED COUNT: 13.9 % (ref 11.5–14.5)
GLOBULIN SER CALC-MCNC: 4.9 G/DL (ref 2–4)
GLUCOSE SERPL-MCNC: 153 MG/DL (ref 65–100)
GRAM STN SPEC: ABNORMAL
HCT VFR BLD AUTO: 33.7 % (ref 36.6–50.3)
HGB BLD-MCNC: 10.5 G/DL (ref 12.1–17)
IMM GRANULOCYTES # BLD AUTO: 0 K/UL
IMM GRANULOCYTES NFR BLD AUTO: 0 %
LYMPHOCYTES # BLD: 1.2 K/UL (ref 0.8–3.5)
LYMPHOCYTES NFR BLD: 16 % (ref 12–49)
MAGNESIUM SERPL-MCNC: 2.6 MG/DL (ref 1.6–2.4)
MCH RBC QN AUTO: 28.1 PG (ref 26–34)
MCHC RBC AUTO-ENTMCNC: 31.2 G/DL (ref 30–36.5)
MCV RBC AUTO: 90.1 FL (ref 80–99)
MONOCYTES # BLD: 0.6 K/UL (ref 0–1)
MONOCYTES NFR BLD: 8 % (ref 5–13)
NEUTS BAND NFR BLD MANUAL: 3 % (ref 0–6)
NEUTS SEG # BLD: 5.3 K/UL (ref 1.8–8)
NEUTS SEG NFR BLD: 72 % (ref 32–75)
NRBC # BLD: 0.02 K/UL (ref 0–0.01)
NRBC BLD-RTO: 0.3 PER 100 WBC
PHOSPHATE SERPL-MCNC: 2.2 MG/DL (ref 2.6–4.7)
PLATELET # BLD AUTO: 558 K/UL (ref 150–400)
PMV BLD AUTO: 9.2 FL (ref 8.9–12.9)
POTASSIUM SERPL-SCNC: 3.1 MMOL/L (ref 3.5–5.1)
PROT SERPL-MCNC: 7.7 G/DL (ref 6.4–8.2)
RBC # BLD AUTO: 3.74 M/UL (ref 4.1–5.7)
RBC MORPH BLD: ABNORMAL
SERVICE CMNT-IMP: ABNORMAL
SODIUM SERPL-SCNC: 140 MMOL/L (ref 136–145)
TRIGL SERPL-MCNC: 279 MG/DL (ref ?–150)
WBC # BLD AUTO: 7.2 K/UL (ref 4.1–11.1)

## 2020-11-20 PROCEDURE — 83735 ASSAY OF MAGNESIUM: CPT

## 2020-11-20 PROCEDURE — 77030018786 HC NDL GD F/USND BARD -B

## 2020-11-20 PROCEDURE — 85025 COMPLETE CBC W/AUTO DIFF WBC: CPT

## 2020-11-20 PROCEDURE — 84478 ASSAY OF TRIGLYCERIDES: CPT

## 2020-11-20 PROCEDURE — 74011250636 HC RX REV CODE- 250/636: Performed by: INTERNAL MEDICINE

## 2020-11-20 PROCEDURE — 65660000000 HC RM CCU STEPDOWN

## 2020-11-20 PROCEDURE — 74011250637 HC RX REV CODE- 250/637: Performed by: NURSE PRACTITIONER

## 2020-11-20 PROCEDURE — 97116 GAIT TRAINING THERAPY: CPT

## 2020-11-20 PROCEDURE — 74011250636 HC RX REV CODE- 250/636: Performed by: NURSE PRACTITIONER

## 2020-11-20 PROCEDURE — 84100 ASSAY OF PHOSPHORUS: CPT

## 2020-11-20 PROCEDURE — 36415 COLL VENOUS BLD VENIPUNCTURE: CPT

## 2020-11-20 PROCEDURE — 76937 US GUIDE VASCULAR ACCESS: CPT

## 2020-11-20 PROCEDURE — 74011250637 HC RX REV CODE- 250/637: Performed by: INTERNAL MEDICINE

## 2020-11-20 PROCEDURE — 80053 COMPREHEN METABOLIC PANEL: CPT

## 2020-11-20 PROCEDURE — 2709999900 HC NON-CHARGEABLE SUPPLY

## 2020-11-20 PROCEDURE — C1751 CATH, INF, PER/CENT/MIDLINE: HCPCS

## 2020-11-20 PROCEDURE — 36573 INSJ PICC RS&I 5 YR+: CPT | Performed by: SURGERY

## 2020-11-20 PROCEDURE — 02HV33Z INSERTION OF INFUSION DEVICE INTO SUPERIOR VENA CAVA, PERCUTANEOUS APPROACH: ICD-10-PCS | Performed by: SURGERY

## 2020-11-20 PROCEDURE — 74011250636 HC RX REV CODE- 250/636: Performed by: SURGERY

## 2020-11-20 PROCEDURE — 74018 RADEX ABDOMEN 1 VIEW: CPT

## 2020-11-20 PROCEDURE — 94640 AIRWAY INHALATION TREATMENT: CPT

## 2020-11-20 PROCEDURE — 74011000258 HC RX REV CODE- 258: Performed by: NURSE PRACTITIONER

## 2020-11-20 PROCEDURE — 97530 THERAPEUTIC ACTIVITIES: CPT

## 2020-11-20 PROCEDURE — 97535 SELF CARE MNGMENT TRAINING: CPT

## 2020-11-20 PROCEDURE — 74011000250 HC RX REV CODE- 250: Performed by: INTERNAL MEDICINE

## 2020-11-20 PROCEDURE — 74011000250 HC RX REV CODE- 250: Performed by: NURSE PRACTITIONER

## 2020-11-20 RX ORDER — POTASSIUM CHLORIDE 7.45 MG/ML
10 INJECTION INTRAVENOUS
Status: COMPLETED | OUTPATIENT
Start: 2020-11-20 | End: 2020-11-21

## 2020-11-20 RX ORDER — DEXTROSE MONOHYDRATE AND SODIUM CHLORIDE 5; .45 G/100ML; G/100ML
100 INJECTION, SOLUTION INTRAVENOUS CONTINUOUS
Status: DISPENSED | OUTPATIENT
Start: 2020-11-20 | End: 2020-11-20

## 2020-11-20 RX ADMIN — LISINOPRIL 40 MG: 20 TABLET ORAL at 18:25

## 2020-11-20 RX ADMIN — ONDANSETRON 4 MG: 2 INJECTION INTRAMUSCULAR; INTRAVENOUS at 06:32

## 2020-11-20 RX ADMIN — MEROPENEM 500 MG: 500 INJECTION, POWDER, FOR SOLUTION INTRAVENOUS at 03:05

## 2020-11-20 RX ADMIN — METOPROLOL TARTRATE 25 MG: 25 TABLET, FILM COATED ORAL at 10:57

## 2020-11-20 RX ADMIN — POTASSIUM CHLORIDE 10 MEQ: 10 INJECTION, SOLUTION INTRAVENOUS at 11:12

## 2020-11-20 RX ADMIN — IPRATROPIUM BROMIDE AND ALBUTEROL SULFATE 3 ML: .5; 3 SOLUTION RESPIRATORY (INHALATION) at 13:29

## 2020-11-20 RX ADMIN — PROCHLORPERAZINE EDISYLATE 10 MG: 5 INJECTION INTRAMUSCULAR; INTRAVENOUS at 16:29

## 2020-11-20 RX ADMIN — POTASSIUM CHLORIDE 10 MEQ: 10 INJECTION, SOLUTION INTRAVENOUS at 13:33

## 2020-11-20 RX ADMIN — DOCUSATE SODIUM 100 MG: 100 CAPSULE, LIQUID FILLED ORAL at 11:01

## 2020-11-20 RX ADMIN — ENOXAPARIN SODIUM 40 MG: 40 INJECTION SUBCUTANEOUS at 21:00

## 2020-11-20 RX ADMIN — POTASSIUM CHLORIDE 10 MEQ: 10 INJECTION, SOLUTION INTRAVENOUS at 15:29

## 2020-11-20 RX ADMIN — CITALOPRAM HYDROBROMIDE 40 MG: 20 TABLET ORAL at 11:01

## 2020-11-20 RX ADMIN — SODIUM CHLORIDE: 234 INJECTION INTRAMUSCULAR; INTRAVENOUS; SUBCUTANEOUS at 18:24

## 2020-11-20 RX ADMIN — GUAIFENESIN 1200 MG: 600 TABLET ORAL at 18:25

## 2020-11-20 RX ADMIN — GUAIFENESIN 1200 MG: 600 TABLET ORAL at 10:57

## 2020-11-20 RX ADMIN — STANDARDIZED SENNA CONCENTRATE 8.6 MG: 8.6 TABLET ORAL at 10:59

## 2020-11-20 RX ADMIN — DIBASIC SODIUM PHOSPHATE, MONOBASIC POTASSIUM PHOSPHATE AND MONOBASIC SODIUM PHOSPHATE 1 TABLET: 852; 155; 130 TABLET ORAL at 10:57

## 2020-11-20 RX ADMIN — DEXTROSE MONOHYDRATE AND SODIUM CHLORIDE 100 ML/HR: 5; .45 INJECTION, SOLUTION INTRAVENOUS at 11:08

## 2020-11-20 RX ADMIN — ATORVASTATIN CALCIUM 10 MG: 10 TABLET, FILM COATED ORAL at 11:01

## 2020-11-20 RX ADMIN — DIBASIC SODIUM PHOSPHATE, MONOBASIC POTASSIUM PHOSPHATE AND MONOBASIC SODIUM PHOSPHATE 1 TABLET: 852; 155; 130 TABLET ORAL at 18:25

## 2020-11-20 RX ADMIN — METOPROLOL TARTRATE 25 MG: 25 TABLET, FILM COATED ORAL at 18:25

## 2020-11-20 RX ADMIN — TAMSULOSIN HYDROCHLORIDE 0.4 MG: 0.4 CAPSULE ORAL at 10:57

## 2020-11-20 RX ADMIN — DOCUSATE SODIUM 100 MG: 100 CAPSULE, LIQUID FILLED ORAL at 18:25

## 2020-11-20 RX ADMIN — POTASSIUM CHLORIDE 10 MEQ: 10 INJECTION, SOLUTION INTRAVENOUS at 12:23

## 2020-11-20 RX ADMIN — PROCHLORPERAZINE EDISYLATE 10 MG: 5 INJECTION INTRAMUSCULAR; INTRAVENOUS at 10:09

## 2020-11-20 RX ADMIN — MEROPENEM 500 MG: 500 INJECTION, POWDER, FOR SOLUTION INTRAVENOUS at 10:54

## 2020-11-20 NOTE — PROGRESS NOTES
11/20/2020   CARE MANAGEMENT NOTE:  Pt transferred from ICU to the 5th floor. EMR reviewed and handoff received from previous  (Ashley Roland.). Pt was admitted with biliary cholecystitis and he is s/p robotic assisted lap bailee on 11/4 and s/p ex-lap and evacuation of hematoma on 11/4. Pt was intubated on 11/4 and extubated on 11/17. Reportedly, pt resides with his wife Perri Celestin ([phone]; cell [phone]).     RUR 18%; LOS 16 days     Transition Plan of Care:  1. Per nutrition note dated 11/20, pt continues to have N/V with po diet; PICC placement today to begin TPN. 2.  [facility] will be needed for inpt rehab or SNF placement   3. COVID 19 test for placement will be required within 24 to 48 hours of discharge      CM will continue to follow pt and will pursue rehab placement once he is off of TPN as limited facilities are able to manage 2/2 cost factor.   Allen

## 2020-11-20 NOTE — PROGRESS NOTES
SURGERY PROGRESS NOTE      Admit Date: 11/3/2020    POD 16 Days Post-Op    Procedure: Procedure(s):  LAPAROTOMY EXPLORATORY, evacuation of hematoma. Subjective:   Not feeling well today. Very nauseous and having diarrhea. Weak as expected. Working with PT. Did not tolerate diet yesterday decreased diet back to clears and minimally tolerating. Cook remains due to retention. Objective:     Visit Vitals  /71 (BP 1 Location: Right arm, BP Patient Position: Lying left side)   Pulse 79   Temp 98.4 °F (36.9 °C)   Resp 17   Ht 5' 11\" (1.803 m)   Wt 101.4 kg (223 lb 9.6 oz)   SpO2 94%   BMI 31.19 kg/m²        Temp (24hrs), Av.9 °F (36.6 °C), Min:97.4 °F (36.3 °C), Max:98.4 °F (36.9 °C)      Physical Exam:     Abdomen:  Soft. Moderately distended. Tenderness only in LLQ on palp. Incision C/D/I.           Lab Results   Component Value Date/Time    WBC 7.2 2020 04:54 AM    HGB 10.5 (L) 2020 04:54 AM    Hemoglobin (POC) 6.8 (L) 2020 03:42 PM    Hemoglobin (POC) 13.3 2014 10:05 PM    HCT 33.7 (L) 2020 04:54 AM    Hematocrit (POC) 39 2014 10:05 PM    PLATELET 916 (H)  04:54 AM    MCV 90.1 2020 04:54 AM     Lab Results   Component Value Date/Time    GFR est non-AA >60 2020 04:54 AM    GFRNA, POC >60 2014 10:05 PM    GFR est AA >60 2020 04:54 AM    GFRAA, POC >60 2014 10:05 PM    Creatinine 0.89 2020 04:54 AM    Creatinine (POC) 1.1 2014 10:05 PM    BUN 19 2020 04:54 AM    BUN (POC) 15 2014 10:05 PM    Sodium 140 2020 04:54 AM    Sodium (POC) 141 2014 10:05 PM    Potassium 3.1 (L) 2020 04:54 AM    Potassium (POC) 3.5 2014 10:05 PM    Chloride 104 2020 04:54 AM    Chloride (POC) 106 2014 10:05 PM    CO2 32 2020 04:54 AM    Magnesium 2.6 (H) 2020 04:54 AM    Phosphorus 2.2 (L) 2020 04:54 AM       Assessment:     Active Problems:    Biliary colic (11/4/2020)      Cholecystitis (11/4/2020)        Plan/Recommendations/Medical Decision Making:   Pt with nausea and vomiting and diarrhea  abd soft ntnd, incisions c/d/i    KUB today - await results  Poss SBFT  Keep Cook  Stop bumex- off O2  Replete K  Cont clears as tolerated  Order PICC placement for TPN  Restart fluids until TPN starts tonight  TPN and lipids  Order triglyceride level today  Up and OOB with PT  IS  PT/OT    Pt seen and discussed with Dr Bartolo Burks NP      Pt seen and examined  Resting comfortably but complains of nausea this am  Will get PICC and start TPN since no significant nutrition since surgery  Abd soft.  NTND incision c/d/i  OOB as able  Clears as tolerates  Rest as above  Electronically signed by:  Radha Norris MD  [facility]  Office: [phone]  Fax: [phone]

## 2020-11-20 NOTE — PROGRESS NOTES
Bedside and Verbal shift change report given to Peterson Seo RN (oncoming nurse) by Cosmo Wood RN (offgoing nurse). Report included the following information SBAR and Intake/Output.

## 2020-11-20 NOTE — PROGRESS NOTES
VAT note- To acknowledge order for PICC placement for TPN. Chart reviewed for PICC placement evaluation. Will plan to place PICC late this morning or early afternoon.

## 2020-11-20 NOTE — PROGRESS NOTES
Problem: Mobility Impaired (Adult and Pediatric)  Goal: *Acute Goals and Plan of Care (Insert Text)  Description: FUNCTIONAL STATUS PRIOR TO ADMISSION: Patient was independent and active without use of DME.    HOME SUPPORT PRIOR TO ADMISSION: The patient lived with wife but did not require assist.    Physical Therapy Goals  Initiated 11/18/2020  1. Patient will move from supine to sit and sit to supine  in bed with moderate assistance x1 within 7 day(s). 2.  Patient will transfer from bed to chair and chair to bed with moderate assistance x1 using the least restrictive device within 7 day(s). 3.  Patient will perform sit to stand with moderate assistance x1 within 7 day(s). 4.  Patient will ambulate with moderate assistance x1 for 50 feet with the least restrictive device within 7 day(s). 5.  Patient will ascend/descend 4 stairs with 2 handrail(s) with minimal assistance/contact guard assist within 7 day(s). Note:   PHYSICAL THERAPY TREATMENT  Patient: Faisal Loera (28 y.o. male)  Date: 11/20/2020  Diagnosis: Biliary colic [X63.27]  Cholecystitis [K81.9]   <principal problem not specified>  Procedure(s) (LRB):  LAPAROTOMY EXPLORATORY, evacuation of hematoma. (N/A) 16 Days Post-Op  Precautions: WBAT, Fall  Chart, physical therapy assessment, plan of care and goals were reviewed. ASSESSMENT  Patient continues with skilled PT services. Pt comes to sit with mod assist.Pt to stand with min assist of 2. Pt ambulated 5ft forward and backwards with RW min to mod assist of 2. Pt shuffles feet and needs cues for weight shift. Pt scheduled for a procedure so he was back to bed with min to mod assist of 2. Pt more alert progressing with mobility. Continue goals. Current Level of Function Impacting Discharge (mobility/balance): Pt min to mod assist of 2 ambulating short distance with RW. PLAN :  Patient continues to benefit from skilled intervention to address the above impairments.   Continue treatment per established plan of care. to address goals. Recommendation for discharge: (in order for the patient to meet his/her long term goals)  Therapy 3 hours per day 5-7 days per week    This discharge recommendation:  Has been made in collaboration with the attending provider and/or case management    IF patient discharges home will need the following DME: rolling walker       SUBJECTIVE:       OBJECTIVE DATA SUMMARY:   Critical Behavior:  Neurologic State: Alert  Orientation Level: Oriented to place  Cognition: Follows commands  Safety/Judgement: Decreased awareness of environment, Decreased awareness of need for safety, Decreased awareness of need for assistance, Decreased insight into deficits  Functional Mobility Training:  Bed Mobility:     Supine to Sit: Moderate assistance  Sit to Supine: Minimum assistance; Moderate assistance;Assist x2           Transfers:  Sit to Stand: Minimum assistance;Assist x2  Stand to Sit: Minimum assistance                             Balance:  Sitting - Static: Fair (occasional)  Standing: With support  Standing - Static: Fair  Ambulation/Gait Training:  Distance (ft): 10 Feet (ft)  Assistive Device: Gait belt;Walker, rolling  Ambulation - Level of Assistance: Minimal assistance; Moderate assistance;Assist x2        Gait Abnormalities: Decreased step clearance        Base of Support: Narrowed     Speed/Syl: Pace decreased (<100 feet/min)                   Activity Tolerance:   Fair    After treatment patient left in no apparent distress:   Supine in bed    COMMUNICATION/COLLABORATION:   The patients plan of care was discussed with: Physical therapist.     Felix Sebastian PTA   Time Calculation: 23 mins

## 2020-11-20 NOTE — PROGRESS NOTES
Nutrition Note    11/20: Pt continues to have significant N/V with po diet. Consuming mostly 0% meals and supplements. Will d/c supplements until N/V improves. PICC placement today for TPN. Pharmacy requesting TPN recs for dextrose/amino acids and rate. Recommend:    Day 1: TPN (D20/AA5) @ 42 mL/hr  Day 2: TPN (D20/AA5) @ 63 mL/hr  Day 3: TPN (D20/AA5) @ 83 mL/hr   Lipids - 20% @ 42 mL/hr x 12 hrs, 3 times per week    Goal provides 2185 kcal and 100 g Pro - meeting 89% of estimated kcal and 99% estimated protein needs. Electrolytes per pharmacy. Check triglycerides prior to first lipid administration. Check triglycerides weekly while on lipids.      Electronically signed by Maciel Benavides RDN on 11/20/2020 at 10:58 AM    Contact: [phone]

## 2020-11-20 NOTE — PROGRESS NOTES
[facility]     Name: Kassie Wharton MRN: [id number]   : 1970 Hospital: [address]   Date: 2020        Impression Plan   1. Acute hypoxemic respiratory failure, intubated , tube exchanged , extubated   2. Hemorrhagic shock, off pressors   3. Acute cholecystitis s/p robotic assisted lap bailee w/ TAMIKO   4. Post-op bleeding/tear in liver capsule s/p ex-lap and evacuation of hematoma   5. Acute blood loss anemia  6. MRSA pneumonia, completed course of vanc   7. Volume overload  8. Persistent fevers  9. H/o seizures  10. REBECCA  11. delirium                 · Supplemental O2 as needed to keep sats > 88%  · cpap whenever sleeping  · Completed 7 days of Meropenem. · Follow cultures  · continue scheduled duonebs, mucinex. · Trend Hgb, transfuse to keep > 7  · Replete lytes: K today  · Continue bowel regimen  · IS, flutter valve  · Post-op mgmt as per surgery    DVT ppx: lovenox  GI ppx: protonix   Advance diet as per surgery       is stable from a pulmonary standpoint. We will sign off and arrange for outpatient follow-up in 2 -3 weeks. Please call with questions. Radiology  ( personally reviewed) CT chest reviewed: Bilateral PNA   ABG No results for input(s): PHI, PO2I, PCO2I in the last 72 hours. Subjective       49 yo with cholecystits s/p lap bailee complicated by laceration of the liver capsule and hemorrhagic shock. Hematoma evacuated- 2L blood. Currently on daniel 130 and Levophed 8.  Pt intubated and sedated- unable to provide any further hx.      - intubated     - bronch, tube exchange       - echo: EF 55-60%, RV normal     - CT chest: small effusions, patchy bilateral asdz/lower lobe consolidations            - CT abd/pelvis: 2 fluid collections in upper abdomen     - extubated    *sputum culture () - MRSA  *all other cultures NGTD  *MRSA screen positive    **stopped propofol due to high TG    Interval history  Afebrile overnight  BP elevated  Sats 96% on RA  PCT .3; better  K 3.1  LFTs stable  Blood cultures negative x 4 days  Respiratory culture with scant possible staph; prelim    ROS:  Had a lot of vomiting and diarrhea in last 24 hours. Breathing feels fine. Past Medical History:   Diagnosis Date    GERD (gastroesophageal reflux disease)     Panic attack     Psychiatric disorder     anxiety    Seizures ([address]     7 yo   was told it was a stress seizure, never had another one    Sleep apnea     uses CPAP      Past Surgical History:   Procedure Laterality Date    ABDOMEN SURGERY PROC UNLISTED  1977    hernia(abdomen)- open    HX GI  2008    esophagus repair narrow- balloon dilation and scar tissue removal    HX HERNIA REPAIR      inguinal hernia    HX OTHER SURGICAL  11years of age    tonsils    HX OTHER SURGICAL  2009    hernia (hiatal and abdomen)-open    HX OTHER SURGICAL  23years of age    wisdom teeth extraction under anesthesia    HX OTHER SURGICAL  8 years     distended testicle     HX OTHER SURGICAL  1/16/15    Laparoscopic ventral hernia repair with mesh      Prior to Admission medications    Medication Sig Start Date End Date Taking? Authorizing Provider   citalopram (CELEXA) 40 mg tablet Take 40 mg by mouth daily. Yes Provider, Historical   lisinopril (PRINIVIL, ZESTRIL) 40 mg tablet Take 40 mg by mouth every evening. Yes Provider, Historical   simvastatin (ZOCOR) 40 mg tablet Take 40 mg by mouth nightly. Yes Provider, Historical   omeprazole (PRILOSEC) 40 mg capsule Take 40 mg by mouth daily.    Yes Provider, Historical     Current Facility-Administered Medications   Medication Dose Route Frequency    potassium chloride 10 mEq in 100 ml IVPB  10 mEq IntraVENous Q1H    TPN ADULT - CENTRAL AA 5% D15% W/ ELECTROLYTES AND CA   IntraVENous CONTINUOUS    dextrose 5 % - 0.45% NaCl infusion  100 mL/hr IntraVENous CONTINUOUS    phosphorus (K PHOS NEUTRAL) 250 mg tablet 1 Tab  1 Tab Oral BID    lisinopriL (PRINIVIL, ZESTRIL) tablet 40 mg  40 mg Oral QPM    tamsulosin (FLOMAX) capsule 0.4 mg  0.4 mg Oral DAILY    docusate sodium (COLACE) capsule 100 mg  100 mg Oral BID    senna (SENOKOT) tablet 8.6 mg  1 Tab Oral DAILY    albuterol-ipratropium (DUO-NEB) 2.5 MG-0.5 MG/3 ML  3 mL Nebulization Q6HWA RT    guaiFENesin ER (MUCINEX) tablet 1,200 mg  1,200 mg Oral BID    atorvastatin (LIPITOR) tablet 10 mg  10 mg Oral DAILY    metoprolol tartrate (LOPRESSOR) tablet 25 mg  25 mg Oral BID    pantoprazole (PROTONIX) tablet 40 mg  40 mg Oral ACB    citalopram (CELEXA) tablet 40 mg  40 mg Oral DAILY    meropenem (MERREM) 500 mg in sterile water (preservative free) 10 mL IV syringe  0.5 g IntraVENous Q6H    enoxaparin (LOVENOX) injection 40 mg  40 mg SubCUTAneous Q24H     Allergies   Allergen Reactions    Pcn [Penicillins] Other (comments)     Reaction was at age 6, itching and eyes swollen; denies any sob or tongue swelling. Has been on Zosyn in the hospital with no problems    Codeine Nausea and Vomiting      Social History     Tobacco Use    Smoking status: Never Smoker    Smokeless tobacco: Never Used   Substance Use Topics    Alcohol use: Yes     Alcohol/week: 1.7 standard drinks     Types: 2 Cans of beer per week     Comment: 1 or 2 beer every two weeks      Family History   Problem Relation Age of Onset    Diabetes Mother     Hypertension Mother     Stroke Mother         copd    Cancer Father         skin    Heart Disease Father     Diabetes Father     Heart Attack Father           Laboratory: I have personally reviewed the critical care flowsheet and labs.      Recent Labs     11/20/20 0454 11/19/20  0634 11/18/20  0344   WBC 7.2 6.9 7.2   HGB 10.5* 9.4* 8.3*   HCT 33.7* 30.7* 26.2*   * 400 395     Recent Labs     11/20/20  0454 11/19/20  0634 11/18/20  0344    140 140   K 3.1* 4.1 3.2*    104 106   CO2 32 33* 29   * 131* 119*   BUN 19 16 13   CREA 0.89 0.68* 0.75   CA 9.2 8.7 8.4*   MG 2.6* 2.7* 2.4   PHOS 2.2* 2.4* 2.5*   ALB 2.8* 2.6* 2.2*   ALT 73 60 37       Objective:     Mode Rate Tidal Volume Pressure FiO2 PEEP   Spontaneous   500 ml  5 cm H2O 46 % 5 cm H20         Intake/Output Summary (Last 24 hours) at 11/20/2020 1008  Last data filed at 11/20/2020 0915  Gross per 24 hour   Intake 480 ml   Output 2805 ml   Net -2325 ml     EXAM:   GENERAL: awake, respirations non labored  HEENT:  Anicteric sclera  NECK:  Trachea midline  LUNGS: diminished, CTA, no distress  HEART: Regular rate and rhythm, normal S1S2, no murmur  ABDOMEN:  Soft, hypoactive bowel sounds, incision c/d/i  EXTREMITIES: trace edema  NEUROLOGIC: oriented x 3    Read Sid, NP  [facility]

## 2020-11-20 NOTE — PROGRESS NOTES
Problem: Self Care Deficits Care Plan (Adult)  Goal: *Acute Goals and Plan of Care (Insert Text)  Description:   FUNCTIONAL STATUS PRIOR TO ADMISSION: Patient was independent and active without use of DME.     HOME SUPPORT: The patient lived with wife but did not require assist.    Occupational Therapy Goals  Initiated 11/18/2020  1. Patient will perform grooming with supervision/set-up from supported sitting position within 7 day(s). 2.  Patient will perform upper body dressing and bathing with minimal assistance within 7 day(s). 3.  Patient will perform lower body dressing and bathing with moderate assistance  within 7 day(s). 4.  Patient will perform toilet transfers to [facility] with moderate assistance x1 within 7 day(s). 5.  Patient will perform all aspects of toileting with moderate assistance  within 7 day(s). 6.  Patient will participate in upper extremity therapeutic exercise/activities with supervision/set-up for 10 minutes within 7 day(s). 7.  Patient will utilize energy conservation techniques during functional activities with verbal cues within 7 day(s). Outcome: Progressing Towards Goal   OCCUPATIONAL THERAPY TREATMENT  Patient: Corine Aranda (61 y.o. male)  Date: 11/20/2020  Diagnosis: Biliary colic [E32.74]  Cholecystitis [K81.9]   <principal problem not specified>  Procedure(s) (LRB):  LAPAROTOMY EXPLORATORY, evacuation of hematoma. (N/A) 16 Days Post-Op  Precautions: WBAT, Fall  Chart, occupational therapy assessment, plan of care, and goals were reviewed. ASSESSMENT  Patient continues with skilled OT services and is progressing towards goals. Pt more alert today, oriented to place. Once seated edge of bed pt + vomiting, but than was able to stand and take steps forwards. Pt returned to sit edge of bed, washed his beard/chin. Pt returned to bed as he needs a PICC line. Spoke to wife re recommending inpatient rehab.      Current Level of Function Impacting Discharge (ADLs): Moderate assist grooming, min assist x 2 sit to stand for functional tasks. Other factors to consider for discharge:          PLAN :  Patient continues to benefit from skilled intervention to address the above impairments. Continue treatment per established plan of care. to address goals. Recommend with staff: There ex, tyson act, ADL's with bed in chair position    Recommend next OT session: Cont towards goals    Recommendation for discharge: (in order for the patient to meet his/her long term goals)  Therapy 3 hours per day 5-7 days per week    This discharge recommendation:  Has not yet been discussed the attending provider and/or case management    IF patient discharges home will need the following DME:        SUBJECTIVE:   Patient stated [facility].     OBJECTIVE DATA SUMMARY:   Cognitive/Behavioral Status:  Neurologic State: Alert  Orientation Level: Oriented to place  Cognition: Follows commands             Functional Mobility and Transfers for ADLs:  Bed Mobility:  Supine to Sit: Moderate assistance    Transfers:   Min assist x 2 sit to  prep for functional tasks. Balance:Intact sitting balance edge of bed       ADL Intervention:       Grooming  Washing Face: Moderate assistance         Activity Tolerance:   Fair    After treatment patient left in no apparent distress:   Supine in bed    COMMUNICATION/COLLABORATION:   The patients plan of care was discussed with: Physical therapy assistant, Occupational therapist, and Registered nurse.      DIONI Gonzalez/L  Time Calculation: 15 mins

## 2020-11-20 NOTE — PROGRESS NOTES
Bedside and Verbal shift change report given to Alexia Walls RN (oncoming nurse) by Corrine Hernandez (offgoing nurse). Report included the following information SBAR, Procedure Summary, Intake/Output and Recent Results.

## 2020-11-20 NOTE — PROGRESS NOTES
5642 pt removed CPAP mask and actively began to vomit dark brown colored emesis, pt states he have had too much chicken and rice yesterday  4131 zofran was administered by RN

## 2020-11-21 LAB
ALBUMIN SERPL-MCNC: 2.6 G/DL (ref 3.5–5)
ALBUMIN/GLOB SERPL: 0.6 {RATIO} (ref 1.1–2.2)
ALP SERPL-CCNC: 121 U/L (ref 45–117)
ALT SERPL-CCNC: 81 U/L (ref 12–78)
ANION GAP SERPL CALC-SCNC: 3 MMOL/L (ref 5–15)
AST SERPL-CCNC: 48 U/L (ref 15–37)
BASOPHILS # BLD: 0 K/UL (ref 0–0.1)
BASOPHILS NFR BLD: 0 % (ref 0–1)
BILIRUB SERPL-MCNC: 0.5 MG/DL (ref 0.2–1)
BUN SERPL-MCNC: 17 MG/DL (ref 6–20)
BUN/CREAT SERPL: 23 (ref 12–20)
CALCIUM SERPL-MCNC: 8.8 MG/DL (ref 8.5–10.1)
CHLORIDE SERPL-SCNC: 105 MMOL/L (ref 97–108)
CO2 SERPL-SCNC: 30 MMOL/L (ref 21–32)
CREAT SERPL-MCNC: 0.75 MG/DL (ref 0.7–1.3)
DIFFERENTIAL METHOD BLD: ABNORMAL
EOSINOPHIL # BLD: 0.1 K/UL (ref 0–0.4)
EOSINOPHIL NFR BLD: 1 % (ref 0–7)
ERYTHROCYTE [DISTWIDTH] IN BLOOD BY AUTOMATED COUNT: 13.4 % (ref 11.5–14.5)
GLOBULIN SER CALC-MCNC: 4.3 G/DL (ref 2–4)
GLUCOSE BLD STRIP.AUTO-MCNC: 141 MG/DL (ref 65–100)
GLUCOSE BLD STRIP.AUTO-MCNC: 163 MG/DL (ref 65–100)
GLUCOSE SERPL-MCNC: 119 MG/DL (ref 65–100)
HCT VFR BLD AUTO: 31.6 % (ref 36.6–50.3)
HGB BLD-MCNC: 10.1 G/DL (ref 12.1–17)
IMM GRANULOCYTES # BLD AUTO: 0 K/UL
IMM GRANULOCYTES NFR BLD AUTO: 0 %
LYMPHOCYTES # BLD: 1.5 K/UL (ref 0.8–3.5)
LYMPHOCYTES NFR BLD: 18 % (ref 12–49)
MAGNESIUM SERPL-MCNC: 2.4 MG/DL (ref 1.6–2.4)
MCH RBC QN AUTO: 28.5 PG (ref 26–34)
MCHC RBC AUTO-ENTMCNC: 32 G/DL (ref 30–36.5)
MCV RBC AUTO: 89 FL (ref 80–99)
METAMYELOCYTES NFR BLD MANUAL: 1 %
MONOCYTES # BLD: 0.8 K/UL (ref 0–1)
MONOCYTES NFR BLD: 9 % (ref 5–13)
NEUTS BAND NFR BLD MANUAL: 5 % (ref 0–6)
NEUTS SEG # BLD: 6 K/UL (ref 1.8–8)
NEUTS SEG NFR BLD: 66 % (ref 32–75)
NRBC # BLD: 0.04 K/UL (ref 0–0.01)
NRBC BLD-RTO: 0.5 PER 100 WBC
PHOSPHATE SERPL-MCNC: 2.3 MG/DL (ref 2.6–4.7)
PLATELET # BLD AUTO: 465 K/UL (ref 150–400)
PMV BLD AUTO: 9.3 FL (ref 8.9–12.9)
POTASSIUM SERPL-SCNC: 3.2 MMOL/L (ref 3.5–5.1)
PROT SERPL-MCNC: 6.9 G/DL (ref 6.4–8.2)
RBC # BLD AUTO: 3.55 M/UL (ref 4.1–5.7)
RBC MORPH BLD: ABNORMAL
SERVICE CMNT-IMP: ABNORMAL
SERVICE CMNT-IMP: ABNORMAL
SODIUM SERPL-SCNC: 138 MMOL/L (ref 136–145)
WBC # BLD AUTO: 8.5 K/UL (ref 4.1–11.1)

## 2020-11-21 PROCEDURE — 84100 ASSAY OF PHOSPHORUS: CPT

## 2020-11-21 PROCEDURE — 74011250637 HC RX REV CODE- 250/637: Performed by: NURSE PRACTITIONER

## 2020-11-21 PROCEDURE — 97116 GAIT TRAINING THERAPY: CPT

## 2020-11-21 PROCEDURE — 85025 COMPLETE CBC W/AUTO DIFF WBC: CPT

## 2020-11-21 PROCEDURE — 74011250636 HC RX REV CODE- 250/636: Performed by: SURGERY

## 2020-11-21 PROCEDURE — 2709999900 HC NON-CHARGEABLE SUPPLY

## 2020-11-21 PROCEDURE — 94640 AIRWAY INHALATION TREATMENT: CPT

## 2020-11-21 PROCEDURE — 65660000000 HC RM CCU STEPDOWN

## 2020-11-21 PROCEDURE — 36415 COLL VENOUS BLD VENIPUNCTURE: CPT

## 2020-11-21 PROCEDURE — 83735 ASSAY OF MAGNESIUM: CPT

## 2020-11-21 PROCEDURE — 74011250637 HC RX REV CODE- 250/637: Performed by: INTERNAL MEDICINE

## 2020-11-21 PROCEDURE — 82962 GLUCOSE BLOOD TEST: CPT

## 2020-11-21 PROCEDURE — 80053 COMPREHEN METABOLIC PANEL: CPT

## 2020-11-21 PROCEDURE — 74011000250 HC RX REV CODE- 250: Performed by: INTERNAL MEDICINE

## 2020-11-21 PROCEDURE — 97110 THERAPEUTIC EXERCISES: CPT

## 2020-11-21 PROCEDURE — 74011000250 HC RX REV CODE- 250: Performed by: SURGERY

## 2020-11-21 PROCEDURE — 74011000258 HC RX REV CODE- 258: Performed by: SURGERY

## 2020-11-21 RX ORDER — IPRATROPIUM BROMIDE AND ALBUTEROL SULFATE 2.5; .5 MG/3ML; MG/3ML
3 SOLUTION RESPIRATORY (INHALATION)
Status: DISCONTINUED | OUTPATIENT
Start: 2020-11-21 | End: 2020-11-25 | Stop reason: HOSPADM

## 2020-11-21 RX ORDER — PROCHLORPERAZINE EDISYLATE 5 MG/ML
10 INJECTION INTRAMUSCULAR; INTRAVENOUS
Status: DISCONTINUED | OUTPATIENT
Start: 2020-11-21 | End: 2020-11-25 | Stop reason: HOSPADM

## 2020-11-21 RX ORDER — ONDANSETRON 2 MG/ML
4 INJECTION INTRAMUSCULAR; INTRAVENOUS EVERY 6 HOURS
Status: DISCONTINUED | OUTPATIENT
Start: 2020-11-21 | End: 2020-11-25 | Stop reason: HOSPADM

## 2020-11-21 RX ADMIN — PANTOPRAZOLE SODIUM 40 MG: 40 TABLET, DELAYED RELEASE ORAL at 08:43

## 2020-11-21 RX ADMIN — GUAIFENESIN 1200 MG: 600 TABLET ORAL at 08:39

## 2020-11-21 RX ADMIN — METOPROLOL TARTRATE 25 MG: 25 TABLET, FILM COATED ORAL at 08:39

## 2020-11-21 RX ADMIN — LISINOPRIL 40 MG: 20 TABLET ORAL at 17:05

## 2020-11-21 RX ADMIN — SODIUM CHLORIDE: 234 INJECTION INTRAMUSCULAR; INTRAVENOUS; SUBCUTANEOUS at 17:11

## 2020-11-21 RX ADMIN — GUAIFENESIN 1200 MG: 600 TABLET ORAL at 17:04

## 2020-11-21 RX ADMIN — ONDANSETRON 4 MG: 2 INJECTION INTRAMUSCULAR; INTRAVENOUS at 03:18

## 2020-11-21 RX ADMIN — CITALOPRAM HYDROBROMIDE 40 MG: 20 TABLET ORAL at 08:39

## 2020-11-21 RX ADMIN — ENOXAPARIN SODIUM 40 MG: 40 INJECTION SUBCUTANEOUS at 20:59

## 2020-11-21 RX ADMIN — ONDANSETRON 4 MG: 2 INJECTION INTRAMUSCULAR; INTRAVENOUS at 15:45

## 2020-11-21 RX ADMIN — DIBASIC SODIUM PHOSPHATE, MONOBASIC POTASSIUM PHOSPHATE AND MONOBASIC SODIUM PHOSPHATE 1 TABLET: 852; 155; 130 TABLET ORAL at 17:04

## 2020-11-21 RX ADMIN — ONDANSETRON 4 MG: 2 INJECTION INTRAMUSCULAR; INTRAVENOUS at 08:40

## 2020-11-21 RX ADMIN — IPRATROPIUM BROMIDE AND ALBUTEROL SULFATE 3 ML: .5; 3 SOLUTION RESPIRATORY (INHALATION) at 07:54

## 2020-11-21 RX ADMIN — DIBASIC SODIUM PHOSPHATE, MONOBASIC POTASSIUM PHOSPHATE AND MONOBASIC SODIUM PHOSPHATE 1 TABLET: 852; 155; 130 TABLET ORAL at 08:39

## 2020-11-21 RX ADMIN — PROCHLORPERAZINE EDISYLATE 10 MG: 5 INJECTION INTRAMUSCULAR; INTRAVENOUS at 17:04

## 2020-11-21 RX ADMIN — TAMSULOSIN HYDROCHLORIDE 0.4 MG: 0.4 CAPSULE ORAL at 08:39

## 2020-11-21 RX ADMIN — METOPROLOL TARTRATE 25 MG: 25 TABLET, FILM COATED ORAL at 17:05

## 2020-11-21 RX ADMIN — ONDANSETRON 4 MG: 2 INJECTION INTRAMUSCULAR; INTRAVENOUS at 20:59

## 2020-11-21 RX ADMIN — DOCUSATE SODIUM 100 MG: 100 CAPSULE, LIQUID FILLED ORAL at 17:05

## 2020-11-21 RX ADMIN — STANDARDIZED SENNA CONCENTRATE 8.6 MG: 8.6 TABLET ORAL at 08:39

## 2020-11-21 RX ADMIN — ATORVASTATIN CALCIUM 10 MG: 10 TABLET, FILM COATED ORAL at 08:39

## 2020-11-21 RX ADMIN — DOCUSATE SODIUM 100 MG: 100 CAPSULE, LIQUID FILLED ORAL at 08:39

## 2020-11-21 NOTE — PROGRESS NOTES
Bedside and Verbal shift change report given to Angel Ibanez RN (oncoming nurse) by Ian Fay RN (offgoing nurse). Report included the following information SBAR, Procedure Summary, Intake/Output, MAR, Recent Results and Quality Measures.

## 2020-11-21 NOTE — PROGRESS NOTES
Problem: Mobility Impaired (Adult and Pediatric)  Goal: *Acute Goals and Plan of Care (Insert Text)  Description: FUNCTIONAL STATUS PRIOR TO ADMISSION: Patient was independent and active without use of DME.    HOME SUPPORT PRIOR TO ADMISSION: The patient lived with wife but did not require assist.    Physical Therapy Goals  Initiated 11/18/2020  1. Patient will move from supine to sit and sit to supine  in bed with moderate assistance x1 within 7 day(s). 2.  Patient will transfer from bed to chair and chair to bed with moderate assistance x1 using the least restrictive device within 7 day(s). 3.  Patient will perform sit to stand with moderate assistance x1 within 7 day(s). 4.  Patient will ambulate with moderate assistance x1 for 50 feet with the least restrictive device within 7 day(s). 5.  Patient will ascend/descend 4 stairs with 2 handrail(s) with minimal assistance/contact guard assist within 7 day(s). Outcome: Progressing Towards Goal   PHYSICAL THERAPY TREATMENT  Patient: Shannon Steen (86 y.o. male)  Date: 11/21/2020  Diagnosis: Biliary colic [W67.98]  Cholecystitis [K81.9]   <principal problem not specified>  Procedure(s) (LRB):  LAPAROTOMY EXPLORATORY, evacuation of hematoma. (N/A) 17 Days Post-Op  Precautions: WBAT, Fall, Contact  Chart, physical therapy assessment, plan of care and goals were reviewed. ASSESSMENT  Patient continues with skilled PT services and is progressing towards goals. Patient slightly confused but cooperative with PT. Patient will benefit from rehab setting. Current Level of Function Impacting Discharge (mobility/balance): Received patient asleep with CPAP in place. Awakened easily. Sat on edge of bed with min assist. Good sitting balance. Patient stood and ambulated 6 feet with rolling walker +2 min/mod assist. He needs cues for safety and assistance to steer walker at times. Once up in chair, he performed BLE exercises in sitting.   Left in chair with alarm in place. Other factors to consider for discharge: cognitive deficits, fall risk         PLAN :  Patient continues to benefit from skilled intervention to address the above impairments. Continue treatment per established plan of care. to address goals. Recommendation for discharge: (in order for the patient to meet his/her long term goals)  Therapy 3 hours per day 5-7 days per week    This discharge recommendation:  Has not yet been discussed the attending provider and/or case management    IF patient discharges home will need the following DME: to be determined (TBD)       SUBJECTIVE:   Patient stated This is [facility] .     OBJECTIVE DATA SUMMARY:   Critical Behavior:  Neurologic State: Alert  Orientation Level: Oriented to person, Oriented to situation, Oriented to place, Oriented to time, Disoriented to time, Other (Comment)(occasional forgetfulness on time)  Cognition: Follows commands  Safety/Judgement: Decreased awareness of environment, Decreased awareness of need for safety, Decreased awareness of need for assistance, Decreased insight into deficits  Functional Mobility Training:  Bed Mobility:     Supine to Sit: Minimum assistance; Additional time              Transfers:  Sit to Stand: Assist x2;Minimum assistance  Stand to Sit: Assist x2;Minimum assistance        Bed to Chair: Assist x2;Minimum assistance                    Balance:  Sitting: Intact  Standing: Intact; With support  Ambulation/Gait Training:  Distance (ft): 6 Feet (ft)  Assistive Device: Walker, rolling;Gait belt  Ambulation - Level of Assistance: Assist x2;Minimal assistance; Moderate assistance        Gait Abnormalities: Decreased step clearance        Base of Support: Narrowed     Speed/Syl: Pace decreased (<100 feet/min)  Step Length: Left shortened;Right shortened                                  Therapeutic Exercises:    Ankle pumps, LAQ, hip flexion in sitting  Pain Rating:  No complaints at this time    Activity Tolerance:   Good    After treatment patient left in no apparent distress:   Sitting in chair, Call bell within reach, and Bed / chair alarm activated    COMMUNICATION/COLLABORATION:   The patients plan of care was discussed with: Registered nurse.      Aileen Jimenez, PT   Time Calculation: 23 mins

## 2020-11-21 NOTE — PROGRESS NOTES
Progress Note    Patient: Mary Carmen Timmons MRN: [id number]  SSN: [id number]    YOB: 1970  Age: 48 y.o. Sex: male      Admit Date: 11/3/2020    17 Days Post-Op    Procedure:  Procedure(s):  LAPAROTOMY EXPLORATORY, evacuation of hematoma. Subjective:     Patient reports severe nausea still worst with nebs and trying to drink. Pain controlled    Objective:     Visit Vitals  BP (!) 161/78 (BP 1 Location: Left arm, BP Patient Position: At rest)   Pulse 78   Temp 97.5 °F (36.4 °C)   Resp 16   Ht 5' 11\" (1.803 m)   Wt 101.4 kg (223 lb 9.6 oz)   SpO2 95%   BMI 31.19 kg/m²       Temp (24hrs), Av.1 °F (36.7 °C), Min:97.5 °F (36.4 °C), Max:98.4 °F (36.9 °C)      Physical Exam:    GENERAL: alert, cooperative, no distress, HEART: regular rate and rhythm, S1, S2 normal, no murmur, click, rub or gallop, ABDOMEN: soft, no distention, minimally tender, incisions C/D/I    Data Review: images and reports reviewed    Lab Review: All lab results for the last 24 hours reviewed.     Assessment:     Hospital Problems  Date Reviewed: 2020          Codes Class Noted POA    Biliary colic XOT-13-BP: O04.03  ICD-9-CM: 574.20  2020 Unknown        Cholecystitis ICD-10-CM: K81.9  ICD-9-CM: 575.10  2020 Unknown              Plan/Recommendations/Medical Decision Making:     Change nebs to prn   Adjust antiemetics so zofran is given routinely  Renew TPN

## 2020-11-21 NOTE — PROGRESS NOTES
0800 Patient complaining of nausea. 1 emesis event (50 mL green). Zofran not due until 0930. RN contacted MD. MD assessed patient and ordered zofran q6h scheduled and compazine q6h prn. RN provided patient with essential oil smells to assist with nausea. Emesis bag at bedside for patient. RN educated patient on medications available. Will continue to assess. TPN still running at 42 mL/hr. Order obtained for q6h blood sugar checks. 1400 Emesis event (small). Zofran given. 1700 Emesis event(small). Compazine given. 1730 Patient resting quietly. 1800 Patient sleeping. 1900 TPN verified. Patient sleeping. Bedside shift change report given to Shelley Marin (oncoming nurse) by Genna Martin RN (offgoing nurse). Report included the following information SBAR, Kardex, Procedure Summary, Intake/Output, MAR and Recent Results.

## 2020-11-21 NOTE — PROGRESS NOTES
Bedside and Verbal shift change report given to [address] (oncoming nurse) by Rafael Chauhan (offgoing nurse). Report included the following information SBAR, Kardex, Procedure Summary, Intake/Output, MAR and Accordion.

## 2020-11-22 LAB
ALBUMIN SERPL-MCNC: 2.6 G/DL (ref 3.5–5)
ALBUMIN/GLOB SERPL: 0.7 {RATIO} (ref 1.1–2.2)
ALP SERPL-CCNC: 118 U/L (ref 45–117)
ALT SERPL-CCNC: 78 U/L (ref 12–78)
ANION GAP SERPL CALC-SCNC: 5 MMOL/L (ref 5–15)
AST SERPL-CCNC: 40 U/L (ref 15–37)
BACTERIA SPEC CULT: NORMAL
BACTERIA SPEC CULT: NORMAL
BASOPHILS # BLD: 0.1 K/UL (ref 0–0.1)
BASOPHILS NFR BLD: 1 % (ref 0–1)
BILIRUB SERPL-MCNC: 0.5 MG/DL (ref 0.2–1)
BUN SERPL-MCNC: 16 MG/DL (ref 6–20)
BUN/CREAT SERPL: 24 (ref 12–20)
CALCIUM SERPL-MCNC: 8.8 MG/DL (ref 8.5–10.1)
CHLORIDE SERPL-SCNC: 107 MMOL/L (ref 97–108)
CO2 SERPL-SCNC: 29 MMOL/L (ref 21–32)
CREAT SERPL-MCNC: 0.68 MG/DL (ref 0.7–1.3)
DIFFERENTIAL METHOD BLD: ABNORMAL
EOSINOPHIL # BLD: 0.1 K/UL (ref 0–0.4)
EOSINOPHIL NFR BLD: 1 % (ref 0–7)
ERYTHROCYTE [DISTWIDTH] IN BLOOD BY AUTOMATED COUNT: 13.8 % (ref 11.5–14.5)
GLOBULIN SER CALC-MCNC: 4 G/DL (ref 2–4)
GLUCOSE BLD STRIP.AUTO-MCNC: 150 MG/DL (ref 65–100)
GLUCOSE BLD STRIP.AUTO-MCNC: 163 MG/DL (ref 65–100)
GLUCOSE BLD STRIP.AUTO-MCNC: 177 MG/DL (ref 65–100)
GLUCOSE SERPL-MCNC: 116 MG/DL (ref 65–100)
HCT VFR BLD AUTO: 31 % (ref 36.6–50.3)
HGB BLD-MCNC: 10 G/DL (ref 12.1–17)
IMM GRANULOCYTES # BLD AUTO: 0 K/UL
IMM GRANULOCYTES NFR BLD AUTO: 0 %
LYMPHOCYTES # BLD: 1.4 K/UL (ref 0.8–3.5)
LYMPHOCYTES NFR BLD: 14 % (ref 12–49)
MAGNESIUM SERPL-MCNC: 2.3 MG/DL (ref 1.6–2.4)
MCH RBC QN AUTO: 28.2 PG (ref 26–34)
MCHC RBC AUTO-ENTMCNC: 32.3 G/DL (ref 30–36.5)
MCV RBC AUTO: 87.6 FL (ref 80–99)
MONOCYTES # BLD: 0.6 K/UL (ref 0–1)
MONOCYTES NFR BLD: 6 % (ref 5–13)
NEUTS BAND NFR BLD MANUAL: 1 % (ref 0–6)
NEUTS SEG # BLD: 8 K/UL (ref 1.8–8)
NEUTS SEG NFR BLD: 77 % (ref 32–75)
NRBC # BLD: 0.04 K/UL (ref 0–0.01)
NRBC BLD-RTO: 0.4 PER 100 WBC
PHOSPHATE SERPL-MCNC: 2.7 MG/DL (ref 2.6–4.7)
PLATELET # BLD AUTO: 417 K/UL (ref 150–400)
PMV BLD AUTO: 9.1 FL (ref 8.9–12.9)
POTASSIUM SERPL-SCNC: 3.3 MMOL/L (ref 3.5–5.1)
PROT SERPL-MCNC: 6.6 G/DL (ref 6.4–8.2)
RBC # BLD AUTO: 3.54 M/UL (ref 4.1–5.7)
RBC MORPH BLD: ABNORMAL
SERVICE CMNT-IMP: ABNORMAL
SERVICE CMNT-IMP: NORMAL
SERVICE CMNT-IMP: NORMAL
SODIUM SERPL-SCNC: 141 MMOL/L (ref 136–145)
WBC # BLD AUTO: 10.2 K/UL (ref 4.1–11.1)

## 2020-11-22 PROCEDURE — 74011250637 HC RX REV CODE- 250/637: Performed by: INTERNAL MEDICINE

## 2020-11-22 PROCEDURE — 84100 ASSAY OF PHOSPHORUS: CPT

## 2020-11-22 PROCEDURE — 82962 GLUCOSE BLOOD TEST: CPT

## 2020-11-22 PROCEDURE — 74011250636 HC RX REV CODE- 250/636: Performed by: SURGERY

## 2020-11-22 PROCEDURE — 74011250637 HC RX REV CODE- 250/637: Performed by: NURSE PRACTITIONER

## 2020-11-22 PROCEDURE — 74011000250 HC RX REV CODE- 250: Performed by: SURGERY

## 2020-11-22 PROCEDURE — 36415 COLL VENOUS BLD VENIPUNCTURE: CPT

## 2020-11-22 PROCEDURE — 80053 COMPREHEN METABOLIC PANEL: CPT

## 2020-11-22 PROCEDURE — 85025 COMPLETE CBC W/AUTO DIFF WBC: CPT

## 2020-11-22 PROCEDURE — 65660000000 HC RM CCU STEPDOWN

## 2020-11-22 PROCEDURE — 74011000258 HC RX REV CODE- 258: Performed by: SURGERY

## 2020-11-22 PROCEDURE — 83735 ASSAY OF MAGNESIUM: CPT

## 2020-11-22 RX ADMIN — DIBASIC SODIUM PHOSPHATE, MONOBASIC POTASSIUM PHOSPHATE AND MONOBASIC SODIUM PHOSPHATE 1 TABLET: 852; 155; 130 TABLET ORAL at 17:13

## 2020-11-22 RX ADMIN — DIBASIC SODIUM PHOSPHATE, MONOBASIC POTASSIUM PHOSPHATE AND MONOBASIC SODIUM PHOSPHATE 1 TABLET: 852; 155; 130 TABLET ORAL at 08:16

## 2020-11-22 RX ADMIN — PANTOPRAZOLE SODIUM 40 MG: 40 TABLET, DELAYED RELEASE ORAL at 06:43

## 2020-11-22 RX ADMIN — ONDANSETRON 4 MG: 2 INJECTION INTRAMUSCULAR; INTRAVENOUS at 03:33

## 2020-11-22 RX ADMIN — GUAIFENESIN 1200 MG: 600 TABLET ORAL at 17:12

## 2020-11-22 RX ADMIN — ONDANSETRON 4 MG: 2 INJECTION INTRAMUSCULAR; INTRAVENOUS at 20:50

## 2020-11-22 RX ADMIN — ENOXAPARIN SODIUM 40 MG: 40 INJECTION SUBCUTANEOUS at 20:50

## 2020-11-22 RX ADMIN — STANDARDIZED SENNA CONCENTRATE 8.6 MG: 8.6 TABLET ORAL at 08:16

## 2020-11-22 RX ADMIN — LISINOPRIL 40 MG: 20 TABLET ORAL at 17:13

## 2020-11-22 RX ADMIN — PROCHLORPERAZINE EDISYLATE 10 MG: 5 INJECTION INTRAMUSCULAR; INTRAVENOUS at 06:43

## 2020-11-22 RX ADMIN — ONDANSETRON 4 MG: 2 INJECTION INTRAMUSCULAR; INTRAVENOUS at 14:53

## 2020-11-22 RX ADMIN — CITALOPRAM HYDROBROMIDE 40 MG: 20 TABLET ORAL at 08:16

## 2020-11-22 RX ADMIN — SODIUM CHLORIDE: 234 INJECTION INTRAMUSCULAR; INTRAVENOUS; SUBCUTANEOUS at 17:13

## 2020-11-22 RX ADMIN — METOPROLOL TARTRATE 25 MG: 25 TABLET, FILM COATED ORAL at 17:12

## 2020-11-22 RX ADMIN — ONDANSETRON 4 MG: 2 INJECTION INTRAMUSCULAR; INTRAVENOUS at 08:16

## 2020-11-22 RX ADMIN — GUAIFENESIN 1200 MG: 600 TABLET ORAL at 08:16

## 2020-11-22 RX ADMIN — METOPROLOL TARTRATE 25 MG: 25 TABLET, FILM COATED ORAL at 08:16

## 2020-11-22 RX ADMIN — DOCUSATE SODIUM 100 MG: 100 CAPSULE, LIQUID FILLED ORAL at 17:12

## 2020-11-22 RX ADMIN — PROCHLORPERAZINE EDISYLATE 10 MG: 5 INJECTION INTRAMUSCULAR; INTRAVENOUS at 11:55

## 2020-11-22 RX ADMIN — TAMSULOSIN HYDROCHLORIDE 0.4 MG: 0.4 CAPSULE ORAL at 08:16

## 2020-11-22 RX ADMIN — ATORVASTATIN CALCIUM 10 MG: 10 TABLET, FILM COATED ORAL at 08:16

## 2020-11-22 RX ADMIN — DOCUSATE SODIUM 100 MG: 100 CAPSULE, LIQUID FILLED ORAL at 08:16

## 2020-11-22 NOTE — PROGRESS NOTES
Progress Note    Patient: Corine Aranda MRN: [id number]  SSN: [id number]    YOB: 1970  Age: 48 y.o. Sex: male      Admit Date: 11/3/2020    18 Days Post-Op    Procedure:  Procedure(s):  LAPAROTOMY EXPLORATORY, evacuation of hematoma. Subjective:     Patient reports nausea somewhat better but still unable to tolerate clears. Objective:     Visit Vitals  BP (!) 148/82 (BP 1 Location: Left arm, BP Patient Position: At rest)   Pulse 74   Temp 97.6 °F (36.4 °C)   Resp 16   Ht 5' 11\" (1.803 m)   Wt 101.4 kg (223 lb 9.6 oz)   SpO2 95%   BMI 31.19 kg/m²       Temp (24hrs), Av.9 °F (36.6 °C), Min:97 °F (36.1 °C), Max:98.7 °F (37.1 °C)      Physical Exam:    ABDOMEN: soft, no distention, non-tender, incisions C/D/I    Data Review: images and reports reviewed    Lab Review: All lab results for the last 24 hours reviewed. Assessment:     Hospital Problems  Date Reviewed: 2020          Codes Class Noted POA    Biliary colic TSW-57-LT: V24.20  ICD-9-CM: 574.20  2020 Unknown        Cholecystitis ICD-10-CM: K81.9  ICD-9-CM: 575.10  2020 Unknown              Plan/Recommendations/Medical Decision Making:     Cont GINGER   Clears as tolerated.   Etiology of nausea remains unclear

## 2020-11-22 NOTE — PROGRESS NOTES
Bedside and Verbal shift change report given to [address] (oncoming nurse) by Maia Castellano (offgoing nurse). Report included the following information SBAR, Kardex, Procedure Summary, Intake/Output, MAR, Accordion, Recent Results and Med Rec Status.

## 2020-11-23 ENCOUNTER — APPOINTMENT (OUTPATIENT)
Dept: GENERAL RADIOLOGY | Age: 50
DRG: 417 | End: 2020-11-23
Attending: SURGERY
Payer: COMMERCIAL

## 2020-11-23 LAB
ALBUMIN SERPL-MCNC: 2.6 G/DL (ref 3.5–5)
ALBUMIN/GLOB SERPL: 0.6 {RATIO} (ref 1.1–2.2)
ALP SERPL-CCNC: 118 U/L (ref 45–117)
ALT SERPL-CCNC: 78 U/L (ref 12–78)
ANION GAP SERPL CALC-SCNC: 3 MMOL/L (ref 5–15)
AST SERPL-CCNC: 78 U/L (ref 15–37)
BASOPHILS # BLD: 0.1 K/UL (ref 0–0.1)
BASOPHILS NFR BLD: 1 % (ref 0–1)
BILIRUB SERPL-MCNC: 0.6 MG/DL (ref 0.2–1)
BUN SERPL-MCNC: 15 MG/DL (ref 6–20)
BUN/CREAT SERPL: 23 (ref 12–20)
CALCIUM SERPL-MCNC: 9.1 MG/DL (ref 8.5–10.1)
CHLORIDE SERPL-SCNC: 106 MMOL/L (ref 97–108)
CO2 SERPL-SCNC: 28 MMOL/L (ref 21–32)
CREAT SERPL-MCNC: 0.66 MG/DL (ref 0.7–1.3)
DIFFERENTIAL METHOD BLD: ABNORMAL
EOSINOPHIL # BLD: 0.2 K/UL (ref 0–0.4)
EOSINOPHIL NFR BLD: 2 % (ref 0–7)
ERYTHROCYTE [DISTWIDTH] IN BLOOD BY AUTOMATED COUNT: 14.4 % (ref 11.5–14.5)
GLOBULIN SER CALC-MCNC: 4.3 G/DL (ref 2–4)
GLUCOSE BLD STRIP.AUTO-MCNC: 140 MG/DL (ref 65–100)
GLUCOSE BLD STRIP.AUTO-MCNC: 158 MG/DL (ref 65–100)
GLUCOSE BLD STRIP.AUTO-MCNC: 160 MG/DL (ref 65–100)
GLUCOSE BLD STRIP.AUTO-MCNC: 170 MG/DL (ref 65–100)
GLUCOSE SERPL-MCNC: 112 MG/DL (ref 65–100)
HCT VFR BLD AUTO: 34 % (ref 36.6–50.3)
HGB BLD-MCNC: 11.1 G/DL (ref 12.1–17)
IMM GRANULOCYTES # BLD AUTO: 0.2 K/UL (ref 0–0.04)
IMM GRANULOCYTES NFR BLD AUTO: 2 % (ref 0–0.5)
LYMPHOCYTES # BLD: 2 K/UL (ref 0.8–3.5)
LYMPHOCYTES NFR BLD: 20 % (ref 12–49)
MAGNESIUM SERPL-MCNC: 2.4 MG/DL (ref 1.6–2.4)
MCH RBC QN AUTO: 28.8 PG (ref 26–34)
MCHC RBC AUTO-ENTMCNC: 32.6 G/DL (ref 30–36.5)
MCV RBC AUTO: 88.1 FL (ref 80–99)
MONOCYTES # BLD: 0.9 K/UL (ref 0–1)
MONOCYTES NFR BLD: 9 % (ref 5–13)
NEUTS SEG # BLD: 6.7 K/UL (ref 1.8–8)
NEUTS SEG NFR BLD: 66 % (ref 32–75)
NRBC # BLD: 0.03 K/UL (ref 0–0.01)
NRBC BLD-RTO: 0.3 PER 100 WBC
PHOSPHATE SERPL-MCNC: 5.2 MG/DL (ref 2.6–4.7)
PLATELET # BLD AUTO: 399 K/UL (ref 150–400)
PMV BLD AUTO: 11.2 FL (ref 8.9–12.9)
POTASSIUM SERPL-SCNC: 4.8 MMOL/L (ref 3.5–5.1)
PROT SERPL-MCNC: 6.9 G/DL (ref 6.4–8.2)
RBC # BLD AUTO: 3.86 M/UL (ref 4.1–5.7)
SERVICE CMNT-IMP: ABNORMAL
SODIUM SERPL-SCNC: 137 MMOL/L (ref 136–145)
WBC # BLD AUTO: 10 K/UL (ref 4.1–11.1)

## 2020-11-23 PROCEDURE — 74250 X-RAY XM SM INT 1CNTRST STD: CPT

## 2020-11-23 PROCEDURE — 82962 GLUCOSE BLOOD TEST: CPT

## 2020-11-23 PROCEDURE — 65660000000 HC RM CCU STEPDOWN

## 2020-11-23 PROCEDURE — 74011000258 HC RX REV CODE- 258: Performed by: NURSE PRACTITIONER

## 2020-11-23 PROCEDURE — 74011250637 HC RX REV CODE- 250/637: Performed by: NURSE PRACTITIONER

## 2020-11-23 PROCEDURE — 74011250636 HC RX REV CODE- 250/636: Performed by: SURGERY

## 2020-11-23 PROCEDURE — 85025 COMPLETE CBC W/AUTO DIFF WBC: CPT

## 2020-11-23 PROCEDURE — 74011250637 HC RX REV CODE- 250/637: Performed by: INTERNAL MEDICINE

## 2020-11-23 PROCEDURE — 97110 THERAPEUTIC EXERCISES: CPT

## 2020-11-23 PROCEDURE — 74011000250 HC RX REV CODE- 250: Performed by: NURSE PRACTITIONER

## 2020-11-23 PROCEDURE — 2709999900 HC NON-CHARGEABLE SUPPLY

## 2020-11-23 PROCEDURE — 36415 COLL VENOUS BLD VENIPUNCTURE: CPT

## 2020-11-23 PROCEDURE — 84100 ASSAY OF PHOSPHORUS: CPT

## 2020-11-23 PROCEDURE — 74011000250 HC RX REV CODE- 250: Performed by: SURGERY

## 2020-11-23 PROCEDURE — 74011250636 HC RX REV CODE- 250/636: Performed by: NURSE PRACTITIONER

## 2020-11-23 PROCEDURE — 80053 COMPREHEN METABOLIC PANEL: CPT

## 2020-11-23 PROCEDURE — 83735 ASSAY OF MAGNESIUM: CPT

## 2020-11-23 PROCEDURE — 97535 SELF CARE MNGMENT TRAINING: CPT

## 2020-11-23 PROCEDURE — 97116 GAIT TRAINING THERAPY: CPT

## 2020-11-23 PROCEDURE — 97530 THERAPEUTIC ACTIVITIES: CPT

## 2020-11-23 RX ADMIN — GUAIFENESIN 1200 MG: 600 TABLET ORAL at 08:25

## 2020-11-23 RX ADMIN — ENOXAPARIN SODIUM 40 MG: 40 INJECTION SUBCUTANEOUS at 20:31

## 2020-11-23 RX ADMIN — PROCHLORPERAZINE EDISYLATE 10 MG: 5 INJECTION INTRAMUSCULAR; INTRAVENOUS at 12:27

## 2020-11-23 RX ADMIN — ONDANSETRON 4 MG: 2 INJECTION INTRAMUSCULAR; INTRAVENOUS at 20:31

## 2020-11-23 RX ADMIN — DIBASIC SODIUM PHOSPHATE, MONOBASIC POTASSIUM PHOSPHATE AND MONOBASIC SODIUM PHOSPHATE 1 TABLET: 852; 155; 130 TABLET ORAL at 18:44

## 2020-11-23 RX ADMIN — ONDANSETRON 4 MG: 2 INJECTION INTRAMUSCULAR; INTRAVENOUS at 08:25

## 2020-11-23 RX ADMIN — PANTOPRAZOLE SODIUM 40 MG: 40 TABLET, DELAYED RELEASE ORAL at 06:54

## 2020-11-23 RX ADMIN — POTASSIUM CHLORIDE: 2 INJECTION, SOLUTION, CONCENTRATE INTRAVENOUS at 18:38

## 2020-11-23 RX ADMIN — CITALOPRAM HYDROBROMIDE 40 MG: 20 TABLET ORAL at 08:25

## 2020-11-23 RX ADMIN — METOPROLOL TARTRATE 25 MG: 25 TABLET, FILM COATED ORAL at 18:44

## 2020-11-23 RX ADMIN — ONDANSETRON 4 MG: 2 INJECTION INTRAMUSCULAR; INTRAVENOUS at 14:37

## 2020-11-23 RX ADMIN — TAMSULOSIN HYDROCHLORIDE 0.4 MG: 0.4 CAPSULE ORAL at 08:25

## 2020-11-23 RX ADMIN — GUAIFENESIN 1200 MG: 600 TABLET ORAL at 18:44

## 2020-11-23 RX ADMIN — LISINOPRIL 40 MG: 20 TABLET ORAL at 18:44

## 2020-11-23 RX ADMIN — ALTEPLASE 1 MG: 2.2 INJECTION, POWDER, LYOPHILIZED, FOR SOLUTION INTRAVENOUS at 05:07

## 2020-11-23 RX ADMIN — ONDANSETRON 4 MG: 2 INJECTION INTRAMUSCULAR; INTRAVENOUS at 03:52

## 2020-11-23 RX ADMIN — DIBASIC SODIUM PHOSPHATE, MONOBASIC POTASSIUM PHOSPHATE AND MONOBASIC SODIUM PHOSPHATE 1 TABLET: 852; 155; 130 TABLET ORAL at 08:25

## 2020-11-23 RX ADMIN — I.V. FAT EMULSION 250 ML: 20 EMULSION INTRAVENOUS at 18:37

## 2020-11-23 RX ADMIN — ATORVASTATIN CALCIUM 10 MG: 10 TABLET, FILM COATED ORAL at 08:25

## 2020-11-23 RX ADMIN — METOPROLOL TARTRATE 25 MG: 25 TABLET, FILM COATED ORAL at 08:25

## 2020-11-23 NOTE — PROGRESS NOTES
Problem: Self Care Deficits Care Plan (Adult)  Goal: *Acute Goals and Plan of Care (Insert Text)  Description:   FUNCTIONAL STATUS PRIOR TO ADMISSION: Patient was independent and active without use of DME.     HOME SUPPORT: The patient lived with wife but did not require assist.    Occupational Therapy Goals  Initiated 11/18/2020  1. Patient will perform grooming with supervision/set-up from supported sitting position within 7 day(s). 2.  Patient will perform upper body dressing and bathing with minimal assistance within 7 day(s). 3.  Patient will perform lower body dressing and bathing with moderate assistance  within 7 day(s). 4.  Patient will perform toilet transfers to [facility] with moderate assistance x1 within 7 day(s). 5.  Patient will perform all aspects of toileting with moderate assistance  within 7 day(s). 6.  Patient will participate in upper extremity therapeutic exercise/activities with supervision/set-up for 10 minutes within 7 day(s). 7.  Patient will utilize energy conservation techniques during functional activities with verbal cues within 7 day(s). Outcome: Progressing Towards Goal   OCCUPATIONAL THERAPY TREATMENT  Patient: Oakwood Persons (70 y.o. male)  Date: 11/23/2020  Diagnosis: Biliary colic [P78.98]  Cholecystitis [K81.9]   <principal problem not specified>  Procedure(s) (LRB):  LAPAROTOMY EXPLORATORY, evacuation of hematoma. (N/A) 19 Days Post-Op  Precautions: WBAT, Fall  Chart, occupational therapy assessment, plan of care, and goals were reviewed. ASSESSMENT  Patient continues with skilled OT services and is progressing towards goals. Pt ambulated to bathroom and was able to tolerate standing to wash his hands. He returned to bed in prep to going for a test. He was able to engage with UE exercises with supervision.     Current Level of Function Impacting Discharge (ADLs): Min assist for hand washing at sink standing    Other factors to consider for discharge: PLAN :  Patient continues to benefit from skilled intervention to address the above impairments. Continue treatment per established plan of care. to address goals. Recommend with staff: ADl's seated edge of bed, activity, there act    Recommend next OT session: Cont towards goals    Recommendation for discharge: (in order for the patient to meet his/her long term goals)  Therapy 3 hours per day 5-7 days per week    This discharge recommendation:  Has not yet been discussed the attending provider and/or case management    IF patient discharges home will need the following DME:        SUBJECTIVE:   Patient stated I can    OBJECTIVE DATA SUMMARY:   Cognitive/Behavioral Status:  Neurologic State: Alert  Orientation Level: Oriented X4  Cognition: Follows commands             Functional Mobility and Transfers for ADLs:  Bed Mobility:  Supine to Sit: Moderate assistance  Sit to Supine: Minimum assistance; Moderate assistance    Transfers:  Sit to Stand: Contact guard assistance;Minimum assistance          Balance:  Sitting: Intact  Standing: With support  Standing - Static: Fair;Good    ADL Intervention:       Grooming  Grooming Assistance: Minimum assistance  Position Performed: Standing  Washing Hands: Minimum assistance         Therapeutic Exercises:     EXERCISE   Sets   Reps   Active Active Assist   Passive   Comments   Finger flex/ext 1 10 [x]           []           []              Wrist flex/ext 1 10 [x]           []           []              Forearm supination/pronation 1 10 [x]           []           []              Chest presses 1 10 [x]           []           []                 []           []           []                 []           []           []                 []           []           []                 []           []           []                 []           []           []                 []           []           []                 []           []           []                 Activity Tolerance:   Fair    After treatment patient left in no apparent distress:   Supine in bed    COMMUNICATION/COLLABORATION:   The patients plan of care was discussed with: Physical therapy assistant, Occupational therapist, and Registered nurse.      DIONI Gonzalez/L  Time Calculation: 28 mins

## 2020-11-23 NOTE — PROGRESS NOTES
Comprehensive Nutrition Assessment    Type and Reason for Visit: Reassess    Nutrition Recommendations/Plan:   1. Continue TPN at goal until pt able to tolerate adequate po intake.    TPN (D20/AA5) @ 83 mL/hr   Lipids - 20% @ 21 mL/hr x 12 hrs, 3 times per week. Once triglycerides are WNL, increase to 42mL/h.     Goal provides 2185 kcal and 100 g Pro - meeting 89% of estimated kcal and 99% estimated protein needs.      Electrolytes per pharmacy. Check triglycerides weekly while on lipids. Nutrition Assessment:      11/23: F/u. Pt continues on CLD. TPN running at goal. SBFT ordered. Was c/o nausea yesterday no but N/V overnight. No abd pain. Still with loose stools. Continue TPN until able to advance diet with good po intake. Per NP note, \"Possible advancement of diet if w/out nausea over am and no concerns on SBFT. \" K+ and phos were both being repleted, phos now high and K+ WNL. Labs- phos 5.2, -112-160. Meds- bowel regimen, Protonix, kphos, Ingenuus@Cisiv. Addendum:  on 11/20. Initiate lipids 250mL at 21mL/h. If lipids trending WNL on next chcck, can increase to 500mL lipids with next infusion. 11/19: F/u. Pt extubated 11/17. Pt on CLD at time of visit. Pt consumed couple bites of jello for breakfast. Says he was having nausea after eating breakfast yesterday, but says meds helped and he was able to eat a little yesterday. Nausea how now subsided. No c/o pain. Pt agreeable to trying Ensure Clear while on CLD, but will continue ordered Ensure HP now that diet is advanced and monitor acceptance. Labs- Mg 2.7, phos 2.4. Med- Bumex, colace, Merrem, Protoinx, senokot. 11/16:  F/u. Remains on vent. Propofol off. MD notes possible extubation tomorrow. Originally planning to extubate today but pt with too many secretions. TF has remained on hold since last assessment 11/12 (x 5 days now). Noted to have 120 ml black residuals yesterday from OGT.   Surgery notes plans to advance PO diet once extubated. Labs: . Meds: Precedex, Colace, Senokol. 11/12: F/u. Remains on vent. Propofol resumed, at 50mcg/kg/min (providing 939 kcals/d). NGT became coiled, was replaced with OGT. TF was started and running as Vital 1.2 at 40ml/hr. Residuals documented as 20-45ml over last 24h. Pt vomited TF last night so TF placed on hold. Concerning that no BM since admission. 11/9: F/u. Pt remains on vent. Off pressors and Propofol. NGT has remained to suction. GI placed consult today to start trickle TF.  NPO x 6 days. Labs: K+ 3.2. Meds: Precedex. LR running at 125ml/hr. 11/6: 47 yo male admitted for cholecystitis. PMhx: GERD, hernia repairs, esophageal dilation. Class II obese per BMI of 36.6. Weight hx indicates weight gain PTA. S/P cholecystectomy and lysis of adhesions on 11/4. Intubated for procedure and remains on vent today. Being sedated with Propofol at 50mcg/kg/min (providing 863 kcals/d). Requiring pressor support: levophed at 2mcg/min and William at 165. NPO x 3 days. NGT to suction with 600 ml output today. MD notes plan to wean off pressors and extubated. LR running at 125 ml/hr. Labs: . Meds: propofol, Lvophed, William. Malnutrition Assessment:  Unable to assess at this time. Estimated Daily Nutrient Needs:  Energy (kcal): 6579(4215 x 1.3 AF); Weight Used for Energy Requirements: Current  Protein (g): 101(1-1.2 g/kg);  Weight Used for Protein Requirements: Current  Fluid (ml/day): 3464; Method Used for Fluid Requirements: 1 ml/kcal    Nutrition Related Findings:  LBM 11/22, trace edema      Wounds:    Surgical incision(ABD)       Current Nutrition Therapies:  DIET CLEAR LIQUID  TPN ADULT - CENTRAL    Anthropometric Measures:  · Height:  5' 11\" (180.3 cm)  · Current Body Wt:  101.4 kg (223 lb 8.7 oz)   · Admission Body Wt:       · Usual Body Wt:        · Ideal Body Wt:   :      · Adjusted Body Weight:   ; Weight Adjustment for: No adjustment   · BMI Category: Obese class 1 (BMI 30.0-34. 9)       Nutrition Diagnosis:   · Inadequate protein-energy intake related to inadequate protein-energy intake as evidenced by NPO or clear liquid status due to medical condition, intubation    11/9: Nutrition Dx continues - NPO.  11/12: Nutrition Dx continues - TF on hold. 11/16: Nutrition Dx continues - TF on hold/NPO.  11/19: Nutrition dx continues- CLD. 11/23: Nutrition dx improved, TPN meeting >75% needs. Nutrition Interventions:   Food and/or Nutrient Delivery: Modify current diet, Continue parenteral nutrition  Nutrition Education and Counseling: No recommendations at this time  Coordination of Nutrition Care: Continue to monitor while inpatient    Goals:  Continue TPN to meet needs until pt able to tolerate adequate po intake next 1-3 days       Nutrition Monitoring and Evaluation:   Food/Nutrient Intake Outcomes: Diet advancement/tolerance, Food and nutrient intake, Parenteral nutrition intake/tolerance  Physical Signs/Symptoms Outcomes: GI status, Biochemical data, Weight    Discharge Planning:     Too soon to determine     Electronically signed by Maciel Benavides RD on 11/23/2020     Contact: [phone]

## 2020-11-23 NOTE — PROGRESS NOTES
Bedside and Verbal shift change report given to Montrell Javier RN (oncoming nurse) by Hortensia Strong RN (offgoing nurse). Report included the following information SBAR, Kardex, MAR, Accordion and Recent Results.

## 2020-11-23 NOTE — PROGRESS NOTES
Problem: Mobility Impaired (Adult and Pediatric)  Goal: *Acute Goals and Plan of Care (Insert Text)  Description: FUNCTIONAL STATUS PRIOR TO ADMISSION: Patient was independent and active without use of DME.    HOME SUPPORT PRIOR TO ADMISSION: The patient lived with wife but did not require assist.    Physical Therapy Goals  Initiated 11/18/2020  1. Patient will move from supine to sit and sit to supine  in bed with moderate assistance x1 within 7 day(s). 2.  Patient will transfer from bed to chair and chair to bed with moderate assistance x1 using the least restrictive device within 7 day(s). 3.  Patient will perform sit to stand with moderate assistance x1 within 7 day(s). 4.  Patient will ambulate with moderate assistance x1 for 50 feet with the least restrictive device within 7 day(s). 5.  Patient will ascend/descend 4 stairs with 2 handrail(s) with minimal assistance/contact guard assist within 7 day(s). Note:   PHYSICAL THERAPY TREATMENT  Patient: Wilmer Carver (5 y.o. male)  Date: 11/23/2020  Diagnosis: Biliary colic [V45.13]  Cholecystitis [K81.9]   <principal problem not specified>  Procedure(s) (LRB):  LAPAROTOMY EXPLORATORY, evacuation of hematoma. (N/A) 19 Days Post-Op  Precautions: WBAT, Fall  Chart, physical therapy assessment, plan of care and goals were reviewed. ASSESSMENT  Patient continues with skilled PT services. Pt comes to sit with mod assist.Pt to stand with CGA to min assist.Pt ambulated 35ft with RW CGA to min assist.Pt was assisted in restroom before back to bed min to mod assist.Pt progressing well with mobility with increased endurance. Continue goals. Current Level of Function Impacting Discharge (mobility/balance):              PLAN :  Patient continues to benefit from skilled intervention to address the above impairments. Continue treatment per established plan of care. to address goals.     Recommendation for discharge: (in order for the patient to meet his/her long term goals)  Therapy 3 hours per day 5-7 days per week    This discharge recommendation:  Has been made in collaboration with the attending provider and/or case management    IF patient discharges home will need the following DME: rolling walker       SUBJECTIVE:       OBJECTIVE DATA SUMMARY:   Critical Behavior:  Neurologic State: Alert  Orientation Level: Oriented X4(periodic confusion and forgetfullnes)  Cognition: Follows commands  Safety/Judgement: Decreased awareness of environment, Decreased awareness of need for safety, Decreased awareness of need for assistance, Decreased insight into deficits  Functional Mobility Training:  Bed Mobility:     Supine to Sit: Moderate assistance  Sit to Supine: Minimum assistance; Moderate assistance           Transfers:  Sit to Stand: Contact guard assistance;Minimum assistance  Stand to Sit: Contact guard assistance                             Balance:  Sitting: Intact  Standing: With support  Standing - Static: Fair;Good  Ambulation/Gait Training:  Distance (ft): 35 Feet (ft)  Assistive Device: Gait belt;Walker, rolling  Ambulation - Level of Assistance: Contact guard assistance;Minimal assistance        Gait Abnormalities: Decreased step clearance        Base of Support: Narrowed     Speed/Syl: Pace decreased (<100 feet/min)  Step Length: Right shortened;Left shortened               Activity Tolerance:   Good    After treatment patient left in no apparent distress:   Supine in bed    COMMUNICATION/COLLABORATION:   The patients plan of care was discussed with: Physical therapist.     Felix Sebastian PTA   Time Calculation: 23 mins

## 2020-11-23 NOTE — ADT AUTH CERT NOTES
General Surgery or Procedure GRG - Care Day 20 (11/23/2020) by Eh Griffith RN  
 
   
Review Status  Review Entered Completed  11/23/2020 15:01   
   
Criteria Review Care Day: 20 Care Date: 11/23/2020 Level of Care: Telemetry Guideline Day 3 Level Of Care ( ) * Activity level acceptable Clinical Status   
(X) * Operative site and other wounds acceptable ( ) * Pain and nausea absent or adequately managed   
(X) * Temperature status acceptable 11/23/2020 15:01:55 EST by Milena Moeller   
  BP(!) 153/77 (BP 1 Location: Left arm, BP Patient Position: At rest;Sitting) [id number] Temp98.2 °F (36.8 °C) Resp17 Ht5' 11\" (1.803 m) Wt101.4 kg (223 lb 9.6 oz) BiY210% BMI31.19 kg/m²   
( ) * No infection, or status acceptable ( ) * No blood loss, or problem resolved ( ) * Abdominal status acceptable ( ) * Hepatic and biliary abnormalities absent or acceptable ( ) * Inflammation absent or stable (eg, colitis)   
( ) * No transplant done, or status acceptable ( ) * General Discharge Criteria met Interventions ( ) * Intake acceptable ( ) * No inpatient interventions needed * Milestone Additional Notes 11/23/20 Meds: Zofran 4mg IV q6hrs, Compazine 10mg IV q6hrs PRN x1, Lopressor 25mg po 2x/d Labs: hgb 11.1, hct 34.0, anion gap 3, gluc 112, creat 0.66, alk phos 118, AST 78, alb 2.6 Admit Date: 11/3/2020   
    
19 Days Post-Op   
    
Procedure:  Procedure(s): LAPAROTOMY EXPLORATORY, evacuation of hematoma. Patient states he feels a bit better today re: clear liquids.  Currently on TPN.  He denies n/v overnight.  He denies all abdominal pain.  Reporting loose stools still.  Using CPAP overnight.     
Plan/Recommendations/Medical Decision Making:   
    
Nausea improving overnight Cont TPN Dr Ayaka Mercedes SBFT Clears as tolerated. Possible advancement of diet if w/out nausea over am and no concerns on SBFT. Cont cooley- consider void trial in upcoming days Up and OOB/ambulate IS   
    
    
Pt seen and to discuss plan with Dr Gomes Points. Visit Vitals CM note:   
  
Transition Plan of Care: 1.  Surgery following for medical management. 2.  Pt was started on TPN 11/20. 3.  PT/OT following pt; pt ambulated 6 feet on 11/21 and IPR is recommended [address] will be needed 5.  COVID 19 test for placement will be required within 24 to 48 hours of discharge   
    
CM will continue to follow pt and will pursue rehab placement once he is able to take p.   
  
   
General Surgery or Procedure GRG - Care Day 17 (11/20/2020) by Dalia Gutiérrez RN  
 
   
Review Status  Review Entered Completed  11/20/2020 14:19   
   
Criteria Review Care Day: 17 Care Date: 11/20/2020 Level of Care: ICU Guideline Day 3 Level Of Care ( ) * Activity level acceptable Clinical Status   
(X) * Operative site and other wounds acceptable ( ) * Pain and nausea absent or adequately managed   
(X) * Temperature status acceptable 11/20/2020 14:19:32 EST by Dalia Gutiérrez   
  98.4f   
( ) * No infection, or status acceptable ( ) * No blood loss, or problem resolved ( ) * Abdominal status acceptable ( ) * Hepatic and biliary abnormalities absent or acceptable ( ) * Inflammation absent or stable (eg, colitis)   
( ) * No transplant done, or status acceptable ( ) * General Discharge Criteria met Interventions ( ) * Intake acceptable ( ) * No inpatient interventions needed * Milestone Additional Notes SURGERY PN 11/20/20     
Subjective: Not feeling well today.  Very nauseous and having diarrhea.  Weak as expected. Working with PT.  Did not tolerate diet yesterday decreased diet back to clears and minimally tolerating.  Cooley remains due to retention   
 Physical Exam:    
            Abdomen:  Soft.  Moderately distended.  Tenderness only in LLQ on palp. Incision C/D/I.  Assessment:   
    
Active Problems:   
  Biliary colic (92/6/5158)     
  Cholecystitis (11/4/2020)     
    
    
Plan/Recommendations/Medical Decision Making: Pt with nausea and vomiting and diarrhea   
abd soft ntnd, incisions c/d/i   
    
KUB today - await results Poss SBFT Keep Cook Stop bumex- off O2 Replete K Cont clears as tolerated Order PICC placement for TPN Restart fluids until TPN starts tonight   
TPN and lipids Order triglyceride level today Up and OOB with PT   
IS   
PT/OT   
    
Pt seen and discussed with Dr Troy Mann NP   
    
    
Pt seen and examined Resting comfortably but complains of nausea this am   
Will get PICC and start TPN since no significant nutrition since surgery Abd soft. NTND incision c/d/i   
OOB as able Clears as tolerates Rest as above   
    
    
    
PULMONARY 11/20: Impression Plan Acute hypoxemic respiratory failure, intubated 11/4, tube exchanged 11/6, extubated 11/17 Hemorrhagic shock, off pressors 11/9 Acute cholecystitis s/p robotic assisted lap bailee w/ TAMIKO 11/4 Post-op bleeding/tear in liver capsule s/p ex-lap and evacuation of hematoma 11/4 Acute blood loss anemia MRSA pneumonia, completed course of vanc 11/18 Volume overload Persistent fevers H/o seizures REBECCA   
delirium   
    
    
    
    
    
    
    
    
Supplemental O2 as needed to keep sats > 88%   
cpap whenever sleeping Completed 7 days of Meropenem. Follow cultures   
continue scheduled duonebs, mucinex. Trend Hgb, transfuse to keep > 7 Replete lytes: K today Continue bowel regimen IS, flutter valve Post-op mgmt as per surgery   
    
DVT ppx: lovenox GI ppx: protonix Advance diet as per surgery   
    
    
Mr. Wilkinson is stable from a pulmonary standpoint.  We will sign off and arrange for outpatient follow-up in 2 -3 weeks. Please call with questions     
    
    
    
HG 10.5 HCT 33.7    
K 3.1 ALBUMIN 2.8 AST 44   
    
    
    
ABD XR: Mildly dilated air-filled small bowel loops in the left abdomen may indicate   
ileus.   
    
    
    
129/71   
98.4F   
79HR   
17RR 94%   
    
    
    
D51/2NACL 100ML/HR IV   
ZOFRAN 4MG IV X 1 KCL 10ME IV X 3   
COMPAZINE 10MG I VX 1 MERREM 500MG IV X 2

## 2020-11-23 NOTE — PROGRESS NOTES
11/23/2020   CARE MANAGEMENT NOTE:  CM reviewed EMR for clinical updates.  Pt was admitted with biliary cholecystitis and he is s/p robotic assisted lap bailee on 11/4 and s/p ex-lap and evacuation of hematoma on 11/4.  Pt was intubated on 11/4 and extubated on 11/17. Reportedly, pt resides with his Alphia Organ ([phone]; cell [phone]).     RUR 19%; LOS 19 days     Transition Plan of Care:  1.  Surgery following for medical management. 2.  Pt was started on TPN 11/20. 3.  PT/OT following pt; pt ambulated 6 feet on 11/21 and IPR is recommended  4. [facility] insurance [employer] will be needed   5.  COVID 19 test for placement will be required within 24 to 48 hours of discharge      CM will continue to follow pt and will pursue rehab placement once he is able to take p.o.   D.Ronan

## 2020-11-23 NOTE — PROGRESS NOTES
Bedside and Verbal shift change report given to HIWOT Kenyon (oncoming nurse) by Barb Jones (offgoing nurse). Report included the following information SBAR, Kardex, Procedure Summary, Intake/Output, MAR, Accordion, Recent Results and Med Rec Status.

## 2020-11-24 LAB
ALBUMIN SERPL-MCNC: 2.8 G/DL (ref 3.5–5)
ALBUMIN/GLOB SERPL: 0.7 {RATIO} (ref 1.1–2.2)
ALP SERPL-CCNC: 120 U/L (ref 45–117)
ALT SERPL-CCNC: 78 U/L (ref 12–78)
ANION GAP SERPL CALC-SCNC: 3 MMOL/L (ref 5–15)
AST SERPL-CCNC: 31 U/L (ref 15–37)
BASOPHILS # BLD: 0.1 K/UL (ref 0–0.1)
BASOPHILS NFR BLD: 1 % (ref 0–1)
BILIRUB SERPL-MCNC: 0.4 MG/DL (ref 0.2–1)
BUN SERPL-MCNC: 14 MG/DL (ref 6–20)
BUN/CREAT SERPL: 20 (ref 12–20)
CALCIUM SERPL-MCNC: 9 MG/DL (ref 8.5–10.1)
CHLORIDE SERPL-SCNC: 107 MMOL/L (ref 97–108)
CO2 SERPL-SCNC: 28 MMOL/L (ref 21–32)
CREAT SERPL-MCNC: 0.7 MG/DL (ref 0.7–1.3)
DIFFERENTIAL METHOD BLD: ABNORMAL
EOSINOPHIL # BLD: 0.2 K/UL (ref 0–0.4)
EOSINOPHIL NFR BLD: 2 % (ref 0–7)
ERYTHROCYTE [DISTWIDTH] IN BLOOD BY AUTOMATED COUNT: 14.6 % (ref 11.5–14.5)
EST. AVERAGE GLUCOSE BLD GHB EST-MCNC: 105 MG/DL
GLOBULIN SER CALC-MCNC: 4.1 G/DL (ref 2–4)
GLUCOSE BLD STRIP.AUTO-MCNC: 124 MG/DL (ref 65–100)
GLUCOSE BLD STRIP.AUTO-MCNC: 144 MG/DL (ref 65–100)
GLUCOSE BLD STRIP.AUTO-MCNC: 161 MG/DL (ref 65–100)
GLUCOSE BLD STRIP.AUTO-MCNC: 170 MG/DL (ref 65–100)
GLUCOSE SERPL-MCNC: 111 MG/DL (ref 65–100)
HBA1C MFR BLD: 5.3 % (ref 4–5.6)
HCT VFR BLD AUTO: 33.3 % (ref 36.6–50.3)
HGB BLD-MCNC: 10.8 G/DL (ref 12.1–17)
IMM GRANULOCYTES # BLD AUTO: 0.2 K/UL (ref 0–0.04)
IMM GRANULOCYTES NFR BLD AUTO: 2 % (ref 0–0.5)
LYMPHOCYTES # BLD: 1.7 K/UL (ref 0.8–3.5)
LYMPHOCYTES NFR BLD: 18 % (ref 12–49)
MAGNESIUM SERPL-MCNC: 2.2 MG/DL (ref 1.6–2.4)
MCH RBC QN AUTO: 29 PG (ref 26–34)
MCHC RBC AUTO-ENTMCNC: 32.4 G/DL (ref 30–36.5)
MCV RBC AUTO: 89.3 FL (ref 80–99)
MONOCYTES # BLD: 0.9 K/UL (ref 0–1)
MONOCYTES NFR BLD: 9 % (ref 5–13)
NEUTS SEG # BLD: 6.9 K/UL (ref 1.8–8)
NEUTS SEG NFR BLD: 68 % (ref 32–75)
NRBC # BLD: 0 K/UL (ref 0–0.01)
NRBC BLD-RTO: 0 PER 100 WBC
PHOSPHATE SERPL-MCNC: 2.7 MG/DL (ref 2.6–4.7)
PLATELET # BLD AUTO: 393 K/UL (ref 150–400)
PMV BLD AUTO: 9.2 FL (ref 8.9–12.9)
POTASSIUM SERPL-SCNC: 3.6 MMOL/L (ref 3.5–5.1)
PROT SERPL-MCNC: 6.9 G/DL (ref 6.4–8.2)
RBC # BLD AUTO: 3.73 M/UL (ref 4.1–5.7)
SERVICE CMNT-IMP: ABNORMAL
SODIUM SERPL-SCNC: 138 MMOL/L (ref 136–145)
WBC # BLD AUTO: 9.9 K/UL (ref 4.1–11.1)

## 2020-11-24 PROCEDURE — 97535 SELF CARE MNGMENT TRAINING: CPT

## 2020-11-24 PROCEDURE — 74011250637 HC RX REV CODE- 250/637: Performed by: NURSE PRACTITIONER

## 2020-11-24 PROCEDURE — 83036 HEMOGLOBIN GLYCOSYLATED A1C: CPT

## 2020-11-24 PROCEDURE — 85025 COMPLETE CBC W/AUTO DIFF WBC: CPT

## 2020-11-24 PROCEDURE — 83735 ASSAY OF MAGNESIUM: CPT

## 2020-11-24 PROCEDURE — 2709999900 HC NON-CHARGEABLE SUPPLY

## 2020-11-24 PROCEDURE — 80053 COMPREHEN METABOLIC PANEL: CPT

## 2020-11-24 PROCEDURE — 74011250637 HC RX REV CODE- 250/637: Performed by: INTERNAL MEDICINE

## 2020-11-24 PROCEDURE — 82962 GLUCOSE BLOOD TEST: CPT

## 2020-11-24 PROCEDURE — 84100 ASSAY OF PHOSPHORUS: CPT

## 2020-11-24 PROCEDURE — 74011250636 HC RX REV CODE- 250/636: Performed by: SURGERY

## 2020-11-24 PROCEDURE — 51798 US URINE CAPACITY MEASURE: CPT

## 2020-11-24 PROCEDURE — 65660000000 HC RM CCU STEPDOWN

## 2020-11-24 PROCEDURE — 36415 COLL VENOUS BLD VENIPUNCTURE: CPT

## 2020-11-24 RX ADMIN — STANDARDIZED SENNA CONCENTRATE 8.6 MG: 8.6 TABLET ORAL at 08:32

## 2020-11-24 RX ADMIN — DOCUSATE SODIUM 100 MG: 100 CAPSULE, LIQUID FILLED ORAL at 17:28

## 2020-11-24 RX ADMIN — METOPROLOL TARTRATE 25 MG: 25 TABLET, FILM COATED ORAL at 17:28

## 2020-11-24 RX ADMIN — DIBASIC SODIUM PHOSPHATE, MONOBASIC POTASSIUM PHOSPHATE AND MONOBASIC SODIUM PHOSPHATE 1 TABLET: 852; 155; 130 TABLET ORAL at 08:32

## 2020-11-24 RX ADMIN — ATORVASTATIN CALCIUM 10 MG: 10 TABLET, FILM COATED ORAL at 08:33

## 2020-11-24 RX ADMIN — ENOXAPARIN SODIUM 40 MG: 40 INJECTION SUBCUTANEOUS at 20:24

## 2020-11-24 RX ADMIN — ONDANSETRON 4 MG: 2 INJECTION INTRAMUSCULAR; INTRAVENOUS at 15:27

## 2020-11-24 RX ADMIN — DOCUSATE SODIUM 100 MG: 100 CAPSULE, LIQUID FILLED ORAL at 08:32

## 2020-11-24 RX ADMIN — CITALOPRAM HYDROBROMIDE 40 MG: 20 TABLET ORAL at 08:33

## 2020-11-24 RX ADMIN — TAMSULOSIN HYDROCHLORIDE 0.4 MG: 0.4 CAPSULE ORAL at 08:33

## 2020-11-24 RX ADMIN — ONDANSETRON 4 MG: 2 INJECTION INTRAMUSCULAR; INTRAVENOUS at 08:33

## 2020-11-24 RX ADMIN — METOPROLOL TARTRATE 25 MG: 25 TABLET, FILM COATED ORAL at 08:33

## 2020-11-24 RX ADMIN — LISINOPRIL 40 MG: 20 TABLET ORAL at 17:28

## 2020-11-24 RX ADMIN — PANTOPRAZOLE SODIUM 40 MG: 40 TABLET, DELAYED RELEASE ORAL at 06:28

## 2020-11-24 RX ADMIN — GUAIFENESIN 1200 MG: 600 TABLET ORAL at 17:28

## 2020-11-24 RX ADMIN — ONDANSETRON 4 MG: 2 INJECTION INTRAMUSCULAR; INTRAVENOUS at 20:24

## 2020-11-24 RX ADMIN — DIBASIC SODIUM PHOSPHATE, MONOBASIC POTASSIUM PHOSPHATE AND MONOBASIC SODIUM PHOSPHATE 1 TABLET: 852; 155; 130 TABLET ORAL at 17:29

## 2020-11-24 RX ADMIN — ONDANSETRON 4 MG: 2 INJECTION INTRAMUSCULAR; INTRAVENOUS at 03:06

## 2020-11-24 RX ADMIN — GUAIFENESIN 1200 MG: 600 TABLET ORAL at 08:32

## 2020-11-24 NOTE — PROGRESS NOTES
OCCUPATIONAL THERAPY TREATMENT/DISCHARGE  Patient: Vandana Loaiza (84 y.o. male)  Date: 11/24/2020  Diagnosis: Biliary colic [T92.02]  Cholecystitis [K81.9] <principal problem not specified>  Procedure(s) (LRB):  LAPAROTOMY EXPLORATORY, evacuation of hematoma. (N/A) 20 Days Post-Op  Precautions: WBAT, Fall  Chart, occupational therapy assessment, plan of care, and goals were reviewed. ASSESSMENT  Patient continues with skilled OT services and has progressed towards goals. Pt received supine in bed. Supportive wife present in the room. Pt presents at mod I to independent level with bed mobility, LB dressing, bathroom mobility, toileting and grooming. Discussed ECT and pacing. Both verbalized understanding. Feel pt is safe for discharge once medically cleared. PT to order walker. No further recommendations at this time. Potential discharge tomorrow. Current Level of Function (ADLs/self-care): Other factors to consider for discharge:          PLAN :  Rationale for discharge: Goals achieved  Recommend with staff:   Recommendation for discharge: (in order for the patient to meet his/her long term goals)  No skilled occupational therapy/ follow up rehabilitation needs identified at this time. This discharge recommendation:  A follow-up discussion with the attending provider and/or case management is planned    IF patient discharges home will need the following DME:RW       SUBJECTIVE:   Patient stated I can't wait to get home.     OBJECTIVE DATA SUMMARY:   Cognitive/Behavioral Status:  Neurologic State: Alert  Orientation Level: Oriented X4  Cognition: Appropriate decision making        Safety/Judgement: Awareness of environment    Functional Mobility and Transfers for ADLs:  Bed Mobility:  Supine to Sit: Modified independent; Additional time  Sit to Supine: Modified independent; Additional time    Transfers:  Sit to Stand: Modified independent  Functional Transfers  Bathroom Mobility: Modified independent       Balance:       ADL Intervention:       Grooming  Grooming Assistance: Independent  Position Performed: Standing                   Lower Body Dressing Assistance  Socks: Independent  Leg Crossed Method Used: Yes    Toileting  Toileting Assistance: Modified independent  Bladder Hygiene: Modified independent  Adaptive Equipment: Walker    Cognitive Retraining  Safety/Judgement: Awareness of environment    Therapeutic Exercises:       Pain:  4/10    Activity Tolerance:   Good    After treatment patient left in no apparent distress:   Supine in bed, Call bell within reach and Caregiver / family present    COMMUNICATION/COLLABORATION:   The patients plan of care was discussed with: Physical therapy assistant and Registered nurse.      Adilia Grace OTR/L  Time Calculation: 23 mins

## 2020-11-24 NOTE — ROUTINE PROCESS
Bedside shift change report given to HIWOT Hamilton (oncoming nurse) by Tommie Lomeli RN (offgoing nurse). Report included the following information SBAR, Kardex and MAR.

## 2020-11-24 NOTE — PROGRESS NOTES
SURGERY PROGRESS NOTE      Admit Date: 11/3/2020    POD 20 Days Post-Op    Procedure: Procedure(s):  LAPAROTOMY EXPLORATORY, evacuation of hematoma. Subjective:   Feels better  No more nasuea      Objective:     Visit Vitals  BP (!) 163/77 (BP 1 Location: Left arm, BP Patient Position: At rest)   Pulse 87   Temp 98.4 °F (36.9 °C)   Resp 18   Ht 5' 11\" (1.803 m)   Wt 101.4 kg (223 lb 9.6 oz)   SpO2 99%   BMI 31.19 kg/m²        Temp (24hrs), Av.1 °F (36.7 °C), Min:97.9 °F (36.6 °C), Max:98.4 °F (36.9 °C)      Physical Exam:     Abdomen:  Soft. Non-tender, non-distended. Incision C/D/I.         Lab Results   Component Value Date/Time    WBC 9.9 2020 03:04 AM    HGB 10.8 (L) 2020 03:04 AM    Hemoglobin (POC) 6.8 (L) 2020 03:42 PM    Hemoglobin (POC) 13.3 2014 10:05 PM    HCT 33.3 (L) 2020 03:04 AM    Hematocrit (POC) 39 2014 10:05 PM    PLATELET 944  03:04 AM    MCV 89.3 2020 03:04 AM     Lab Results   Component Value Date/Time    GFR est non-AA >60 2020 03:04 AM    GFRNA, POC >60 2014 10:05 PM    GFR est AA >60 2020 03:04 AM    GFRAA, POC >60 2014 10:05 PM    Creatinine 0.70 2020 03:04 AM    Creatinine (POC) 1.1 2014 10:05 PM    BUN 14 2020 03:04 AM    BUN (POC) 15 2014 10:05 PM    Sodium 138 2020 03:04 AM    Sodium (POC) 141 2014 10:05 PM    Potassium 3.6 2020 03:04 AM    Potassium (POC) 3.5 2014 10:05 PM    Chloride 107 2020 03:04 AM    Chloride (POC) 106 2014 10:05 PM    CO2 28 2020 03:04 AM    Magnesium 2.2 2020 03:04 AM    Phosphorus 2.7 2020 03:04 AM       Assessment:     Active Problems:    Biliary colic (695)      Cholecystitis (2020)        Plan/Recommendations/Medical Decision Making:   advance to gi lite diet  Decrease rate of TPN and wean off  D/c cooley  Staples removed  Anticipate home tomorrow

## 2020-11-24 NOTE — PROGRESS NOTES
PHYSICAL THERAPY:PT attempted ttreatment x 2. Pt requested PT come back on both occasions. PT will follow later today as time allows.

## 2020-11-24 NOTE — PROGRESS NOTES
Bedside and Verbal shift change report given to [address] [facility] Rosalind (oncoming nurse) by Addi Arce (offgoing nurse). Report included the following information SBAR, Kardex, OR Summary, Intake/Output, MAR, Recent Results and Cardiac Rhythm NSR.

## 2020-11-25 VITALS
SYSTOLIC BLOOD PRESSURE: 133 MMHG | HEART RATE: 76 BPM | BODY MASS INDEX: 31.3 KG/M2 | DIASTOLIC BLOOD PRESSURE: 78 MMHG | OXYGEN SATURATION: 97 % | WEIGHT: 223.6 LBS | HEIGHT: 71 IN | RESPIRATION RATE: 18 BRPM | TEMPERATURE: 97.7 F

## 2020-11-25 LAB
ALBUMIN SERPL-MCNC: 3 G/DL (ref 3.5–5)
ALBUMIN/GLOB SERPL: 0.8 {RATIO} (ref 1.1–2.2)
ALP SERPL-CCNC: 126 U/L (ref 45–117)
ALT SERPL-CCNC: 71 U/L (ref 12–78)
ANION GAP SERPL CALC-SCNC: 2 MMOL/L (ref 5–15)
AST SERPL-CCNC: 27 U/L (ref 15–37)
BASOPHILS # BLD: 0.1 K/UL (ref 0–0.1)
BASOPHILS NFR BLD: 1 % (ref 0–1)
BILIRUB SERPL-MCNC: 0.6 MG/DL (ref 0.2–1)
BUN SERPL-MCNC: 12 MG/DL (ref 6–20)
BUN/CREAT SERPL: 17 (ref 12–20)
CALCIUM SERPL-MCNC: 9.2 MG/DL (ref 8.5–10.1)
CHLORIDE SERPL-SCNC: 108 MMOL/L (ref 97–108)
CO2 SERPL-SCNC: 28 MMOL/L (ref 21–32)
CREAT SERPL-MCNC: 0.71 MG/DL (ref 0.7–1.3)
DIFFERENTIAL METHOD BLD: ABNORMAL
EOSINOPHIL # BLD: 0.1 K/UL (ref 0–0.4)
EOSINOPHIL NFR BLD: 2 % (ref 0–7)
ERYTHROCYTE [DISTWIDTH] IN BLOOD BY AUTOMATED COUNT: 14.4 % (ref 11.5–14.5)
GLOBULIN SER CALC-MCNC: 4 G/DL (ref 2–4)
GLUCOSE BLD STRIP.AUTO-MCNC: 120 MG/DL (ref 65–100)
GLUCOSE SERPL-MCNC: 108 MG/DL (ref 65–100)
HCT VFR BLD AUTO: 33.7 % (ref 36.6–50.3)
HGB BLD-MCNC: 10.8 G/DL (ref 12.1–17)
IMM GRANULOCYTES # BLD AUTO: 0.2 K/UL (ref 0–0.04)
IMM GRANULOCYTES NFR BLD AUTO: 2 % (ref 0–0.5)
LYMPHOCYTES # BLD: 1.7 K/UL (ref 0.8–3.5)
LYMPHOCYTES NFR BLD: 21 % (ref 12–49)
MAGNESIUM SERPL-MCNC: 2.2 MG/DL (ref 1.6–2.4)
MCH RBC QN AUTO: 28.5 PG (ref 26–34)
MCHC RBC AUTO-ENTMCNC: 32 G/DL (ref 30–36.5)
MCV RBC AUTO: 88.9 FL (ref 80–99)
MONOCYTES # BLD: 0.9 K/UL (ref 0–1)
MONOCYTES NFR BLD: 12 % (ref 5–13)
NEUTS SEG # BLD: 4.8 K/UL (ref 1.8–8)
NEUTS SEG NFR BLD: 62 % (ref 32–75)
NRBC # BLD: 0 K/UL (ref 0–0.01)
NRBC BLD-RTO: 0 PER 100 WBC
PHOSPHATE SERPL-MCNC: 3.1 MG/DL (ref 2.6–4.7)
PLATELET # BLD AUTO: 363 K/UL (ref 150–400)
PMV BLD AUTO: 9.5 FL (ref 8.9–12.9)
POTASSIUM SERPL-SCNC: 3.7 MMOL/L (ref 3.5–5.1)
PROT SERPL-MCNC: 7 G/DL (ref 6.4–8.2)
RBC # BLD AUTO: 3.79 M/UL (ref 4.1–5.7)
SERVICE CMNT-IMP: ABNORMAL
SODIUM SERPL-SCNC: 138 MMOL/L (ref 136–145)
WBC # BLD AUTO: 7.8 K/UL (ref 4.1–11.1)

## 2020-11-25 PROCEDURE — 80053 COMPREHEN METABOLIC PANEL: CPT

## 2020-11-25 PROCEDURE — 83735 ASSAY OF MAGNESIUM: CPT

## 2020-11-25 PROCEDURE — 74011250637 HC RX REV CODE- 250/637: Performed by: NURSE PRACTITIONER

## 2020-11-25 PROCEDURE — 82962 GLUCOSE BLOOD TEST: CPT

## 2020-11-25 PROCEDURE — 84100 ASSAY OF PHOSPHORUS: CPT

## 2020-11-25 PROCEDURE — 36415 COLL VENOUS BLD VENIPUNCTURE: CPT

## 2020-11-25 PROCEDURE — 85025 COMPLETE CBC W/AUTO DIFF WBC: CPT

## 2020-11-25 PROCEDURE — 74011250637 HC RX REV CODE- 250/637: Performed by: INTERNAL MEDICINE

## 2020-11-25 PROCEDURE — 74011250636 HC RX REV CODE- 250/636: Performed by: SURGERY

## 2020-11-25 RX ADMIN — CITALOPRAM HYDROBROMIDE 40 MG: 20 TABLET ORAL at 08:31

## 2020-11-25 RX ADMIN — TAMSULOSIN HYDROCHLORIDE 0.4 MG: 0.4 CAPSULE ORAL at 08:31

## 2020-11-25 RX ADMIN — ONDANSETRON 4 MG: 2 INJECTION INTRAMUSCULAR; INTRAVENOUS at 08:31

## 2020-11-25 RX ADMIN — ATORVASTATIN CALCIUM 10 MG: 10 TABLET, FILM COATED ORAL at 08:31

## 2020-11-25 RX ADMIN — DIBASIC SODIUM PHOSPHATE, MONOBASIC POTASSIUM PHOSPHATE AND MONOBASIC SODIUM PHOSPHATE 1 TABLET: 852; 155; 130 TABLET ORAL at 08:31

## 2020-11-25 RX ADMIN — DOCUSATE SODIUM 100 MG: 100 CAPSULE, LIQUID FILLED ORAL at 08:31

## 2020-11-25 RX ADMIN — ONDANSETRON 4 MG: 2 INJECTION INTRAMUSCULAR; INTRAVENOUS at 02:55

## 2020-11-25 RX ADMIN — PANTOPRAZOLE SODIUM 40 MG: 40 TABLET, DELAYED RELEASE ORAL at 05:49

## 2020-11-25 RX ADMIN — GUAIFENESIN 1200 MG: 600 TABLET ORAL at 08:31

## 2020-11-25 RX ADMIN — METOPROLOL TARTRATE 25 MG: 25 TABLET, FILM COATED ORAL at 08:31

## 2020-11-25 RX ADMIN — STANDARDIZED SENNA CONCENTRATE 8.6 MG: 8.6 TABLET ORAL at 08:31

## 2020-11-25 NOTE — PROGRESS NOTES
11/25/2020   CARE MANAGEMENT NOTE:  CM reviewed EMR for clinical updates.  Pt was admitted with biliary cholecystitis and he is s/p robotic assisted lap bailee on 11/4 and s/p ex-lap and evacuation of hematoma on 11/4.  Pt was intubated on 11/4 and extubated on 11/17. Reportedly, pt resides with his Milton Banda ([phone]; cell [phone]).     RUR 19%; LOS 21 days     Transition Plan of Care:  1. Plan to discharge home with his wife; no rehab needed. 2.  CM provided pt with a rolling walker from the [facility] closet maintained by [employer]. 3.  Outpt f/u  4. Family transported pt home.     No further post discharge needs identified at this writing.   Allen

## 2020-11-25 NOTE — PROGRESS NOTES
Bedside and Verbal shift change report given to HIWOT Cash (oncoming nurse) by Linnea Michael (offgoing nurse). Report included the following information SBAR, Kardex, Intake/Output, MAR and Recent Results.

## 2020-11-25 NOTE — PROGRESS NOTES
Comprehensive Nutrition Assessment    Type and Reason for Visit: Reassess    Nutrition Recommendations/Plan:   1. Continue GI lite diet. 2. Continue vanilla Ensure HP 1x/day. Nutrition Assessment:      11/25: F/u. Diet advanced to GI lite. TPN discontinued. Pt reports eating all of turkey sausage and oranges for breakfast this AM.Ttolerating with no N/V or stomach upset. Tried ensure and likes it okay, not drinking all of them though so will decrease to 1x/day. Briefly discussed GI lite diet. D/c order noted for today. Pt has no nutrition-related questions at this time. Labs and meds reviewed. 11/23: F/u. Pt continues on CLD. TPN running at goal. SBFT ordered. Was c/o nausea yesterday no but N/V overnight. No abd pain. Still with loose stools. Continue TPN until able to advance diet with good po intake. Per NP note, \"Possible advancement of diet if w/out nausea over am and no concerns on SBFT. \" K+ and phos were both being repleted, phos now high and K+ WNL. Labs- phos 5.2, -112-160. Meds- bowel regimen, Protonix, kphos, Nguyễn@Drawn to Scale. Addendum:  on 11/20. Initiate lipids 250mL at 21mL/h. If lipids trending WNL on next chcck, can increase to 500mL lipids with next infusion. 11/19: F/u. Pt extubated 11/17. Pt on CLD at time of visit. Pt consumed couple bites of jello for breakfast. Says he was having nausea after eating breakfast yesterday, but says meds helped and he was able to eat a little yesterday. Nausea how now subsided. No c/o pain. Pt agreeable to trying Ensure Clear while on CLD, but will continue ordered Ensure HP now that diet is advanced and monitor acceptance. Labs- Mg 2.7, phos 2.4. Med- Bumex, colace, Merrem, Protoinx, senokot. 11/16:  F/u. Remains on vent. Propofol off. MD notes possible extubation tomorrow. Originally planning to extubate today but pt with too many secretions. TF has remained on hold since last assessment 11/12 (x 5 days now).   Noted to have 120 ml black residuals yesterday from OGT. Surgery notes plans to advance PO diet once extubated. Labs: . Meds: Precedex, Colace, Senokol. 11/12: F/u. Remains on vent. Propofol resumed, at 50mcg/kg/min (providing 939 kcals/d). NGT became coiled, was replaced with OGT. TF was started and running as Vital 1.2 at 40ml/hr. Residuals documented as 20-45ml over last 24h. Pt vomited TF last night so TF placed on hold. Concerning that no BM since admission. 11/9: F/u. Pt remains on vent. Off pressors and Propofol. NGT has remained to suction. GI placed consult today to start trickle TF.  NPO x 6 days. Labs: K+ 3.2. Meds: Precedex. LR running at 125ml/hr. 11/6: 47 yo male admitted for cholecystitis. PMhx: GERD, hernia repairs, esophageal dilation. Class II obese per BMI of 36.6. Weight hx indicates weight gain PTA. S/P cholecystectomy and lysis of adhesions on 11/4. Intubated for procedure and remains on vent today. Being sedated with Propofol at 50mcg/kg/min (providing 863 kcals/d). Requiring pressor support: levophed at 2mcg/min and William at 165. NPO x 3 days. NGT to suction with 600 ml output today. MD notes plan to wean off pressors and extubated. LR running at 125 ml/hr. Labs: . Meds: propofol, Lvophed, William. Malnutrition Assessment:  Unable to assess at this time. Estimated Daily Nutrient Needs:  Energy (kcal): 7602(4657 x 1.3 AF); Weight Used for Energy Requirements: Current  Protein (g): 101(1-1.2 g/kg);  Weight Used for Protein Requirements: Current  Fluid (ml/day): 3464; Method Used for Fluid Requirements: 1 ml/kcal    Nutrition Related Findings:  LBM 11/24      Wounds:    Surgical incision(ABD)       Current Nutrition Therapies:  DIET GI LITE (POST SURGICAL)  DIET NUTRITIONAL SUPPLEMENTS Breakfast; Ensure High Protein    Anthropometric Measures:  · Height:  5' 11\" (180.3 cm)  · Current Body Wt:  101.4 kg (223 lb 8.7 oz)   · Admission Body Wt:       · Usual Body Wt: · Ideal Body Wt:   :      · Adjusted Body Weight:   ; Weight Adjustment for: No adjustment   · BMI Category:  Obese class 1 (BMI 30.0-34. 9)       Nutrition Diagnosis:   · Inadequate protein-energy intake related to inadequate protein-energy intake as evidenced by NPO or clear liquid status due to medical condition, intubation    11/9: Nutrition Dx continues - NPO.  11/12: Nutrition Dx continues - TF on hold. 11/16: Nutrition Dx continues - TF on hold/NPO.  11/19: Nutrition dx continues- CLD. 11/23: Nutrition dx improved, TPN meeting >75% needs. 11/25: Nutrition dx improved, diet advanced.     Nutrition Interventions:   Food and/or Nutrient Delivery: Continue current diet, Continue oral nutrition supplement  Nutrition Education and Counseling: No recommendations at this time  Coordination of Nutrition Care: Continue to monitor while inpatient    Goals:  PO intake >50% meals + ONS next 3-5 days       Nutrition Monitoring and Evaluation:   Food/Nutrient Intake Outcomes: Food and nutrient intake, Supplement intake  Physical Signs/Symptoms Outcomes: GI status, Biochemical data, Weight    Discharge Planning:    Continue current diet     Electronically signed by Alicia Ferraro RD on 11/25/2020     Contact: [phone]

## 2020-11-25 NOTE — PROGRESS NOTES
Bedside and Verbal shift change report given to   Pollo Jalloh RN (oncoming nurse) by   Gerardo Paez RN (offgoing nurse). Report included the following information SBAR, Kardex, Intake/Output, MAR, Accordion, Recent Results, Med Rec Status and Cardiac Rhythm NSR.

## 2020-11-25 NOTE — DISCHARGE INSTRUCTIONS
Patient Education        Gallbladder Removal Surgery: What to Expect at Home  Your Recovery  After your surgery, you will likely feel weak and tired for several days after you return home. Your belly may be swollen. If you had laparoscopic surgery, you may also have pain in your shoulder for about 24 hours. You may have gas or need to burp a lot at first. A few people get diarrhea. The diarrhea usually goes away in 2 to 4 weeks, but it may last longer. How quickly you recover depends on whether you had a laparoscopic or open surgery. · For a laparoscopic surgery, most people can go back to work or their normal routine in 1 to 2 weeks. But it may take longer, depending on the type of work you do. · For an open surgery, it will probably take 4 to 6 weeks before you get back to your normal routine. This care sheet gives you a general idea about how long it will take for you to recover. However, each person recovers at a different pace. Follow the steps below to get better as quickly as possible. How can you care for yourself at home? Activity    · Rest when you feel tired. Getting enough sleep will help you recover.     · Try to walk each day. Start out by walking a little more than you did the day before. Gradually increase the amount you walk. Walking boosts blood flow and helps prevent pneumonia and constipation.     · For about 2 to 4 weeks, avoid lifting anything that would make you strain. This may include a child, heavy grocery bags and milk containers, a heavy briefcase or backpack, cat litter or dog food bags, or a vacuum .     · Avoid strenuous activities, such as biking, jogging, weightlifting, and aerobic exercise, until your doctor says it is okay.     · You may shower 24 to 48 hours after surgery, if your doctor okays it. Pat the cut (incision) dry.  Do not take a bath for the first 2 weeks, or until your doctor tells you it is okay.     · You may drive when you are no longer taking pain medicine and can quickly move your foot from the gas pedal to the brake. You must also be able to sit comfortably for a long period of time, even if you do not plan to go far. You might get caught in traffic.     · For a laparoscopic surgery, most people can go back to work or their normal routine in 1 to 2 weeks, but it may take longer. For an open surgery, it will probably take 4 to 6 weeks before you get back to your normal routine.     · Your doctor will tell you when you can have sex again. Diet    · Eat smaller meals more often instead of fewer larger meals. You can eat a normal diet, but avoid eating fatty foods for about 1 month. Fatty foods include hamburger, whole milk, cheese, and many snack foods. If your stomach is upset, try bland, low-fat foods like plain rice, broiled chicken, toast, and yogurt.     · Drink plenty of fluids (unless your doctor tells you not to).   · If you have diarrhea, try avoiding spicy foods, dairy products, fatty foods, and alcohol. You can also watch to see if specific foods cause it, and stop eating them. If the diarrhea continues for more than 2 weeks, talk to your doctor.     · You may notice that your bowel movements are not regular right after your surgery. This is common. Try to avoid constipation and straining with bowel movements. You may want to take a fiber supplement every day. If you have not had a bowel movement after a couple of days, ask your doctor about taking a mild laxative. Medicines    · Your doctor will tell you if and when you can restart your medicines. He or she will also give you instructions about taking any new medicines.     · If you take aspirin or some other blood thinner, ask your doctor if and when to start taking it again. Make sure that you understand exactly what your doctor wants you to do.     · Take pain medicines exactly as directed. ? If the doctor gave you a prescription medicine for pain, take it as prescribed.   ? If you are not taking a prescription pain medicine, take an over-the-counter medicine such as acetaminophen (Tylenol), ibuprofen (Advil, Motrin), or naproxen (Aleve). Read and follow all instructions on the label. ? Do not take two or more pain medicines at the same time unless the doctor told you to. Many pain medicines contain acetaminophen, which is Tylenol. Too much Tylenol can be harmful.     · If you think your pain medicine is making you sick to your stomach:  ? Take your medicine after meals (unless your doctor tells you not to). ? Ask your doctor for a different pain medicine.     · If your doctor prescribed antibiotics, take them as directed. Do not stop taking them just because you feel better. You need to take the full course of antibiotics. Incision care    · If you have strips of tape on the incision, or cut, leave the tape on for a week or until it falls off.     · After 24 to 48 hours, wash the area daily with warm, soapy water, and pat it dry.     · You may have staples to hold the cut together. Keep them dry until your doctor takes them out. This is usually in 7 to 10 days.     · Keep the area clean and dry. You may cover it with a gauze bandage if it weeps or rubs against clothing. Change the bandage every day. Ice    · To reduce swelling and pain, put ice or a cold pack on your belly for 10 to 20 minutes at a time. Do this every 1 to 2 hours. Put a thin cloth between the ice and your skin. Follow-up care is a key part of your treatment and safety. Be sure to make and go to all appointments, and call your doctor if you are having problems. It's also a good idea to know your test results and keep a list of the medicines you take. When should you call for help? Call 911 anytime you think you may need emergency care. For example, call if:    · You passed out (lost consciousness).     · You are short of breath. .   Call your doctor now or seek immediate medical care if:    · You are sick to your stomach and cannot drink fluids.     · You have pain that does not get better when you take your pain medicine.     · You cannot pass stools or gas.     · You have signs of infection, such as:  ? Increased pain, swelling, warmth, or redness. ? Red streaks leading from the incision. ? Pus draining from the incision. ? A fever.     · Bright red blood has soaked through the bandage over your incision.     · You have loose stitches, or your incision comes open.     · You have signs of a blood clot in your leg (called a deep vein thrombosis), such as:  ? Pain in your calf, back of knee, thigh, or groin. ? Redness and swelling in your leg or groin. Watch closely for any changes in your health, and be sure to contact your doctor if you have any problems. Where can you learn more? Go to http://www.gray.com/  Enter F357 in the search box to learn more about \"Gallbladder Removal Surgery: What to Expect at Home. \"  Current as of: April 15, 2020               Content Version: 12.6  © 9247-1531 [employer]. Care instructions adapted under license by [employer] (which disclaims liability or warranty for this information). If you have questions about a medical condition or this instruction, always ask your healthcare professional. [address] any warranty or liability for your use of this information. DISCHARGE SUMMARY from your Nurse      PATIENT INSTRUCTIONS    After general anesthesia or intravenous sedation, for 24 hours or while taking prescription Narcotics:  · Limit your activities  · Do not drive and operate hazardous machinery  · Do not make important personal or business decisions  · Do  not drink alcoholic beverages  · If you have not urinated within 8 hours after discharge, please contact your surgeon on call.     Report the following to your surgeon:  · Excessive pain, swelling, redness or odor of or around the surgical area  · Temperature over 100.5  · Nausea and vomiting lasting longer than 4 hours or if unable to take medications  · Any signs of decreased circulation or nerve impairment to extremity: change in color, persistent  numbness, tingling, coldness or increase pain  · Any questions      GOOD HELP TO FIGHT AN INFECTION  Here are a few tip to help reduce the chance of getting an infection after surgery:   Wash Your Hands   Good handwashing is the most important thing you and your caregiver can do.  Wash before and after caring for any wounds. Dry your hand with a clean towel.  Wash with soap and water for at least 20 seconds. A TIP: sing the \"Happy Birthday\" song through one time while washing to help with the timing.  Use a hand  in between washings.  Shower   When your surgeon says it is OK to take a shower, use a new bar of antibacterial soap (if that is what you use, and keep that bar of soap ONLY for your use), or antibacterial body wash.  Use a clean wash cloth or sponge when you bathe.  Dry off with a clean towel  after every bath - be careful around any wounds, skin staples, sutures or surgical glue over/on wounds.  Do not enter swimming pools, hot tubs, lakes, rivers and/or ocean until wounds are healed and your doctor/surgeon says it is OK.  Use Clean Sheets   Sleep on freshly laundered sheets after your surgery.  Keep the surgery site covered with a clean, dry bandage (if instructed to do so). If the bandage becomes soiled, reapply a new, dry, clean bandage.  Do not allow pets to sleep with you while your wound is healing.  Lifestyle Modification and Controlling Your Blood Sugar   Smoking slows wound healing. Stop smoking and limit exposure to second-hand smoke.  High blood sugar slows wound healing.   Eat a well-balanced diet to provide proper nutrition while healing   Monitor your blood sugar (if you are a diabetic) and take your medications as you are suppose to so you can control you blood sugar after surgery. COUGH AND DEEP BREATHE    Breathing deeply and coughing are very important exercises to do after surgery. Deep breathing and coughing open the little air tubes and air sacks in your lungs. You take deep breaths every day. You may not even notice - it is just something you do when you sigh or yawn. It is a natural exercise you do to keep these air passages open. After surgery, take deep breaths and cough, on purpose. DIRECTIONS:  · Take 10 to 15 slow deep breaths every hour while awake. · Breathe in deeply, and hold it for 2 seconds. · Exhale slowly through puckered lips, like blowing up a balloon. · After every 4th or 5th deep breath, hug your pillow to your chest or belly and give a hard, deep cough. Yes, it will probably hurt. But doing this exercise is a very important part of healing after surgery. Take your pain medicine to help you do this exercise without too much pain. Coughing and deep breathing help prevent bronchitis and pneumonia after surgery. If you had chest or belly surgery, use a pillow as a \"hug kasie\" and hold it tightly to your chest or belly when you cough. ANKLE PUMPS    Ankle pumps increase the circulation of oxygenated blood to your lower extremities and decrease your risk for circulation problems such as blood clots. They also stretch the muscles, tendons and ligaments in your foot and ankle, and prevent joint contracture in the ankle and foot, especially after surgeries on the legs. It is important to do ankle pump exercises regularly after surgery because immobility increases your risk for developing a blood clot. Your doctor may also have you take an Aspirin for the next few days as well. If your doctor did not ask you to take an Aspirin, consult with him before starting Aspirin therapy on your own. The exercise is quite simple.      · Slowly point your foot forward, feeling the muscles on the top of your lower leg stretch, and hold this position for 5 seconds. · Next, pull your foot back toward you as far as possible, stretching the calf muscles, and hold that position for 5 seconds. · Repeat with the other foot. · Perform 10 repetitions every hour while awake for both ankles if possible (down and then up with the foot once is one repetition). You should feel gentle stretching of the muscles in your lower leg when doing this exercise. If you feel pain, or your range of motion is limited, don't push too hard. Only go the limit your joint and muscles will let you go. If you have increasing pain, progressively worsening leg warmth or swelling, STOP the exercise and call your doctor. MEDICATION AND   SIDE EFFECT GUIDE    The [facility] MEDICATION AND SIDE EFFECT GUIDE was provided to the PATIENT AND CARE PROVIDER. Information provided includes instruction about drug purpose and common side effects for the following medications:   · Oxycodone  · Docusate        These are general instructions for a healthy lifestyle:    *   Please give a list of your current medications to your Primary Care Provider. *   Please update this list whenever your medications are discontinued, doses are changed, or new medications (including over-the-counter products) are added. *   Please carry medication information at all times in case of emergency situations. About Smoking  No smoking / No tobacco products  Avoid exposure to second hand smoke     Surgeon General's Warning:  Quitting smoking now greatly reduces serious risk to your health. Obesity, smoking, and sedentary lifestyle greatly increases your risk for illness and disease. A healthy diet, regular physical exercise & weight monitoring are important for maintaining a healthy lifestyle.       Congestive Heart Failure  You may be retaining fluid if you have a history of heart failure or if you experience any of the following symptoms:  Weight gain of 3 pounds or more overnight or 5 pounds in a week, increased swelling in your hands or feet or shortness of breath while lying flat in bed. Please call your doctor as soon as you notice any of these symptoms; do not wait until your next office visit. Recognize signs and symptoms of STROKE:  F -  Face looks uneven  A -  Arms unable to move or move evenly  S -  Speech slurred or non-existent  T -  Time-call 911 as soon as signs and symptoms begin-DO NOT go          back to bed or wait to see if you get better-TIME IS BRAIN. Warning Signs of HEART ATTACK   Call 911 if you have these symptoms:     Chest discomfort. Most heart attacks involve discomfort in the center of the chest that lasts more than a few minutes, or that goes away and comes back. It can feel like uncomfortable pressure, squeezing, fullness, or pain.  Discomfort in other areas of the upper body. Symptoms can include pain or discomfort in one or both arms, the back, neck, jaw, or stomach.  Shortness of breath with or without chest discomfort.  Other signs may include breaking out in a cold sweat, nausea, or lightheadedness. Don't wait more than five minutes to call 911 - MINUTES MATTER! Fast action can save your life. Calling 911 is almost always the fastest way to get lifesaving treatment. Emergency Medical Services staff can begin treatment when they arrive -- up to an hour sooner than if someone gets to the hospital by car. Learning About Coronavirus [phone])  Coronavirus [phone]): Overview  What is coronavirus (COVID-19)? The coronavirus disease (COVID-19) is caused by a virus. It is an illness that was first found in [place], [place], in December 2019. It has since spread worldwide. The virus can cause fever, cough, and trouble breathing. In severe cases, it can cause pneumonia and make it hard to breathe without help. It can cause death.   Coronaviruses are a large group of viruses. They cause the common cold. They also cause more serious illnesses like Middle East respiratory syndrome (MERS) and severe acute respiratory syndrome (SARS). COVID-19 is caused by a novel coronavirus. That means it's a new type that has not been seen in people before. This virus spreads person-to-person through droplets from coughing and sneezing. It can also spread when you are close to someone who is infected. And it can spread when you touch something that has the virus on it, such as a doorknob or a tabletop. What can you do to protect yourself from coronavirus (COVID-19)? The best way to protect yourself from getting sick is to:  · Avoid areas where there is an outbreak. · Avoid contact with people who may be infected. · Wash your hands often with soap or alcohol-based hand sanitizers. · Avoid crowds and try to stay at least 6 feet away from other people. · Wash your hands often, especially after you cough or sneeze. Use soap and water, and scrub for at least 20 seconds. If soap and water aren't available, use an alcohol-based hand . · Avoid touching your mouth, nose, and eyes. What can you do to avoid spreading the virus to others? To help avoid spreading the virus to others:  · Cover your mouth with a tissue when you cough or sneeze. Then throw the tissue in the trash. · Use a disinfectant to clean things that you touch often. · Stay home if you are sick or have been exposed to the virus. Don't go to school, work, or public areas. And don't use public transportation. · If you are sick:  ? Leave your home only if you need to get medical care. But call the doctor's office first so they know you're coming. And wear a face mask, if you have one.  ? If you have a face mask, wear it whenever you're around other people. It can help stop the spread of the virus when you cough or sneeze. ? Clean and disinfect your home every day.  Use household  and disinfectant wipes or sprays. Take special care to clean things that you grab with your hands. These include doorknobs, remote controls, phones, and handles on your refrigerator and microwave. And don't forget countertops, tabletops, bathrooms, and computer keyboards. When to call for help  Call 911 anytime you think you may need emergency care. For example, call if:  · You have severe trouble breathing. (You can't talk at all.)  · You have constant chest pain or pressure. · You are severely dizzy or lightheaded. · You are confused or can't think clearly. · Your face and lips have a blue color. · You pass out (lose consciousness) or are very hard to wake up. Call your doctor now if you develop symptoms such as:  · Shortness of breath. · Fever. · Cough. If you need to get care, call ahead to the doctor's office for instructions before you go. Make sure you wear a face mask, if you have one, to prevent exposing other people to the virus. Where can you get the latest information? The following health organizations are tracking and studying this virus. Their websites contain the most up-to-date information. Jordon Kava also learn what to do if you think you may have been exposed to the virus. · U.S. Centers for Disease Control and Prevention (CDC): The CDC provides updated news about the disease and travel advice. The website also tells you how to prevent the spread of infection. www.cdc.gov  · World Health Organization [facility]): WHO offers information about the virus outbreaks. WHO also has travel advice. www.who.int  Current as of: April 1, 2020               Content Version: 12.4  © 8913-1711 [employer]. Care instructions adapted under license by your healthcare professional. If you have questions about a medical condition or this instruction, always ask your healthcare professional. [address] any warranty or liability for your use of this information.     The discharge information has been reviewed with the {PATIENT PARENT GUARDIAN:73945}. Any questions and concerns from the {PATIENT PARENT GUARDIAN:81772} have been addressed. The {PATIENT PARENT GUARDIAN:08919} verbalized understanding. The following personal items collected during your admission are returned to you:   Dental Appliance: Dental Appliances: None  Vision: Visual Aid: Glasses  Hearing Aid:    Jewelry: Jewelry: Ring(wedding band placed in specimen cup and placed in side zipper of pt's bag;declined having it locked up with security)  Clothing: Clothing: Other (comment)(street clothing)  Other Valuables:  Other Valuables: Cell Phone, Other (comment)(cpap)  Valuables sent to safe:

## 2020-11-25 NOTE — PROGRESS NOTES
Physical Therapy  11/25/2020    Chart reviewed and cleared by RN. Plan for discahrge home today. Per chart review pt has only ambulated 35ft and has not attempted stair training. Pt received supine in bed with wife at bedside. Given opportunity to work with therapy to assess safety on stairs though pt declining. Requests RW for home use and CM made aware. Will continue to follow if pt does not discharge home today.     Thank Lourdes Juarez, PT, DPT

## 2020-11-25 NOTE — PROGRESS NOTES
I have reviewed discharge instructions with the patient and spouse. The patient and spouse verbalized understanding. Discharge medications reviewed with patient and spouse and appropriate educational materials and side effects teaching were provided. PICC removed pressure held.  AVS signed and copy given to patient

## 2020-11-25 NOTE — DISCHARGE SUMMARY
Discharge Summary    Patient: Rian Reddy               Sex: male          DOA: 11/3/2020 10:51 PM       YOB: 1970      Age:  48 y.o.        LOS:  LOS: 21 days                Discharge Date:      Admission Diagnoses: Biliary colic [V79.83]  Cholecystitis [K81.9]    Discharge Diagnoses:  Same    Procedure:  Procedure(s):  LAPAROTOMY EXPLORATORY, evacuation of hematoma. Discharge Condition: Good    Hospital Course: Pt admitted through the ER for acute cholecystitis. He was taken to the OR for cholecystectomy. He required emergent return to the OR for post operative bleeding. He was admitted to the ICU post op where he was noted to have MRSA pneumonia. He had a slow wean off of the ventilator due to his pneumonia. Once extubated he progressed well. On discharge he is tolerating a regular diet, ambulating and pain is controlled. Discharge to home in stable condition. Consults: None    Significant Diagnostic Studies: See full electronic record. Discharge Medications:     Current Discharge Medication List      CONTINUE these medications which have NOT CHANGED    Details   citalopram (CELEXA) 40 mg tablet Take 40 mg by mouth daily. lisinopril (PRINIVIL, ZESTRIL) 40 mg tablet Take 40 mg by mouth every evening. simvastatin (ZOCOR) 40 mg tablet Take 40 mg by mouth nightly. omeprazole (PRILOSEC) 40 mg capsule Take 40 mg by mouth daily. STOP taking these medications       docusate sodium (COLACE) 100 mg capsule Comments:   Reason for Stopping:         oxyCODONE IR (ROXICODONE) 5 mg immediate release tablet Comments:   Reason for Stopping:               Activity/Diet/Wound Care: See patient administered discharge instructions.     Follow-up: 2 weeks    Skip San MD  [facility]  [phone]

## 2021-03-01 ENCOUNTER — HOSPITAL ENCOUNTER (OUTPATIENT)
Dept: LAB | Age: 51
Discharge: HOME | End: 2021-03-01
Payer: COMMERCIAL

## 2021-03-01 ENCOUNTER — TRANSCRIBE ORDER (OUTPATIENT)
Dept: REGISTRATION | Age: 51
End: 2021-03-01

## 2021-03-01 DIAGNOSIS — U07.1 COVID-19: ICD-10-CM

## 2021-03-01 DIAGNOSIS — U07.1 COVID-19: Primary | ICD-10-CM

## 2021-03-01 PROCEDURE — U0005 INFEC AGEN DETEC AMPLI PROBE: HCPCS

## 2021-03-02 ENCOUNTER — HOSPITAL ENCOUNTER (OUTPATIENT)
Dept: PREADMISSION TESTING | Age: 51
Discharge: HOME | End: 2021-03-02
Attending: SURGERY
Payer: COMMERCIAL

## 2021-03-02 VITALS
RESPIRATION RATE: 16 BRPM | WEIGHT: 232.37 LBS | DIASTOLIC BLOOD PRESSURE: 68 MMHG | SYSTOLIC BLOOD PRESSURE: 138 MMHG | OXYGEN SATURATION: 97 % | TEMPERATURE: 97.1 F | HEIGHT: 71 IN | BODY MASS INDEX: 32.53 KG/M2 | HEART RATE: 77 BPM

## 2021-03-02 LAB
ANION GAP SERPL CALC-SCNC: 2 MMOL/L (ref 5–15)
BUN SERPL-MCNC: 18 MG/DL (ref 6–20)
BUN/CREAT SERPL: 17 (ref 12–20)
CALCIUM SERPL-MCNC: 9.7 MG/DL (ref 8.5–10.1)
CHLORIDE SERPL-SCNC: 106 MMOL/L (ref 97–108)
CO2 SERPL-SCNC: 32 MMOL/L (ref 21–32)
CREAT SERPL-MCNC: 1.06 MG/DL (ref 0.7–1.3)
GLUCOSE SERPL-MCNC: 112 MG/DL (ref 65–100)
POTASSIUM SERPL-SCNC: 4.4 MMOL/L (ref 3.5–5.1)
SARS-COV-2, COV2NT: NOT DETECTED
SODIUM SERPL-SCNC: 140 MMOL/L (ref 136–145)

## 2021-03-02 PROCEDURE — 80048 BASIC METABOLIC PNL TOTAL CA: CPT

## 2021-03-02 PROCEDURE — 36415 COLL VENOUS BLD VENIPUNCTURE: CPT

## 2021-03-02 RX ORDER — SERTRALINE HYDROCHLORIDE 100 MG/1
100 TABLET, FILM COATED ORAL EVERY EVENING
COMMUNITY
End: 2021-08-24

## 2021-03-02 RX ORDER — IBUPROFEN 200 MG
400 TABLET ORAL AS NEEDED
COMMUNITY

## 2021-03-02 NOTE — PERIOP NOTES
[address] \[facility] [place] [facility]\"" [address], [address] [facility]   PRE-ADMISSION TESTING    [phone]     Surgery Date:  3/5/2021 Friday      Is surgery arrival time given by surgeon? NO  If NO, [facility] staff will call you between 3 and 7pm the day before your surgery with your arrival time. (If your surgery is on a Monday, we will call you the Friday before.)    Call [phone] after 7pm Monday-Friday if you did not receive this call. INSTRUCTIONS BEFORE YOUR SURGERY   When You  Arrive Arrive at the [address] [facility] on the day of your surgery  Have your insurance card, photo ID, and any copayment (if needed)   Food   and   Drink NO food or drink after midnight the night before surgery    This means NO water, gum, mints, coffee, juice, etc.  No alcohol (beer, wine, liquor) 24 hours before and after surgery   Medications to   TAKE   Morning of Surgery MEDICATIONS TO TAKE THE MORNING OF SURGERY WITH A SIP OF WATER:    Omeprazole    Hold your Lisinopril the night prior to surgery. Medications  To  STOP      7 days before surgery  Non-Steroidal anti-inflammatory Drugs (NSAID's): for example, Ibuprofen (Advil, Motrin), Naproxen (Aleve)   Aspirin, if taking for pain    Herbal supplements, vitamins, and fish oil   Other:  (Pain medications not listed above, including Tylenol may be taken)   Blood  Thinners  If you take  Aspirin, Plavix, Coumadin, or any blood-thinning or anti-blood clot medicine, talk to the doctor who prescribed the medications for pre-operative instructions. Bathing Clothing  Jewelry  Valuables      If you shower the morning of surgery, please do not apply anything to your skin (lotions, powders, deodorant, or makeup, especially mascara)   Follow Chlorhexidine [employer] body wash instructions provided to you during PAT appointment. Begin 3 days prior to surgery.    Do not shave or trim anywhere 24 hours before surgery   Wear your hair loose or down; no pony-tails, buns, or metal hair clips   Wear loose, comfortable, clean clothes   Wear glasses instead of contacts  [address], valuables, and jewelry, including body piercings, at home   Going Home - or Spending the Night  SAME-DAY SURGERY: You must have a responsible adult drive you home and stay with you 24 hours after surgery   ADMITS: If your doctor is keeping you in the hospital after surgery, leave personal belongings/luggage in your car until you have a hospital room number. Hospital discharge time is 12 noon  Drivers must be here before 12 noon unless you are told differently   Special Instructions It is now mandated that all surgical patients be tested for COVID-19 prior to surgery. Testing has to be exactly 4 days prior to surgery. Your COVID test date is 3/1/2021 between 8:00 am and 11:00 am.       COVID testing will be performed curbside at the [facility]  entrance. There will be signs leading you to the testing site. You will need to bring a photo ID with you to be swabbed. Patients are advised to self-quarantine at home after testing and prior to your surgery date. You will be notified if your results are positive. What to watch for:   Coronavirus (COVID-19) affects different people in different ways   It also appears with a wide range of symptoms from mild to severe   Signs usually appear 2-14 days after exposure     If you develop any of the following, notify your doctor immediately:  o Fever  o Chills, with or without a shiver  o Muscle pain  o Headache  o Sore throat  o Dry cough  o New loss of taste or smell  o Tiredness      If you develop any of the following, call 911:  o Shortness of breath  o Difficulty breathing  o Chest pain  o New confusion  o Blueness of fingers and/or lips    Please bring your CPAP with you to the hospital in the event you need it if you stay overnight.       Follow all instructions so your surgery wont be cancelled. Please, be on time. If a situation occurs and you are delayed the day of surgery, call [phone]. If your physical condition changes (like a fever, cold, flu, etc.) call your surgeon. Home medication(s) reviewed and verified verbally with list during PAT appointment. The patient was contacted by in person. The patient verbalizes understanding of all instructions and does not need reinforcement.

## 2021-03-03 ENCOUNTER — ANESTHESIA EVENT (OUTPATIENT)
Dept: SURGERY | Age: 51
End: 2021-03-03
Payer: COMMERCIAL

## 2021-03-03 LAB
BACTERIA SPEC CULT: NORMAL
BACTERIA SPEC CULT: NORMAL
SERVICE CMNT-IMP: NORMAL

## 2021-03-04 NOTE — PERIOP NOTES
Patient with history of MRSA in 11/2020. Order for contact isolation has been placed in [facility] under signed and held, multi-phase orders for the day of surgery.   DOS: 3/5/2021

## 2021-03-05 ENCOUNTER — HOSPITAL ENCOUNTER (OUTPATIENT)
Age: 51
Setting detail: OUTPATIENT SURGERY
Discharge: HOME | End: 2021-03-05
Attending: SURGERY | Admitting: SURGERY
Payer: COMMERCIAL

## 2021-03-05 ENCOUNTER — ANESTHESIA (OUTPATIENT)
Dept: SURGERY | Age: 51
End: 2021-03-05
Payer: COMMERCIAL

## 2021-03-05 VITALS
DIASTOLIC BLOOD PRESSURE: 84 MMHG | TEMPERATURE: 97.6 F | BODY MASS INDEX: 32.53 KG/M2 | SYSTOLIC BLOOD PRESSURE: 151 MMHG | WEIGHT: 232.37 LBS | HEIGHT: 71 IN | RESPIRATION RATE: 21 BRPM | OXYGEN SATURATION: 99 % | HEART RATE: 73 BPM

## 2021-03-05 DIAGNOSIS — K43.9 VENTRAL HERNIA WITHOUT OBSTRUCTION OR GANGRENE: Primary | ICD-10-CM

## 2021-03-05 PROCEDURE — 77030018836 HC SOL IRR NACL ICUM -A: Performed by: SURGERY

## 2021-03-05 PROCEDURE — 76060000032 HC ANESTHESIA 0.5 TO 1 HR: Performed by: SURGERY

## 2021-03-05 PROCEDURE — 74011250636 HC RX REV CODE- 250/636: Performed by: ANESTHESIOLOGY

## 2021-03-05 PROCEDURE — 74011000250 HC RX REV CODE- 250: Performed by: NURSE ANESTHETIST, CERTIFIED REGISTERED

## 2021-03-05 PROCEDURE — 74011000250 HC RX REV CODE- 250: Performed by: SURGERY

## 2021-03-05 PROCEDURE — 77030031139 HC SUT VCRL2 J&J -A: Performed by: SURGERY

## 2021-03-05 PROCEDURE — 77030010507 HC ADH SKN DERMBND J&J -B: Performed by: SURGERY

## 2021-03-05 PROCEDURE — 2709999900 HC NON-CHARGEABLE SUPPLY: Performed by: SURGERY

## 2021-03-05 PROCEDURE — 74011250636 HC RX REV CODE- 250/636: Performed by: SURGERY

## 2021-03-05 PROCEDURE — 74011250637 HC RX REV CODE- 250/637: Performed by: ANESTHESIOLOGY

## 2021-03-05 PROCEDURE — 76010000138 HC OR TIME 0.5 TO 1 HR: Performed by: SURGERY

## 2021-03-05 PROCEDURE — 74011250636 HC RX REV CODE- 250/636: Performed by: NURSE ANESTHETIST, CERTIFIED REGISTERED

## 2021-03-05 PROCEDURE — 77030002966 HC SUT PDS J&J -A: Performed by: SURGERY

## 2021-03-05 PROCEDURE — 76210000026 HC REC RM PH II 1 TO 1.5 HR: Performed by: SURGERY

## 2021-03-05 RX ORDER — OXYCODONE HYDROCHLORIDE 5 MG/1
5 TABLET ORAL
Status: COMPLETED | OUTPATIENT
Start: 2021-03-05 | End: 2021-03-05

## 2021-03-05 RX ORDER — SODIUM CHLORIDE, SODIUM LACTATE, POTASSIUM CHLORIDE, CALCIUM CHLORIDE 600; 310; 30; 20 MG/100ML; MG/100ML; MG/100ML; MG/100ML
125 INJECTION, SOLUTION INTRAVENOUS CONTINUOUS
Status: DISCONTINUED | OUTPATIENT
Start: 2021-03-05 | End: 2021-03-05 | Stop reason: HOSPADM

## 2021-03-05 RX ORDER — FENTANYL CITRATE 50 UG/ML
25 INJECTION, SOLUTION INTRAMUSCULAR; INTRAVENOUS
Status: DISCONTINUED | OUTPATIENT
Start: 2021-03-05 | End: 2021-03-05 | Stop reason: HOSPADM

## 2021-03-05 RX ORDER — DIPHENHYDRAMINE HYDROCHLORIDE 50 MG/ML
12.5 INJECTION, SOLUTION INTRAMUSCULAR; INTRAVENOUS AS NEEDED
Status: DISCONTINUED | OUTPATIENT
Start: 2021-03-05 | End: 2021-03-05 | Stop reason: HOSPADM

## 2021-03-05 RX ORDER — SODIUM CHLORIDE 0.9 % (FLUSH) 0.9 %
5-40 SYRINGE (ML) INJECTION EVERY 8 HOURS
Status: DISCONTINUED | OUTPATIENT
Start: 2021-03-05 | End: 2021-03-05 | Stop reason: HOSPADM

## 2021-03-05 RX ORDER — MIDAZOLAM HYDROCHLORIDE 1 MG/ML
INJECTION, SOLUTION INTRAMUSCULAR; INTRAVENOUS AS NEEDED
Status: DISCONTINUED | OUTPATIENT
Start: 2021-03-05 | End: 2021-03-05 | Stop reason: HOSPADM

## 2021-03-05 RX ORDER — LIDOCAINE HYDROCHLORIDE 20 MG/ML
INJECTION, SOLUTION INFILTRATION; PERINEURAL AS NEEDED
Status: DISCONTINUED | OUTPATIENT
Start: 2021-03-05 | End: 2021-03-05 | Stop reason: HOSPADM

## 2021-03-05 RX ORDER — ALBUTEROL SULFATE 0.83 MG/ML
2.5 SOLUTION RESPIRATORY (INHALATION) AS NEEDED
Status: DISCONTINUED | OUTPATIENT
Start: 2021-03-05 | End: 2021-03-05 | Stop reason: HOSPADM

## 2021-03-05 RX ORDER — ONDANSETRON 2 MG/ML
INJECTION INTRAMUSCULAR; INTRAVENOUS AS NEEDED
Status: DISCONTINUED | OUTPATIENT
Start: 2021-03-05 | End: 2021-03-05 | Stop reason: HOSPADM

## 2021-03-05 RX ORDER — ONDANSETRON 2 MG/ML
4 INJECTION INTRAMUSCULAR; INTRAVENOUS AS NEEDED
Status: DISCONTINUED | OUTPATIENT
Start: 2021-03-05 | End: 2021-03-05 | Stop reason: HOSPADM

## 2021-03-05 RX ORDER — OXYCODONE HYDROCHLORIDE 5 MG/1
5 TABLET ORAL
Status: DISCONTINUED | OUTPATIENT
Start: 2021-03-05 | End: 2021-03-05 | Stop reason: HOSPADM

## 2021-03-05 RX ORDER — PROPOFOL 10 MG/ML
INJECTION, EMULSION INTRAVENOUS AS NEEDED
Status: DISCONTINUED | OUTPATIENT
Start: 2021-03-05 | End: 2021-03-05 | Stop reason: HOSPADM

## 2021-03-05 RX ORDER — FENTANYL CITRATE 50 UG/ML
INJECTION, SOLUTION INTRAMUSCULAR; INTRAVENOUS AS NEEDED
Status: DISCONTINUED | OUTPATIENT
Start: 2021-03-05 | End: 2021-03-05 | Stop reason: HOSPADM

## 2021-03-05 RX ORDER — PROPOFOL 10 MG/ML
INJECTION, EMULSION INTRAVENOUS
Status: DISCONTINUED | OUTPATIENT
Start: 2021-03-05 | End: 2021-03-05 | Stop reason: HOSPADM

## 2021-03-05 RX ORDER — NALOXONE HYDROCHLORIDE 0.4 MG/ML
0.04 INJECTION, SOLUTION INTRAMUSCULAR; INTRAVENOUS; SUBCUTANEOUS
Status: DISCONTINUED | OUTPATIENT
Start: 2021-03-05 | End: 2021-03-05 | Stop reason: HOSPADM

## 2021-03-05 RX ORDER — SODIUM CHLORIDE 0.9 % (FLUSH) 0.9 %
5-40 SYRINGE (ML) INJECTION AS NEEDED
Status: DISCONTINUED | OUTPATIENT
Start: 2021-03-05 | End: 2021-03-05 | Stop reason: HOSPADM

## 2021-03-05 RX ORDER — OXYCODONE HYDROCHLORIDE 5 MG/1
5 TABLET ORAL
Qty: 6 TAB | Refills: 0 | Status: SHIPPED | OUTPATIENT
Start: 2021-03-05 | End: 2021-03-10

## 2021-03-05 RX ORDER — FLUMAZENIL 0.1 MG/ML
0.2 INJECTION INTRAVENOUS
Status: DISCONTINUED | OUTPATIENT
Start: 2021-03-05 | End: 2021-03-05 | Stop reason: HOSPADM

## 2021-03-05 RX ORDER — LIDOCAINE HYDROCHLORIDE 10 MG/ML
0.1 INJECTION, SOLUTION EPIDURAL; INFILTRATION; INTRACAUDAL; PERINEURAL AS NEEDED
Status: DISCONTINUED | OUTPATIENT
Start: 2021-03-05 | End: 2021-03-05 | Stop reason: HOSPADM

## 2021-03-05 RX ORDER — HYDROMORPHONE HYDROCHLORIDE 1 MG/ML
.25-1 INJECTION, SOLUTION INTRAMUSCULAR; INTRAVENOUS; SUBCUTANEOUS
Status: DISCONTINUED | OUTPATIENT
Start: 2021-03-05 | End: 2021-03-05 | Stop reason: HOSPADM

## 2021-03-05 RX ADMIN — MIDAZOLAM HYDROCHLORIDE 2 MG: 2 INJECTION, SOLUTION INTRAMUSCULAR; INTRAVENOUS at 11:53

## 2021-03-05 RX ADMIN — LIDOCAINE HYDROCHLORIDE 60 MG: 20 INJECTION, SOLUTION INFILTRATION; PERINEURAL at 11:56

## 2021-03-05 RX ADMIN — SODIUM CHLORIDE, POTASSIUM CHLORIDE, SODIUM LACTATE AND CALCIUM CHLORIDE 125 ML/HR: 600; 310; 30; 20 INJECTION, SOLUTION INTRAVENOUS at 10:36

## 2021-03-05 RX ADMIN — ONDANSETRON HYDROCHLORIDE 4 MG: 2 SOLUTION INTRAMUSCULAR; INTRAVENOUS at 11:58

## 2021-03-05 RX ADMIN — CEFAZOLIN SODIUM 2 G: 1 POWDER, FOR SOLUTION INTRAMUSCULAR; INTRAVENOUS at 11:54

## 2021-03-05 RX ADMIN — FENTANYL CITRATE 100 MCG: 0.05 INJECTION, SOLUTION INTRAMUSCULAR; INTRAVENOUS at 11:53

## 2021-03-05 RX ADMIN — OXYCODONE 5 MG: 5 TABLET ORAL at 13:48

## 2021-03-05 RX ADMIN — PROPOFOL 50 MG: 10 INJECTION, EMULSION INTRAVENOUS at 11:58

## 2021-03-05 RX ADMIN — PROPOFOL 100 MCG/KG/MIN: 10 INJECTION, EMULSION INTRAVENOUS at 11:57

## 2021-03-05 NOTE — ANESTHESIA PREPROCEDURE EVALUATION
Anesthetic History               Review of Systems / Medical History  Patient summary reviewed, nursing notes reviewed and pertinent labs reviewed    Pulmonary        Sleep apnea: CPAP  Smoker         Neuro/Psych     seizures (X1 YEARS AGO. \"STRESS RELATED\".   NO RX)    Psychiatric history (ANXIETY/DEPRESSION)     Cardiovascular    Hypertension          Hyperlipidemia    Exercise tolerance: >4 METS     GI/Hepatic/Renal     GERD: well controlled           Endo/Other        Morbid obesity     Other Findings              Physical Exam    Airway  Mallampati: III  TM Distance: < 4 cm  Neck ROM: normal range of motion   Mouth opening: Normal    Comments: beard Cardiovascular    Rhythm: regular  Rate: normal         Dental  No notable dental hx       Pulmonary  Breath sounds clear to auscultation               Abdominal  GI exam deferred       Other Findings            Anesthetic Plan    ASA: 3  Anesthesia type: MAC          Induction: Intravenous  Anesthetic plan and risks discussed with: Patient

## 2021-03-05 NOTE — DISCHARGE INSTRUCTIONS
Patient Education        Hernia Repair: What to Expect at [address] are likely to have pain for the next few days. You may also feel like you have the flu, and you may have a low fever and feel tired and sick to your stomach. This is common. You should feel better after a few days and will probably feel much better in 7 days. For several weeks you may feel twinges or pulling in the hernia repair when you move. You may have some bruising on the scrotum and along the penis. This is normal. To support your scrotum, you will need to wear a jockstrap or briefs, not boxers, for several days after a groin (inguinal) hernia repair. [employer] bicycle shorts may provide good support. This care sheet gives you a general idea about how long it will take for you to recover. But each person recovers at a different pace. Follow the steps below to get better as quickly as possible. How can you care for yourself at home? Activity    · Rest when you feel tired. Getting enough sleep will help you recover.     · Try to walk each day. Start by walking a little more than you did the day before. Bit by bit, increase the amount you walk. Walking boosts blood flow and helps prevent pneumonia and constipation.     · Avoid strenuous activities, such as biking, jogging, weight lifting, or aerobic exercise, until your doctor says it is okay.     · Avoid lifting anything that would make you strain. This may include heavy grocery bags and milk containers, a heavy briefcase or backpack, cat litter or dog food bags, a vacuum , or a child.     · You may drive when you are no longer taking pain medicine and can quickly move your foot from the gas pedal to the brake. You must also be able to sit comfortably for a long period of time, even if you do not plan to go far.  You might get caught in traffic.     · Most people are able to return to work within 1 to 2 weeks after surgery.     · You may shower 24 to 48 hours after surgery, if your doctor okays it. Pat the cut (incision) dry. Do not take a bath for the first 2 weeks, or until your doctor tells you it is okay.     · Your doctor will tell you when you can have sex again. Diet    · You can eat your normal diet. If your stomach is upset, try bland, low-fat foods like plain rice, broiled chicken, toast, and yogurt.     · Drink plenty of fluids (unless your doctor tells you not to).     · You may notice that your bowel movements are not regular right after your surgery. This is common. Avoid constipation and straining with bowel movements. You may want to take a fiber supplement every day. If you have not had a bowel movement after a couple of days, ask your doctor about taking a mild laxative. Medicines    · Your doctor will tell you if and when you can restart your medicines. He or she will also give you instructions about taking any new medicines.     · If you take aspirin or some other blood thinner, ask your doctor if and when to start taking it again. Make sure that you understand exactly what your doctor wants you to do.     · Be safe with medicines. Take pain medicines exactly as directed. ? If the doctor gave you a prescription medicine for pain, take it as prescribed. ? If you are not taking a prescription pain medicine, take an over-the-counter medicine such as acetaminophen (Tylenol), ibuprofen (Advil, Motrin), or naproxen (Aleve). Read and follow all instructions on the label. ? Do not take two or more pain medicines at the same time unless the doctor told you to. Many pain medicines have acetaminophen, which is Tylenol. Too much acetaminophen (Tylenol) can be harmful.     · If your doctor prescribed antibiotics, take them as directed. Do not stop taking them just because you feel better.  You need to take the full course of antibiotics.     · If you think your pain medicine is making you sick to your stomach:  ? Take your medicine after meals (unless your doctor has told you not to). ? Ask your doctor for a different pain medicine. Incision care    · If you have strips of tape on the cut (incision) the doctor made, leave the tape on for a week or until it falls off.     · If you have staples closing the cut, you will need to visit your doctor in 1 to 2 weeks to have them removed.     · Wash the area daily with warm, soapy water and pat it dry. Follow-up care is a key part of your treatment and safety. Be sure to make and go to all appointments, and call your doctor if you are having problems. It's also a good idea to know your test results and keep a list of the medicines you take. When should you call for help? Call 911 anytime you think you may need emergency care. For example, call if:    · You passed out (lost consciousness).     · You are short of breath. Call your doctor now or seek immediate medical care if:    · You have pain that does not get better after you take pain medicine.     · You are sick to your stomach and cannot keep fluids down.     · You have signs of a blood clot in your leg (called a deep vein thrombosis), such as:  ? Pain in your calf, back of the knee, thigh, or groin. ? Redness and swelling in your leg or groin.     · You cannot pass stools or gas.     · Bright red blood has soaked through the bandage over your incision.     · You have loose stitches, or your incision comes open.     · You have signs of infection, such as:  ? Increased pain, swelling, warmth, or redness. ? Red streaks leading from the incision. ? Pus draining from the incision. ? A fever. Watch closely for any changes in your health, and be sure to contact your doctor if you have any problems. Where can you learn more? Go to http://www.gray.com/  Enter S063 in the search box to learn more about \"Hernia Repair: What to Expect at Home. \"  Current as of: April 15, 2020               Content Version: 12.6  © 8307-7329 Healthwise, Incorporated. Care instructions adapted under license by [employer] (which disclaims liability or warranty for this information). If you have questions about a medical condition or this instruction, always ask your healthcare professional. [address] any warranty or liability for your use of this information. DISCHARGE SUMMARY from your Nurse      PATIENT INSTRUCTIONS    After general anesthesia or intravenous sedation, for 24 hours or while taking prescription Narcotics:  · Limit your activities  · Do not drive and operate hazardous machinery  · Do not make important personal or business decisions  · Do  not drink alcoholic beverages  · If you have not urinated within 8 hours after discharge, please contact your surgeon on call. Report the following to your surgeon:  · Excessive pain, swelling, redness or odor of or around the surgical area  · Temperature over 100.5  · Nausea and vomiting lasting longer than 4 hours or if unable to take medications  · Any signs of decreased circulation or nerve impairment to extremity: change in color, persistent  numbness, tingling, coldness or increase pain  · Any questions      GOOD HELP TO FIGHT AN INFECTION  Here are a few tip to help reduce the chance of getting an infection after surgery:   Wash Your Hands   Good handwashing is the most important thing you and your caregiver can do.  Wash before and after caring for any wounds. Dry your hand with a clean towel.  Wash with soap and water for at least 20 seconds. A TIP: sing the \"Happy Birthday\" song through one time while washing to help with the timing.  Use a hand  in between washings.  Shower   When your surgeon says it is OK to take a shower, use a new bar of antibacterial soap (if that is what you use, and keep that bar of soap ONLY for your use), or antibacterial body wash.  Use a clean wash cloth or sponge when you bathe.    Dry off with a clean towel  after every bath - be careful around any wounds, skin staples, sutures or surgical glue over/on wounds.  Do not enter swimming pools, hot tubs, lakes, rivers and/or ocean until wounds are healed and your doctor/surgeon says it is OK.  Use Clean Sheets   Sleep on freshly laundered sheets after your surgery.  Keep the surgery site covered with a clean, dry bandage (if instructed to do so). If the bandage becomes soiled, reapply a new, dry, clean bandage.  Do not allow pets to sleep with you while your wound is healing.  Lifestyle Modification and Controlling Your Blood Sugar   Smoking slows wound healing. Stop smoking and limit exposure to second-hand smoke.  High blood sugar slows wound healing. Eat a well-balanced diet to provide proper nutrition while healing   Monitor your blood sugar (if you are a diabetic) and take your medications as you are suppose to so you can control you blood sugar after surgery. COUGH AND DEEP BREATHE    Breathing deeply and coughing are very important exercises to do after surgery. Deep breathing and coughing open the little air tubes and air sacks in your lungs. You take deep breaths every day. You may not even notice - it is just something you do when you sigh or yawn. It is a natural exercise you do to keep these air passages open. After surgery, take deep breaths and cough, on purpose. DIRECTIONS:  · Take 10 to 15 slow deep breaths every hour while awake. · Breathe in deeply, and hold it for 2 seconds. · Exhale slowly through puckered lips, like blowing up a balloon. · After every 4th or 5th deep breath, hug your pillow to your chest or belly and give a hard, deep cough. Yes, it will probably hurt. But doing this exercise is a very important part of healing after surgery. Take your pain medicine to help you do this exercise without too much pain.     Coughing and deep breathing help prevent bronchitis and pneumonia after surgery. If you had chest or belly surgery, use a pillow as a \"hug kasie\" and hold it tightly to your chest or belly when you cough. ANKLE PUMPS    Ankle pumps increase the circulation of oxygenated blood to your lower extremities and decrease your risk for circulation problems such as blood clots. They also stretch the muscles, tendons and ligaments in your foot and ankle, and prevent joint contracture in the ankle and foot, especially after surgeries on the legs. It is important to do ankle pump exercises regularly after surgery because immobility increases your risk for developing a blood clot. Your doctor may also have you take an Aspirin for the next few days as well. If your doctor did not ask you to take an Aspirin, consult with him before starting Aspirin therapy on your own. The exercise is quite simple. · Slowly point your foot forward, feeling the muscles on the top of your lower leg stretch, and hold this position for 5 seconds. · Next, pull your foot back toward you as far as possible, stretching the calf muscles, and hold that position for 5 seconds. · Repeat with the other foot. · Perform 10 repetitions every hour while awake for both ankles if possible (down and then up with the foot once is one repetition). You should feel gentle stretching of the muscles in your lower leg when doing this exercise. If you feel pain, or your range of motion is limited, don't push too hard. Only go the limit your joint and muscles will let you go. If you have increasing pain, progressively worsening leg warmth or swelling, STOP the exercise and call your doctor. MEDICATION AND   SIDE EFFECT GUIDE    The [facility] Insurance MEDICATION AND SIDE EFFECT GUIDE was provided to the PATIENT AND CARE PROVIDER.   Information provided includes instruction about drug purpose and common side effects for the following medications:   · Roxicodone        These are general instructions for a healthy lifestyle:    *   Please give a list of your current medications to your Primary Care Provider. *   Please update this list whenever your medications are discontinued, doses are changed, or new medications (including over-the-counter products) are added. *   Please carry medication information at all times in case of emergency situations. About Smoking  No smoking / No tobacco products  Avoid exposure to second hand smoke     Surgeon General's Warning:  Quitting smoking now greatly reduces serious risk to your health. Obesity, smoking, and sedentary lifestyle greatly increases your risk for illness and disease. A healthy diet, regular physical exercise & weight monitoring are important for maintaining a healthy lifestyle. Congestive Heart Failure  You may be retaining fluid if you have a history of heart failure or if you experience any of the following symptoms:  Weight gain of 3 pounds or more overnight or 5 pounds in a week, increased swelling in your hands or feet or shortness of breath while lying flat in bed. Please call your doctor as soon as you notice any of these symptoms; do not wait until your next office visit. Recognize signs and symptoms of STROKE:  F -  Face looks uneven  A -  Arms unable to move or move evenly  S -  Speech slurred or non-existent  T -  Time-call 911 as soon as signs and symptoms begin-DO NOT go          back to bed or wait to see if you get better-TIME IS BRAIN. Warning Signs of HEART ATTACK   Call 911 if you have these symptoms:     Chest discomfort. Most heart attacks involve discomfort in the center of the chest that lasts more than a few minutes, or that goes away and comes back. It can feel like uncomfortable pressure, squeezing, fullness, or pain.  Discomfort in other areas of the upper body. Symptoms can include pain or discomfort in one or both arms, the back, neck, jaw, or stomach.    Shortness of breath with or without chest discomfort.  Other signs may include breaking out in a cold sweat, nausea, or lightheadedness. Don't wait more than five minutes to call 911 - MINUTES MATTER! Fast action can save your life. Calling 911 is almost always the fastest way to get lifesaving treatment. Emergency Medical Services staff can begin treatment when they arrive -- up to an hour sooner than if someone gets to the hospital by car. Learning About Coronavirus [phone])  Coronavirus [phone]): Overview  What is coronavirus (COVID-19)? The coronavirus disease (COVID-19) is caused by a virus. It is an illness that was first found in [place], [place], in December 2019. It has since spread worldwide. The virus can cause fever, cough, and trouble breathing. In severe cases, it can cause pneumonia and make it hard to breathe without help. It can cause death. Coronaviruses are a large group of viruses. They cause the common cold. They also cause more serious illnesses like Middle East respiratory syndrome (MERS) and severe acute respiratory syndrome (SARS). COVID-19 is caused by a novel coronavirus. That means it's a new type that has not been seen in people before. This virus spreads person-to-person through droplets from coughing and sneezing. It can also spread when you are close to someone who is infected. And it can spread when you touch something that has the virus on it, such as a doorknob or a tabletop. What can you do to protect yourself from coronavirus (COVID-19)? The best way to protect yourself from getting sick is to:  · Avoid areas where there is an outbreak. · Avoid contact with people who may be infected. · Wash your hands often with soap or alcohol-based hand sanitizers. · Avoid crowds and try to stay at least 6 feet away from other people. · Wash your hands often, especially after you cough or sneeze. Use soap and water, and scrub for at least 20 seconds.  If soap and water aren't available, use an alcohol-based hand . · Avoid touching your mouth, nose, and eyes. What can you do to avoid spreading the virus to others? To help avoid spreading the virus to others:  · Cover your mouth with a tissue when you cough or sneeze. Then throw the tissue in the trash. · Use a disinfectant to clean things that you touch often. · Stay home if you are sick or have been exposed to the virus. Don't go to school, work, or public areas. And don't use public transportation. · If you are sick:  ? Leave your home only if you need to get medical care. But call the doctor's office first so they know you're coming. And wear a face mask, if you have one.  ? If you have a face mask, wear it whenever you're around other people. It can help stop the spread of the virus when you cough or sneeze. ? Clean and disinfect your home every day. Use household  and disinfectant wipes or sprays. Take special care to clean things that you grab with your hands. These include doorknobs, remote controls, phones, and handles on your refrigerator and microwave. And don't forget countertops, tabletops, bathrooms, and computer keyboards. When to call for help  Call 911 anytime you think you may need emergency care. For example, call if:  · You have severe trouble breathing. (You can't talk at all.)  · You have constant chest pain or pressure. · You are severely dizzy or lightheaded. · You are confused or can't think clearly. · Your face and lips have a blue color. · You pass out (lose consciousness) or are very hard to wake up. Call your doctor now if you develop symptoms such as:  · Shortness of breath. · Fever. · Cough. If you need to get care, call ahead to the doctor's office for instructions before you go. Make sure you wear a face mask, if you have one, to prevent exposing other people to the virus. Where can you get the latest information? The following health organizations are tracking and studying this virus. Their websites contain the most up-to-date information. Romero Bishop also learn what to do if you think you may have been exposed to the virus. · U.S. Centers for Disease Control and Prevention (CDC): The CDC provides updated news about the disease and travel advice. The website also tells you how to prevent the spread of infection. www.cdc.gov  · World Health Organization [facility]): WHO offers information about the virus outbreaks. WHO also has travel advice. www.who.int  Current as of: April 1, 2020               Content Version: 12.4  © 6666-2541 Healthwise, Incorporated. Care instructions adapted under license by your healthcare professional. If you have questions about a medical condition or this instruction, always ask your healthcare professional. [address] any warranty or liability for your use of this information. The discharge information has been reviewed with the {PATIENT PARENT GUARDIAN:55937}. Any questions and concerns from the {PATIENT PARENT GUARDIAN:08365} have been addressed. The {PATIENT PARENT GUARDIAN:61798} verbalized understanding.         CONTENTS FOUND IN YOUR DISCHARGE ENVELOPE:  [x]     Surgeon and Hospital Discharge Instructions  [x]     [facility] Care Provider Card  [x]     Medication & Side Effect Guide            (your newly prescribed medications have been marked/highlighted showing the most common side effects from the classes of drugs on your prescriptions)  []     Medication Prescription(s) x *** ( [] These have been sent electronically to your pharmacy by your surgeon,   - OR -       your surgeon has already provided these to you during a previous/pre-op office visit)  []     Physical Therapy Prescription  []     Follow-up Appointment Cards  []     Surgery-related Pictures/Media  []     Pain block and/or block with On-Q Catheter from Anesthesia Service (information included in your instructions above)  []     Medical device information sheets/pamphlets from their    []     School/work excuse note. []     /parent work excuse note. The following personal items collected during your admission are returned to you:   Dental Appliance: Dental Appliances: None  Vision: Visual Aid: Glasses  Hearing Aid:    Jewelry: Jewelry: None  Clothing: Clothing: Other (comment)(street clothing placed in bag and into locker)  Other Valuables:  Other Valuables: None  Valuables sent to safe:

## 2021-03-05 NOTE — ANESTHESIA POSTPROCEDURE EVALUATION
Procedure(s):  INCISIONAL HERNIA REPAIR. MAC    Anesthesia Post Evaluation      Multimodal analgesia: multimodal analgesia not used between 6 hours prior to anesthesia start to PACU discharge  Patient location during evaluation: PACU  Patient participation: complete - patient participated  Level of consciousness: awake and alert  Pain score: 6  Pain management: adequate  Airway patency: patent  Anesthetic complications: no  Cardiovascular status: hemodynamically stable and acceptable  Respiratory status: acceptable  Hydration status: acceptable  Comments: Patient seen and evaluated; patient says he is okay to go home and wants oral analgesic. Post anesthesia nausea and vomiting:  none      INITIAL Post-op Vital signs:   Vitals Value Taken Time   /84 03/05/21 1320   Temp 36.4 °C (97.6 °F) 03/05/21 1320   Pulse 82 03/05/21 1321   Resp 19 03/05/21 1321   SpO2 94 % 03/05/21 1321   Vitals shown include unvalidated device data.

## 2021-03-05 NOTE — BRIEF OP NOTE
Brief Postoperative Note    Patient: Arron Thompson  YOB: 1970  MRN: [id number]    Date of Procedure: 3/5/2021     Pre-Op Diagnosis: ABDOMINAL WALL MASS    Post-Op Diagnosis: incisional hernia      Procedure(s):  INCISIONAL HERNIA REPAIR    Surgeon(s):  Trip Bui MD    Surgical Assistant: Surg Asst-1: Cesar GRAMAJO    Anesthesia: MAC     Estimated Blood Loss (mL): Minimal    Complications: None    Specimens: * No specimens in log *     Implants: * No implants in log *    Drains: * No LDAs found *    Findings: incisional hernia at port site    Electronically Signed by Tish Quiroz MD on 3/5/2021 at 12:44 PM

## 2021-03-06 NOTE — OP NOTES
[facility] 79  OPERATIVE REPORT    Name:  Corinna Viera  MR#:  [id number]  :  1970  ACCOUNT #:  [de-identified]  DATE OF SERVICE:  2021    CLINICAL SERVICE:  General Surgery    PREOPERATIVE DIAGNOSIS:  Abdominal wall mass. POSTOPERATIVE DIAGNOSIS:  Incisional hernia. PROCEDURE PERFORMED:  Incisional hernia repair    SURGEON:  Cece Al MD    ASSISTANT:  See brief op note    ANESTHESIA: MAC and local    COMPLICATIONS:  None. SPECIMENS REMOVED:  None. IMPLANTS: none    ESTIMATED BLOOD LOSS: minimal.    DRAINS AND TUBES:  None. INDICATIONS:  The patient is a 54-year-old gentleman who presents to the office with a painful bulge of his abdominal wall. Imaging initially did not indicate any type of hernia, however, with the ongoing pain, a decision was made to bring him to the operating room for investigation of this mass. PROCEDURE:  After obtaining informed consent, the patient was brought to the operating room where he was laid supine on the operating room table. After adequate sedation was achieved, the area was prepped and draped in standard fashion. 2 g of IV Ancef was administered as preoperative antibiotic. A combination of 1% lidocaine and 0.25% Marcaine was infiltrated in the area to allow for analgesia. The area was adequately anesthetized. A 15-blade scalpel was used to make an incision over the area of the bulge. The dissection was carried down through the subcutaneous tissue through the fat down to the abdominal wall. Initially, there was no obvious pathology noted, however, when the abdominal wall was inspected, there did appear to be a hernia at the previous trocar site. The anterior fascia was  with deeper tissue bulging through. The anterior fascia was reapproximated using interrupted 2-0 PDS suture in a figure-of-eight pattern. With the anterior fascia closed, the area was inspected and appeared to be hemostatic.   3-0 Vicryl suture was used to reapproximate the subcutaneous tissue and a running 4-0 Vicryl was used to close the skin in running subcuticular. The skin was then sealed with Dermabond. The patient tolerated the procedure well. There were no complications. All instrument, needle, and sponge counts were correct at the end of the case.       Anitra Neri MD [id number]  D:  03/05/2021 14:41  T:  03/05/2021 22:02  JOB #:  [id number]

## 2021-08-24 ENCOUNTER — HOSPITAL ENCOUNTER (OUTPATIENT)
Dept: PREADMISSION TESTING | Age: 51
Discharge: HOME | End: 2021-08-24
Payer: COMMERCIAL

## 2021-08-24 VITALS
BODY MASS INDEX: 34.15 KG/M2 | WEIGHT: 243.9 LBS | OXYGEN SATURATION: 94 % | DIASTOLIC BLOOD PRESSURE: 79 MMHG | HEART RATE: 80 BPM | RESPIRATION RATE: 16 BRPM | SYSTOLIC BLOOD PRESSURE: 132 MMHG | HEIGHT: 71 IN | TEMPERATURE: 97.7 F

## 2021-08-24 LAB
ANION GAP SERPL CALC-SCNC: 4 MMOL/L (ref 5–15)
BUN SERPL-MCNC: 19 MG/DL (ref 6–20)
BUN/CREAT SERPL: 17 (ref 12–20)
CALCIUM SERPL-MCNC: 9.5 MG/DL (ref 8.5–10.1)
CHLORIDE SERPL-SCNC: 105 MMOL/L (ref 97–108)
CO2 SERPL-SCNC: 31 MMOL/L (ref 21–32)
CREAT SERPL-MCNC: 1.14 MG/DL (ref 0.7–1.3)
GLUCOSE SERPL-MCNC: 98 MG/DL (ref 65–100)
POTASSIUM SERPL-SCNC: 4.1 MMOL/L (ref 3.5–5.1)
SODIUM SERPL-SCNC: 140 MMOL/L (ref 136–145)

## 2021-08-24 PROCEDURE — 36415 COLL VENOUS BLD VENIPUNCTURE: CPT

## 2021-08-24 PROCEDURE — 80048 BASIC METABOLIC PNL TOTAL CA: CPT

## 2021-08-24 PROCEDURE — 93005 ELECTROCARDIOGRAM TRACING: CPT

## 2021-08-24 RX ORDER — PRAZOSIN HYDROCHLORIDE 1 MG/1
1 CAPSULE ORAL
COMMUNITY

## 2021-08-24 NOTE — PERIOP NOTES
[address] , [address] [facility]   PRE-ADMISSION TESTING    [phone]     Surgery Date:  8/27/2021 Friday      Is surgery arrival time given by surgeon? NO  If NO, [facility] staff will call you between 3 and 7pm the day before your surgery with your arrival time. (If your surgery is on a Monday, we will call you the Friday before.)    Call [phone] after 7pm Monday-Friday if you did not receive this call. INSTRUCTIONS BEFORE YOUR SURGERY   When You  Arrive Arrive at the [address] [facility] on the day of your surgery  Have your insurance card, photo ID, and any copayment (if needed)   Food   and   Drink NO food or drink after midnight the night before surgery    This means NO water, gum, mints, coffee, juice, etc.  No alcohol (beer, wine, liquor) 24 hours before and after surgery   Medications to   TAKE   Morning of Surgery MEDICATIONS TO TAKE THE MORNING OF SURGERY WITH A SIP OF WATER:    Buspar   Omeprazole    Hold your evening dose of Lisinopril the night prior to surgery. Medications  To  STOP      7 days before surgery  Non-Steroidal anti-inflammatory Drugs (NSAID's): for example, Ibuprofen (Advil, Motrin), Naproxen (Aleve)   Aspirin, if taking for pain    Herbal supplements, vitamins, and fish oil   Other:  (Pain medications not listed above, including Tylenol may be taken)   Blood  Thinners  If you take  Aspirin, Plavix, Coumadin, or any blood-thinning or anti-blood clot medicine, talk to the doctor who prescribed the medications for pre-operative instructions. Bathing Clothing  Jewelry  Valuables      If you shower the morning of surgery, please do not apply anything to your skin (lotions, powders, deodorant, or makeup, especially mascara)   Follow Chlorhexidine [employer] body wash instructions provided to you during PAT appointment. Begin 3 days prior to surgery.    Do not shave or trim anywhere 24 hours before surgery   Wear your hair loose or down; no pony-tails, buns, or metal hair clips   Wear loose, comfortable, clean clothes   Wear glasses instead of contacts  [address], valuables, and jewelry, including body piercings, at home   If you were given an [employer], bring it on day of surgery. Going Home - or Spending the Night  SAME-DAY SURGERY: You must have a responsible adult drive you home and stay with you 24 hours after surgery   ADMITS: If your doctor is keeping you in the hospital after surgery, leave personal belongings/luggage in your car until you have a hospital room number. Hospital discharge time is 12 noon  Drivers must be here before 12 noon unless you are told differently   Special Instructions        Follow all instructions so your surgery wont be cancelled. Please, be on time. If a situation occurs and you are delayed the day of surgery, call [phone]. If your physical condition changes (like a fever, cold, flu, etc.) call your surgeon. Home medication(s) reviewed and verified verbally during PAT appointment. The patient was contacted in person. The patient verbalizes understanding of all instructions and does not need reinforcement.

## 2021-08-25 LAB
ATRIAL RATE: 72 BPM
CALCULATED P AXIS, ECG09: 72 DEGREES
CALCULATED R AXIS, ECG10: 95 DEGREES
CALCULATED T AXIS, ECG11: 52 DEGREES
DIAGNOSIS, 93000: NORMAL
P-R INTERVAL, ECG05: 158 MS
Q-T INTERVAL, ECG07: 416 MS
QRS DURATION, ECG06: 100 MS
QTC CALCULATION (BEZET), ECG08: 455 MS
VENTRICULAR RATE, ECG03: 72 BPM

## 2021-08-26 ENCOUNTER — ANESTHESIA EVENT (OUTPATIENT)
Dept: SURGERY | Age: 51
End: 2021-08-26
Payer: COMMERCIAL

## 2021-08-26 NOTE — PERIOP NOTES
Patient with history of MRSA in 11/2020. Order for contact isolation has been placed in [facility] under signed and held, multi-phase orders for the day of surgery.   DOS: 8/27/2021

## 2021-08-27 ENCOUNTER — HOSPITAL ENCOUNTER (OUTPATIENT)
Age: 51
Setting detail: OUTPATIENT SURGERY
Discharge: HOME | End: 2021-08-27
Attending: SURGERY | Admitting: SURGERY
Payer: COMMERCIAL

## 2021-08-27 ENCOUNTER — ANESTHESIA (OUTPATIENT)
Dept: SURGERY | Age: 51
End: 2021-08-27
Payer: COMMERCIAL

## 2021-08-27 VITALS
SYSTOLIC BLOOD PRESSURE: 147 MMHG | BODY MASS INDEX: 34.04 KG/M2 | TEMPERATURE: 97.8 F | OXYGEN SATURATION: 96 % | HEIGHT: 71 IN | HEART RATE: 83 BPM | RESPIRATION RATE: 18 BRPM | DIASTOLIC BLOOD PRESSURE: 80 MMHG | WEIGHT: 243.17 LBS

## 2021-08-27 DIAGNOSIS — K43.9 VENTRAL HERNIA WITHOUT OBSTRUCTION OR GANGRENE: Primary | ICD-10-CM

## 2021-08-27 LAB
ABO + RH BLD: NORMAL
BASOPHILS # BLD: 0.1 K/UL (ref 0–0.1)
BASOPHILS NFR BLD: 1 % (ref 0–1)
BLOOD GROUP ANTIBODIES SERPL: NORMAL
DIFFERENTIAL METHOD BLD: NORMAL
EOSINOPHIL # BLD: 0.2 K/UL (ref 0–0.4)
EOSINOPHIL NFR BLD: 3 % (ref 0–7)
ERYTHROCYTE [DISTWIDTH] IN BLOOD BY AUTOMATED COUNT: 13.2 % (ref 11.5–14.5)
HCT VFR BLD AUTO: 40.4 % (ref 36.6–50.3)
HGB BLD-MCNC: 13.6 G/DL (ref 12.1–17)
IMM GRANULOCYTES # BLD AUTO: 0 K/UL (ref 0–0.04)
IMM GRANULOCYTES NFR BLD AUTO: 0 % (ref 0–0.5)
LYMPHOCYTES # BLD: 2.1 K/UL (ref 0.8–3.5)
LYMPHOCYTES NFR BLD: 35 % (ref 12–49)
MCH RBC QN AUTO: 28.8 PG (ref 26–34)
MCHC RBC AUTO-ENTMCNC: 33.7 G/DL (ref 30–36.5)
MCV RBC AUTO: 85.4 FL (ref 80–99)
MONOCYTES # BLD: 0.7 K/UL (ref 0–1)
MONOCYTES NFR BLD: 11 % (ref 5–13)
NEUTS SEG # BLD: 2.9 K/UL (ref 1.8–8)
NEUTS SEG NFR BLD: 50 % (ref 32–75)
NRBC # BLD: 0 K/UL (ref 0–0.01)
NRBC BLD-RTO: 0 PER 100 WBC
PLATELET # BLD AUTO: 183 K/UL (ref 150–400)
PMV BLD AUTO: 9.2 FL (ref 8.9–12.9)
RBC # BLD AUTO: 4.73 M/UL (ref 4.1–5.7)
SPECIMEN EXP DATE BLD: NORMAL
WBC # BLD AUTO: 6 K/UL (ref 4.1–11.1)

## 2021-08-27 PROCEDURE — 77030022704 HC SUT VLOC COVD -B: Performed by: SURGERY

## 2021-08-27 PROCEDURE — 85025 COMPLETE CBC W/AUTO DIFF WBC: CPT

## 2021-08-27 PROCEDURE — 77030033188 HC TBNG FLTRD BIIFUR DISP CNMD -C: Performed by: SURGERY

## 2021-08-27 PROCEDURE — 86900 BLOOD TYPING SEROLOGIC ABO: CPT

## 2021-08-27 PROCEDURE — 77030040922 HC BLNKT HYPOTHRM STRY -A

## 2021-08-27 PROCEDURE — 74011000250 HC RX REV CODE- 250: Performed by: SURGERY

## 2021-08-27 PROCEDURE — 74011250636 HC RX REV CODE- 250/636: Performed by: ANESTHESIOLOGY

## 2021-08-27 PROCEDURE — 77030031139 HC SUT VCRL2 J&J -A: Performed by: SURGERY

## 2021-08-27 PROCEDURE — 77030018836 HC SOL IRR NACL ICUM -A: Performed by: SURGERY

## 2021-08-27 PROCEDURE — 76010000875 HC OR TIME 1.5 TO 2HR INTENSV - TIER 2: Performed by: SURGERY

## 2021-08-27 PROCEDURE — C1781 MESH (IMPLANTABLE): HCPCS | Performed by: SURGERY

## 2021-08-27 PROCEDURE — 77030040361 HC SLV COMPR DVT MDII -B

## 2021-08-27 PROCEDURE — 77030002966 HC SUT PDS J&J -A: Performed by: SURGERY

## 2021-08-27 PROCEDURE — 77030035029 HC NDL INSUF VERES DISP COVD -B: Performed by: SURGERY

## 2021-08-27 PROCEDURE — 77030010507 HC ADH SKN DERMBND J&J -B: Performed by: SURGERY

## 2021-08-27 PROCEDURE — 76210000021 HC REC RM PH II 0.5 TO 1 HR: Performed by: SURGERY

## 2021-08-27 PROCEDURE — 77030003666 HC NDL SPINAL BD -A: Performed by: SURGERY

## 2021-08-27 PROCEDURE — 76060000034 HC ANESTHESIA 1.5 TO 2 HR: Performed by: SURGERY

## 2021-08-27 PROCEDURE — 77030036554: Performed by: SURGERY

## 2021-08-27 PROCEDURE — 77030008684 HC TU ET CUF COVD -B: Performed by: ANESTHESIOLOGY

## 2021-08-27 PROCEDURE — 76210000016 HC OR PH I REC 1 TO 1.5 HR: Performed by: SURGERY

## 2021-08-27 PROCEDURE — 74011000250 HC RX REV CODE- 250: Performed by: ANESTHESIOLOGY

## 2021-08-27 PROCEDURE — 74011250636 HC RX REV CODE- 250/636: Performed by: SURGERY

## 2021-08-27 PROCEDURE — 77030035236 HC SUT PDS STRATFX BARB J&J -B: Performed by: SURGERY

## 2021-08-27 PROCEDURE — 77030037032 HC INSRT SCIS CLICKLLINE DISP STOR -B: Performed by: SURGERY

## 2021-08-27 PROCEDURE — 77030035277 HC OBTRTR BLDELSS DISP INTU -B: Performed by: SURGERY

## 2021-08-27 PROCEDURE — 77030020703 HC SEAL CANN DISP INTU -B: Performed by: SURGERY

## 2021-08-27 PROCEDURE — 36415 COLL VENOUS BLD VENIPUNCTURE: CPT

## 2021-08-27 PROCEDURE — 2709999900 HC NON-CHARGEABLE SUPPLY: Performed by: SURGERY

## 2021-08-27 DEVICE — COMPOSITE MESH,MONOFILAMENT POLYESTER WITH ABSORBABLE COLLAGEN FILM AND MARKING
Type: IMPLANTABLE DEVICE | Site: ABDOMEN | Status: FUNCTIONAL
Brand: SYMBOTEX

## 2021-08-27 RX ORDER — ONDANSETRON 8 MG/1
8 TABLET, ORALLY DISINTEGRATING ORAL
Qty: 12 TABLET | Refills: 1 | Status: SHIPPED | OUTPATIENT
Start: 2021-08-27

## 2021-08-27 RX ORDER — GLYCOPYRROLATE 0.2 MG/ML
INJECTION INTRAMUSCULAR; INTRAVENOUS AS NEEDED
Status: DISCONTINUED | OUTPATIENT
Start: 2021-08-27 | End: 2021-08-27 | Stop reason: HOSPADM

## 2021-08-27 RX ORDER — DEXAMETHASONE SODIUM PHOSPHATE 4 MG/ML
INJECTION, SOLUTION INTRA-ARTICULAR; INTRALESIONAL; INTRAMUSCULAR; INTRAVENOUS; SOFT TISSUE AS NEEDED
Status: DISCONTINUED | OUTPATIENT
Start: 2021-08-27 | End: 2021-08-27 | Stop reason: HOSPADM

## 2021-08-27 RX ORDER — SODIUM CHLORIDE, SODIUM LACTATE, POTASSIUM CHLORIDE, CALCIUM CHLORIDE 600; 310; 30; 20 MG/100ML; MG/100ML; MG/100ML; MG/100ML
125 INJECTION, SOLUTION INTRAVENOUS CONTINUOUS
Status: DISCONTINUED | OUTPATIENT
Start: 2021-08-27 | End: 2021-08-27 | Stop reason: HOSPADM

## 2021-08-27 RX ORDER — SUCCINYLCHOLINE CHLORIDE 20 MG/ML
INJECTION INTRAMUSCULAR; INTRAVENOUS AS NEEDED
Status: DISCONTINUED | OUTPATIENT
Start: 2021-08-27 | End: 2021-08-27 | Stop reason: HOSPADM

## 2021-08-27 RX ORDER — HYDROMORPHONE HYDROCHLORIDE 1 MG/ML
.25-1 INJECTION, SOLUTION INTRAMUSCULAR; INTRAVENOUS; SUBCUTANEOUS
Status: DISCONTINUED | OUTPATIENT
Start: 2021-08-27 | End: 2021-08-27 | Stop reason: HOSPADM

## 2021-08-27 RX ORDER — ONDANSETRON 2 MG/ML
INJECTION INTRAMUSCULAR; INTRAVENOUS AS NEEDED
Status: DISCONTINUED | OUTPATIENT
Start: 2021-08-27 | End: 2021-08-27 | Stop reason: HOSPADM

## 2021-08-27 RX ORDER — SODIUM CHLORIDE, SODIUM LACTATE, POTASSIUM CHLORIDE, CALCIUM CHLORIDE 600; 310; 30; 20 MG/100ML; MG/100ML; MG/100ML; MG/100ML
75 INJECTION, SOLUTION INTRAVENOUS CONTINUOUS
Status: DISCONTINUED | OUTPATIENT
Start: 2021-08-27 | End: 2021-08-27 | Stop reason: HOSPADM

## 2021-08-27 RX ORDER — ROCURONIUM BROMIDE 10 MG/ML
INJECTION, SOLUTION INTRAVENOUS AS NEEDED
Status: DISCONTINUED | OUTPATIENT
Start: 2021-08-27 | End: 2021-08-27 | Stop reason: HOSPADM

## 2021-08-27 RX ORDER — BUPIVACAINE HYDROCHLORIDE 5 MG/ML
30 INJECTION, SOLUTION EPIDURAL; INTRACAUDAL ONCE
Status: COMPLETED | OUTPATIENT
Start: 2021-08-27 | End: 2021-08-27

## 2021-08-27 RX ORDER — SODIUM CHLORIDE 0.9 % (FLUSH) 0.9 %
5-40 SYRINGE (ML) INJECTION EVERY 8 HOURS
Status: DISCONTINUED | OUTPATIENT
Start: 2021-08-27 | End: 2021-08-27 | Stop reason: HOSPADM

## 2021-08-27 RX ORDER — DIPHENHYDRAMINE HYDROCHLORIDE 50 MG/ML
12.5 INJECTION, SOLUTION INTRAMUSCULAR; INTRAVENOUS AS NEEDED
Status: DISCONTINUED | OUTPATIENT
Start: 2021-08-27 | End: 2021-08-27 | Stop reason: HOSPADM

## 2021-08-27 RX ORDER — OXYCODONE HYDROCHLORIDE 5 MG/1
5 TABLET ORAL
Qty: 30 TABLET | Refills: 0 | Status: SHIPPED | OUTPATIENT
Start: 2021-08-27 | End: 2021-09-03

## 2021-08-27 RX ORDER — LIDOCAINE HYDROCHLORIDE 10 MG/ML
0.1 INJECTION, SOLUTION EPIDURAL; INFILTRATION; INTRACAUDAL; PERINEURAL AS NEEDED
Status: DISCONTINUED | OUTPATIENT
Start: 2021-08-27 | End: 2021-08-27 | Stop reason: HOSPADM

## 2021-08-27 RX ORDER — DOCUSATE SODIUM 100 MG/1
100 CAPSULE, LIQUID FILLED ORAL 2 TIMES DAILY
Qty: 20 CAPSULE | Refills: 0 | Status: SHIPPED | OUTPATIENT
Start: 2021-08-27 | End: 2021-09-06

## 2021-08-27 RX ORDER — FENTANYL CITRATE 50 UG/ML
INJECTION, SOLUTION INTRAMUSCULAR; INTRAVENOUS AS NEEDED
Status: DISCONTINUED | OUTPATIENT
Start: 2021-08-27 | End: 2021-08-27 | Stop reason: HOSPADM

## 2021-08-27 RX ORDER — SODIUM CHLORIDE 0.9 % (FLUSH) 0.9 %
5-40 SYRINGE (ML) INJECTION AS NEEDED
Status: DISCONTINUED | OUTPATIENT
Start: 2021-08-27 | End: 2021-08-27 | Stop reason: HOSPADM

## 2021-08-27 RX ORDER — ONDANSETRON 2 MG/ML
4 INJECTION INTRAMUSCULAR; INTRAVENOUS AS NEEDED
Status: DISCONTINUED | OUTPATIENT
Start: 2021-08-27 | End: 2021-08-27 | Stop reason: HOSPADM

## 2021-08-27 RX ORDER — LIDOCAINE HYDROCHLORIDE 20 MG/ML
INJECTION, SOLUTION EPIDURAL; INFILTRATION; INTRACAUDAL; PERINEURAL AS NEEDED
Status: DISCONTINUED | OUTPATIENT
Start: 2021-08-27 | End: 2021-08-27 | Stop reason: HOSPADM

## 2021-08-27 RX ORDER — PROPOFOL 10 MG/ML
INJECTION, EMULSION INTRAVENOUS AS NEEDED
Status: DISCONTINUED | OUTPATIENT
Start: 2021-08-27 | End: 2021-08-27 | Stop reason: HOSPADM

## 2021-08-27 RX ORDER — NEOSTIGMINE METHYLSULFATE 1 MG/ML
INJECTION, SOLUTION INTRAVENOUS AS NEEDED
Status: DISCONTINUED | OUTPATIENT
Start: 2021-08-27 | End: 2021-08-27 | Stop reason: HOSPADM

## 2021-08-27 RX ORDER — SODIUM CHLORIDE, SODIUM LACTATE, POTASSIUM CHLORIDE, CALCIUM CHLORIDE 600; 310; 30; 20 MG/100ML; MG/100ML; MG/100ML; MG/100ML
100 INJECTION, SOLUTION INTRAVENOUS CONTINUOUS
Status: DISCONTINUED | OUTPATIENT
Start: 2021-08-27 | End: 2021-08-27 | Stop reason: HOSPADM

## 2021-08-27 RX ORDER — MIDAZOLAM HYDROCHLORIDE 1 MG/ML
INJECTION, SOLUTION INTRAMUSCULAR; INTRAVENOUS AS NEEDED
Status: DISCONTINUED | OUTPATIENT
Start: 2021-08-27 | End: 2021-08-27 | Stop reason: HOSPADM

## 2021-08-27 RX ADMIN — HYDROMORPHONE HYDROCHLORIDE 0.5 MG: 1 INJECTION, SOLUTION INTRAMUSCULAR; INTRAVENOUS; SUBCUTANEOUS at 10:00

## 2021-08-27 RX ADMIN — FENTANYL CITRATE 150 MCG: 50 INJECTION, SOLUTION INTRAMUSCULAR; INTRAVENOUS at 07:51

## 2021-08-27 RX ADMIN — ROCURONIUM BROMIDE 10 MG: 10 INJECTION INTRAVENOUS at 07:51

## 2021-08-27 RX ADMIN — ROCURONIUM BROMIDE 40 MG: 10 INJECTION INTRAVENOUS at 08:07

## 2021-08-27 RX ADMIN — LIDOCAINE HYDROCHLORIDE 80 MG: 20 INJECTION, SOLUTION EPIDURAL; INFILTRATION; INTRACAUDAL; PERINEURAL at 07:51

## 2021-08-27 RX ADMIN — Medication 3 MG: at 09:15

## 2021-08-27 RX ADMIN — GLYCOPYRROLATE 0.2 MG: 0.2 INJECTION INTRAMUSCULAR; INTRAVENOUS at 09:15

## 2021-08-27 RX ADMIN — ROCURONIUM BROMIDE 20 MG: 10 INJECTION INTRAVENOUS at 08:58

## 2021-08-27 RX ADMIN — DEXAMETHASONE SODIUM PHOSPHATE 8 MG: 4 INJECTION, SOLUTION INTRAMUSCULAR; INTRAVENOUS at 08:07

## 2021-08-27 RX ADMIN — PROPOFOL 300 MG: 10 INJECTION, EMULSION INTRAVENOUS at 07:51

## 2021-08-27 RX ADMIN — SODIUM CHLORIDE, POTASSIUM CHLORIDE, SODIUM LACTATE AND CALCIUM CHLORIDE 75 ML/HR: 600; 310; 30; 20 INJECTION, SOLUTION INTRAVENOUS at 06:52

## 2021-08-27 RX ADMIN — ONDANSETRON HYDROCHLORIDE 4 MG: 2 SOLUTION INTRAMUSCULAR; INTRAVENOUS at 08:07

## 2021-08-27 RX ADMIN — SUCCINYLCHOLINE CHLORIDE 100 MG: 20 INJECTION, SOLUTION INTRAMUSCULAR; INTRAVENOUS at 07:51

## 2021-08-27 RX ADMIN — FENTANYL CITRATE 100 MCG: 50 INJECTION, SOLUTION INTRAMUSCULAR; INTRAVENOUS at 08:07

## 2021-08-27 RX ADMIN — SUGAMMADEX 200 MG: 100 INJECTION, SOLUTION INTRAVENOUS at 09:24

## 2021-08-27 RX ADMIN — SODIUM CHLORIDE, POTASSIUM CHLORIDE, SODIUM LACTATE AND CALCIUM CHLORIDE 100 ML/HR: 600; 310; 30; 20 INJECTION, SOLUTION INTRAVENOUS at 06:52

## 2021-08-27 RX ADMIN — HYDROMORPHONE HYDROCHLORIDE 0.5 MG: 1 INJECTION, SOLUTION INTRAMUSCULAR; INTRAVENOUS; SUBCUTANEOUS at 10:19

## 2021-08-27 RX ADMIN — CEFAZOLIN SODIUM 2 G: 1 POWDER, FOR SOLUTION INTRAMUSCULAR; INTRAVENOUS at 08:10

## 2021-08-27 RX ADMIN — FENTANYL CITRATE 100 MCG: 50 INJECTION, SOLUTION INTRAMUSCULAR; INTRAVENOUS at 09:03

## 2021-08-27 RX ADMIN — MIDAZOLAM HYDROCHLORIDE 5 MG: 1 INJECTION, SOLUTION INTRAMUSCULAR; INTRAVENOUS at 07:45

## 2021-08-27 NOTE — DISCHARGE INSTRUCTIONS
Patient Education        Abdominal Hernia Repair: What to Expect at Home  Your Recovery  After surgery to repair your hernia, you are likely to have pain for a few days. You may also feel tired and have less energy than normal. This is common. You should feel better after a few days and will probably feel much better in 7 days. For several weeks you may feel discomfort or pulling in the hernia repair when you move. You may have some bruising around the area of the repair. This is normal.  This care sheet gives you a general idea about how long it will take for you to recover. But each person recovers at a different pace. Follow the steps below to get better as quickly as possible. How can you care for yourself at home? Activity    · Rest when you feel tired. Getting enough sleep will help you recover.     · Try to walk each day. Start by walking a little more than you did the day before. Bit by bit, increase the amount you walk. Walking boosts blood flow and helps prevent pneumonia and constipation.     · If your doctor gives you an abdominal binder to wear, use it as directed. This is an elastic bandage that wraps around your belly and upper hips. It helps support your belly muscles after surgery.     · Avoid strenuous activities, such as biking, jogging, weight lifting, or aerobic exercise, until your doctor says it is okay.     · Avoid lifting anything that would make you strain. This may include heavy grocery bags and milk containers, a heavy briefcase or backpack, cat litter or dog food bags, a vacuum , or a child.     · Ask your doctor when you can drive again.     · Most people are able to return to work within 1 to 2 weeks after surgery. But if your job requires that you do heavy lifting or strenuous activity, you may need to take 4 to 6 weeks off from work.     · You may shower 24 to 48 hours after surgery, if your doctor okays it. Pat the cut (incision) dry.  Do not take a bath for the first 2 weeks, or until your doctor tells you it is okay.     · Ask your doctor when it is okay for you to have sex. Diet    · You can eat your normal diet. If your stomach is upset, try bland, low-fat foods like plain rice, broiled chicken, toast, and yogurt.     · Drink plenty of fluids (unless your doctor tells you not to).     · You may notice that your bowel movements are not regular right after your surgery. This is common. Avoid constipation and straining with bowel movements. You may want to take a fiber supplement every day. If you have not had a bowel movement after a couple of days, ask your doctor about taking a mild laxative. Medicines    · Your doctor will tell you if and when you can restart your medicines. You will also be given instructions about taking any new medicines.     · If you take aspirin or some other blood thinner, ask your doctor if and when to start taking it again. Make sure that you understand exactly what your doctor wants you to do.     · Be safe with medicines. Take pain medicines exactly as directed. ? If the doctor gave you a prescription medicine for pain, take it as prescribed. ? If you are not taking a prescription pain medicine, ask your doctor if you can take an over-the-counter medicine.     · If your doctor prescribed antibiotics, take them as directed. Do not stop taking them just because you feel better. You need to take the full course of antibiotics.     · If you think your pain medicine is making you sick to your stomach:  ? Take your medicine after meals (unless your doctor has told you not to). ? Ask your doctor for a different pain medicine. Incision care    · If you have strips of tape on the cut (incision) the doctor made, leave the tape on for a week or until it falls off.  Or follow your doctor's instructions for removing the tape.     · If you have staples closing the cut, you will need to visit your doctor in 1 to 2 weeks to have them removed.     · Wash the area daily with warm, soapy water, and pat it dry. Don't use hydrogen peroxide or alcohol, which can slow healing. You may cover the area with a gauze bandage if it weeps or rubs against clothing. Change the bandage every day. Other instructions    · Hold a pillow over your incision when you cough or take deep breaths. This will support your belly and decrease your pain.     · Do breathing exercises at home as instructed by your doctor. This will help prevent pneumonia.     · If you had laparoscopic surgery, you may also have pain in your shoulder. The pain usually lasts about a day or two. Follow-up care is a key part of your treatment and safety. Be sure to make and go to all appointments, and call your doctor if you are having problems. It's also a good idea to know your test results and keep a list of the medicines you take. When should you call for help? Call 911 anytime you think you may need emergency care. For example, call if:    · You passed out (lost consciousness).     · You are short of breath. Call your doctor now or seek immediate medical care if:    · You are sick to your stomach and cannot drink fluids.     · You have signs of a blood clot in your leg (called a deep vein thrombosis), such as:  ? Pain in your calf, back of the knee, thigh, or groin. ? Redness and swelling in your leg or groin.     · You have signs of infection, such as:  ? Increased pain, swelling, warmth, or redness. ? Red streaks leading from the incision. ? Pus draining from the incision. ? A fever.     · You cannot pass stools or gas.     · You have pain that does not get better after you take pain medicine.     · You have loose stitches, or your incision comes open.     · Bright red blood has soaked through the bandage over your incision. Watch closely for changes in your health, and be sure to contact your doctor if you have any problems. Where can you learn more?   Go to http://www.gray.com/  Enter B577 in the search box to learn more about \"Abdominal Hernia Repair: What to Expect at Home. \"  Current as of: April 15, 2020               Content Version: 12.8  © 2006-2021 Healthwise, [facility]. Care instructions adapted under license by [employer] (which disclaims liability or warranty for this information). If you have questions about a medical condition or this instruction, always ask your healthcare professional. [address] any warranty or liability for your use of this information. DISCHARGE SUMMARY from your Nurse      PATIENT INSTRUCTIONS    After general anesthesia or intravenous sedation, for 24 hours or while taking prescription Narcotics:  · Limit your activities  · Do not drive and operate hazardous machinery  · Do not make important personal or business decisions  · Do  not drink alcoholic beverages  · If you have not urinated within 8 hours after discharge, please contact your surgeon on call. Report the following to your surgeon:  · Excessive pain, swelling, redness or odor of or around the surgical area  · Temperature over 100.5  · Nausea and vomiting lasting longer than 4 hours or if unable to take medications  · Any signs of decreased circulation or nerve impairment to extremity: change in color, persistent  numbness, tingling, coldness or increase pain  · Any questions      GOOD HELP TO FIGHT AN INFECTION  Here are a few tip to help reduce the chance of getting an infection after surgery:   Wash Your Hands   Good handwashing is the most important thing you and your caregiver can do.  Wash before and after caring for any wounds. Dry your hand with a clean towel.  Wash with soap and water for at least 20 seconds. A TIP: sing the \"Happy Birthday\" song through one time while washing to help with the timing.  Use a hand  in between washings.      Shower   When your surgeon says it is OK to take a shower, use a new bar of antibacterial soap (if that is what you use, and keep that bar of soap ONLY for your use), or antibacterial body wash.  Use a clean wash cloth or sponge when you bathe.  Dry off with a clean towel  after every bath - be careful around any wounds, skin staples, sutures or surgical glue over/on wounds.  Do not enter swimming pools, hot tubs, lakes, rivers and/or ocean until wounds are healed and your doctor/surgeon says it is OK.  Use Clean Sheets   Sleep on freshly laundered sheets after your surgery.  Keep the surgery site covered with a clean, dry bandage (if instructed to do so). If the bandage becomes soiled, reapply a new, dry, clean bandage.  Do not allow pets to sleep with you while your wound is healing.  Lifestyle Modification and Controlling Your Blood Sugar   Smoking slows wound healing. Stop smoking and limit exposure to second-hand smoke.  High blood sugar slows wound healing. Eat a well-balanced diet to provide proper nutrition while healing   Monitor your blood sugar (if you are a diabetic) and take your medications as you are suppose to so you can control you blood sugar after surgery. COUGH AND DEEP BREATHE    Breathing deeply and coughing are very important exercises to do after surgery. Deep breathing and coughing open the little air tubes and air sacks in your lungs. You take deep breaths every day. You may not even notice - it is just something you do when you sigh or yawn. It is a natural exercise you do to keep these air passages open. After surgery, take deep breaths and cough, on purpose. DIRECTIONS:  · Take 10 to 15 slow deep breaths every hour while awake. · Breathe in deeply, and hold it for 2 seconds. · Exhale slowly through puckered lips, like blowing up a balloon. · After every 4th or 5th deep breath, hug your pillow to your chest or belly and give a hard, deep cough.       Yes, it will probably hurt.  But doing this exercise is a very important part of healing after surgery. Take your pain medicine to help you do this exercise without too much pain. Coughing and deep breathing help prevent bronchitis and pneumonia after surgery. If you had chest or belly surgery, use a pillow as a \"hug kasie\" and hold it tightly to your chest or belly when you cough. ANKLE PUMPS    Ankle pumps increase the circulation of oxygenated blood to your lower extremities and decrease your risk for circulation problems such as blood clots. They also stretch the muscles, tendons and ligaments in your foot and ankle, and prevent joint contracture in the ankle and foot, especially after surgeries on the legs. It is important to do ankle pump exercises regularly after surgery because immobility increases your risk for developing a blood clot. Your doctor may also have you take an Aspirin for the next few days as well. If your doctor did not ask you to take an Aspirin, consult with him before starting Aspirin therapy on your own. The exercise is quite simple. · Slowly point your foot forward, feeling the muscles on the top of your lower leg stretch, and hold this position for 5 seconds. · Next, pull your foot back toward you as far as possible, stretching the calf muscles, and hold that position for 5 seconds. · Repeat with the other foot. · Perform 10 repetitions every hour while awake for both ankles if possible (down and then up with the foot once is one repetition). You should feel gentle stretching of the muscles in your lower leg when doing this exercise. If you feel pain, or your range of motion is limited, don't push too hard. Only go the limit your joint and muscles will let you go. If you have increasing pain, progressively worsening leg warmth or swelling, STOP the exercise and call your doctor.            MEDICATION AND   SIDE EFFECT GUIDE    The [facility] MEDICATION AND SIDE EFFECT GUIDE was provided to the PATIENT AND CARE PROVIDER. Information provided includes instruction about drug purpose and common side effects for the following medications:   ·         These are general instructions for a healthy lifestyle:    *   Please give a list of your current medications to your Primary Care Provider. *   Please update this list whenever your medications are discontinued, doses are changed, or new medications (including over-the-counter products) are added. *   Please carry medication information at all times in case of emergency situations. About Smoking  No smoking / No tobacco products  Avoid exposure to second hand smoke     Surgeon General's Warning:  Quitting smoking now greatly reduces serious risk to your health. Obesity, smoking, and sedentary lifestyle greatly increases your risk for illness and disease. A healthy diet, regular physical exercise & weight monitoring are important for maintaining a healthy lifestyle. Congestive Heart Failure  You may be retaining fluid if you have a history of heart failure or if you experience any of the following symptoms:  Weight gain of 3 pounds or more overnight or 5 pounds in a week, increased swelling in your hands or feet or shortness of breath while lying flat in bed. Please call your doctor as soon as you notice any of these symptoms; do not wait until your next office visit. Recognize signs and symptoms of STROKE:  F -  Face looks uneven  A -  Arms unable to move or move evenly  S -  Speech slurred or non-existent  T -  Time-call 911 as soon as signs and symptoms begin-DO NOT go          back to bed or wait to see if you get better-TIME IS BRAIN. Warning Signs of HEART ATTACK   Call 911 if you have these symptoms:     Chest discomfort. Most heart attacks involve discomfort in the center of the chest that lasts more than a few minutes, or that goes away and comes back.  It can feel like uncomfortable pressure, squeezing, fullness, or pain.  Discomfort in other areas of the upper body. Symptoms can include pain or discomfort in one or both arms, the back, neck, jaw, or stomach.  Shortness of breath with or without chest discomfort.  Other signs may include breaking out in a cold sweat, nausea, or lightheadedness. Don't wait more than five minutes to call 911 - MINUTES MATTER! Fast action can save your life. Calling 911 is almost always the fastest way to get lifesaving treatment. Emergency Medical Services staff can begin treatment when they arrive -- up to an hour sooner than if someone gets to the hospital by car. Learning About Coronavirus [phone])  Coronavirus [phone]): Overview  What is coronavirus (COVID-19)? The coronavirus disease (COVID-19) is caused by a virus. It is an illness that was first found in [place], [place], in December 2019. It has since spread worldwide. The virus can cause fever, cough, and trouble breathing. In severe cases, it can cause pneumonia and make it hard to breathe without help. It can cause death. Coronaviruses are a large group of viruses. They cause the common cold. They also cause more serious illnesses like Middle East respiratory syndrome (MERS) and severe acute respiratory syndrome (SARS). COVID-19 is caused by a novel coronavirus. That means it's a new type that has not been seen in people before. This virus spreads person-to-person through droplets from coughing and sneezing. It can also spread when you are close to someone who is infected. And it can spread when you touch something that has the virus on it, such as a doorknob or a tabletop. What can you do to protect yourself from coronavirus (COVID-19)? The best way to protect yourself from getting sick is to:  · Avoid areas where there is an outbreak. · Avoid contact with people who may be infected. · Wash your hands often with soap or alcohol-based hand sanitizers.   · Avoid crowds and try to stay at least 6 feet away from other people. · Wash your hands often, especially after you cough or sneeze. Use soap and water, and scrub for at least 20 seconds. If soap and water aren't available, use an alcohol-based hand . · Avoid touching your mouth, nose, and eyes. What can you do to avoid spreading the virus to others? To help avoid spreading the virus to others:  · Cover your mouth with a tissue when you cough or sneeze. Then throw the tissue in the trash. · Use a disinfectant to clean things that you touch often. · Stay home if you are sick or have been exposed to the virus. Don't go to school, work, or public areas. And don't use public transportation. · If you are sick:  ? Leave your home only if you need to get medical care. But call the doctor's office first so they know you're coming. And wear a face mask, if you have one.  ? If you have a face mask, wear it whenever you're around other people. It can help stop the spread of the virus when you cough or sneeze. ? Clean and disinfect your home every day. Use household  and disinfectant wipes or sprays. Take special care to clean things that you grab with your hands. These include doorknobs, remote controls, phones, and handles on your refrigerator and microwave. And don't forget countertops, tabletops, bathrooms, and computer keyboards. When to call for help  Call 911 anytime you think you may need emergency care. For example, call if:  · You have severe trouble breathing. (You can't talk at all.)  · You have constant chest pain or pressure. · You are severely dizzy or lightheaded. · You are confused or can't think clearly. · Your face and lips have a blue color. · You pass out (lose consciousness) or are very hard to wake up. Call your doctor now if you develop symptoms such as:  · Shortness of breath. · Fever. · Cough.   If you need to get care, call ahead to the doctor's office for instructions before you go. Make sure you wear a face mask, if you have one, to prevent exposing other people to the virus. Where can you get the latest information? The following health organizations are tracking and studying this virus. Their websites contain the most up-to-date information. Salvatore Aaron also learn what to do if you think you may have been exposed to the virus. · U.S. Centers for Disease Control and Prevention (CDC): The CDC provides updated news about the disease and travel advice. The website also tells you how to prevent the spread of infection. www.cdc.gov  · World Health Organization [facility]): WHO offers information about the virus outbreaks. WHO also has travel advice. www.who.int  Current as of: April 1, 2020               Content Version: 12.4  © 2006-2020 Healthwise[employer].    Care instructions adapted under license by your healthcare professional. If you have questions about a medical condition or this instruction, always ask your healthcare professional. [place] [address] any warranty or liability for your use of this information       [address]:  [x]     Surgeon and Hospital Discharge Instructions  [x]     [facility] Surgical Services Care Provider Card  [x]     Medication & Side Effect Guide            (your newly prescribed medications have been marked/highlighted showing the most common side effects from   the classes of drugs on your prescriptions)  []     Medication Prescription(s) x *** ( [] These have been sent electronically to your pharmacy by your surgeon,   - OR -       your surgeon has already provided these to you during a previous/pre-op office visit)  []     [address]  []     Physical Therapy Prescription  []     Follow-up Appointment Cards  []     Surgery-related Pictures/Media  []     Pain block and/or block with On-Q Catheter from Anesthesia Service (information included in your instructions above)  []     Medical device information sheets/pamphlets from their    []     School/work excuse note. []     /parent work excuse note.

## 2021-08-27 NOTE — BRIEF OP NOTE
Brief Postoperative Note    Patient: Audie Payne  YOB: 1970  MRN: [id number]    Date of Procedure: 8/27/2021     Pre-Op Diagnosis: INCISIONAL HERNIA    Post-Op Diagnosis: Same as preoperative diagnosis. Procedure(s):  ROBOTIC ASSISTED INCISIONAL HERNIA  REPAIR WITH MESH    Surgeon(s):  Dru Sofia MD    Surgical Assistant: Surg Asst-1: [facility]    Anesthesia: General     Estimated Blood Loss (mL): Minimal    Complications: None    Specimens: * No specimens in log *     Implants:   Implant Name Type Inv.  Item Serial No.  Lot No. LRB No. Used Action   MESH SURG DIA9CM WHT POLY CLLGN FLM RND MFIL BIOABSRB - SN/A Mesh MESH SURG DIA9CM WHT POLY CLLGN FLM RND MFIL BIOABSRB N/A [employer] COVIDIEN ENDO_WD [id number] N/A 1 Implanted       Drains: * No LDAs found *    Findings: incisional hernia lateral abd wall    Electronically Signed by Sarah Willard MD on 8/27/2021 at 9:37 AM

## 2021-08-27 NOTE — H&P
8/27/2021    Chart reviewed and patient interviewed and examined.  No new changes since current H&P.

## 2021-08-27 NOTE — ANESTHESIA PREPROCEDURE EVALUATION
Anesthetic History               Review of Systems / Medical History  Patient summary reviewed, nursing notes reviewed and pertinent labs reviewed    Pulmonary        Sleep apnea: CPAP  Smoker         Neuro/Psych     seizures (X1 YEARS AGO. \"STRESS RELATED\".   NO RX)    Psychiatric history (ANXIETY/DEPRESSION)     Cardiovascular    Hypertension          Hyperlipidemia    Exercise tolerance: >4 METS     GI/Hepatic/Renal     GERD: well controlled           Endo/Other        Obesity     Other Findings              Physical Exam    Airway  Mallampati: III  TM Distance: < 4 cm  Neck ROM: normal range of motion   Mouth opening: Normal    Comments: beard Cardiovascular    Rhythm: regular  Rate: normal         Dental  No notable dental hx       Pulmonary  Breath sounds clear to auscultation               Abdominal  GI exam deferred       Other Findings            Anesthetic Plan    ASA: 3  Anesthesia type: general          Induction: Intravenous  Anesthetic plan and risks discussed with: Patient

## 2021-08-27 NOTE — OP NOTES
OPERATIVE REPORT     PREOPERATIVE DIAGNOSIS: incisional hernia. POSTOPERATIVE DIAGNOSIS: incisional hernia. PROCEDURES PERFORMED: Robotic repair of incisional hernia with mesh. SURGEON: Jasiel Jones MD.     ANESTHESIA: GETA    INDICATION: As documented in history and physical.     FINDINGS: incisional hernia    DESCRIPTION OF PROCEDURE: Patient was taken to the operating room and  was placed supine on the operating table, the abdomen was cleaned, prepped, and draped. A left upper quadrant incision was made. Veress needle was passed without difficulty. Initial intra-abdominal pressure were low. Pneumoperitoneum of 15 mmHG was reached. An mm robotic trocar was passed through, then mm 30 degree camera passed through. Inspection of immediate area was performed for any inadvertent injury and none were seen. We then placed 2 other 8mm robotic trocars under direct visualization in the left mid and left lower abdomen. Robot was then docked. For my right hand I had monopolar scissors and Cardiere grasper for my left. Center of the defect was found and marked on the outside. Defect was closed with #1 stratafix suture after intra-abdominal pressure was reduced. .  A 9 cm round mesh was passed through. Mesh was grasped with All-Scrap device via center of defect. Mesh was then sewn in place with V-Lock absorbable suture. Once done area was inspected and mesh was laying flat. All instruments and needle removed. Pneumoperitoneum released. All 3 trocar incisions closed with 4-0 Vicryl. Area cleaned and dried. Dermabond used as dressing. Patient taken to PACU in a stable condition after abdominal binder placed    ESTIMATED BLOOD LOSS: Minimal.     SPECIMENS REMOVED: None    COUNTS: Correct at the completion of surgery.

## 2021-08-27 NOTE — H&P
Assessment:     49 yo man with incisional hernia    Plan:     OR today for incisional hernia repair    Signed By: Lena Ferrell MD  [address] [facility]  [phone]    August 27, 2021          General Surgery History and Physical    Subjective:      Dereck Kumar is a 48 y.o.  male who presents with pain at incisional hernia site.     Past Medical History:   Diagnosis Date    Adverse effect of anesthesia 2016    Woke up during endoscopy     Anxiety     COVID-19 vaccine series completed     Moderna    GERD (gastroesophageal reflux disease)     History of vascular access device 11/20/2020    [facility] VAT 5Fr Triple R Basilic 02BA  for TPN    Hypertension     MRSA pneumonia ([facility] [address] 11/2020    Required ICU stay after lap bailee and emergent laparotomy    Panic attack     Seizures ([facility] [address] 1981    Last seizure was at 6years old; not currently on any medication    Sleep apnea     uses CPAP     Past Surgical History:   Procedure Laterality Date    HX GI  2008    esophagus repair narrow- balloon dilation and scar tissue removal    HX HERNIA REPAIR N/A 1977    hernia(abdomen)- open    HX HERNIA REPAIR  1/16/15    Laparoscopic ventral hernia repair with mesh    HX HERNIA REPAIR N/A 2009    hernia (hiatal and abdomen)-open    HX LAP CHOLECYSTECTOMY N/A 11/04/2020     required emergent laparotomy post surgery s/t bleeding    HX LAPAROTOMY N/A 11/2020    Exploratory laparotomy for evacuation of hematoma post cholecystectomy    HX ORCHIOPEXY N/A 8 years    HX TONSILLECTOMY N/A 11years of age   Freeda Crumb WISDOM TEETH EXTRACTION  23years of age      Family History   Problem Relation Age of Onset    Diabetes Mother     Hypertension Mother     Stroke Mother         copd    Cancer Father         skin    Heart Disease Father     Diabetes Father     Heart Attack Father      Social History     Socioeconomic History    Marital status:      Spouse name: Not on file    Number of children: Not on file  Years of education: Not on file    Highest education level: Not on file   Tobacco Use    Smoking status: Never Smoker    Smokeless tobacco: Never Used   Substance and Sexual Activity    Alcohol use: Yes     Alcohol/week: 0.0 - 2.0 standard drinks    Drug use: Not Currently     Comment: Marijuana when a teenager; none currently    Sexual activity: Yes     Partners: Female     Birth control/protection: None   Other Topics Concern     Social Determinants of Health     Financial Resource Strain:     Difficulty of Paying Living Expenses:    Food Insecurity:     Worried About Running Out of Food in the Last Year:     [address] in the Last Year:    Transportation Needs:     Lack of Transportation (Medical):      Lack of Transportation (Non-Medical):    Physical Activity:     Days of Exercise per Week:     Minutes of Exercise per Session:    Stress:     Feeling of Stress :    Social Connections:     Frequency of Communication with Friends and Family:     Frequency of Social Gatherings with Friends and Family:     Attends Voodoo Services:     Active Member of Clubs or Organizations:     Attends Club or Organization Meetings:     Marital Status:       Current Facility-Administered Medications   Medication Dose Route Frequency    lidocaine (PF) (XYLOCAINE) 10 mg/mL (1 %) injection 0.1 mL  0.1 mL SubCUTAneous PRN    lactated Ringers infusion  100 mL/hr IntraVENous CONTINUOUS    sodium chloride (NS) flush 5-40 mL  5-40 mL IntraVENous Q8H    sodium chloride (NS) flush 5-40 mL  5-40 mL IntraVENous PRN    lactated Ringers infusion  75 mL/hr IntraVENous CONTINUOUS    lidocaine (PF) (XYLOCAINE) 10 mg/mL (1 %) injection 0.1 mL  0.1 mL SubCUTAneous PRN    ceFAZolin (ANCEF) 2 g in sterile water (preservative free) 20 mL IV syringe  2 g IntraVENous ONCE      Allergies   Allergen Reactions    Pcn [Penicillins] Other (comments)     Reaction was at age 6, itching and eyes swollen; denies any sob or tongue swelling. Has been on Zosyn in the hospital with no problems; completed ancef 2020 and 21    Codeine Nausea and Vomiting       Review of Systems:     []     Unable to obtain  ROS due to  []    mental status change  []    sedated   []    intubated   [x]    Total of 12 system negative, unless specified below or in HPI:  Constitutional: negative fever, negative chills, negative weight loss  Eyes:   negative visual changes  ENT:   negative sore throat, tongue or lip swelling  Respiratory:  negative cough, negative dyspnea  Cards:  negative for chest pain, palpitations, lower extremity edema  GI:   negative for nausea, vomiting, diarrhea, and positive for abdominal pain  :  negative for frequency, dysuria  Integument:  negative for rash and pruritus  Heme:  negative for easy bruising and gum/nose bleeding  Musculoskel: negative for myalgias,  back pain and muscle weakness  Neuro:  negative for headaches, dizziness, vertigo  Psych:  negative for feelings of anxiety, depression     Objective:        Patient Vitals for the past 8 hrs:   BP Temp Pulse Resp SpO2 Height Weight   21 0652 137/80 98.4 °F (36.9 °C) 78 16 98 % 5' 11\" (1.803 m) 110.3 kg (243 lb 2.7 oz)       Temp (24hrs), Av.4 °F (36.9 °C), Min:98.4 °F (36.9 °C), Max:98.4 °F (36.9 °C)      Physical Exam:  General:  Alert, cooperative, no distress, appears stated age. Eyes:  Conjunctivae clear. PERRL, EOMs intact. Nose: Nares normal. Septum midline. Mucosa normal. No drainage or sinus tenderness. Mouth/Throat: Lips, mucosa, and tongue normal. Teeth and gums normal.   Neck: Supple, symmetrical, trachea midline   Back:   Symmetric, no curvature. ROM normal. No CVA tenderness. Lungs:   Clear to auscultation bilaterally. Heart:  Regular rate and rhythm   Abdomen:   Soft, non-tender. Bowel sounds normal. No masses,  No organomegaly. Extremities: Extremities normal, atraumatic, no cyanosis or edema.        Skin: Skin color, texture, turgor normal. No rashes or lesions         BMP: No results found for: NA, K, CL, CO2, AGAP, GLU, BUN, CREA, GFRAA, GFRNA  CMP: No results found for: NA, K, CL, CO2, AGAP, GLU, BUN, CREA, GFRAA, GFRNA, CA, MG, PHOS, ALB, TBIL, TP, ALB, GLOB, AGRAT, ALT  CBC: No results found for: WBC, HGB, HGBEXT, HCT, HCTEXT, PLT, PLTEXT, HGBEXT, HCTEXT, PLTEXT  All Cardiac Markers in the last 24 hours: No results found for: CPK, CK, CKMMB, CKMB, RCK3, CKMBT, CKNDX, CKND1, OPAL, TROPT, TROIQ, BRET, TROPT, TNIPOC, BNP, BNPP  ABG: No results found for: PH, PHI, PCO2, PCO2I, PO2, PO2I, HCO3, HCO3I, FIO2, FIO2I  COAGS: No results found for: APTT, PTP, INR, INREXT, INREXT  Pancreatic Markers: No results found for: AMYLPOCT, AML, LIPPOCT, LPSE

## 2021-08-27 NOTE — ANESTHESIA POSTPROCEDURE EVALUATION
Procedure(s):  ROBOTIC ASSISTED INCISIONAL HERNIA  REPAIR WITH MESH. general    Anesthesia Post Evaluation      Multimodal analgesia: multimodal analgesia not used between 6 hours prior to anesthesia start to PACU discharge  Patient location during evaluation: PACU  Patient participation: complete - patient participated  Level of consciousness: awake  Pain management: adequate  Airway patency: patent  Anesthetic complications: no  Cardiovascular status: acceptable, blood pressure returned to baseline and hemodynamically stable  Respiratory status: acceptable  Hydration status: acceptable  Post anesthesia nausea and vomiting:  controlled      INITIAL Post-op Vital signs:   Vitals Value Taken Time   /78 08/27/21 1045   Temp 36.6 °C (97.8 °F) 08/27/21 1035   Pulse 83 08/27/21 1049   Resp 16 08/27/21 1049   SpO2 97 % 08/27/21 1049   Vitals shown include unvalidated device data.

## 2022-03-18 PROBLEM — E66.01 MORBID OBESITY (HCC): Status: ACTIVE | Noted: 2020-01-23

## 2022-03-19 PROBLEM — F99 PSYCHIATRIC DISORDER: Status: ACTIVE | Noted: 2020-01-23

## 2022-03-19 PROBLEM — K81.9 CHOLECYSTITIS: Status: ACTIVE | Noted: 2020-11-04

## 2022-03-19 PROBLEM — G47.30 SLEEP APNEA: Status: ACTIVE | Noted: 2020-01-23

## 2022-03-19 PROBLEM — I10 HTN (HYPERTENSION): Status: ACTIVE | Noted: 2020-01-23

## 2022-03-19 PROBLEM — K80.50 BILIARY COLIC: Status: ACTIVE | Noted: 2020-11-04

## 2022-03-19 PROBLEM — R56.9 SEIZURES (HCC): Status: ACTIVE | Noted: 2020-01-23

## 2022-03-20 PROBLEM — F41.0 PANIC ATTACK: Status: ACTIVE | Noted: 2020-01-23

## 2022-03-20 PROBLEM — R55 SYNCOPE: Status: ACTIVE | Noted: 2020-01-23

## 2022-11-15 ENCOUNTER — TRANSCRIBE ORDER (OUTPATIENT)
Dept: SCHEDULING | Age: 52
End: 2022-11-15

## 2022-11-15 DIAGNOSIS — R06.02 SHORTNESS OF BREATH: Primary | ICD-10-CM

## 2022-12-27 ENCOUNTER — HOSPITAL ENCOUNTER (OUTPATIENT)
Dept: CT IMAGING | Age: 52
Discharge: HOME | End: 2022-12-27
Attending: INTERNAL MEDICINE
Payer: COMMERCIAL

## 2022-12-27 DIAGNOSIS — R06.02 SHORTNESS OF BREATH: ICD-10-CM

## 2022-12-27 PROCEDURE — 71250 CT THORAX DX C-: CPT

## 2023-02-15 NOTE — PROGRESS NOTES
Patients nausea better managed today. Had two loose BM's. Up in chair for 2 hours today. Capillary refill less/equal to 2 seconds

## 2023-02-16 NOTE — CONSULTS
[facility]     Name: Darlene Stahl MRN: [id number]   : 1970 Hospital: [address]   Date: 2020        Impression Plan   1. Respiratory insufficiency  2. Hemorrhagic shock due  3. S/p lacerated liver capsule repair  4. Cholelithiasis- S/p Cholecystecomy and lysis of adhesions    cxray some worse this am, s/p bronch yesterday, could be fluid shifts, no other signs of infection, would hold on diuresis for now since he is on pressors             · In shock, will try to wean propfol, add fentnayl, can use precedex if agitated, wean william, could go up on levophed, replace k, will wean fio2, check abg, wean peep when able to wean fio2  ·   · SBT again this morning- barrier to extubation is pressor requirement  · Wean levoped and william- wean levophed first  · Wean prop to RASS -1  · IVF- LR  · Pantoprazole  · Hold on AC proph  · SCDs  · NPO         Pt is critically ill. Critical care time spent with pt exclusive of procedures was 35 minutes. Pt at risk for further decline due to hemorrhagic shock    Radiology  ( personally reviewed) CXR reviewed: no infiltrates, ETT   ABG No results for input(s): PHI, PO2I, PCO2I in the last 72 hours. Subjective     Cc: hemorrhagic shock    47 yo with cholecystits s/p lab bailee complicated by laceration of the liver capsule and hemorrhagic shock. Hematoma evacuated- 2L blood. Currently on william 130 and Levophed 8. Pt intubated and sedated- unable to provide any further hx.     : William at 165, levo at 4  Tolerated SBT yesterday and placed on again this morning  : increased hypoxia overnight, fio2 100%, peep of 8, on prop at 50, sedated, william at 190, 2 of levophed    Review of Systems:  Review of systems not obtained due to patient factors.     Past Medical History:   Diagnosis Date    GERD (gastroesophageal reflux disease)     Panic attack     Psychiatric disorder     anxiety    Seizures ([facility] [address]     7 yo   was told it was a stress Pt admitted for SBO.  Dc'd home 2/15/2023.  Started on Augmentin and Senokot.  Needs TCM schedule with Dr Mcgee   seizure, never had another one    Sleep apnea     uses CPAP      Past Surgical History:   Procedure Laterality Date    ABDOMEN SURGERY PROC UNLISTED  1977    hernia(abdomen)- open    HX GI  2008    esophagus repair narrow- balloon dilation and scar tissue removal    HX HERNIA REPAIR      inguinal hernia    HX OTHER SURGICAL  11years of age    tonsils    HX OTHER SURGICAL  2009    hernia (hiatal and abdomen)-open    HX OTHER SURGICAL  23years of age    wisdom teeth extraction under anesthesia    HX OTHER SURGICAL  8 years     distended testicle     HX OTHER SURGICAL  1/16/15    Laparoscopic ventral hernia repair with mesh      Prior to Admission medications    Medication Sig Start Date End Date Taking? Authorizing Provider   citalopram (CELEXA) 40 mg tablet Take 40 mg by mouth daily. Yes Provider, Historical   docusate sodium (COLACE) 100 mg capsule Take 1 Cap by mouth two (2) times a day for 10 days. 11/4/20 11/14/20 Yes Shay Bay MD   oxyCODONE IR (ROXICODONE) 5 mg immediate release tablet Take 1 Tab by mouth every four (4) hours as needed for Pain for up to 3 days. Max Daily Amount: 30 mg. 11/4/20 11/7/20 Yes Shay Bay MD   lisinopril (PRINIVIL, ZESTRIL) 40 mg tablet Take 40 mg by mouth every evening. Yes Provider, Historical   simvastatin (ZOCOR) 40 mg tablet Take 40 mg by mouth nightly. Yes Provider, Historical   omeprazole (PRILOSEC) 40 mg capsule Take 40 mg by mouth daily.    Yes Provider, Historical     Current Facility-Administered Medications   Medication Dose Route Frequency    piperacillin-tazobactam (ZOSYN) 3.375 g in 0.9% sodium chloride (MBP/ADV) 100 mL  3.375 g IntraVENous Q8H    fluconazole (DIFLUCAN) 200mg/100 mL IVPB (premix)  200 mg IntraVENous DAILY    NOREPINephrine (LEVOPHED) 8 mg in 5% dextrose 250mL (32 mcg/mL) infusion  0.5-16 mcg/min IntraVENous TITRATE    enoxaparin (LOVENOX) injection 40 mg  40 mg SubCUTAneous Q24H    pantoprazole (PROTONIX) 40 mg in 0.9% sodium chloride 10 mL injection  40 mg IntraVENous DAILY    lactated Ringers infusion  125 mL/hr IntraVENous CONTINUOUS    ketorolac (TORADOL) injection 15 mg  15 mg IntraVENous Q6H    docusate sodium (COLACE) capsule 100 mg  100 mg Oral BID    propofol (DIPRIVAN) 10 mg/mL infusion  5-50 mcg/kg/min IntraVENous TITRATE    PHENYLephrine (BETTY-SYNEPHRINE) 100 mg in 0.9% sodium chloride 250 mL infusion   mcg/min IntraVENous TITRATE     Allergies   Allergen Reactions    Pcn [Penicillins] Hives     Reaction was at age 6, itching and eyes swollen; denies any sob or tongue swelling    Codeine Nausea and Vomiting      Social History     Tobacco Use    Smoking status: Never Smoker    Smokeless tobacco: Never Used   Substance Use Topics    Alcohol use: Yes     Alcohol/week: 1.7 standard drinks     Types: 2 Cans of beer per week     Comment: 1 or 2 beer every two weeks      Family History   Problem Relation Age of Onset    Diabetes Mother     Hypertension Mother     Stroke Mother         copd    Cancer Father         skin    Heart Disease Father     Diabetes Father     Heart Attack Father           Laboratory: I have personally reviewed the critical care flowsheet and labs.      Recent Labs     11/07/20 0341 11/06/20 0220 11/05/20  1424   WBC 15.8* 16.2* 17.3*   HGB 7.2* 7.4* 8.7*   HCT 21.7* 21.9* 25.4*   * 146* 154     Recent Labs     11/07/20 0341 11/06/20 0220 11/05/20  1424    142 142   K 3.2* 3.9 4.2   * 112* 113*   CO2 30 28 26   * 160* 160*   BUN 7 9 12   CREA 0.99 1.11 1.19   CA 7.9* 7.9* 7.9*   ALB  --   --  2.1*   ALT  --   --  309*       Objective:     Mode Rate Tidal Volume Pressure FiO2 PEEP   Assist control   500 ml  5 cm H2O 100 % 8 cm H20     Vital Signs:    Ventilator Pressures  Pressure Support (cm H2O): 5 cm H2O  PIP Observed (cm H2O): 28 cm H2O  MAP (cm H2O): 17  PEEP/VENT (cm H2O): 8 cm X80SIHJ(24)Ventilator Pressures  Pressure Support (cm H2O): 5 cm H2O  PIP Observed (cm H2O): 28 cm H2O  MAP (cm H2O): 17  PEEP/VENT (cm H2O): 8 cm H20  Safety & Alarms  Circuit Temperature: (HME)  Backup Mode Checked/Apnea: Yes  Pressure Max: 45 cm H2O  Pressure Min: 13 cm H2O  Ve Min: 2  Ve Max: 20  Vt Min: 200 ml  Vt Max: 1000 ml  RR Min: 21  RR Max: 50  Intake/Output:   Last shift:      Ventilator Volumes  Vt Set (ml): 500 ml  Vt Exhaled (Machine Breath) (ml): 898 ml  Vt Spont (ml): 475 ml  Ve Observed (l/min): 9.4 l/min  Last 3 shifts: 11/07 0701 - 11/07 1900  In: 499.5 [I.V.:499.5]  Out: 70 [Urine:70]RRIOLAST3    Intake/Output Summary (Last 24 hours) at 11/7/2020 0915  Last data filed at 11/7/2020 7121  Gross per 24 hour   Intake 4704.83 ml   Output 2765 ml   Net 1939.83 ml     EXAM:   GENERAL: intubated, sedated, HEENT:  PERRL, EOMI, no alar flaring or epistaxis, oral mucosa moist without cyanosis, NECK:  no jugular vein distention, no retractions, no thyromegaly or masses, LUNGS: CTA, no w/r/r , HEART:  Regular rate and rhythm with no MGR; no edema is present, ABDOMEN:  soft s, bowel sounds absent, EXTREMITIES:  warm with no cyanosis, SKIN:  no jaundice or ecchymosis and NEUROLOGIC: RASS -2    Radha Chanel MD  [facility]

## 2023-08-03 NOTE — PROGRESS NOTES
11/24/2020   CARE MANAGEMENT NOTE:  CM reviewed EMR for clinical updates.  Pt was admitted with biliary cholecystitis and he is s/p robotic assisted lap bailee on 11/4 and s/p ex-lap and evacuation of hematoma on 11/4.  Pt was intubated on 11/4 and extubated on 11/17. Reportedly, pt resides with his Pat Carlisle ([phone]; cell [phone]).     RUR 19%; LOS 20 days     Transition Plan of Care:  1. Revised plan is for pt to discharge home instead of going to [facility]   2.  Pt was started on TPN 11/20 and now weaning. Pt to start GI lite diet. 3.  CM will provide rolling walker to pt prior to discharge. 4.  Pt will arrange his own transportation home.     CM will continue to follow pt until discharged.   Allen  Detail Level: Zone

## 2024-03-09 ENCOUNTER — HOSPITAL ENCOUNTER (OUTPATIENT)
Facility: HOSPITAL | Age: 54
End: 2024-03-09
Attending: INTERNAL MEDICINE
Payer: COMMERCIAL

## 2024-03-09 DIAGNOSIS — R91.8 MULTIPLE PULMONARY NODULES: ICD-10-CM

## 2024-03-09 PROCEDURE — 71250 CT THORAX DX C-: CPT

## 2024-03-20 ENCOUNTER — TELEPHONE (OUTPATIENT)
Age: 54
End: 2024-03-20

## 2024-03-20 NOTE — TELEPHONE ENCOUNTER
Phoned the patient returning his  requesting a direct referral for a sleep study.  He was informed that his provider with  [facility] would need to fax over a request prior to scheduling.  In the event that a P2P is needed they would have to obtain it.

## 2024-07-18 ENCOUNTER — TELEPHONE (OUTPATIENT)
Facility: CLINIC | Age: 54
End: 2024-07-18

## 2024-08-24 ENCOUNTER — HOSPITAL ENCOUNTER (OUTPATIENT)
Facility: HOSPITAL | Age: 54
End: 2024-08-24
Attending: INTERNAL MEDICINE
Payer: COMMERCIAL

## 2024-08-24 DIAGNOSIS — R91.8 MULTIPLE PULMONARY NODULES: ICD-10-CM

## 2024-08-24 PROCEDURE — 71250 CT THORAX DX C-: CPT

## 2025-03-06 NOTE — PROCEDURES
[address]  [facility] [address]  [address] [address] [address]  [phone]    Intubation Note    Name: Shannon Steen MRN: [id number]   : 1970 Hospital: [address] [place] [address]   Date: 2020            Procedure: elective intubation    Indication: respiratory insufficiency, occluded ETT; reintubation    ETT occluded due to significant kink in the tube. ETT removed and then sedation and paralytic administered. Anesthesia- Rapid sequence: Etomidate 15mg  Paralysis: Succinylcholine 70mg    Etomidate and succnylcholine was given sequentially in rapid IV push. The Sellick maneuver was performed throughout the entire sequence. After adequate sedation and paralisys emergent intubation was performed. A 4.0 glidescope  laryngoscope was used to visualize the epiglottis and vocal chords. After positive identification of the vocal chords, an 7. ET tube was placed into the trachea with direct visualization. The oropharynx was crowded with redundant tissue. The tube was seen passing through the vocal chords without some difficulty. CO2 colorimetry was employed immediately to verify tube in airway with color change indicating detection of CO2. Water vapor was seen within the ET tube, and auscultation of the abdomen revealed no bubbling souds. Auscultation  and inspection of the chest after intubation showed symmetric chest excursion and symmetric air entry bilaterally. Chest X-ray has been ordered and is pending. The patient has been placed on a mechanical ventilator. There were no complications.     Joaquín Edwards MD
[address]  [address]  [address]  [address]  [phone]    Aquilino Wilkinson  1970  [id number]      Date of Procedure: 11/6/2020    Preoperative diagnosis: occluded ETT     Procedure: bronchoscopy    Indication: Occluded ETT     :  Anayeli Lewis MD    Assistant(s): Gloria Mckeon    Anesthesia/Sedation:  continous drips for sedation      Procedure Details:  Bronchoscope used to examine ETT as the inline suction device could not be completely collapse. The bronchoscope was inserted into the ETT and at about half of the distance of the orpharynx, the tube was significantly kinked. The tube was adjusted and even with repositioning, the bronchoscope was unable to pass the bend in the tube. Complications:   None noted; patient tolerated the procedure well.     EBL:  Minimal           Impression:  Occluded ETT     Recommendations:   Plan on extubation and reintubation with a new ETT      Brady Coburn MD  11/6/2020  8:59 PM
fair, will monitor progress closely

## (undated) DEVICE — SPONGE LAP 18X18IN STRL -- 5/PK

## (undated) DEVICE — INSUFFLATION NEEDLE: Brand: SURGINEEDLE

## (undated) DEVICE — COVER LT HNDL PLAS RIG 1 PER PK

## (undated) DEVICE — SUT ETHLN 2-0 18IN FS BLK --

## (undated) DEVICE — SEALER TISS L20CM DIA13MM ADV BPLR L CRV JAW OPN APPRCH

## (undated) DEVICE — GLOVE SURG SZ 65 THK91MIL LTX FREE SYN POLYISOPRENE

## (undated) DEVICE — RESERVOIR,SUCTION,100CC,SILICONE: Brand: MEDLINE

## (undated) DEVICE — DERMABOND SKIN ADH 0.7ML -- DERMABOND ADVANCED 12/BX

## (undated) DEVICE — DBD-PACK,LAPAROTOMY,2 REINFORCED GOWNS: Brand: MEDLINE

## (undated) DEVICE — TOWEL SURG W17XL27IN STD BLU COT NONFENESTRATED PREWASHED

## (undated) DEVICE — SPONGE GZ W2XL2IN 4 PLY RAYON/POLYESTER N WVN AVANT

## (undated) DEVICE — ROBOTIC GENERAL-SFMC: Brand: MEDLINE INDUSTRIES, INC.

## (undated) DEVICE — DRAPE,REIN 53X77,STERILE: Brand: MEDLINE

## (undated) DEVICE — INFECTION CONTROL KIT SYS

## (undated) DEVICE — STERILE POLYISOPRENE POWDER-FREE SURGICAL GLOVES: Brand: PROTEXIS

## (undated) DEVICE — REM POLYHESIVE ADULT PATIENT RETURN ELECTRODE: Brand: VALLEYLAB

## (undated) DEVICE — ELECTRO LUBE IS A SINGLE PATIENT USE DEVICE THAT IS INTENDED TO BE USED ON ELECTROSURGICAL ELECTRODES TO REDUCE STICKING.: Brand: KEY SURGICAL ELECTRO LUBE

## (undated) DEVICE — ROCKER SWITCH PENCIL BLADE ELECTRODE, HOLSTER: Brand: EDGE

## (undated) DEVICE — SYR 10ML LUER LOK 1/5ML GRAD --

## (undated) DEVICE — SURGICAL PROCEDURE KIT GEN LAPAROSCOPY LF

## (undated) DEVICE — BLADELESS OBTURATOR: Brand: WECK VISTA

## (undated) DEVICE — SOL IRRIGATION INJ NACL 0.9% 500ML BTL

## (undated) DEVICE — ARM DRAPE

## (undated) DEVICE — DEVICE TRNSF SPIK STL 2008S] MICROTEK MEDICAL INC]

## (undated) DEVICE — POWDER HEMOSTAT GEL 3.0GR -- SURGICEL

## (undated) DEVICE — COVER MPLR TIP CRV SCIS ACC DA VINCI

## (undated) DEVICE — SUTURE VCRL SZ 3-0 L27IN ABSRB UD L26MM SH 1/2 CIR J416H

## (undated) DEVICE — CLICKLINE SCISSORS INSERT: Brand: CLICKLINE

## (undated) DEVICE — DRAPE,LAPAROTOMY,T,PEDI,STERILE: Brand: MEDLINE

## (undated) DEVICE — SEAL UNIV 5-8MM DISP BX/10 -- DA VINCI XI - SNGL USE

## (undated) DEVICE — SUTURE PDS II SZ 2-0 L27IN ABSRB VLT SH L26MM 1/2 CIR Z317H

## (undated) DEVICE — NEEDLE HYPO 22GA L1.5IN BLK S STL HUB POLYPR SHLD REG BVL

## (undated) DEVICE — DRAIN SURG W10XL20CM SIL SMOOTH FLAT 3/4 PERF DBL WRP

## (undated) DEVICE — VISUALIZATION SYSTEM: Brand: CLEARIFY

## (undated) DEVICE — SURGICAL PROCEDURE PACK BASIN MAJ SET CUST NO CAUT

## (undated) DEVICE — SUTURE VCRL SZ 4-0 L27IN ABSRB UD L19MM PS-2 3/8 CIR PRIM J426H

## (undated) DEVICE — CANISTER, RIGID, 3000CC: Brand: MEDLINE INDUSTRIES, INC.

## (undated) DEVICE — SUTURE PDS II SZ 1 L96IN ABSRB VLT TP-1 L65MM 1/2 CIR Z880G

## (undated) DEVICE — SOLUTION IV 1000ML 0.9% SOD CHL

## (undated) DEVICE — SUTURE SZ 0 27IN 5/8 CIR UR-6  TAPER PT VIOLET ABSRB VICRYL J603H

## (undated) DEVICE — LARGE, DISPOSABLE ALEXIS O C-SECTION PROTECTOR - RETRACTOR: Brand: ALEXIS ® O C-SECTION PROTECTOR - RETRACTOR

## (undated) DEVICE — CLIP LIG ABSRB HEM-LOK LG PUR --

## (undated) DEVICE — 3M™ TEGADERM™ TRANSPARENT FILM DRESSING FRAME STYLE, 1626W, 4 IN X 4-3/4 IN (10 CM X 12 CM), 50/CT 4CT/CASE: Brand: 3M™ TEGADERM™

## (undated) DEVICE — STRAP,POSITIONING,KNEE/BODY,FOAM,4X60": Brand: MEDLINE

## (undated) DEVICE — NDL SPNE QNCKE 18GX3.5IN LF --

## (undated) DEVICE — AIRSEAL BIFURCATED SMOKE EVAC FILTERED TUBE SET: Brand: AIRSEAL

## (undated) DEVICE — TOTAL TRAY, 16FR 10ML SIL FOLEY, URN: Brand: MEDLINE

## (undated) DEVICE — PACK,BASIC,SIRUS,V: Brand: MEDLINE

## (undated) DEVICE — PREP SKN CHLRAPRP APL 26ML STR --

## (undated) DEVICE — BINDER ABD S/M 12X30-45IN --

## (undated) DEVICE — DRAPE FLD WRM W44XL66IN C6L FOR INTRATEMP SYS THERMABASIN

## (undated) DEVICE — SUTURE DEV SZ 2-0 WND CLSR ABSRB GS-22 VLOC COVIDIEN VLOCM2145

## (undated) DEVICE — PAD BD MATTRESS 73X32 IN STD CONVOLUTED FOAM LTX FREE

## (undated) DEVICE — Device

## (undated) DEVICE — SUTURE PDS II SZ 0 L27IN ABSRB VLT L36MM CT-1 1/2 CIR Z340H

## (undated) DEVICE — SUTURE STRATAFIX SYMMETRIC SZ 1 L18IN ABSRB VLT CT1 L36CM SXPP1A404

## (undated) DEVICE — COVER,MAYO STAND,STERILE: Brand: MEDLINE